# Patient Record
Sex: FEMALE | Race: BLACK OR AFRICAN AMERICAN | NOT HISPANIC OR LATINO | Employment: FULL TIME | URBAN - METROPOLITAN AREA
[De-identification: names, ages, dates, MRNs, and addresses within clinical notes are randomized per-mention and may not be internally consistent; named-entity substitution may affect disease eponyms.]

---

## 2017-01-04 RX ORDER — LEVONORGESTREL / ETHINYL ESTRADIOL AND ETHINYL ESTRADIOL 150-30(84)
1 KIT ORAL DAILY
Qty: 28 EACH | Refills: 0 | Status: SHIPPED | OUTPATIENT
Start: 2017-01-04 | End: 2017-02-07

## 2017-01-09 ENCOUNTER — OFFICE VISIT (OUTPATIENT)
Dept: PULMONOLOGY | Facility: CLINIC | Age: 37
End: 2017-01-09
Payer: COMMERCIAL

## 2017-01-09 VITALS
BODY MASS INDEX: 48.82 KG/M2 | DIASTOLIC BLOOD PRESSURE: 100 MMHG | RESPIRATION RATE: 18 BRPM | HEIGHT: 65 IN | SYSTOLIC BLOOD PRESSURE: 136 MMHG | HEART RATE: 92 BPM | OXYGEN SATURATION: 99 % | WEIGHT: 293 LBS

## 2017-01-09 DIAGNOSIS — G47.33 OSA ON CPAP: Chronic | ICD-10-CM

## 2017-01-09 DIAGNOSIS — E66.01 MORBID OBESITY WITH BMI OF 70 AND OVER, ADULT: Primary | ICD-10-CM

## 2017-01-09 PROCEDURE — 99213 OFFICE O/P EST LOW 20 MIN: CPT | Mod: S$GLB,,, | Performed by: INTERNAL MEDICINE

## 2017-01-09 PROCEDURE — 99999 PR PBB SHADOW E&M-EST. PATIENT-LVL III: CPT | Mod: PBBFAC,,, | Performed by: INTERNAL MEDICINE

## 2017-01-09 NOTE — MR AVS SNAPSHOT
[facility] - Pulmonary Services  [address] [facility] [place] [address]  Phone: [phone]  Fax: [phone]                  Shaina Olson   2017 8:00 AM   Office Visit    Description:  Female : 1980   Provider:  Dakotah Aj MD   Department:  [facility] - Pulmonary Services           Reason for Visit     Sleep Apnea           Diagnoses this Visit        Comments    Morbid obesity with BMI of 70 and over, adult    -  Primary     TREVOR on CPAP                To Do List           Future Appointments        Provider Department Dept Phone    2017 8:15 AM Jose R Mariscal OD [facility] - Ophthalmology [phone]    2017 8:00 AM PRIMARY CARE NURSE, FILIBERTO Duran - Primary Care [phone]    2017 2:30 PM Homar Hart MD [place] - Endocrinology [phone]    3/6/2017 8:00 AM LINDA Perry - Orthopedics [phone]      Goals (5 Years of Data)     None      Follow-Up and Disposition     Return in about 1 year (around 2018) for weight loss and exercise, dowload.      [facility] On Call     [facility] On Call Nurse [facility] Line -  Assistance  Registered nurses in the [facility] On Call Center provide clinical advisement, health education, appointment booking, and other advisory services.  Call for this free service at [phone].             Medications           Message regarding Medications     Verify the changes and/or additions to your medication regime listed below are the same as discussed with your clinician today.  If any of these changes or additions are incorrect, please notify your healthcare provider.             Verify that the below list of medications is an accurate representation of the medications you are currently taking.  If none reported, the list may be blank. If incorrect, please contact your healthcare provider. Carry this list with you in case of emergency.           Current Medications     albuterol 90 mcg/actuation inhaler Inhale 2  "puffs into the lungs every 4 (four) hours as needed for Wheezing.    amlodipine (NORVASC) 5 MG tablet Take 1 tablet (5 mg total) by mouth once daily.    cyclobenzaprine (FLEXERIL) 10 MG tablet Take 1 tablet (10 mg total) by mouth 3 (three) times daily as needed for Muscle spasms.    duloxetine (CYMBALTA) 30 MG capsule TAKE 1 CAPSULE BY MOUTH ONCE DAILY    fluticasone (FLONASE) 50 mcg/actuation nasal spray 2 sprays by Each Nare route once daily.    hydrOXYzine HCl (ATARAX) 25 MG tablet Take 1 tablet (25 mg total) by mouth 3 (three) times daily as needed.    L norgest/e.estradiol-e.estrad (CAMRESE) 0.15 mg-30 mcg (84)/10 mcg (7) 3MPk Take 1 tablet by mouth once daily.    latanoprost 0.005 % ophthalmic solution Place 1 drop into both eyes every evening.    levocetirizine (XYZAL) 5 MG tablet Take 1 tablet (5 mg total) by mouth every evening.    meloxicam (MOBIC) 15 MG tablet Take 1 tablet (15 mg total) by mouth 2 (two) times daily.    methimazole (TAPAZOLE) 5 MG Tab Take 1 tablet (5 mg total) by mouth once daily. Take 1.5 tablets (7.5 mg total) by mouth once daily.    montelukast (SINGULAIR) 10 mg tablet Take 1 tablet (10 mg total) by mouth once daily.    pantoprazole (PROTONIX) 40 MG tablet Take 1 tablet (40 mg total) by mouth once daily.    propranolol (INDERAL) 60 MG tablet Take 1 tablet (60 mg total) by mouth 2 (two) times daily.    VITAMIN D2 50,000 unit capsule 50,000 Units every 7 days.            Clinical Reference Information           Vital Signs - Last Recorded  Most recent update: 1/9/2017  8:22 AM by Suhas Mon LPN    BP Pulse Resp Ht Wt SpO2    (!) 136/100 92 18 5' 4.5" (1.638 m) (!) 199.2 kg (439 lb 2.5 oz) 99%    BMI                74.22 kg/m2          Blood Pressure          Most Recent Value    BP  (!)  136/100      Allergies as of 1/9/2017     Demerol [Meperidine]      Immunizations Administered on Date of Encounter - 1/9/2017     None      Instructions    [place] [id number]   [facility] " Health [facility]  Everyone Everyone    Add to Wishlist    Install

## 2017-01-09 NOTE — PROGRESS NOTES
Subjective:       Shaina Olson is a 36 y.o. female    This a follow-up appointment since November 2016.    I ordered an auto Pap machine set at 4-20 cm water pressure  Patient's been using the machine well and benefits from it.    Chandler sleepiness score 9.  Bedtime is 10 PM onset 11 PM  Patient has a routine where she may play with her cell phone in bed and sometimes watch TV.  I reminded her that this activities resulting in the blue light which we will attenuate the melatonin effect on sleep onset.  Wakeup time is 7:15 AM.  Weight has increased from 430 pounds to 439 pounds.  We discussed using the Healthy wage  in behavioral technique for management modifying food choices and exercise   has CPAP usage is 63%.  Her AHI is 2.9.  The mean pressure was 7.8.  I like her to email me with her  weight change in the next 3 months       Previous Report(s) Reviewed: historical medical records, lab reports and office notes     The following portions of the patient's history were reviewed and updated as appropriate:   She  has a past medical history of Allergy; Anxiety (2004); Arthritis; Arthritis of right knee; Graves' disease (6/2014); Hypertension (2004); and Vitamin D deficiency.  She  does not have any pertinent problems on file.  She  has a past surgical history that includes Wrist fracture surgery (Right, 2013); TONSILLECTOMY, ADENOIDECTOMY (2001); Sinus surgery (2001); Nasal septum surgery (2001); and Fracture surgery (Right, 2013).  Her family history includes Cancer in her maternal grandfather, maternal grandmother, paternal grandfather, and paternal grandmother; Cancer (age of onset: 30) in her sister; Diabetes in her paternal grandmother; Diverticulosis in her mother; Hypertension in her mother; Lupus in her mother; Stroke in her paternal grandmother.  She  reports that she has never smoked. She has never used smokeless tobacco. She reports that she drinks alcohol. She reports that she does not use illicit  drugs.  She has a current medication list which includes the following prescription(s): albuterol, amlodipine, cyclobenzaprine, duloxetine, fluticasone, hydroxyzine hcl, l norgest/e.estradiol-e.estrad, latanoprost, levocetirizine, meloxicam, methimazole, montelukast, pantoprazole, propranolol, and vitamin d2.  Current Outpatient Prescriptions on File Prior to Visit   Medication Sig Dispense Refill    albuterol 90 mcg/actuation inhaler Inhale 2 puffs into the lungs every 4 (four) hours as needed for Wheezing. 18 g 3    amlodipine (NORVASC) 5 MG tablet Take 1 tablet (5 mg total) by mouth once daily. 90 tablet 1    cyclobenzaprine (FLEXERIL) 10 MG tablet Take 1 tablet (10 mg total) by mouth 3 (three) times daily as needed for Muscle spasms. 90 tablet 0    duloxetine (CYMBALTA) 30 MG capsule TAKE 1 CAPSULE BY MOUTH ONCE DAILY 30 capsule 3    fluticasone (FLONASE) 50 mcg/actuation nasal spray 2 sprays by Each Nare route once daily. 16 g 6    hydrOXYzine HCl (ATARAX) 25 MG tablet Take 1 tablet (25 mg total) by mouth 3 (three) times daily as needed. 30 tablet 1    L norgest/e.estradiol-e.estrad (CAMRESE) 0.15 mg-30 mcg (84)/10 mcg (7) 3MPk Take 1 tablet by mouth once daily. 28 each 0    latanoprost 0.005 % ophthalmic solution Place 1 drop into both eyes every evening. 1 Bottle 4    levocetirizine (XYZAL) 5 MG tablet Take 1 tablet (5 mg total) by mouth every evening. 90 tablet 3    meloxicam (MOBIC) 15 MG tablet Take 1 tablet (15 mg total) by mouth 2 (two) times daily. 180 tablet 0    methimazole (TAPAZOLE) 5 MG Tab Take 1 tablet (5 mg total) by mouth once daily. Take 1.5 tablets (7.5 mg total) by mouth once daily. 30 tablet 6    montelukast (SINGULAIR) 10 mg tablet Take 1 tablet (10 mg total) by mouth once daily. 90 tablet 1    pantoprazole (PROTONIX) 40 MG tablet Take 1 tablet (40 mg total) by mouth once daily. 30 tablet 11    propranolol (INDERAL) 60 MG tablet Take 1 tablet (60 mg total) by mouth 2 (two)  "times daily. 180 tablet 3    VITAMIN D2 50,000 unit capsule 50,000 Units every 7 days.        No current facility-administered medications on file prior to visit.      She is allergic to demerol [meperidine]..    Review of Systems  A comprehensive review of systems was negative.      Objective:        Visit Vitals    BP (!) 136/100    Pulse 92    Resp 18    Ht 5' 4.5" (1.638 m)    Wt (!) 199.2 kg (439 lb 2.5 oz)    SpO2 99%    BMI 74.22 kg/m2     General appearance: alert, appears stated age, cooperative, no distress and morbidly obese        DOWNLOAD    12/6/2016 - 1/4/2017  YOB: 1980  Mask:  Compliance Summary  12/6/2016 - 1/4/2017 (30 days)  Days with Device Usage 26 days  Days without Device Usage 4 days  Percent Days with Device Usage 86.7%  Cumulative Usage 6 days 3 hrs. 11 mins. 49 secs.  Maximum Usage (1 Day) 8 hrs. 23 mins. 51 secs.  Average Usage (All Days) 4 hrs. 54 mins. 23 secs.  Average Usage (Days Used) 5 hrs. 39 mins. 41 secs.  Minimum Usage (1 Day) 1 hrs. 18 mins. 50 secs.  Percent of Days with Usage >= 4 Hours 63.3%  Percent of Days with Usage < 4 Hours 36.7%  Date Range  Total Blower Time 7 days 4 hrs. 11 mins. 53 secs.  Average AHI 2.9  Auto CPAP Summary  Auto CPAP Mean Pressure 7.8 cmH2O  Auto CPAP Peak Average Pressure 16.3 cmH2O  Average Device Pressure <= 90% of Time 9.7 cmH2O  Average Time in Large Leak Per Day 46 secs.  Assessment:     Problem List Items Addressed This Visit     TREVOR on CPAP (Chronic)    Morbid obesity with BMI of 70 and over, adult - Primary           Plan:     Return in about 1 year (around 1/9/2018) for weight loss and exercise, dowload.    This note was prepared using voice recognition system and is likely to have sound alike errors that may have been overlooked even after proof reading.  Please call me with any questions    Discussed diagnosis, its evaluation, treatment and usual course. All questions answered.    Thank you for the courtesy of " participating in the care of this patient      Time spent: 20 minutes in face to face  discussion concerning diagnosis, prognosis, review of lab and test results, benefits of treatment as well as management of disease, counseling of patient and coordination of care between various health  care providers . Greater than half the time spent was used for coordination of care and counseling of patient.     Dakotah Aj    Pulmonary/Critical care/Sleepmedicine

## 2017-01-09 NOTE — PATIENT INSTRUCTIONS
[employer]   [address]   [employer] [facility] Health & Fitness  Everyone Everyone    Add to Wishlist    Install

## 2017-01-16 ENCOUNTER — OFFICE VISIT (OUTPATIENT)
Dept: OPHTHALMOLOGY | Facility: CLINIC | Age: 37
End: 2017-01-16
Payer: COMMERCIAL

## 2017-01-16 DIAGNOSIS — H40.003 GLAUCOMA SUSPECT OF BOTH EYES: Primary | ICD-10-CM

## 2017-01-16 DIAGNOSIS — E05.00 GRAVES' DISEASE: Chronic | ICD-10-CM

## 2017-01-16 PROCEDURE — 99999 PR PBB SHADOW E&M-EST. PATIENT-LVL I: CPT | Mod: PBBFAC,,, | Performed by: OPTOMETRIST

## 2017-01-16 PROCEDURE — 92012 INTRM OPH EXAM EST PATIENT: CPT | Mod: S$GLB,,, | Performed by: OPTOMETRIST

## 2017-01-16 NOTE — PROGRESS NOTES
HPI     Glaucoma Suspect    Additional comments: Latanoprost QHS OU           Comments   Pt states no VA change since her last appt. No other ocular symptoms.     Medication eye drops if any: Latanoprost QHS OU; pt is 100% compliant         Last edited by Atilio Santiago, Patient Care Assistant on 1/16/2017  8:13   AM. (History)            Assessment /Plan     For exam results, see Encounter Report.    Glaucoma suspect of both eyes    Graves' disease    IOP still elevated since starting latanoprost and not within acceptable range  Discussed adding med vs SLT laser  Continue latanoprost qhs OU  Refer to Dr Cruz for SLT consult, next available  Discussed above and all questions were answered.

## 2017-01-18 ENCOUNTER — PATIENT MESSAGE (OUTPATIENT)
Dept: ORTHOPEDICS | Facility: CLINIC | Age: 37
End: 2017-01-18

## 2017-01-23 ENCOUNTER — DOCUMENTATION ONLY (OUTPATIENT)
Dept: BARIATRICS | Facility: CLINIC | Age: 37
End: 2017-01-23

## 2017-01-26 ENCOUNTER — PATIENT MESSAGE (OUTPATIENT)
Dept: OTOLARYNGOLOGY | Facility: CLINIC | Age: 37
End: 2017-01-26

## 2017-01-26 ENCOUNTER — OFFICE VISIT (OUTPATIENT)
Dept: FAMILY MEDICINE | Facility: CLINIC | Age: 37
End: 2017-01-26
Payer: COMMERCIAL

## 2017-01-26 VITALS
HEART RATE: 116 BPM | WEIGHT: 293 LBS | DIASTOLIC BLOOD PRESSURE: 86 MMHG | BODY MASS INDEX: 72.71 KG/M2 | OXYGEN SATURATION: 96 % | SYSTOLIC BLOOD PRESSURE: 143 MMHG | TEMPERATURE: 98 F

## 2017-01-26 DIAGNOSIS — Z98.890 HISTORY OF SINUS SURGERY: ICD-10-CM

## 2017-01-26 DIAGNOSIS — J10.1 INFLUENZA A: Primary | ICD-10-CM

## 2017-01-26 DIAGNOSIS — J32.9 CHRONIC SINUSITIS, UNSPECIFIED LOCATION: Primary | ICD-10-CM

## 2017-01-26 PROCEDURE — 99999 PR PBB SHADOW E&M-EST. PATIENT-LVL III: CPT | Mod: PBBFAC,,, | Performed by: NURSE PRACTITIONER

## 2017-01-26 PROCEDURE — 99213 OFFICE O/P EST LOW 20 MIN: CPT | Mod: S$GLB,,, | Performed by: NURSE PRACTITIONER

## 2017-01-26 RX ORDER — OSELTAMIVIR PHOSPHATE 75 MG/1
75 CAPSULE ORAL 2 TIMES DAILY
Qty: 10 CAPSULE | Refills: 0 | Status: SHIPPED | OUTPATIENT
Start: 2017-01-26 | End: 2017-01-31

## 2017-01-26 NOTE — TELEPHONE ENCOUNTER
Spoke with patient and scheduled CT scan for 2/7/17 and follow up on 2/13/17. Patient also wanted to advise that she seen the provider at her office and she tested positive for flu.

## 2017-01-26 NOTE — PROGRESS NOTES
CC: No chief complaint on file.    HPI: This is a new problem.   Shaina Olson is a 37 y.o. female with a complaint of URI.  The current episode started in the past 2 days.   The problem has been gradually worsening.   Associated symptoms included fever, chills, nasal congestion, facial pain, cough, body aches.    Pertinent negatives include dyspnea, wheezing   Treatments tried: pt is currently taking zpak for sinusitis and prednisone taper  Has had flu vaccine     [unfilled]  Outpatient Medications Prior to Visit   Medication Sig Dispense Refill    albuterol 90 mcg/actuation inhaler Inhale 2 puffs into the lungs every 4 (four) hours as needed for Wheezing. 18 g 3    amlodipine (NORVASC) 5 MG tablet Take 1 tablet (5 mg total) by mouth once daily. 90 tablet 1    cyclobenzaprine (FLEXERIL) 10 MG tablet Take 1 tablet (10 mg total) by mouth 3 (three) times daily as needed for Muscle spasms. 90 tablet 0    duloxetine (CYMBALTA) 30 MG capsule TAKE 1 CAPSULE BY MOUTH ONCE DAILY 30 capsule 3    fluticasone (FLONASE) 50 mcg/actuation nasal spray 2 sprays by Each Nare route once daily. 16 g 6    hydrOXYzine HCl (ATARAX) 25 MG tablet Take 1 tablet (25 mg total) by mouth 3 (three) times daily as needed. 30 tablet 1    L norgest/e.estradiol-e.estrad (CAMRESE) 0.15 mg-30 mcg (84)/10 mcg (7) 3MPk Take 1 tablet by mouth once daily. 28 each 0    latanoprost 0.005 % ophthalmic solution Place 1 drop into both eyes every evening. 1 Bottle 4    levocetirizine (XYZAL) 5 MG tablet Take 1 tablet (5 mg total) by mouth every evening. 90 tablet 3    meloxicam (MOBIC) 15 MG tablet Take 1 tablet (15 mg total) by mouth 2 (two) times daily. 180 tablet 0    methimazole (TAPAZOLE) 5 MG Tab Take 1 tablet (5 mg total) by mouth once daily. Take 1.5 tablets (7.5 mg total) by mouth once daily. 30 tablet 6    montelukast (SINGULAIR) 10 mg tablet Take 1 tablet (10 mg total) by mouth once daily. 90 tablet 1    oseltamivir (TAMIFLU) 75 MG  capsule Take 1 capsule (75 mg total) by mouth 2 (two) times daily. 10 capsule 0    pantoprazole (PROTONIX) 40 MG tablet Take 1 tablet (40 mg total) by mouth once daily. 30 tablet 11    propranolol (INDERAL) 60 MG tablet Take 1 tablet (60 mg total) by mouth 2 (two) times daily. 180 tablet 3    VITAMIN D2 50,000 unit capsule 50,000 Units every 7 days.        No facility-administered medications prior to visit.         Physical Exam   Visit Vitals    BP (!) 143/86 (BP Location: Right arm, Patient Position: Sitting, BP Method: Automatic)    Pulse (!) 116    Temp 98.1 °F (36.7 °C) (Tympanic)    Wt (!) 195.1 kg (430 lb 3.6 oz)    SpO2 96%    BMI 72.71 kg/m2     Constitutional: The patient appears well-developed and well-nourished.   Head: Normocephalic and atraumatic.   Right Ear: Tympanic membrane and ear canal normal. No drainage, swelling or tenderness. Tympanic membrane is not injected, not erythematous and not bulging.   Left Ear: Ear canal normal. No drainage, swelling or tenderness. Tympanic membrane is not injected, not erythematous and not bulging.   Nose: Mucosal edema and rhinorrhea present. Bilateral frontal and maxillary sinus tenderness on palpation  Mouth/Throat: Uvula is midline. Posterior oropharyngeal erythema present. No oropharyngeal exudate.        THE MUCOSA IS BOGGY AND ERYTHEMATOUS.     Eyes: Conjunctivae normal and lids are normal. Pupils are equal, round, and reactive to light. Right eye exhibits no discharge. Left eye exhibits no discharge. Right eye exhibits normal extraocular motion. Left eye exhibits normal extraocular motion.   Neck: Trachea normal and normal range of motion. Neck supple. No tracheal tenderness present. No mass and no thyromegaly present.   Cardiovascular: Normal rate, regular rhythm, S1 normal, S2 normal and normal heart sounds.  Exam reveals no gallop, no S3, no S4 and no friction rub.    No murmur heard.  Pulmonary/Chest: Effort normal and breath sounds normal.  No stridor. Not tachypneic. No respiratory distress. The patient has no wheezes. The patient has no rhonchi. The patient has no rales.   Skin: The patient is not diaphoretic.     Encounter Diagnosis   Name Primary?    Influenza A Yes       PLAN:    Diagnoses and all orders for this visit:    Influenza A  -     POCT Influenza A/B  -positive influenza A.  Tamiflu 75mg bid sent to the pharmacy       Orders Placed This Encounter   Procedures    POCT Influenza A/B     RTC if symptoms are worsening or changing significantly or if not improved by the end of therapy.

## 2017-01-29 DIAGNOSIS — I10 HYPERTENSION GOAL BP (BLOOD PRESSURE) < 140/90: ICD-10-CM

## 2017-01-30 ENCOUNTER — PATIENT MESSAGE (OUTPATIENT)
Dept: OTOLARYNGOLOGY | Facility: CLINIC | Age: 37
End: 2017-01-30

## 2017-01-30 RX ORDER — AMLODIPINE BESYLATE 5 MG/1
TABLET ORAL
Qty: 90 TABLET | Refills: 1 | Status: SHIPPED | OUTPATIENT
Start: 2017-01-30 | End: 2017-06-02 | Stop reason: SDUPTHER

## 2017-02-06 ENCOUNTER — OFFICE VISIT (OUTPATIENT)
Dept: OPHTHALMOLOGY | Facility: CLINIC | Age: 37
End: 2017-02-06
Payer: COMMERCIAL

## 2017-02-06 DIAGNOSIS — H40.003 GLAUCOMA SUSPECT OF BOTH EYES: Primary | ICD-10-CM

## 2017-02-06 DIAGNOSIS — E05.00 GRAVES' DISEASE: ICD-10-CM

## 2017-02-06 PROCEDURE — 92014 COMPRE OPH EXAM EST PT 1/>: CPT | Mod: S$GLB,,, | Performed by: OPHTHALMOLOGY

## 2017-02-06 PROCEDURE — 92020 GONIOSCOPY: CPT | Mod: S$GLB,,, | Performed by: OPHTHALMOLOGY

## 2017-02-06 PROCEDURE — 99999 PR PBB SHADOW E&M-EST. PATIENT-LVL II: CPT | Mod: PBBFAC,,, | Performed by: OPHTHALMOLOGY

## 2017-02-06 NOTE — PROGRESS NOTES
HPI     Pt is here for evaluation for possible SLT.  She has been on6-8   weeks, and it hasn't brought the pressure down.  Had HVF and SDPs last   year.    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         Assessment /Plan     For exam results, see Encounter Report.      ICD-10-CM ICD-9-CM    1. Glaucoma suspect of both eyes H40.003 365.00 Wait on laser for now  Pt is straining some in slit lamp, that is raising  IOP on exam- confirmed this with multiple tonopen readings   Stop meds for now and check IOP with tonopen  Pt also has thick CCT  Gonio Done today    2. Graves' disease E05.00 242.00 Liz done today, appears stable        Stop meds for now - dee IOP off meds with tonopen     RETURN TO CLINIC 4 weeks with Dr. Cruz for IOP check with Cornelius pen and slit lamp

## 2017-02-07 ENCOUNTER — OFFICE VISIT (OUTPATIENT)
Dept: FAMILY MEDICINE | Facility: CLINIC | Age: 37
End: 2017-02-07
Payer: COMMERCIAL

## 2017-02-07 VITALS
DIASTOLIC BLOOD PRESSURE: 71 MMHG | SYSTOLIC BLOOD PRESSURE: 151 MMHG | TEMPERATURE: 98 F | BODY MASS INDEX: 48.82 KG/M2 | WEIGHT: 293 LBS | OXYGEN SATURATION: 98 % | HEART RATE: 94 BPM | HEIGHT: 65 IN

## 2017-02-07 DIAGNOSIS — E55.9 VITAMIN D DEFICIENCY: ICD-10-CM

## 2017-02-07 DIAGNOSIS — R25.2 CRAMPS OF LOWER EXTREMITY, UNSPECIFIED LATERALITY: Primary | ICD-10-CM

## 2017-02-07 DIAGNOSIS — E66.01 MORBID OBESITY WITH BMI OF 70 AND OVER, ADULT: ICD-10-CM

## 2017-02-07 DIAGNOSIS — I10 ESSENTIAL HYPERTENSION: ICD-10-CM

## 2017-02-07 DIAGNOSIS — G44.019 EPISODIC CLUSTER HEADACHE, NOT INTRACTABLE: ICD-10-CM

## 2017-02-07 PROCEDURE — 99999 PR PBB SHADOW E&M-EST. PATIENT-LVL III: CPT | Mod: PBBFAC,,, | Performed by: FAMILY MEDICINE

## 2017-02-07 PROCEDURE — 99213 OFFICE O/P EST LOW 20 MIN: CPT | Mod: S$GLB,,, | Performed by: FAMILY MEDICINE

## 2017-02-07 RX ORDER — CYCLOBENZAPRINE HCL 10 MG
10 TABLET ORAL 3 TIMES DAILY PRN
Qty: 90 TABLET | Refills: 0 | Status: SHIPPED | OUTPATIENT
Start: 2017-02-07 | End: 2017-08-02 | Stop reason: SDUPTHER

## 2017-02-07 RX ORDER — AMLODIPINE BESYLATE 2.5 MG/1
TABLET ORAL
Qty: 30 TABLET | Refills: 0 | Status: SHIPPED | OUTPATIENT
Start: 2017-02-07 | End: 2017-03-10 | Stop reason: SDUPTHER

## 2017-02-07 RX ORDER — TOPIRAMATE 25 MG/1
25 TABLET ORAL 2 TIMES DAILY
Qty: 60 TABLET | Refills: 0 | Status: SHIPPED | OUTPATIENT
Start: 2017-02-07 | End: 2017-10-02

## 2017-02-08 ENCOUNTER — LAB VISIT (OUTPATIENT)
Dept: LAB | Facility: HOSPITAL | Age: 37
End: 2017-02-08
Attending: FAMILY MEDICINE
Payer: COMMERCIAL

## 2017-02-08 DIAGNOSIS — R25.2 CRAMPS OF LOWER EXTREMITY, UNSPECIFIED LATERALITY: ICD-10-CM

## 2017-02-08 DIAGNOSIS — E55.9 VITAMIN D DEFICIENCY: ICD-10-CM

## 2017-02-08 LAB
25(OH)D3+25(OH)D2 SERPL-MCNC: 14 NG/ML
ALBUMIN SERPL BCP-MCNC: 3.5 G/DL
ALP SERPL-CCNC: 97 U/L
ALT SERPL W/O P-5'-P-CCNC: 24 U/L
ANION GAP SERPL CALC-SCNC: 8 MMOL/L
AST SERPL-CCNC: 18 U/L
BILIRUB SERPL-MCNC: 0.6 MG/DL
BUN SERPL-MCNC: 14 MG/DL
CALCIUM SERPL-MCNC: 9.5 MG/DL
CHLORIDE SERPL-SCNC: 107 MMOL/L
CO2 SERPL-SCNC: 25 MMOL/L
CREAT SERPL-MCNC: 0.9 MG/DL
EST. GFR  (AFRICAN AMERICAN): >60 ML/MIN/1.73 M^2
EST. GFR  (NON AFRICAN AMERICAN): >60 ML/MIN/1.73 M^2
GLUCOSE SERPL-MCNC: 97 MG/DL
MAGNESIUM SERPL-MCNC: 1.9 MG/DL
POTASSIUM SERPL-SCNC: 4.3 MMOL/L
PROT SERPL-MCNC: 7.5 G/DL
SODIUM SERPL-SCNC: 140 MMOL/L
TSH SERPL DL<=0.005 MIU/L-ACNC: 0.93 UIU/ML

## 2017-02-08 PROCEDURE — 82306 VITAMIN D 25 HYDROXY: CPT

## 2017-02-08 PROCEDURE — 36415 COLL VENOUS BLD VENIPUNCTURE: CPT | Mod: PO

## 2017-02-08 PROCEDURE — 83735 ASSAY OF MAGNESIUM: CPT

## 2017-02-08 PROCEDURE — 80053 COMPREHEN METABOLIC PANEL: CPT

## 2017-02-08 PROCEDURE — 84443 ASSAY THYROID STIM HORMONE: CPT

## 2017-02-09 ENCOUNTER — PATIENT MESSAGE (OUTPATIENT)
Dept: FAMILY MEDICINE | Facility: CLINIC | Age: 37
End: 2017-02-09

## 2017-02-10 ENCOUNTER — TELEPHONE (OUTPATIENT)
Dept: INTERNAL MEDICINE | Facility: CLINIC | Age: 37
End: 2017-02-10

## 2017-02-10 RX ORDER — VIT C/E/ZN/COPPR/LUTEIN/ZEAXAN 250MG-90MG
CAPSULE ORAL
Qty: 60 CAPSULE | Refills: 0 | Status: SHIPPED | OUTPATIENT
Start: 2017-02-10 | End: 2017-03-13

## 2017-02-20 DIAGNOSIS — M79.10 MYALGIA: ICD-10-CM

## 2017-02-20 DIAGNOSIS — F41.9 ANXIETY: ICD-10-CM

## 2017-02-20 RX ORDER — DULOXETIN HYDROCHLORIDE 30 MG/1
30 CAPSULE, DELAYED RELEASE ORAL DAILY
Qty: 30 CAPSULE | Refills: 3 | Status: SHIPPED | OUTPATIENT
Start: 2017-02-20 | End: 2017-06-12 | Stop reason: SDUPTHER

## 2017-02-20 NOTE — TELEPHONE ENCOUNTER
Patient requesting refill of cymbalta, would like med sent to [facility] [address] in [place]. Last office visit 2/7/17

## 2017-02-24 ENCOUNTER — LAB VISIT (OUTPATIENT)
Dept: LAB | Facility: HOSPITAL | Age: 37
End: 2017-02-24
Attending: INTERNAL MEDICINE
Payer: COMMERCIAL

## 2017-02-24 DIAGNOSIS — E05.00 GRAVES' DISEASE: Chronic | ICD-10-CM

## 2017-02-24 DIAGNOSIS — R00.2 PALPITATIONS: ICD-10-CM

## 2017-02-24 DIAGNOSIS — E66.01 MORBID OBESITY WITH BMI OF 70 AND OVER, ADULT: ICD-10-CM

## 2017-02-24 LAB
T4 FREE SERPL-MCNC: 0.98 NG/DL
TSH SERPL DL<=0.005 MIU/L-ACNC: 1.04 UIU/ML

## 2017-02-24 PROCEDURE — 36415 COLL VENOUS BLD VENIPUNCTURE: CPT | Mod: PO

## 2017-02-24 PROCEDURE — 84443 ASSAY THYROID STIM HORMONE: CPT

## 2017-02-24 PROCEDURE — 84439 ASSAY OF FREE THYROXINE: CPT

## 2017-02-25 ENCOUNTER — PATIENT MESSAGE (OUTPATIENT)
Dept: OPHTHALMOLOGY | Facility: CLINIC | Age: 37
End: 2017-02-25

## 2017-02-27 ENCOUNTER — OFFICE VISIT (OUTPATIENT)
Dept: ENDOCRINOLOGY | Facility: CLINIC | Age: 37
End: 2017-02-27
Payer: COMMERCIAL

## 2017-02-27 VITALS
HEIGHT: 65 IN | BODY MASS INDEX: 48.82 KG/M2 | HEART RATE: 80 BPM | WEIGHT: 293 LBS | SYSTOLIC BLOOD PRESSURE: 120 MMHG | DIASTOLIC BLOOD PRESSURE: 84 MMHG

## 2017-02-27 DIAGNOSIS — I10 ESSENTIAL HYPERTENSION: Chronic | ICD-10-CM

## 2017-02-27 DIAGNOSIS — E66.01 MORBID OBESITY WITH BMI OF 70 AND OVER, ADULT: ICD-10-CM

## 2017-02-27 DIAGNOSIS — E05.00 GRAVES' DISEASE: Primary | Chronic | ICD-10-CM

## 2017-02-27 DIAGNOSIS — E55.9 VITAMIN D DEFICIENCY: ICD-10-CM

## 2017-02-27 PROCEDURE — 99999 PR PBB SHADOW E&M-EST. PATIENT-LVL III: CPT | Mod: PBBFAC,,, | Performed by: INTERNAL MEDICINE

## 2017-02-27 PROCEDURE — 99214 OFFICE O/P EST MOD 30 MIN: CPT | Mod: S$GLB,,, | Performed by: INTERNAL MEDICINE

## 2017-02-27 RX ORDER — ERGOCALCIFEROL 1.25 MG/1
100000 CAPSULE ORAL WEEKLY
Qty: 24 CAPSULE | Refills: 2 | Status: SHIPPED | OUTPATIENT
Start: 2017-02-27 | End: 2017-05-16

## 2017-02-27 NOTE — MR AVS SNAPSHOT
[facility]  [address] [facility] [address] [place] [address]  Phone: [phone]  Fax: [phone]                  Shaina Olson   2017 2:30 PM   Office Visit    Description:  Female : 1980   Provider:  Homar Hart MD   Department:  [facility]           Reason for Visit     Graves' Disease           Diagnoses this Visit        Comments    Graves' disease    -  Primary     Essential hypertension         Vitamin D deficiency         Morbid obesity with BMI of 70 and over, adult                To Do List           Future Appointments        Provider Department Dept Phone    3/6/2017 8:00 AM LINDA Perry - Orthopedics [phone]    3/9/2017 5:45 PM Jose R Mariscal OD [place] - Ophthalmology [phone]    3/13/2017 8:00 AM [facility] CT1 LIM[address] [facility] -  [phone]    3/20/2017 7:30 AM MD Ghanshyam Arizmendi - Otohinolaryngology [phone]    4/10/2017 9:40 AM LABORATORY, [place] [facility] (5 Years of Data)     None       These Medications        Disp Refills Start End    ergocalciferol (ERGOCALCIFEROL) 50,000 unit Cap 24 capsule 2 2017    Take 2 capsules (100,000 Units total) by mouth once a week. - Oral    Pharmacy: [facility]/pharm[facility] #[id number] - [place]  [address] AT [facility] [place] Ph #: [phone]         [facility] On Call     [facility] On Call Nurse Care Line -  Assistance  Registered nurses in the [facility] On Call Center provide clinical advisement, health education, appointment booking, and other advisory services.  Call for this free service at [phone].             Medications           Message regarding Medications     Verify the changes and/or additions to your medication regime listed below are the same as discussed with your clinician today.  If any of these changes or additions are incorrect, please notify your  healthcare provider.        START taking these NEW medications        Refills    ergocalciferol (ERGOCALCIFEROL) 50,000 unit Cap 2    Sig: Take 2 capsules (100,000 Units total) by mouth once a week.    Class: Normal    Route: Oral           Verify that the below list of medications is an accurate representation of the medications you are currently taking.  If none reported, the list may be blank. If incorrect, please contact your healthcare provider. Carry this list with you in case of emergency.           Current Medications     albuterol 90 mcg/actuation inhaler Inhale 2 puffs into the lungs every 4 (four) hours as needed for Wheezing.    amlodipine (NORVASC) 2.5 MG tablet 1 tab po qd for a total of 7.5 mg qd    amlodipine (NORVASC) 5 MG tablet TAKE 1 TABLET BY MOUTH EVERY DAY    cholecalciferol, vitamin D3, 1,000 unit capsule 2 caps po qd    cyclobenzaprine (FLEXERIL) 10 MG tablet Take 1 tablet (10 mg total) by mouth 3 (three) times daily as needed for Muscle spasms.    duloxetine (CYMBALTA) 30 MG capsule Take 1 capsule (30 mg total) by mouth once daily.    ergocalciferol (ERGOCALCIFEROL) 50,000 unit Cap Take 2 capsules (100,000 Units total) by mouth once a week.    fluticasone (FLONASE) 50 mcg/actuation nasal spray 2 sprays by Each Nare route once daily.    hydrOXYzine HCl (ATARAX) 25 MG tablet Take 1 tablet (25 mg total) by mouth 3 (three) times daily as needed.    levocetirizine (XYZAL) 5 MG tablet Take 1 tablet (5 mg total) by mouth every evening.    meloxicam (MOBIC) 15 MG tablet Take 1 tablet (15 mg total) by mouth 2 (two) times daily.    methimazole (TAPAZOLE) 5 MG Tab Take 1 tablet (5 mg total) by mouth once daily. Take 1.5 tablets (7.5 mg total) by mouth once daily.    montelukast (SINGULAIR) 10 mg tablet Take 1 tablet (10 mg total) by mouth once daily.    pantoprazole (PROTONIX) 40 MG tablet Take 1 tablet (40 mg total) by mouth once daily.    topiramate (TOPAMAX) 25 MG tablet Take 1 tablet (25 mg total)  by mouth 2 (two) times daily.           Clinical Reference Information           Your Vitals Were     BP                   120/84 (BP Method: Manual)           Blood Pressure          Most Recent Value    BP  120/84      Allergies as of 2/27/2017     Demerol [Meperidine]      Immunizations Administered on Date of Encounter - 2/27/2017     None      Orders Placed During Today's Visit     Future Labs/Procedures Expected by Expires    T3, FREE  2/27/2017 4/28/2018    T3, FREE  2/27/2017 4/28/2018    T4, free  2/27/2017 4/28/2018    T4, free  2/27/2017 4/28/2018    TSH  2/27/2017 4/28/2018    TSH  2/27/2017 4/28/2018      Language Assistance Services     ATTENTION: Language assistance services are available, free of charge. Please call [phone].      ATENCIÓN: Si habla verenaañol, tiene a erickson disposición servicios gratuitos de asistencia lingüística. Llame al [phone].     CHÚ Ý: N?u b?n nói Ti?ng Vi?t, có các d?ch v? h? tr? ngôn ng? mi?n phí dành cho b?n. G?i s? [phone].         [facility] complies with applicable Federal civil rights laws and does not discriminate on the basis of race, color, national origin, age, disability, or sex.

## 2017-02-27 NOTE — PROGRESS NOTES
Subjective:      Patient ID: Shaina Olsno is a 37 y.o. female.    Chief Complaint:  Graves' Disease      History of Present Illness      Diagnosed with Graves disease in summer 2014.   Found to have graves disease, was on PTU for few weeks then switched to MMI,  She was told by her old doctor she can not be on MMI for more than 18-24 months. But we went over and she does not have any side effects form MMI.   Was on MMI 40 mg BID now tapered down to 5 mg daily current dose      Denies change in size of neck, difficulty swallowing, shortness of breath in the recumbent position, pain or pressure around the neck.  + Hoarseness  - getting better with steroids and Augmentin - she is a ray. - nodule on vocal cord and on speech therapy     Denies rashes   No infection   No fever   LRTs     Patient denies history of radiation to the head or neck. Denies history of colon or breast cancer. Denies family history of thyroid cancer or thyroid disease.        Not planning to get pregnant     Interval HPI:  Planning to get gastric sleeve   evaluated by sleep study and also cardiology   Off of propranolol     Takes MMI 5 mg daily    Review of Systems   Constitutional: Positive for fatigue. Negative for unexpected weight change.   HENT: Positive for congestion.    Eyes: Negative for visual disturbance.   Respiratory: Negative for shortness of breath.    Cardiovascular: Negative for chest pain.   Gastrointestinal: Negative for abdominal pain, constipation, diarrhea and vomiting.   Endocrine: Positive for heat intolerance.   Genitourinary: Negative for menstrual problem (on OCPs).   Musculoskeletal: Negative for arthralgias.   Skin: Negative for wound.   Neurological: Negative for headaches.   Hematological: Does not bruise/bleed easily.   Psychiatric/Behavioral: Negative for sleep disturbance.        Takes lexapro   All other systems reviewed and are negative.      Objective:   Physical Exam   Skin:   Insulin resistance    "    Vitals - 1 value per visit 2/27/2017   SYSTOLIC 120   DIASTOLIC 84   PULSE 80   TEMPERATURE    RESPIRATIONS    SPO2    Weight (lb) 433.2   HEIGHT 5' 4.5"   BODY MASS INDEX 73.21       Vitals - 1 value per visit 6/30/2016 7/12/2016 7/18/2016 7/20/2016   Weight (lb) 431 430.78 431.66 429.68     Vitals - 1 value per visit 8/22/2016 8/29/2016 9/9/2016 9/16/2016   Weight (lb) 433.42 429.24  426.37     Vitals - 1 value per visit 10/17/2016 10/24/2016 10/26/2016   Weight (lb) 421.52 423.28 423.72     Vitals - 1 value per visit 11/7/2016 11/14/2016 12/5/2016 12/19/2016   Weight (lb) 430.56 438.72 438.72 435.41     Vitals - 1 value per visit 1/9/2017 1/26/2017 2/7/2017 2/27/2017   Weight (lb) 439.16 430.23 427.69 433.2       Lab Review:   Results for orders placed or performed in visit on 02/24/17   TSH   Result Value Ref Range    TSH 1.041 0.400 - 4.000 uIU/mL   T4, free   Result Value Ref Range    Free T4 0.98 0.71 - 1.51 ng/dL     Results for TULIO COLLAZO (MRN [id number]) as of 2/27/2017 14:52   Ref. Range 4/20/2016 15:19 2/8/2017 16:05   Vit D, 25-Hydroxy Latest Ref Range: 30 - 96 ng/mL 10 (L) 14 (L)   Results for TULIO COLLAZO (MRN [id number]) as of 2/27/2017 15:11   Ref. Range 7/21/2016 08:12   Cortisol -8 AM Latest Ref Range: 4.30 - 22.40 ug/dL <1.0 (L)   ACTH Latest Ref Range: 0 - 46 pg/mL 12       Assessment:     # Graves disease     - Check TSH, Free T3, Free T4, CMP and CBC in 6 weeks   - Clinically and bio chemically euthyroid      - continue  MMI to 5 mg daily, repeat TFTs in 6 weeks and in 6 months     # Morbid Obesity Body mass index is 73.21 kg/(m^2).  - DST - normal   - Bariatric surgery advised      #vitamin D deficiency   - restart ergo  100,000 units once a week likely will require higher doses after gastric surgery         Plan:     Follow up:    TSH, FT4,FT3 in  6 weeks   TSH,FT4,FT3 in 6 months f/u in 6 months     "

## 2017-03-06 ENCOUNTER — TELEPHONE (OUTPATIENT)
Dept: FAMILY MEDICINE | Facility: CLINIC | Age: 37
End: 2017-03-06

## 2017-03-06 RX ORDER — PREDNISONE 20 MG/1
TABLET ORAL
Qty: 20 TABLET | Refills: 0 | Status: SHIPPED | OUTPATIENT
Start: 2017-03-06 | End: 2017-05-08

## 2017-03-06 RX ORDER — AMOXICILLIN AND CLAVULANATE POTASSIUM 875; 125 MG/1; MG/1
1 TABLET, FILM COATED ORAL 2 TIMES DAILY
Qty: 20 TABLET | Refills: 0 | Status: SHIPPED | OUTPATIENT
Start: 2017-03-06 | End: 2017-03-16

## 2017-03-06 NOTE — TELEPHONE ENCOUNTER
Returned call. Spoke with pharmacist. Verified prescription strength. Pharmacist verbalized understanding.

## 2017-03-06 NOTE — TELEPHONE ENCOUNTER
----- Message from Carolyn Roque sent at 3/6/2017  2:33 PM CST -----   [facility] [place] at [phone]//states is needing to check on the dosage of med sent in//med is Prednisone/please call//thanks/[facility]

## 2017-03-07 ENCOUNTER — OFFICE VISIT (OUTPATIENT)
Dept: OPHTHALMOLOGY | Facility: CLINIC | Age: 37
End: 2017-03-07
Payer: COMMERCIAL

## 2017-03-07 DIAGNOSIS — H40.003 GLAUCOMA SUSPECT OF BOTH EYES: Primary | ICD-10-CM

## 2017-03-07 DIAGNOSIS — E05.00 GRAVES' DISEASE: ICD-10-CM

## 2017-03-07 PROCEDURE — 92012 INTRM OPH EXAM EST PATIENT: CPT | Mod: S$GLB,,, | Performed by: OPTOMETRIST

## 2017-03-07 PROCEDURE — 99999 PR PBB SHADOW E&M-EST. PATIENT-LVL I: CPT | Mod: PBBFAC,,, | Performed by: OPTOMETRIST

## 2017-03-07 NOTE — PROGRESS NOTES
HPI     Glaucoma Suspect    Additional comments: iop ck with tonopen and slit lamp           Comments   Last seen by mgm 2/6/17. Last seen by dnl 1/16/17. Here for iop ck with   tonopen and slit lamp. Pt would like PD measured as well. Dr yanez took   her off of latanoprost.        Last edited by Louann Moseley MA on 3/7/2017  8:24 AM. (History)            Assessment /Plan     For exam results, see Encounter Report.    Glaucoma suspect of both eyes    Graves' disease      IOP elevated today since stopping latanoprost  Checked IOP with tonopen  Recommended resuming latanoprost qhs OU  Monitor 4 months    RTC 4 months for IOP check or PRN   Discussed above and all questions were answered.

## 2017-03-10 RX ORDER — AMLODIPINE BESYLATE 2.5 MG/1
TABLET ORAL
Qty: 30 TABLET | Refills: 0 | Status: SHIPPED | OUTPATIENT
Start: 2017-03-10 | End: 2017-04-12 | Stop reason: SDUPTHER

## 2017-03-13 ENCOUNTER — OFFICE VISIT (OUTPATIENT)
Dept: ORTHOPEDICS | Facility: CLINIC | Age: 37
End: 2017-03-13
Payer: COMMERCIAL

## 2017-03-13 ENCOUNTER — HOSPITAL ENCOUNTER (OUTPATIENT)
Dept: RADIOLOGY | Facility: HOSPITAL | Age: 37
Discharge: HOME | End: 2017-03-13
Attending: ORTHOPAEDIC SURGERY
Payer: COMMERCIAL

## 2017-03-13 VITALS
BODY MASS INDEX: 50.02 KG/M2 | HEIGHT: 64 IN | SYSTOLIC BLOOD PRESSURE: 130 MMHG | HEART RATE: 89 BPM | DIASTOLIC BLOOD PRESSURE: 82 MMHG | WEIGHT: 293 LBS

## 2017-03-13 DIAGNOSIS — M17.11 ARTHRITIS OF RIGHT KNEE: ICD-10-CM

## 2017-03-13 DIAGNOSIS — Z98.890 HISTORY OF SINUS SURGERY: ICD-10-CM

## 2017-03-13 DIAGNOSIS — J32.9 CHRONIC SINUSITIS, UNSPECIFIED LOCATION: ICD-10-CM

## 2017-03-13 DIAGNOSIS — M22.2X1 PATELLOFEMORAL STRESS SYNDROME OF RIGHT KNEE: Primary | ICD-10-CM

## 2017-03-13 DIAGNOSIS — E66.01 MORBID OBESITY WITH BMI OF 70 AND OVER, ADULT: ICD-10-CM

## 2017-03-13 PROCEDURE — 99999 PR PBB SHADOW E&M-EST. PATIENT-LVL III: CPT | Mod: PBBFAC,,, | Performed by: PHYSICIAN ASSISTANT

## 2017-03-13 PROCEDURE — 99214 OFFICE O/P EST MOD 30 MIN: CPT | Mod: S$GLB,,, | Performed by: PHYSICIAN ASSISTANT

## 2017-03-13 PROCEDURE — 70486 CT MAXILLOFACIAL W/O DYE: CPT | Mod: TC

## 2017-03-13 NOTE — PROGRESS NOTES
Subjective:      Patient ID: Shaina Olson is a 37 y.o. female.    Chief Complaint: Pain of the Right Knee    HPI Comments: Body part: Right Knee    Occupation: LPN/ [employer]    Dominant hand: Right    Referred by: Sarah Smart, *    Date of Injury: None    Patient's visit goal: FU Right Knee    Problem Description: Right Knee Pain; noticed pain after doing [place].  She is well established to our orthopedic department and has had x-rays as well as MRI of the right knee.  She is a new patient to me.  She has daily pain is worse with activity.  Pain is primarily in the anterior aspect of the knees.  She does not notice any swelling.  She occasionally has feelings of giving way, but no discrete locking or catching.  She does take mobic on a daily basis which helps.  She is under evaluation and workup for bariatric surgery.          Pain   The current episode started more than 1 month ago (pain since October 2016). The problem occurs intermittently. The problem has been unchanged. Pertinent negatives include no abdominal pain, chest pain, chills, congestion, coughing, fever, joint swelling, nausea, numbness, rash or vomiting. Exacerbated by: activity. She has tried NSAIDs and ice for the symptoms. The treatment provided significant relief.       Review of Systems   Constitution: Negative for chills, fever and weight loss.   HENT: Negative for congestion and hearing loss.    Eyes: Negative for double vision and pain.   Cardiovascular: Negative for chest pain and irregular heartbeat.   Respiratory: Negative for cough and shortness of breath.    Endocrine: Negative for polyuria.   Hematologic/Lymphatic: Does not bruise/bleed easily.   Skin: Negative for poor wound healing, rash and suspicious lesions.   Musculoskeletal: Positive for arthritis and joint pain. Negative for joint swelling.   Gastrointestinal: Negative for abdominal pain, nausea and vomiting.   Genitourinary: Negative for bladder incontinence and  frequency.   Neurological: Negative for loss of balance, numbness, paresthesias, sensory change and tremors.   Psychiatric/Behavioral: Negative for depression. The patient is nervous/anxious.    Allergic/Immunologic: Negative for hives.         Objective:            General    Nursing note and vitals reviewed.  Constitutional: She is oriented to person, place, and time. She appears well-developed and well-nourished. No distress.   Neurological: She is alert and oriented to person, place, and time.   Psychiatric: She has a normal mood and affect. Her behavior is normal. Judgment and thought content normal.           Right Knee Exam     Inspection   Scars: absent  Swelling: absent  Effusion: effusion    Tenderness   The patient is tender to palpation of the medial joint line and patella.    Range of Motion   The patient has normal right knee ROM.    Tests   Ligament Examination   MCL - Valgus: normal (0 to 2mm)  LCL - Varus: normal  Patella   Patellar Grind: positive    Other   Sensation: normal    Comments:  She has full extension and skin to skin flexion bilaterally.    Left Knee Exam     Inspection   Scars: absent  Swelling: absent  Effusion: absent    Tenderness   The patient tender to palpation of the patella.    Range of Motion   The patient has normal left knee ROM.    Tests   Stability   MCL - Valgus: normal (0 to 2mm)  LCL - Varus: normal (0 to 2mm)    Other   Sensation: normal    Muscle Strength   Right Lower Extremity   Hip Abduction: 4/5 and 5/5   Quadriceps:  4/5   Hamstrin/5 and 5/5   Left Lower Extremity   Hip Abduction: 5/5   Quadriceps:  4/5   Hamstrin/5 and 5/5       I have reviewed the films and report. I agree with the radiologist interpretation of the radiographic findings:  There is mild joint space narrowing and mild marginal osteophyte formation involving the medial compartment of either knee.  No acute fracture or dislocation.  No joint effusion suggested.        Assessment:        Encounter Diagnoses   Name Primary?    Patellofemoral stress syndrome of right knee Yes    Morbid obesity with BMI of 70 and over, adult     Arthritis of right knee           Plan:       Shaina was seen today for pain.    Diagnoses and all orders for this visit:    Patellofemoral stress syndrome of right knee    Morbid obesity with BMI of 70 and over, adult    Arthritis of right knee      I reviewed the x-rays and the MRI.  I think most of her discomfort from a clinical standpoint is patellofemoral.  We reviewed exercises and conditioning programs to work on VMO strengthening.  Ultimately, she has to lose weight in order to relieve the pressure on her knees.  It will not only help her rapidly advancing degenerative changes, but overall improve her health.  She knows that this has to be completed and she is in the saving this stage as she has to come out of pocket $5000 in order to do this.  She has approximately half saved.  While the Mobic is helping to control her symptoms, I have encouraged her to reduce it to once daily and ultimately when necessary.  She should continue to follow with her primary care concerning the effect of this on her renal function.  For now, I have discouraged injections into her knees.     The patient understands, chooses and consents to this plan and accepts all   the risks which include but are not limited to the risks mentioned above.   Pt understands the alternative of having no testing, intervention or       treatment at this time. Pt left content and without questions.     Disclaimer: This note was prepared using a voice recognition system and is likely to have sound alike errors within the text.

## 2017-03-13 NOTE — LETTER
March 14, 2017      Sarah Smart MD  [id number] [address] [address]           [facility]  [address] [place] [id number]  Phone: [phone]  Fax: [phone]          Patient: Shaina Olson   MR Number: [id number]   YOB: 1980   Date of Visit: 3/13/2017       Dear Dr. Sarah Smart:    Thank you for referring Shaina Olson to me for evaluation. Attached you will find relevant portions of my assessment and plan of care.    If you have questions, please do not hesitate to call me. I look forward to following Shaina Olson along with you.    Sincerely,    LINDA Cordovaosure  CC:  No Recipients    If you would like to receive this communication electronically, please contact [email or web address] or [phone] to request more information on [facility] [employer] access.    For providers and/or their staff who would like to refer a patient to [facility], please contact us through our one-stop-shop provider referral line, [facility], at [phone].    If you feel you have received this communication in error or would no longer like to receive these types of communications, please e-mail [email or web address]

## 2017-03-14 ENCOUNTER — TELEPHONE (OUTPATIENT)
Dept: FAMILY MEDICINE | Facility: CLINIC | Age: 37
End: 2017-03-14

## 2017-03-14 NOTE — TELEPHONE ENCOUNTER
----- Message from Jena Beltrán sent at 3/10/2017 12:09 PM CST -----  Contact: [facility]  She needs to clarify the directions and the quantity for Amlodipine besylat.  Call her at [phone].                                       pang

## 2017-03-14 NOTE — TELEPHONE ENCOUNTER
----- Message from Melly Dickson sent at 3/13/2017  3:48 PM CDT -----  [phone][facility] been [facility] since last week no one call them back yet ,please call//elvis ht

## 2017-03-20 ENCOUNTER — OFFICE VISIT (OUTPATIENT)
Dept: OTOLARYNGOLOGY | Facility: CLINIC | Age: 37
End: 2017-03-20
Payer: COMMERCIAL

## 2017-03-20 VITALS
BODY MASS INDEX: 74.28 KG/M2 | WEIGHT: 293 LBS | TEMPERATURE: 98 F | DIASTOLIC BLOOD PRESSURE: 63 MMHG | HEART RATE: 91 BPM | SYSTOLIC BLOOD PRESSURE: 121 MMHG

## 2017-03-20 DIAGNOSIS — J30.9 CHRONIC ALLERGIC RHINITIS: ICD-10-CM

## 2017-03-20 DIAGNOSIS — J32.9 CHRONIC SINUSITIS, UNSPECIFIED LOCATION: Primary | ICD-10-CM

## 2017-03-20 DIAGNOSIS — Z98.890 HISTORY OF SINUS SURGERY: ICD-10-CM

## 2017-03-20 PROCEDURE — 99214 OFFICE O/P EST MOD 30 MIN: CPT | Mod: S$GLB,,, | Performed by: ORTHOPAEDIC SURGERY

## 2017-03-20 NOTE — PROGRESS NOTES
Subjective:       Patient ID: Shaina Olson is a 37 y.o. female.    Chief Complaint: No chief complaint on file.    HPI Comments: Patient is a very pleasant 37 year old female here to see me today for evaluation of several issues.  First, she has recently noted an increase in her allergy symptoms including nasal congestion and drainage.  She has not had any eye symptoms including itching and drainage.  She has had a feeling of congestion in her ears.  She continues to have issues with intermittent hoarseness, worse with singing.  She has been trying to decrease her singing.  She has a history of childhood asthma, not currently.  She remains on Singulair, Xyzal and Flonase daily.  I last saw her in December, and at that time she was diagnosed with a vocal cord nodule.  She has undergone speech therapy in the past, with some modest improvement.  Since her last visit, she has had a CT of her sinuses.  She has a history of a FESS in 2001 with Dr. De La Rosa in [place].    Review of Systems   Constitutional: Negative for chills, fatigue, fever and unexpected weight change.   HENT: Positive for congestion, postnasal drip, rhinorrhea, sinus pressure and voice change. Negative for dental problem, ear discharge, ear pain (ear pressure), facial swelling, hearing loss, nosebleeds, sneezing, sore throat, tinnitus and trouble swallowing.    Eyes: Negative for redness, itching and visual disturbance.   Respiratory: Positive for cough. Negative for choking, shortness of breath and wheezing.    Cardiovascular: Negative for chest pain and palpitations.   Gastrointestinal: Negative for abdominal pain.        No reflux.   Musculoskeletal: Negative for gait problem.   Skin: Negative for rash.   Allergic/Immunologic: Positive for environmental allergies.   Neurological: Positive for headaches. Negative for dizziness and light-headedness.       Objective:      Physical Exam   Constitutional: She is oriented to person, place, and  time. She appears well-developed and well-nourished. No distress.   HENT:   Head: Normocephalic and atraumatic.   Right Ear: Tympanic membrane, external ear and ear canal normal.   Left Ear: Tympanic membrane, external ear and ear canal normal.   Nose: Mucosal edema and rhinorrhea present. No nasal deformity or septal deviation. No epistaxis. Right sinus exhibits no maxillary sinus tenderness and no frontal sinus tenderness. Left sinus exhibits no maxillary sinus tenderness and no frontal sinus tenderness.   Mouth/Throat: Uvula is midline, oropharynx is clear and moist and mucous membranes are normal. Mucous membranes are not pale and not dry. No dental caries. No oropharyngeal exudate or posterior oropharyngeal erythema.   Very coarse voice, partial resection of right middle turbinate visible on anterior rhinoscopy   Eyes: Conjunctivae, EOM and lids are normal. Pupils are equal, round, and reactive to light. Right eye exhibits no chemosis. Left eye exhibits no chemosis. Right conjunctiva is not injected. Left conjunctiva is not injected. No scleral icterus. Right eye exhibits normal extraocular motion and no nystagmus. Left eye exhibits normal extraocular motion and no nystagmus.   Neck: Trachea normal and phonation normal. No tracheal tenderness present. No tracheal deviation present. No thyroid mass and no thyromegaly present.   Cardiovascular: Intact distal pulses.    Pulmonary/Chest: Effort normal. No stridor. No respiratory distress.   Abdominal: She exhibits no distension.   Lymphadenopathy:        Head (right side): No submental, no submandibular, no preauricular, no posterior auricular and no occipital adenopathy present.        Head (left side): No submental, no submandibular, no preauricular, no posterior auricular and no occipital adenopathy present.     She has no cervical adenopathy.   Neurological: She is alert and oriented to person, place, and time. No cranial nerve deficit.   Skin: Skin is warm and  dry. No rash noted. No erythema.   Psychiatric: She has a normal mood and affect. Her behavior is normal.       CT SINUS:  Images and report reviewed with patient.  She has moderate thickening of the right maxillary sinus and right sphenoid, but overall antrostomies are adequate and patent        Assessment:       1. Chronic sinusitis, unspecified location    2. History of sinus surgery    3. Chronic allergic rhinitis        Plan:       1.  Chronic sinusitis, history of FESS:  Continue with current allergy therapy.  Reviewed her CT together in detail, and she does have mucosal thickening.  However, with her previous surgery we do have very good access for topical therapy.  Will send prescription to [facility] for nasoneb, and I would like to see her back in three months to review.  If she is not making progress at that point, would then repeat her skin testing as it has been several years since it was done.  2.  AR:  As above.  Continue with current maximal medical therapy.  If no improvement will need repeat skin testing.

## 2017-03-21 ENCOUNTER — LAB VISIT (OUTPATIENT)
Dept: LAB | Facility: HOSPITAL | Age: 37
End: 2017-03-21
Attending: FAMILY MEDICINE
Payer: COMMERCIAL

## 2017-03-21 ENCOUNTER — TELEPHONE (OUTPATIENT)
Dept: FAMILY MEDICINE | Facility: CLINIC | Age: 37
End: 2017-03-21

## 2017-03-21 DIAGNOSIS — R30.0 DYSURIA: Primary | ICD-10-CM

## 2017-03-21 DIAGNOSIS — R30.0 DYSURIA: ICD-10-CM

## 2017-03-21 LAB
BACTERIA #/AREA URNS AUTO: NORMAL /HPF
HYALINE CASTS UR QL AUTO: 1 /LPF
MICROSCOPIC COMMENT: NORMAL
RBC #/AREA URNS AUTO: 1 /HPF (ref 0–4)
SQUAMOUS #/AREA URNS AUTO: 3 /HPF
WBC #/AREA URNS AUTO: 1 /HPF (ref 0–5)

## 2017-03-21 PROCEDURE — 81001 URINALYSIS AUTO W/SCOPE: CPT

## 2017-03-21 PROCEDURE — 87086 URINE CULTURE/COLONY COUNT: CPT

## 2017-03-21 RX ORDER — SULFAMETHOXAZOLE AND TRIMETHOPRIM 800; 160 MG/1; MG/1
1 TABLET ORAL 2 TIMES DAILY
Qty: 14 TABLET | Refills: 0 | Status: SHIPPED | OUTPATIENT
Start: 2017-03-21 | End: 2017-03-28

## 2017-03-21 NOTE — TELEPHONE ENCOUNTER
Patient having UTI symptoms and would like to know if something can be called to pharm for her. [facility] [address]

## 2017-03-22 ENCOUNTER — TELEPHONE (OUTPATIENT)
Dept: FAMILY MEDICINE | Facility: CLINIC | Age: 37
End: 2017-03-22

## 2017-03-22 LAB — BACTERIA UR CULT: NORMAL

## 2017-03-22 RX ORDER — FLUCONAZOLE 150 MG/1
150 TABLET ORAL DAILY
Qty: 1 TABLET | Refills: 3 | Status: SHIPPED | OUTPATIENT
Start: 2017-03-22 | End: 2017-03-23

## 2017-03-23 ENCOUNTER — TELEPHONE (OUTPATIENT)
Dept: FAMILY MEDICINE | Facility: CLINIC | Age: 37
End: 2017-03-23

## 2017-03-23 RX ORDER — PHENTERMINE HYDROCHLORIDE 37.5 MG/1
37.5 TABLET ORAL
Qty: 30 TABLET | Refills: 2 | Status: SHIPPED | OUTPATIENT
Start: 2017-03-23 | End: 2017-04-22

## 2017-03-27 ENCOUNTER — PATIENT MESSAGE (OUTPATIENT)
Dept: OTOLARYNGOLOGY | Facility: CLINIC | Age: 37
End: 2017-03-27

## 2017-04-10 ENCOUNTER — LAB VISIT (OUTPATIENT)
Dept: LAB | Facility: HOSPITAL | Age: 37
End: 2017-04-10
Attending: INTERNAL MEDICINE
Payer: COMMERCIAL

## 2017-04-10 DIAGNOSIS — E55.9 VITAMIN D DEFICIENCY: ICD-10-CM

## 2017-04-10 DIAGNOSIS — I10 ESSENTIAL HYPERTENSION: Chronic | ICD-10-CM

## 2017-04-10 DIAGNOSIS — E05.00 GRAVES' DISEASE: Chronic | ICD-10-CM

## 2017-04-10 DIAGNOSIS — E66.01 MORBID OBESITY WITH BMI OF 70 AND OVER, ADULT: ICD-10-CM

## 2017-04-10 LAB
T3FREE SERPL-MCNC: 3 PG/ML
T4 FREE SERPL-MCNC: 0.91 NG/DL
TSH SERPL DL<=0.005 MIU/L-ACNC: 0.93 UIU/ML

## 2017-04-10 PROCEDURE — 84439 ASSAY OF FREE THYROXINE: CPT

## 2017-04-10 PROCEDURE — 84443 ASSAY THYROID STIM HORMONE: CPT

## 2017-04-10 PROCEDURE — 84481 FREE ASSAY (FT-3): CPT

## 2017-04-10 PROCEDURE — 36415 COLL VENOUS BLD VENIPUNCTURE: CPT | Mod: PO

## 2017-04-12 RX ORDER — AMLODIPINE BESYLATE 2.5 MG/1
TABLET ORAL
Qty: 90 TABLET | Refills: 3 | Status: SHIPPED | OUTPATIENT
Start: 2017-04-12 | End: 2018-04-08 | Stop reason: SDUPTHER

## 2017-04-18 RX ORDER — MONTELUKAST SODIUM 10 MG/1
10 TABLET ORAL DAILY
Qty: 30 TABLET | Refills: 11 | Status: SHIPPED | OUTPATIENT
Start: 2017-04-18 | End: 2017-05-19 | Stop reason: SDUPTHER

## 2017-05-05 DIAGNOSIS — M25.561 RIGHT KNEE PAIN, UNSPECIFIED CHRONICITY: ICD-10-CM

## 2017-05-05 DIAGNOSIS — M23.91 INTERNAL DERANGEMENT OF KNEE, RIGHT: ICD-10-CM

## 2017-05-07 RX ORDER — MELOXICAM 15 MG/1
15 TABLET ORAL 2 TIMES DAILY
Qty: 180 TABLET | Refills: 0 | Status: SHIPPED | OUTPATIENT
Start: 2017-05-07 | End: 2017-12-11

## 2017-05-08 ENCOUNTER — OFFICE VISIT (OUTPATIENT)
Dept: OBSTETRICS AND GYNECOLOGY | Facility: CLINIC | Age: 37
End: 2017-05-08
Payer: COMMERCIAL

## 2017-05-08 VITALS
WEIGHT: 293 LBS | DIASTOLIC BLOOD PRESSURE: 85 MMHG | HEIGHT: 64 IN | BODY MASS INDEX: 50.02 KG/M2 | SYSTOLIC BLOOD PRESSURE: 138 MMHG

## 2017-05-08 DIAGNOSIS — Z30.011 ENCOUNTER FOR INITIAL PRESCRIPTION OF CONTRACEPTIVE PILLS: Primary | ICD-10-CM

## 2017-05-08 PROCEDURE — 99213 OFFICE O/P EST LOW 20 MIN: CPT | Mod: S$GLB,,, | Performed by: ADVANCED PRACTICE MIDWIFE

## 2017-05-08 PROCEDURE — 99999 PR PBB SHADOW E&M-EST. PATIENT-LVL III: CPT | Mod: PBBFAC,,, | Performed by: ADVANCED PRACTICE MIDWIFE

## 2017-05-08 RX ORDER — DROSPIRENONE AND ETHINYL ESTRADIOL 0.02-3(28)
1 KIT ORAL DAILY
Qty: 28 TABLET | Refills: 12 | Status: SHIPPED | OUTPATIENT
Start: 2017-05-08 | End: 2017-05-10 | Stop reason: ALTCHOICE

## 2017-05-08 NOTE — PROGRESS NOTES
Subjective:      Shaina Olson is a 37 y.o. female who presents for contraception counseling. The patient is complaining of heavy cycles. She stopped her OCP's last year because she is not sexually active but would like to restart secondary to her heavy cycles.     Past Medical History:   Diagnosis Date    Allergy     Anxiety     Arthritis     Arthritis of right knee     Graves' disease 2014    Hypertension 2004    Vitamin D deficiency      Family History   Problem Relation Age of Onset    Hypertension Mother     Lupus Mother     Diverticulosis Mother     Cancer Sister 30     stg 1 boderline? ov cancer    Cancer Maternal Grandmother     Cancer Maternal Grandfather     Diabetes Paternal Grandmother     Stroke Paternal Grandmother     Cancer Paternal Grandmother     Cancer Paternal Grandfather      Social History     Social History    Marital status: Single     Spouse name: N/A    Number of children: 0    Years of education: N/A     Occupational History    [employer]      [employer]     Social History Main Topics    Smoking status: Never Smoker    Smokeless tobacco: Never Used    Alcohol use 0.0 oz/week     0 Standard drinks or equivalent per week      Comment: occasionally    Drug use: No    Sexual activity: Yes     Partners: Male     Birth control/ protection: OCP     Other Topics Concern    None     Social History Narrative     Past Surgical History:   Procedure Laterality Date    FRACTURE SURGERY Right 2013    right wrist    NASAL SEPTUM SURGERY  2001    SINUS SURGERY  2001    TONSILLECTOMY, ADENOIDECTOMY  2001    WRIST FRACTURE SURGERY Right 2013    Right wrist       OB History    Para Term  AB SAB TAB Ectopic Multiple Living   0 0 0 0 0 0 0 0 0 0             Menstrual History:  OB History      Para Term  AB TAB SAB Ectopic Multiple Living    0 0 0 0 0 0 0 0 0 0           Patient's last menstrual period was 2017 (exact date).           Review of  Systems  A comprehensive review of systems was negative.       Assessment:     Encounter Diagnosis   Name Primary?    Encounter for initial prescription of contraceptive pills Yes       Plan:   Encounter for initial prescription of contraceptive pills    Other orders  -     drospirenone-ethinyl estradiol (GABRIELA) 3-0.02 mg per tablet; Take 1 tablet by mouth once daily.  Dispense: 28 tablet; Refill: 12        Discussed risks of DVT development with birth control use.  Pt denies history of blood clots, DVT, cardiac issues, HTN, CVA, gallbladder disease, liver disease, or adrenal disease.   Pt denies family hx of DVT use.  Pt with CHTN, well controlled on medication. Ok to start her back on her OCP's per Dr Bautista.

## 2017-05-09 ENCOUNTER — PATIENT MESSAGE (OUTPATIENT)
Dept: OBSTETRICS AND GYNECOLOGY | Facility: CLINIC | Age: 37
End: 2017-05-09

## 2017-05-10 RX ORDER — NORGESTIMATE AND ETHINYL ESTRADIOL 7DAYSX3 28
1 KIT ORAL DAILY
Qty: 30 TABLET | Refills: 11 | Status: SHIPPED | OUTPATIENT
Start: 2017-05-10 | End: 2017-10-02

## 2017-05-16 DIAGNOSIS — F41.9 ANXIETY: ICD-10-CM

## 2017-05-16 RX ORDER — HYDROXYZINE HYDROCHLORIDE 25 MG/1
25 TABLET, FILM COATED ORAL 3 TIMES DAILY PRN
Qty: 30 TABLET | Refills: 1 | Status: SHIPPED | OUTPATIENT
Start: 2017-05-16 | End: 2018-10-25

## 2017-05-19 NOTE — TELEPHONE ENCOUNTER
Last office visit 02/07/2017. Last refill date 04/18/2017. Pt is requesting a refill on singular 10mg #30 qd.

## 2017-05-24 RX ORDER — MONTELUKAST SODIUM 10 MG/1
10 TABLET ORAL DAILY
Qty: 30 TABLET | Refills: 11 | Status: SHIPPED | OUTPATIENT
Start: 2017-05-24 | End: 2018-10-29 | Stop reason: SDUPTHER

## 2017-06-01 ENCOUNTER — PATIENT MESSAGE (OUTPATIENT)
Dept: FAMILY MEDICINE | Facility: CLINIC | Age: 37
End: 2017-06-01

## 2017-06-01 DIAGNOSIS — J30.9 ALLERGIC RHINITIS: ICD-10-CM

## 2017-06-01 DIAGNOSIS — I10 HYPERTENSION GOAL BP (BLOOD PRESSURE) < 140/90: ICD-10-CM

## 2017-06-01 RX ORDER — LEVOCETIRIZINE DIHYDROCHLORIDE 5 MG/1
5 TABLET, FILM COATED ORAL NIGHTLY
Qty: 90 TABLET | Refills: 1 | Status: SHIPPED | OUTPATIENT
Start: 2017-06-01 | End: 2017-11-26 | Stop reason: SDUPTHER

## 2017-06-02 RX ORDER — AMLODIPINE BESYLATE 5 MG/1
5 TABLET ORAL DAILY
Qty: 90 TABLET | Refills: 1 | Status: SHIPPED | OUTPATIENT
Start: 2017-06-02 | End: 2017-08-02 | Stop reason: SDUPTHER

## 2017-06-02 NOTE — TELEPHONE ENCOUNTER
Shaina Smart MD 10 hours ago (9:54 PM)         Can I please have a refill on my amlodipine 5 mg? [facility] [address]

## 2017-06-06 ENCOUNTER — TELEPHONE (OUTPATIENT)
Dept: OTOLARYNGOLOGY | Facility: CLINIC | Age: 37
End: 2017-06-06

## 2017-06-06 NOTE — TELEPHONE ENCOUNTER
Patient has a 3 month follow up with Dr. Wilkinson on Monday, 6/26/17 at 7:15 am.  Dr. Wilkinson will be in surgery at this time.  We need to either move it down later in the day or change it to a different day.  I asked that she call back to reschedule.

## 2017-06-12 DIAGNOSIS — F41.9 ANXIETY: ICD-10-CM

## 2017-06-12 DIAGNOSIS — M79.10 MYALGIA: ICD-10-CM

## 2017-06-12 RX ORDER — METHIMAZOLE 5 MG/1
TABLET ORAL
Qty: 45 TABLET | Refills: 3 | Status: SHIPPED | OUTPATIENT
Start: 2017-06-12 | End: 2017-12-20 | Stop reason: SDUPTHER

## 2017-06-12 RX ORDER — DULOXETIN HYDROCHLORIDE 30 MG/1
30 CAPSULE, DELAYED RELEASE ORAL DAILY
Qty: 30 CAPSULE | Refills: 3 | Status: SHIPPED | OUTPATIENT
Start: 2017-06-12 | End: 2017-10-12 | Stop reason: SDUPTHER

## 2017-07-11 ENCOUNTER — TELEPHONE (OUTPATIENT)
Dept: FAMILY MEDICINE | Facility: CLINIC | Age: 37
End: 2017-07-11

## 2017-07-11 ENCOUNTER — LAB VISIT (OUTPATIENT)
Dept: LAB | Facility: HOSPITAL | Age: 37
End: 2017-07-11
Attending: FAMILY MEDICINE
Payer: COMMERCIAL

## 2017-07-11 DIAGNOSIS — Z00.00 WELLNESS EXAMINATION: Primary | ICD-10-CM

## 2017-07-11 DIAGNOSIS — Z00.00 WELLNESS EXAMINATION: ICD-10-CM

## 2017-07-11 LAB
ALBUMIN SERPL BCP-MCNC: 3.3 G/DL
ALP SERPL-CCNC: 96 U/L
ALT SERPL W/O P-5'-P-CCNC: 11 U/L
ANION GAP SERPL CALC-SCNC: 9 MMOL/L
AST SERPL-CCNC: 12 U/L
BILIRUB SERPL-MCNC: 0.8 MG/DL
BUN SERPL-MCNC: 9 MG/DL
CALCIUM SERPL-MCNC: 9 MG/DL
CHLORIDE SERPL-SCNC: 108 MMOL/L
CHOLEST/HDLC SERPL: 3.5 {RATIO}
CO2 SERPL-SCNC: 25 MMOL/L
CREAT SERPL-MCNC: 0.8 MG/DL
EST. GFR  (AFRICAN AMERICAN): >60 ML/MIN/1.73 M^2
EST. GFR  (NON AFRICAN AMERICAN): >60 ML/MIN/1.73 M^2
GLUCOSE SERPL-MCNC: 85 MG/DL
HDL/CHOLESTEROL RATIO: 28.6 %
HDLC SERPL-MCNC: 206 MG/DL
HDLC SERPL-MCNC: 59 MG/DL
LDLC SERPL CALC-MCNC: 133.2 MG/DL
NONHDLC SERPL-MCNC: 147 MG/DL
POTASSIUM SERPL-SCNC: 4 MMOL/L
PROT SERPL-MCNC: 6.7 G/DL
SODIUM SERPL-SCNC: 142 MMOL/L
TRIGL SERPL-MCNC: 69 MG/DL

## 2017-07-11 PROCEDURE — 80053 COMPREHEN METABOLIC PANEL: CPT

## 2017-07-11 PROCEDURE — 36415 COLL VENOUS BLD VENIPUNCTURE: CPT | Mod: PO

## 2017-07-11 PROCEDURE — 80061 LIPID PANEL: CPT

## 2017-07-13 ENCOUNTER — TELEPHONE (OUTPATIENT)
Dept: OTOLARYNGOLOGY | Facility: CLINIC | Age: 37
End: 2017-07-13

## 2017-07-13 NOTE — TELEPHONE ENCOUNTER
I left patient another message.  This time I let her know that I had to cancel her 7:15 appointment on Monday and changed it to 1:45 pm the same day.  I asked if this was not good for her, to please call and reschedule.

## 2017-07-24 ENCOUNTER — PATIENT MESSAGE (OUTPATIENT)
Dept: OBSTETRICS AND GYNECOLOGY | Facility: CLINIC | Age: 37
End: 2017-07-24

## 2017-07-25 ENCOUNTER — OFFICE VISIT (OUTPATIENT)
Dept: OPHTHALMOLOGY | Facility: CLINIC | Age: 37
End: 2017-07-25
Payer: COMMERCIAL

## 2017-07-25 DIAGNOSIS — H40.003 GLAUCOMA SUSPECT OF BOTH EYES: Primary | ICD-10-CM

## 2017-07-25 DIAGNOSIS — H52.13 MYOPIA, BILATERAL: ICD-10-CM

## 2017-07-25 PROCEDURE — 99999 PR PBB SHADOW E&M-EST. PATIENT-LVL I: CPT | Mod: PBBFAC,,, | Performed by: OPTOMETRIST

## 2017-07-25 PROCEDURE — 92015 DETERMINE REFRACTIVE STATE: CPT | Mod: S$GLB,,, | Performed by: OPTOMETRIST

## 2017-07-25 PROCEDURE — 92012 INTRM OPH EXAM EST PATIENT: CPT | Mod: S$GLB,,, | Performed by: OPTOMETRIST

## 2017-07-25 NOTE — PROGRESS NOTES
HPI     Glaucoma Suspect    Additional comments: 4 Month IOP           Eye Exam    Additional comments: Yearly           Comments   Last seen by DNL on 3/7/17 for IOP check. Patient here today for 4 month   IOP check. Patient is also requesting to update her glasses prescription   today.  1. Glaucoma Suspect OU  PT was last seen on 3/7/17 with DNL for IOP check. PT was told to RTC  4 Months for IOP check.  Medication eye drops if any: Latanoprost % compliant  Last HVF: 9/9/16  Last gOCT: 9/9/16  Last SDPs:7/5/16    HPI    Any vision changes since last exam: No  Eye pain: No  Other ocular symptoms: No    Do you wear currently wear glasses or contacts? Glasses and Contacts    Interested in contacts today? Yes    Do you plan on getting new glasses today? Yes       Last edited by Ana Macdonald, PCT on 7/25/2017  8:12 AM. (History)              Assessment /Plan     For exam results, see Encounter Report.    Glaucoma suspect of both eyes  IOP much better today with latanoprost qhs OU  Pt experiencing some burning OU after instillation, sometimes hours later  Recommended AT about 15-20 after instillation of latanoprost  Continue latanoprost qhs OU  Monitor 3 months    Myopia, bilateral  Eyeglass Final Rx     Eyeglass Final Rx       Sphere Cylinder [place] Dist [facility]    Right -4.25 +0.25 095 20/20    Left -4.50 +0.50 095 20/20    Expiration Date:  7/26/2018              Contact [facility] Prescription (7/25/2017)        Brand Base Curve Diameter Sphere    Right Acuvue 1 Day Moist 8.5 14.2 -4.00    Left Acuvue 1 Day Moist 8.5 14.2 -4.00    Expiration Date:  7/26/2018    Replacement:  Daily    Wearing Schedule:  Daily wear        RTC 3 months for 24-2VF, gOCT and IOP check  Discussed above and all questions were answered.

## 2017-08-02 DIAGNOSIS — I10 HYPERTENSION GOAL BP (BLOOD PRESSURE) < 140/90: ICD-10-CM

## 2017-08-02 RX ORDER — AMLODIPINE BESYLATE 5 MG/1
5 TABLET ORAL DAILY
Qty: 90 TABLET | Refills: 1 | Status: SHIPPED | OUTPATIENT
Start: 2017-08-02 | End: 2018-01-30 | Stop reason: SDUPTHER

## 2017-08-02 RX ORDER — CYCLOBENZAPRINE HCL 10 MG
10 TABLET ORAL 3 TIMES DAILY PRN
Qty: 90 TABLET | Refills: 0 | Status: SHIPPED | OUTPATIENT
Start: 2017-08-02 | End: 2017-11-01 | Stop reason: SDUPTHER

## 2017-08-07 ENCOUNTER — LAB VISIT (OUTPATIENT)
Dept: LAB | Facility: HOSPITAL | Age: 37
End: 2017-08-07
Attending: FAMILY MEDICINE
Payer: COMMERCIAL

## 2017-08-07 DIAGNOSIS — E05.00 GRAVES' DISEASE: Chronic | ICD-10-CM

## 2017-08-07 DIAGNOSIS — E55.9 VITAMIN D DEFICIENCY: ICD-10-CM

## 2017-08-07 DIAGNOSIS — E66.01 MORBID OBESITY WITH BMI OF 70 AND OVER, ADULT: ICD-10-CM

## 2017-08-07 DIAGNOSIS — I10 ESSENTIAL HYPERTENSION: Chronic | ICD-10-CM

## 2017-08-07 PROCEDURE — 84439 ASSAY OF FREE THYROXINE: CPT

## 2017-08-07 PROCEDURE — 84443 ASSAY THYROID STIM HORMONE: CPT

## 2017-08-07 PROCEDURE — 84481 FREE ASSAY (FT-3): CPT

## 2017-08-07 PROCEDURE — 36415 COLL VENOUS BLD VENIPUNCTURE: CPT | Mod: PO

## 2017-08-08 LAB
T3FREE SERPL-MCNC: 3.3 PG/ML
T4 FREE SERPL-MCNC: 1.05 NG/DL
TSH SERPL DL<=0.005 MIU/L-ACNC: 0.95 UIU/ML

## 2017-08-14 ENCOUNTER — OFFICE VISIT (OUTPATIENT)
Dept: ENDOCRINOLOGY | Facility: CLINIC | Age: 37
End: 2017-08-14
Payer: COMMERCIAL

## 2017-08-14 VITALS
DIASTOLIC BLOOD PRESSURE: 80 MMHG | BODY MASS INDEX: 50.02 KG/M2 | SYSTOLIC BLOOD PRESSURE: 124 MMHG | HEART RATE: 95 BPM | HEIGHT: 64 IN | WEIGHT: 293 LBS | TEMPERATURE: 98 F

## 2017-08-14 DIAGNOSIS — E05.90 HYPERTHYROIDISM: Primary | ICD-10-CM

## 2017-08-14 PROCEDURE — 99214 OFFICE O/P EST MOD 30 MIN: CPT | Mod: S$GLB,,, | Performed by: INTERNAL MEDICINE

## 2017-08-14 PROCEDURE — 3079F DIAST BP 80-89 MM HG: CPT | Mod: S$GLB,,, | Performed by: INTERNAL MEDICINE

## 2017-08-14 PROCEDURE — 3074F SYST BP LT 130 MM HG: CPT | Mod: S$GLB,,, | Performed by: INTERNAL MEDICINE

## 2017-08-14 PROCEDURE — 3008F BODY MASS INDEX DOCD: CPT | Mod: S$GLB,,, | Performed by: INTERNAL MEDICINE

## 2017-08-14 PROCEDURE — 99999 PR PBB SHADOW E&M-EST. PATIENT-LVL III: CPT | Mod: PBBFAC,,, | Performed by: INTERNAL MEDICINE

## 2017-08-14 NOTE — PROGRESS NOTES
""This note will be shared with the patient"Subjective:       Patient ID: Shaina Olson is a 37 y.o. female.  Patient is new to me  Chief Complaint: Hyperthyroidism    HPI    Consultation was requested by Dr. Manasa Aviles       Diagnosed: diagnosed in 2014 and started on antithyroid medications immediately due to severe symptoms; she shared a picture of how she looked initially and patient had significant stare and enlarged thyroid which both have improved over time on treatment    Previous radiology tests:  Thyroid ultrasound : Yes - last one October 2016 showing a stable nodule, status post benign FNA   NM uptake and scan: No    Previous thyroid surgery: No      Thyroid symptoms:denies fatigue, weight changes, heat/cold intolerance, bowel/skin changes or CVS symptoms      Thyroid medications: methimazole 5 mg daily      Takes medication appropriately: Yes    I have reviewed the past medical, family and social history  Review of Systems   Constitutional: Negative for appetite change, fatigue, fever and unexpected weight change.        Has lost weight doing [employer]   HENT: Negative for sore throat and trouble swallowing.    Eyes: Negative for visual disturbance.   Respiratory: Negative for shortness of breath and wheezing.    Cardiovascular: Positive for palpitations. Negative for chest pain and leg swelling.        Rarely has  Palpitations and will take a propranolol now and then   Gastrointestinal: Negative for diarrhea, nausea and vomiting.   Endocrine: Negative for cold intolerance, heat intolerance, polydipsia, polyphagia and polyuria.   Genitourinary: Negative for difficulty urinating, dysuria and menstrual problem.   Musculoskeletal: Negative for arthralgias and joint swelling.   Skin: Negative for rash.   Neurological: Positive for weakness. Negative for dizziness, numbness and headaches.        Complains of muscle weakness for several months and has joined [employer] to help with muscle " strengthening especially in her legs   Psychiatric/Behavioral: Negative for confusion, dysphoric mood and sleep disturbance.       Objective:      Physical Exam   Constitutional: She is oriented to person, place, and time. No distress.   HENT:   Head: Normocephalic and atraumatic.   Right Ear: External ear normal.   Left Ear: External ear normal.   Nose: Nose normal.   Mouth/Throat: Oropharynx is clear and moist. No oropharyngeal exudate.   Lid lag right eye   Eyes: Conjunctivae and EOM are normal. Pupils are equal, round, and reactive to light. No scleral icterus.   Neck: No JVD present. No tracheal deviation present. Thyromegaly present.   Cardiovascular: Normal rate, regular rhythm, normal heart sounds and intact distal pulses.  Exam reveals no gallop and no friction rub.    No murmur heard.  Pulmonary/Chest: Effort normal and breath sounds normal. No respiratory distress. She has no wheezes. She has no rales.   Abdominal: Soft. Bowel sounds are normal. She exhibits no distension and no mass. There is no tenderness. There is no rebound and no guarding. No hernia.   Musculoskeletal: She exhibits no edema or deformity.   Lymphadenopathy:     She has no cervical adenopathy.   Neurological: She is alert and oriented to person, place, and time. She has normal reflexes. No cranial nerve deficit.   Skin: Skin is warm. No rash noted. She is not diaphoretic. No erythema.   Psychiatric: She has a normal mood and affect. Her behavior is normal.   Vitals reviewed.        Lab Review:   Lab Results   Component Value Date    TSH 0.951 08/07/2017    TSH 0.929 04/10/2017    TSH 1.041 02/24/2017    TSH 0.931 02/08/2017     Lab Results   Component Value Date    FREET4 1.05 08/07/2017    FREET4 0.91 04/10/2017    FREET4 0.98 02/24/2017    FREET4 0.99 12/07/2016     No components found for: FREET3      Assessment:     1. Hyperthyroidism  TSH    T4, free    T3, free    Thyroid peroxidase antibody    Thyroid stimulating immunoglobulin     Anti-thyroglobulin antibody    CBC auto differential        History of Graves' disease stable on medication; continue methimazole    Discussed the importance of monitoring thyroid function and antibody levels should she become pregnant    Discussed alternative treatment such as radioactive iodine but since has been well controlled on methimazole will continue this  Plan:   Hyperthyroidism  -     TSH; Future; Expected date: 11/27/2017  -     T4, free; Future; Expected date: 11/27/2017  -     T3, free; Future; Expected date: 11/27/2017  -     Thyroid peroxidase antibody; Future; Expected date: 11/27/2017  -     Thyroid stimulating immunoglobulin; Future; Expected date: 11/27/2017  -     Anti-thyroglobulin antibody; Future; Expected date: 11/27/2017  -     CBC auto differential; Future; Expected date: 11/27/2017        Return in about 4 months (around 12/14/2017). labs prior     Thank you to Dr. Manasa Aviles for this consultation

## 2017-08-24 ENCOUNTER — OFFICE VISIT (OUTPATIENT)
Dept: FAMILY MEDICINE | Facility: CLINIC | Age: 37
End: 2017-08-24
Payer: COMMERCIAL

## 2017-08-24 VITALS
SYSTOLIC BLOOD PRESSURE: 124 MMHG | HEIGHT: 64 IN | OXYGEN SATURATION: 99 % | HEART RATE: 91 BPM | BODY MASS INDEX: 50.02 KG/M2 | WEIGHT: 293 LBS | DIASTOLIC BLOOD PRESSURE: 80 MMHG | TEMPERATURE: 98 F

## 2017-08-24 DIAGNOSIS — G47.33 OSA ON CPAP: ICD-10-CM

## 2017-08-24 DIAGNOSIS — J45.20 MILD INTERMITTENT ASTHMA WITHOUT COMPLICATION: ICD-10-CM

## 2017-08-24 DIAGNOSIS — J06.9 UPPER RESPIRATORY TRACT INFECTION, UNSPECIFIED TYPE: Primary | ICD-10-CM

## 2017-08-24 DIAGNOSIS — I10 ESSENTIAL HYPERTENSION: ICD-10-CM

## 2017-08-24 DIAGNOSIS — Z87.09 HISTORY OF ASTHMA: ICD-10-CM

## 2017-08-24 DIAGNOSIS — E05.00 GRAVES' DISEASE: ICD-10-CM

## 2017-08-24 PROCEDURE — 96372 THER/PROPH/DIAG INJ SC/IM: CPT | Mod: S$GLB,,, | Performed by: FAMILY MEDICINE

## 2017-08-24 PROCEDURE — 99999 PR PBB SHADOW E&M-EST. PATIENT-LVL III: CPT | Mod: PBBFAC,,, | Performed by: FAMILY MEDICINE

## 2017-08-24 PROCEDURE — 3074F SYST BP LT 130 MM HG: CPT | Mod: S$GLB,,, | Performed by: FAMILY MEDICINE

## 2017-08-24 PROCEDURE — 99214 OFFICE O/P EST MOD 30 MIN: CPT | Mod: 25,S$GLB,, | Performed by: FAMILY MEDICINE

## 2017-08-24 PROCEDURE — 3079F DIAST BP 80-89 MM HG: CPT | Mod: S$GLB,,, | Performed by: FAMILY MEDICINE

## 2017-08-24 PROCEDURE — 3008F BODY MASS INDEX DOCD: CPT | Mod: S$GLB,,, | Performed by: FAMILY MEDICINE

## 2017-08-24 RX ORDER — LATANOPROST 50 UG/ML
1 SOLUTION/ DROPS OPHTHALMIC NIGHTLY
Refills: 1 | COMMUNITY
Start: 2017-08-17 | End: 2018-07-10 | Stop reason: SDUPTHER

## 2017-08-24 RX ORDER — BETAMETHASONE SODIUM PHOSPHATE AND BETAMETHASONE ACETATE 3; 3 MG/ML; MG/ML
6 INJECTION, SUSPENSION INTRA-ARTICULAR; INTRALESIONAL; INTRAMUSCULAR; SOFT TISSUE
Status: COMPLETED | OUTPATIENT
Start: 2017-08-24 | End: 2017-08-24

## 2017-08-24 RX ADMIN — BETAMETHASONE SODIUM PHOSPHATE AND BETAMETHASONE ACETATE 6 MG: 3; 3 INJECTION, SUSPENSION INTRA-ARTICULAR; INTRALESIONAL; INTRAMUSCULAR; SOFT TISSUE at 09:08

## 2017-08-24 NOTE — PROGRESS NOTES
Subjective:       Patient ID: Shaina Olson is a 37 y.o. female.    Chief Complaint: Sinusitis; Cough; Nasal Congestion; Headache; and Otalgia (haroon)      HPI Comments:     24-hour history of dry hacking cough, keeping her up all night.  Congestion.  Bilateral ear pain and itching.  No sore throat.  No GI symptoms.  Hasn't taken anything for it yet.  Has Phenergan cough syrup at home which has helped in the past      Review of Systems   Constitutional: Positive for activity change. Negative for appetite change, chills and fever.   HENT: Positive for congestion, ear pain, postnasal drip, rhinorrhea and sinus pressure. Negative for sore throat.    Respiratory: Positive for cough. Negative for shortness of breath.    Cardiovascular: Negative for chest pain.   Gastrointestinal: Negative for abdominal pain.   Neurological: Negative for dizziness and headaches.   Hematological: Negative for adenopathy.       Objective:      Physical Exam   Constitutional: She is oriented to person, place, and time. She appears well-developed and well-nourished. No distress.   HENT:   Right Ear: Hearing, tympanic membrane, external ear and ear canal normal.   Left Ear: Hearing, tympanic membrane, external ear and ear canal normal.   Nose: Mucosal edema and rhinorrhea present.   Mouth/Throat: Mucous membranes are normal. Posterior oropharyngeal edema present. No oropharyngeal exudate, posterior oropharyngeal erythema or tonsillar abscesses.   Neck: Normal range of motion.   Cardiovascular: Normal rate, regular rhythm and normal heart sounds.    No murmur heard.  Pulmonary/Chest: Effort normal and breath sounds normal. She has no rales.   Lymphadenopathy:     She has no cervical adenopathy.   Neurological: She is alert and oriented to person, place, and time.   Skin: Skin is warm and dry. She is not diaphoretic.   Psychiatric: She has a normal mood and affect. Her behavior is normal. Judgment and thought content normal.   Nursing note and  vitals reviewed.      Assessment:       1. Upper respiratory tract infection, unspecified type    2. History of asthma    3. Essential hypertension    4. Graves' disease    5. Mild intermittent asthma without complication    6. TREVOR on CPAP        Plan:   Upper respiratory tract infection, unspecified type  Comments:  IM  [employer]; has Phenergan syrup at home for cough, phenylephrine if needed for decongestant  Orders:  -     betamethasone acetate-betamethasone sodium phosphate injection 6 mg; Inject 1 mL (6 mg total) into the muscle one time.    History of asthma  Comments:  Albuterol inhaler when necessary for wheezing or protracted coughing spells  Orders:  -     betamethasone acetate-betamethasone sodium phosphate injection 6 mg; Inject 1 mL (6 mg total) into the muscle one time.    Essential hypertension  Comments:  Controlled    Graves' disease  Comments:  Stable per recent lab work    Mild intermittent asthma without complication  Comments:  Currently stable.  Albuterol when necessary    TREVOR on CPAP

## 2017-08-31 RX ORDER — AMOXICILLIN 875 MG/1
875 TABLET, FILM COATED ORAL 2 TIMES DAILY
Qty: 20 TABLET | Refills: 0 | Status: SHIPPED | OUTPATIENT
Start: 2017-08-31 | End: 2017-09-10

## 2017-09-21 ENCOUNTER — TELEPHONE (OUTPATIENT)
Dept: OBSTETRICS AND GYNECOLOGY | Facility: CLINIC | Age: 37
End: 2017-09-21

## 2017-09-21 NOTE — TELEPHONE ENCOUNTER
Patient asking if her Sean (OCP) has been called to the [facility] in [place] as discussed.  Please advise.

## 2017-09-22 ENCOUNTER — TELEPHONE (OUTPATIENT)
Dept: OBSTETRICS AND GYNECOLOGY | Facility: CLINIC | Age: 37
End: 2017-09-22

## 2017-09-22 RX ORDER — LEVONORGESTREL / ETHINYL ESTRADIOL AND ETHINYL ESTRADIOL 150-30(84)
1 KIT ORAL DAILY
Qty: 30 EACH | Refills: 11 | Status: SHIPPED | OUTPATIENT
Start: 2017-09-22 | End: 2018-12-03 | Stop reason: SDUPTHER

## 2017-10-02 ENCOUNTER — OFFICE VISIT (OUTPATIENT)
Dept: OTOLARYNGOLOGY | Facility: CLINIC | Age: 37
End: 2017-10-02
Payer: COMMERCIAL

## 2017-10-02 VITALS — HEART RATE: 84 BPM | TEMPERATURE: 99 F | WEIGHT: 293 LBS | BODY MASS INDEX: 72.09 KG/M2

## 2017-10-02 DIAGNOSIS — Z98.890 HISTORY OF SINUS SURGERY: ICD-10-CM

## 2017-10-02 DIAGNOSIS — J32.9 CHRONIC SINUSITIS, UNSPECIFIED LOCATION: Primary | ICD-10-CM

## 2017-10-02 DIAGNOSIS — J30.89 CHRONIC NON-SEASONAL ALLERGIC RHINITIS, UNSPECIFIED TRIGGER: ICD-10-CM

## 2017-10-02 PROCEDURE — 99214 OFFICE O/P EST MOD 30 MIN: CPT | Mod: 25,S$GLB,, | Performed by: ORTHOPAEDIC SURGERY

## 2017-10-02 PROCEDURE — 31231 NASAL ENDOSCOPY DX: CPT | Mod: S$GLB,,, | Performed by: ORTHOPAEDIC SURGERY

## 2017-10-02 PROCEDURE — 99999 PR PBB SHADOW E&M-EST. PATIENT-LVL III: CPT | Mod: PBBFAC,,, | Performed by: ORTHOPAEDIC SURGERY

## 2017-10-02 RX ORDER — CEFDINIR 300 MG/1
300 CAPSULE ORAL 2 TIMES DAILY
Qty: 20 CAPSULE | Refills: 0 | Status: SHIPPED | OUTPATIENT
Start: 2017-10-02 | End: 2017-10-12

## 2017-10-02 NOTE — PROGRESS NOTES
Subjective:       Patient ID: Shaina Olson is a 37 y.o. female.    Chief Complaint: Sinus Problem    Patient is a very pleasant 37 year old female here to see me today in followup for evaluation of her sinuses.   First, she has recently noted an increase in her allergy symptoms including nasal congestion and drainage.  She has not had any eye symptoms including itching and drainage.  She has been having itching in her ears, and has been itching her throat as well.  She has had a feeling of congestion in her ears.  She continues to have issues with intermittent hoarseness, worse with singing.  She has a history of childhood asthma, not currently.  She remains on Singulair, Xyzal and Flonase daily.  She has been in immunotherapy in the past (several years ago), and found benefit from injections the first time.  She had a variety of aeroallergens.  Second, she has had a CT of her sinuses at her last visit which showed mild mucosal thickening.  She has been using her Nasoneb every other day, and is still seeing green and hard nasal draiange.  She has a history of a FESS in 2001 with Dr. De La Rosa in [place].  She has noted increased headaches as well.      Review of Systems   Constitutional: Positive for fatigue. Negative for chills, fever and unexpected weight change.   HENT: Positive for congestion, postnasal drip, rhinorrhea and sinus pressure. Negative for dental problem, ear discharge, ear pain, facial swelling, hearing loss, nosebleeds, sneezing, sore throat, tinnitus, trouble swallowing and voice change.    Eyes: Positive for itching. Negative for redness and visual disturbance.   Respiratory: Positive for cough and shortness of breath. Negative for choking and wheezing.    Cardiovascular: Negative for chest pain and palpitations.   Gastrointestinal: Negative for abdominal pain.        No reflux.   Musculoskeletal: Negative for gait problem.   Skin: Negative for rash.   Allergic/Immunologic: Positive for  environmental allergies.   Neurological: Positive for headaches. Negative for dizziness and light-headedness.       Objective:      Physical Exam   Constitutional: She is oriented to person, place, and time. She appears well-developed and well-nourished. No distress.   HENT:   Head: Normocephalic and atraumatic.   Right Ear: Tympanic membrane, external ear and ear canal normal.   Left Ear: Tympanic membrane, external ear and ear canal normal.   Nose: Nose normal. No mucosal edema, rhinorrhea, nasal deformity or septal deviation. No epistaxis. Right sinus exhibits no maxillary sinus tenderness and no frontal sinus tenderness. Left sinus exhibits no maxillary sinus tenderness and no frontal sinus tenderness.   Mouth/Throat: Uvula is midline, oropharynx is clear and moist and mucous membranes are normal. Mucous membranes are not pale and not dry. No dental caries. No oropharyngeal exudate or posterior oropharyngeal erythema.   Hyponasal voice   Eyes: Conjunctivae, EOM and lids are normal. Pupils are equal, round, and reactive to light. Right eye exhibits no chemosis. Left eye exhibits no chemosis. Right conjunctiva is not injected. Left conjunctiva is not injected. No scleral icterus. Right eye exhibits normal extraocular motion and no nystagmus. Left eye exhibits normal extraocular motion and no nystagmus.   Neck: Trachea normal and phonation normal. No tracheal tenderness present. No tracheal deviation present. No thyroid mass and no thyromegaly present.   Cardiovascular: Intact distal pulses.    Pulmonary/Chest: Effort normal. No stridor. No respiratory distress.   Abdominal: She exhibits no distension.   Lymphadenopathy:        Head (right side): No submental, no submandibular, no preauricular, no posterior auricular and no occipital adenopathy present.        Head (left side): No submental, no submandibular, no preauricular, no posterior auricular and no occipital adenopathy present.     She has no cervical  adenopathy.   Neurological: She is alert and oriented to person, place, and time. No cranial nerve deficit.   Skin: Skin is warm and dry. No rash noted. No erythema.   Psychiatric: She has a normal mood and affect. Her behavior is normal.       PROCEDURE NOTE:  Nasal endoscopy and debridement  Preprocedure diagnosis:  Chronic sinusitis  Postprocedure diangosis:  Same  Complications:  None  Blood Loss:  None    Procedure in detail:  After verbal consent was obtained, the patient's nasal cavity was anesthesized using topical Tetracaine and Neosynepherine.  A rigid 30 degree endoscope was placed in first the right, then the left nasal cavity.  The inferior and middle turbinates were examined, and found to be normal bilaterally.  The middle meatus and maxillary antrum was also examined, and found to be normal bilaterally.  No purulent drainage or masses seen.  Overall, she has had good result from her FESS and I can see into her ethmoids and maxillary sinuses bilaterally.  She did have a concretion in the floor of her left maxillary sinus that I was unable to remove in clinic due to patient tolerance, was surrounded by purulent drainage as well.  The patient tolerated the procedure well and there were no complications.            Assessment:       1. Chronic sinusitis, unspecified location    2. History of sinus surgery    3. Chronic non-seasonal allergic rhinitis, unspecified trigger        Plan:       1.  Chronic sinusitis:  Her exam today is consistent with an infection in her left maxillary sinus.  I would like for her to start using a sinus irrigation to her nose daily to help soften and flush out the debris in her left maxillary sinus.  Prescription for omnicef sent to her pharmacy.  Return to clinic in 2-3 weeks for repeat scope examination, and will hopefully be able to clear her left maxillary sinus at that time.  2.  History of sinus surgery:  As above.  3.  Chronic AR:  Continue with Singulair, Xyzal, and  Flonase at this time.  Discussed that repeating her allergy testing and considering immunotherapy is still an option once her sinus disease is controlled.  Will discuss further at her return visit.  She had been on immunotherapy in the past, but it was difficult at that time for her to be consistent with her injections, would be easier at this time for her.

## 2017-10-11 ENCOUNTER — PATIENT MESSAGE (OUTPATIENT)
Dept: OTOLARYNGOLOGY | Facility: CLINIC | Age: 37
End: 2017-10-11

## 2017-10-12 DIAGNOSIS — F41.9 ANXIETY: ICD-10-CM

## 2017-10-12 DIAGNOSIS — M79.10 MYALGIA: ICD-10-CM

## 2017-10-13 RX ORDER — DULOXETIN HYDROCHLORIDE 30 MG/1
CAPSULE, DELAYED RELEASE ORAL
Qty: 30 CAPSULE | Refills: 3 | Status: SHIPPED | OUTPATIENT
Start: 2017-10-13 | End: 2017-12-18 | Stop reason: SDUPTHER

## 2017-10-23 ENCOUNTER — OFFICE VISIT (OUTPATIENT)
Dept: OTOLARYNGOLOGY | Facility: CLINIC | Age: 37
End: 2017-10-23
Payer: COMMERCIAL

## 2017-10-23 VITALS
HEART RATE: 75 BPM | WEIGHT: 293 LBS | RESPIRATION RATE: 18 BRPM | TEMPERATURE: 98 F | HEIGHT: 64 IN | BODY MASS INDEX: 50.02 KG/M2

## 2017-10-23 DIAGNOSIS — Z98.890 HISTORY OF SINUS SURGERY: ICD-10-CM

## 2017-10-23 DIAGNOSIS — J32.4 CHRONIC PANSINUSITIS: Primary | ICD-10-CM

## 2017-10-23 DIAGNOSIS — J30.89 CHRONIC NON-SEASONAL ALLERGIC RHINITIS, UNSPECIFIED TRIGGER: ICD-10-CM

## 2017-10-23 PROCEDURE — 99214 OFFICE O/P EST MOD 30 MIN: CPT | Mod: 25,S$GLB,, | Performed by: ORTHOPAEDIC SURGERY

## 2017-10-23 PROCEDURE — 31231 NASAL ENDOSCOPY DX: CPT | Mod: S$GLB,,, | Performed by: ORTHOPAEDIC SURGERY

## 2017-10-23 PROCEDURE — 99999 PR PBB SHADOW E&M-EST. PATIENT-LVL V: CPT | Mod: PBBFAC,,, | Performed by: ORTHOPAEDIC SURGERY

## 2017-10-23 RX ORDER — ERGOCALCIFEROL 1.25 MG/1
2 CAPSULE ORAL
Refills: 2 | COMMUNITY
Start: 2017-10-09 | End: 2018-07-23 | Stop reason: SDUPTHER

## 2017-10-23 NOTE — PROGRESS NOTES
Subjective:       Patient ID: Shaina Olson is a 37 y.o. female.    Chief Complaint: Sinus Problem    Patient is a very pleasant 37 year old female here to see me today in followup for evaluation of her sinuses.   First, she has recently noted an increase in her allergy symptoms including nasal congestion and drainage.  She has not had any eye symptoms including itching and drainage.  She has been having itching in her ears, and has been itching her throat as well.  She has had a feeling of congestion in her ears.  She continues to have issues with intermittent hoarseness, worse with singing.  She has a history of childhood asthma, not currently.  She remains on Singulair, Xyzal and Flonase daily.  She has been in immunotherapy in the past (several years ago), and found benefit from injections the first time.  She had a variety of aeroallergens.  Second, she has had a CT of her sinuses at her last visit which showed mild mucosal thickening.  She has been using her Nasoneb every other day, and is still seeing green and hard nasal draiange.  She has a history of a FESS in 2001 with Dr. De La Rosa in [place].  She has noted increased headaches as well.  Since her last visit, she has been using a nasal irrigation, as she had purulent debris in her left maxillary sinus.      Review of Systems   Constitutional: Negative for chills, fatigue, fever and unexpected weight change.   HENT: Positive for congestion, postnasal drip and rhinorrhea. Negative for dental problem, ear discharge, ear pain, facial swelling, hearing loss, nosebleeds, sinus pressure (improved from previously), sneezing, sore throat, tinnitus, trouble swallowing and voice change.    Eyes: Positive for itching. Negative for redness and visual disturbance.   Respiratory: Negative for cough, choking, shortness of breath and wheezing.    Cardiovascular: Negative for chest pain and palpitations.   Gastrointestinal: Negative for abdominal pain.        No  reflux.   Musculoskeletal: Negative for gait problem.   Skin: Negative for rash.   Allergic/Immunologic: Positive for environmental allergies.   Neurological: Negative for dizziness, light-headedness and headaches (intermittent).       Objective:      Physical Exam   Constitutional: She is oriented to person, place, and time. She appears well-developed and well-nourished. No distress.   HENT:   Head: Normocephalic and atraumatic.   Right Ear: Tympanic membrane, external ear and ear canal normal.   Left Ear: Tympanic membrane, external ear and ear canal normal.   Nose: Nose normal. No mucosal edema, rhinorrhea, nasal deformity or septal deviation. No epistaxis. Right sinus exhibits no maxillary sinus tenderness and no frontal sinus tenderness. Left sinus exhibits no maxillary sinus tenderness and no frontal sinus tenderness.   Mouth/Throat: Uvula is midline, oropharynx is clear and moist and mucous membranes are normal. Mucous membranes are not pale and not dry. No dental caries. No oropharyngeal exudate or posterior oropharyngeal erythema.   Hyponasal voice   Eyes: Conjunctivae, EOM and lids are normal. Pupils are equal, round, and reactive to light. Right eye exhibits no chemosis. Left eye exhibits no chemosis. Right conjunctiva is not injected. Left conjunctiva is not injected. No scleral icterus. Right eye exhibits normal extraocular motion and no nystagmus. Left eye exhibits normal extraocular motion and no nystagmus.   Neck: Trachea normal and phonation normal. No tracheal tenderness present. No tracheal deviation present. No thyroid mass and no thyromegaly present.   Cardiovascular: Intact distal pulses.    Pulmonary/Chest: Effort normal. No stridor. No respiratory distress.   Abdominal: She exhibits no distension.   Lymphadenopathy:        Head (right side): No submental, no submandibular, no preauricular, no posterior auricular and no occipital adenopathy present.        Head (left side): No submental, no  submandibular, no preauricular, no posterior auricular and no occipital adenopathy present.     She has no cervical adenopathy.   Neurological: She is alert and oriented to person, place, and time. No cranial nerve deficit.   Skin: Skin is warm and dry. No rash noted. No erythema.   Psychiatric: She has a normal mood and affect. Her behavior is normal.       PROCEDURE NOTE:  Nasal endoscopy and debridement  Preprocedure diagnosis:  Chronic sinusitis  Postprocedure diangosis:  Same  Complications:  None  Blood Loss:  None     Procedure in detail:  After verbal consent was obtained, the patient's nasal cavity was anesthesized using topical Tetracaine and Neosynepherine.  A rigid 30 degree endoscope was placed in first the right, then the left nasal cavity.  The inferior and middle turbinates were examined, and found to be normal bilaterally.  The middle meatus and maxillary antrum was also examined, and found to be normal bilaterally.  No purulent drainage or masses seen.  Overall, she has had good result from her FESS and I can see into her ethmoids and maxillary sinuses bilaterally.  She did have a concretion in the floor of her left maxillary sinus that I was unable to remove in clinic due to patient tolerance, was surrounded by purulent drainage as well.  The patient tolerated the procedure well and there were no complications.        Assessment:       1. Chronic pansinusitis    2. History of sinus surgery    3. Chronic non-seasonal allergic rhinitis, unspecified trigger        Plan:       1.  Chronic pansinusitis:  Unfortunately, despite aggressive use of saline irrigations and antibiotics, she continues to have a large concretion in her left maxillary sinus surrounded by purulent debris.  At this point, I would recommend a revision FESS, primarily focusing on her left maxillary sinus and other areas of residual disease.  Risks and benefits of the procedure were discussed in detail, and she agreed to the  treatment plan.  2.  History of sinus surgery:  As above, will proceed with revision FESS at her convenience.  3.  Chronic AR:  She understands that she likely has at least a component of her symptoms related to allergy, and if she continues to have symptoms after her upcoming procedure would then consider repeating her skin testing as it has been some time since it has been done.

## 2017-10-23 NOTE — PROGRESS NOTES
Subjective:       Patient ID: Shaina Olson is a 37 y.o. female.    Chief Complaint: Sinus Problem    HPI  Review of Systems    Objective:      Physical Exam    Assessment:       No diagnosis found.    Plan:       ***

## 2017-10-26 ENCOUNTER — CLINICAL SUPPORT (OUTPATIENT)
Dept: CARDIOLOGY | Facility: CLINIC | Age: 37
End: 2017-10-26
Payer: COMMERCIAL

## 2017-10-26 ENCOUNTER — TELEPHONE (OUTPATIENT)
Dept: FAMILY MEDICINE | Facility: CLINIC | Age: 37
End: 2017-10-26

## 2017-10-26 ENCOUNTER — LAB VISIT (OUTPATIENT)
Dept: LAB | Facility: HOSPITAL | Age: 37
End: 2017-10-26
Attending: ORTHOPAEDIC SURGERY
Payer: COMMERCIAL

## 2017-10-26 DIAGNOSIS — J32.4 CHRONIC PANSINUSITIS: ICD-10-CM

## 2017-10-26 DIAGNOSIS — E78.5 DYSLIPIDEMIA: ICD-10-CM

## 2017-10-26 DIAGNOSIS — E78.5 DYSLIPIDEMIA: Primary | ICD-10-CM

## 2017-10-26 LAB
ANION GAP SERPL CALC-SCNC: 7 MMOL/L
BASOPHILS # BLD AUTO: 0.06 K/UL
BASOPHILS NFR BLD: 0.6 %
BUN SERPL-MCNC: 9 MG/DL
CALCIUM SERPL-MCNC: 9.2 MG/DL
CHLORIDE SERPL-SCNC: 107 MMOL/L
CO2 SERPL-SCNC: 25 MMOL/L
CREAT SERPL-MCNC: 0.7 MG/DL
DIFFERENTIAL METHOD: NORMAL
EOSINOPHIL # BLD AUTO: 0.2 K/UL
EOSINOPHIL NFR BLD: 2.1 %
ERYTHROCYTE [DISTWIDTH] IN BLOOD BY AUTOMATED COUNT: 13.4 %
EST. GFR  (AFRICAN AMERICAN): >60 ML/MIN/1.73 M^2
EST. GFR  (NON AFRICAN AMERICAN): >60 ML/MIN/1.73 M^2
GLUCOSE SERPL-MCNC: 84 MG/DL
HCT VFR BLD AUTO: 39 %
HGB BLD-MCNC: 12.5 G/DL
IMM GRANULOCYTES # BLD AUTO: 0.03 K/UL
IMM GRANULOCYTES NFR BLD AUTO: 0.3 %
LYMPHOCYTES # BLD AUTO: 3 K/UL
LYMPHOCYTES NFR BLD: 28.5 %
MCH RBC QN AUTO: 29.3 PG
MCHC RBC AUTO-ENTMCNC: 32.1 G/DL
MCV RBC AUTO: 92 FL
MONOCYTES # BLD AUTO: 0.5 K/UL
MONOCYTES NFR BLD: 5.2 %
NEUTROPHILS # BLD AUTO: 6.6 K/UL
NEUTROPHILS NFR BLD: 63.3 %
NRBC BLD-RTO: 0 /100 WBC
PLATELET # BLD AUTO: 343 K/UL
PMV BLD AUTO: 10.2 FL
POTASSIUM SERPL-SCNC: 3.9 MMOL/L
RBC # BLD AUTO: 4.26 M/UL
SODIUM SERPL-SCNC: 139 MMOL/L
WBC # BLD AUTO: 10.37 K/UL

## 2017-10-26 PROCEDURE — 80048 BASIC METABOLIC PNL TOTAL CA: CPT

## 2017-10-26 PROCEDURE — 83036 HEMOGLOBIN GLYCOSYLATED A1C: CPT

## 2017-10-26 PROCEDURE — 36415 COLL VENOUS BLD VENIPUNCTURE: CPT | Mod: PO

## 2017-10-26 PROCEDURE — 93010 ELECTROCARDIOGRAM REPORT: CPT | Mod: S$GLB,,, | Performed by: INTERNAL MEDICINE

## 2017-10-26 PROCEDURE — 93005 ELECTROCARDIOGRAM TRACING: CPT | Mod: S$GLB,,, | Performed by: ORTHOPAEDIC SURGERY

## 2017-10-26 PROCEDURE — 85025 COMPLETE CBC W/AUTO DIFF WBC: CPT

## 2017-10-27 ENCOUNTER — HOSPITAL ENCOUNTER (OUTPATIENT)
Dept: RADIOLOGY | Facility: HOSPITAL | Age: 37
Discharge: HOME | End: 2017-10-27
Attending: ORTHOPAEDIC SURGERY
Payer: COMMERCIAL

## 2017-10-27 DIAGNOSIS — J32.4 CHRONIC PANSINUSITIS: ICD-10-CM

## 2017-10-27 LAB
ESTIMATED AVG GLUCOSE: 85 MG/DL
HBA1C MFR BLD HPLC: 4.6 %

## 2017-10-27 PROCEDURE — 70486 CT MAXILLOFACIAL W/O DYE: CPT | Mod: TC

## 2017-10-29 ENCOUNTER — PATIENT MESSAGE (OUTPATIENT)
Dept: SURGERY | Facility: HOSPITAL | Age: 37
End: 2017-10-29

## 2017-11-01 ENCOUNTER — OFFICE VISIT (OUTPATIENT)
Dept: FAMILY MEDICINE | Facility: CLINIC | Age: 37
End: 2017-11-01
Payer: COMMERCIAL

## 2017-11-01 VITALS
WEIGHT: 293 LBS | OXYGEN SATURATION: 99 % | DIASTOLIC BLOOD PRESSURE: 85 MMHG | BODY MASS INDEX: 72.66 KG/M2 | SYSTOLIC BLOOD PRESSURE: 129 MMHG | HEART RATE: 89 BPM

## 2017-11-01 DIAGNOSIS — E66.01 MORBID OBESITY WITH BMI OF 70 AND OVER, ADULT: ICD-10-CM

## 2017-11-01 DIAGNOSIS — Z01.811 PRE-OP CHEST EXAM: Primary | ICD-10-CM

## 2017-11-01 DIAGNOSIS — I10 HYPERTENSION, UNSPECIFIED TYPE: ICD-10-CM

## 2017-11-01 PROCEDURE — 99214 OFFICE O/P EST MOD 30 MIN: CPT | Mod: S$GLB,,, | Performed by: FAMILY MEDICINE

## 2017-11-01 PROCEDURE — 99999 PR PBB SHADOW E&M-EST. PATIENT-LVL II: CPT | Mod: PBBFAC,,, | Performed by: FAMILY MEDICINE

## 2017-11-01 RX ORDER — CYCLOBENZAPRINE HCL 10 MG
10 TABLET ORAL 3 TIMES DAILY PRN
Qty: 90 TABLET | Refills: 0 | Status: SHIPPED | OUTPATIENT
Start: 2017-11-01 | End: 2018-02-20 | Stop reason: SDUPTHER

## 2017-11-01 NOTE — PROGRESS NOTES
Subjective:       Patient ID: Shaina Olson is a 37 y.o. female.    Chief Complaint: No chief complaint on file.    37 y old female with MO, HTN here to get pre op clearance  for  Sinus surgery (functional endoscopy )By Dr Wilkinson  on 11/22 . Last surgery was orthopaedic wrist surgery in 2013. She did  well , no complications . No hx of DVT . NO adverse reactions  to anesthesia . She  Started walking on treadmill however she quit , she denies any PORTILLO, palpations , CP when she used to       Review of Systems   Constitutional: Negative.  Negative for activity change and unexpected weight change.   HENT: Positive for sinus pain and sinus pressure. Negative for hearing loss, rhinorrhea and trouble swallowing.    Eyes: Negative for discharge and visual disturbance.   Respiratory: Negative.  Negative for chest tightness and wheezing.    Cardiovascular: Negative.  Negative for chest pain and palpitations.   Gastrointestinal: Negative.  Negative for blood in stool, constipation, diarrhea and vomiting.   Endocrine: Negative for polydipsia and polyuria.   Genitourinary: Negative.  Negative for difficulty urinating, dysuria, hematuria and menstrual problem.   Musculoskeletal: Negative for arthralgias, joint swelling and neck pain.   Neurological: Negative.  Negative for weakness and headaches.   Hematological: Negative.    Psychiatric/Behavioral: Negative.  Negative for confusion and dysphoric mood.       Objective:      Physical Exam   Constitutional: She is oriented to person, place, and time. She appears well-developed and well-nourished. No distress.   HENT:   Head: Normocephalic and atraumatic.   Right Ear: External ear normal.   Left Ear: External ear normal.   Mouth/Throat: No oropharyngeal exudate.   Eyes: Conjunctivae and EOM are normal. Pupils are equal, round, and reactive to light. Right eye exhibits no discharge. Left eye exhibits no discharge. No scleral icterus.   Neck: Normal range of motion. Neck supple.  No JVD present. No tracheal deviation present. No thyromegaly present.   Cardiovascular: Normal rate, regular rhythm and normal heart sounds.  Exam reveals no gallop and no friction rub.    No murmur heard.  Pulmonary/Chest: Effort normal and breath sounds normal. No stridor. No respiratory distress. She has no wheezes. She has no rales. She exhibits no tenderness.   Abdominal: Soft. Bowel sounds are normal. She exhibits no distension. There is no tenderness. There is no rebound and no guarding.   Musculoskeletal: Normal range of motion.   Lymphadenopathy:     She has no cervical adenopathy.   Neurological: She is alert and oriented to person, place, and time.   Skin: Skin is warm and dry. She is not diaphoretic.   Psychiatric: She has a normal mood and affect. Her behavior is normal. Judgment and thought content normal.       Assessment:      Diagnoses and all orders for this visit:    Pre-op chest exam  -     X-Ray Chest PA And Lateral; Future    Morbid obesity with BMI of 70 and over, adult    Hypertension, unspecified type    Other orders  -     cyclobenzaprine (FLEXERIL) 10 MG tablet; Take 1 tablet (10 mg total) by mouth 3 (three) times daily as needed for Muscle spasms.      Plan:     Risk has been discussed with patient who expressed and verbalized understanding. Based on my assessment , it is okay to proceed with surgery PROVIDED RISK IS ACCEPTABLE TO BOTH THE PATIENT AND THE SURGEON PERFORMING THE SURGERY.     The patient is asked to make an attempt to improve diet and exercise patterns to aid in medical management of this problem.   Controlled . Cont med

## 2017-11-02 ENCOUNTER — HOSPITAL ENCOUNTER (OUTPATIENT)
Dept: RADIOLOGY | Facility: HOSPITAL | Age: 37
Discharge: HOME | End: 2017-11-02
Attending: FAMILY MEDICINE
Payer: COMMERCIAL

## 2017-11-02 ENCOUNTER — TELEPHONE (OUTPATIENT)
Dept: FAMILY MEDICINE | Facility: CLINIC | Age: 37
End: 2017-11-02

## 2017-11-02 DIAGNOSIS — Z01.811 PRE-OP CHEST EXAM: ICD-10-CM

## 2017-11-02 PROCEDURE — 71020 XR CHEST PA AND LATERAL: CPT | Mod: TC,PO

## 2017-11-02 PROCEDURE — 71020 XR CHEST PA AND LATERAL: CPT | Mod: 26,,, | Performed by: RADIOLOGY

## 2017-11-15 ENCOUNTER — PATIENT MESSAGE (OUTPATIENT)
Dept: PULMONOLOGY | Facility: CLINIC | Age: 37
End: 2017-11-15

## 2017-11-15 ENCOUNTER — TELEPHONE (OUTPATIENT)
Dept: OTOLARYNGOLOGY | Facility: CLINIC | Age: 37
End: 2017-11-15

## 2017-11-15 DIAGNOSIS — G47.33 OSA ON CPAP: Primary | Chronic | ICD-10-CM

## 2017-11-15 NOTE — TELEPHONE ENCOUNTER
Attempt #1 to reach patient    I left a message asking the patient to call us back.  We need to let her know that the arrival time for surgery on Wednesday, 11/22/17, is 8:00 am and the surgery should begin around  9:30 am.  NPO after midnight and location also need to be discussed.

## 2017-11-16 NOTE — PRE-PROCEDURE INSTRUCTIONS
Pre op instructions reviewed with patient per phone:    To confirm, Your surgeon has instructed you:  Surgery is scheduled 11/22/17 at ENT.      Please report to [facility] 1st floor main lobby by MD.   Pre admit office to call afternoon prior to surgery with final arrival time      INSTRUCTIONS IMPORTANT!!!  ¨ Do not eat, drink, or smoke after 12 midnight-including water. OK to brush teeth, no gum, candy or mints!    ¨ Take only these medicines with a small swallow of water-morning of surgery.  None  ____  Do not wear makeup, including mascara.  ____  No powder, lotions or creams to surgical area.  ____  Please remove all jewelry, including piercings and leave at home.  ____  No money or valuables needed. Please leave at home.  ____  Please bring identification and insurance information to hospital.  ____  If going home the same day, arrange for a ride home. You will not be able to   drive if Anesthesia was used.  ____  Children, under 12 years old, must remain in the waiting room with an adult.  They are not allowed in patient areas.  ____  Wear loose fitting clothing. Allow for dressings, bandages.  ____  Stop Aspirin, Ibuprofen, Motrin and Aleve at least 5-7 days before surgery, unless otherwise instructed by your doctor, or the nurse.   You MAY use Tylenol/acetaminophen until day of surgery.  ____  If you take diabetic medication, do not take am of surgery unless instructed by   Doctor.  ____ Stop taking any Fish Oil supplement or any Vitamins that contain Vitamin E at least 5 days prior to surgery.          Bathing Instructions-- The night before surgery and the morning prior to coming to the hospital:   -Do not shave the surgical area.   -Shower and wash your hair and body as usual with anti-bacterial  soap and shampoo.   -Rinse your hair and body completely.   -Use one packet of hibiclens to wash the surgical site (using your hand) gently for 5 minutes.  Do not scrub you skin too  hard.   -Do not use hibiclens on your head, face, or genitals.   -Do not wash with anti-bacterial soap after you use the hibiclens.   -Rinse your body thoroughly.   -Dry with clean, soft towel.  Do not use lotion, cream, deodorant, or powders on   the surgical site.    Use antibacterial soap in place of hibiclens if your surgery is on the head, face or genitals.         Surgical Site Infection    Prevention of surgical site infections:     -Keep incisions clean and dry.   -Do not soak/submerge incisions in water until completely healed.   -Do not apply lotions, powders, creams, or deodorants to site.   -Always make sure hands are cleaned with antibacterial soap/ alcohol-based   prior to touching the surgical site.  (This includes doctors, nurses, staff, and yourself.)    Signs and symptoms:   -Redness and pain around the area where you had surgery   -Drainage of cloudy fluid from your surgical wound   -Fever over 100.4  I have read or had read and explained to me, and understand the above information.

## 2017-11-21 ENCOUNTER — ANESTHESIA EVENT (OUTPATIENT)
Dept: SURGERY | Facility: HOSPITAL | Age: 37
End: 2017-11-21
Payer: COMMERCIAL

## 2017-11-22 ENCOUNTER — HOSPITAL ENCOUNTER (OUTPATIENT)
Facility: HOSPITAL | Age: 37
Discharge: HOME | End: 2017-11-22
Attending: ORTHOPAEDIC SURGERY | Admitting: ORTHOPAEDIC SURGERY
Payer: COMMERCIAL

## 2017-11-22 ENCOUNTER — ANESTHESIA (OUTPATIENT)
Dept: SURGERY | Facility: HOSPITAL | Age: 37
End: 2017-11-22
Payer: COMMERCIAL

## 2017-11-22 ENCOUNTER — SURGERY (OUTPATIENT)
Age: 37
End: 2017-11-22

## 2017-11-22 VITALS
TEMPERATURE: 98 F | OXYGEN SATURATION: 97 % | SYSTOLIC BLOOD PRESSURE: 125 MMHG | HEART RATE: 98 BPM | RESPIRATION RATE: 18 BRPM | HEIGHT: 64 IN | WEIGHT: 293 LBS | DIASTOLIC BLOOD PRESSURE: 80 MMHG | BODY MASS INDEX: 50.02 KG/M2

## 2017-11-22 DIAGNOSIS — J32.4 CHRONIC PANSINUSITIS: Primary | ICD-10-CM

## 2017-11-22 LAB
B-HCG UR QL: NEGATIVE
CTP QC/QA: YES

## 2017-11-22 PROCEDURE — 88305 TISSUE EXAM BY PATHOLOGIST: CPT | Performed by: PATHOLOGY

## 2017-11-22 PROCEDURE — 25000003 PHARM REV CODE 250: Performed by: NURSE ANESTHETIST, CERTIFIED REGISTERED

## 2017-11-22 PROCEDURE — 71000015 HC POSTOP RECOV 1ST HR: Performed by: ORTHOPAEDIC SURGERY

## 2017-11-22 PROCEDURE — 63600175 PHARM REV CODE 636 W HCPCS: Performed by: NURSE ANESTHETIST, CERTIFIED REGISTERED

## 2017-11-22 PROCEDURE — 25000003 PHARM REV CODE 250: Performed by: ORTHOPAEDIC SURGERY

## 2017-11-22 PROCEDURE — 88305 TISSUE EXAM BY PATHOLOGIST: CPT | Mod: 26,,, | Performed by: PATHOLOGY

## 2017-11-22 PROCEDURE — 36000709 HC OR TIME LEV III EA ADD 15 MIN: Performed by: ORTHOPAEDIC SURGERY

## 2017-11-22 PROCEDURE — 61782 SCAN PROC CRANIAL EXTRA: CPT | Mod: ,,, | Performed by: ORTHOPAEDIC SURGERY

## 2017-11-22 PROCEDURE — 81025 URINE PREGNANCY TEST: CPT | Performed by: ANESTHESIOLOGY

## 2017-11-22 PROCEDURE — 31255 NSL/SINS NDSC W/TOT ETHMDCT: CPT | Mod: LT,,, | Performed by: ORTHOPAEDIC SURGERY

## 2017-11-22 PROCEDURE — 36000708 HC OR TIME LEV III 1ST 15 MIN: Performed by: ORTHOPAEDIC SURGERY

## 2017-11-22 PROCEDURE — 71000033 HC RECOVERY, INTIAL HOUR: Performed by: ORTHOPAEDIC SURGERY

## 2017-11-22 PROCEDURE — 27201423 OPTIME MED/SURG SUP & DEVICES STERILE SUPPLY: Performed by: ORTHOPAEDIC SURGERY

## 2017-11-22 PROCEDURE — 71000016 HC POSTOP RECOV ADDL HR: Performed by: ORTHOPAEDIC SURGERY

## 2017-11-22 PROCEDURE — 63600175 PHARM REV CODE 636 W HCPCS: Performed by: ANESTHESIOLOGY

## 2017-11-22 PROCEDURE — 37000009 HC ANESTHESIA EA ADD 15 MINS: Performed by: ORTHOPAEDIC SURGERY

## 2017-11-22 PROCEDURE — 31267 ENDOSCOPY MAXILLARY SINUS: CPT | Mod: 51,LT,, | Performed by: ORTHOPAEDIC SURGERY

## 2017-11-22 PROCEDURE — 37000008 HC ANESTHESIA 1ST 15 MINUTES: Performed by: ORTHOPAEDIC SURGERY

## 2017-11-22 PROCEDURE — 25000003 PHARM REV CODE 250: Performed by: ANESTHESIOLOGY

## 2017-11-22 RX ORDER — SUCCINYLCHOLINE CHLORIDE 20 MG/ML
INJECTION INTRAMUSCULAR; INTRAVENOUS
Status: DISCONTINUED | OUTPATIENT
Start: 2017-11-22 | End: 2017-11-22

## 2017-11-22 RX ORDER — MORPHINE SULFATE 10 MG/ML
2 INJECTION INTRAMUSCULAR; INTRAVENOUS; SUBCUTANEOUS EVERY 5 MIN PRN
Status: COMPLETED | OUTPATIENT
Start: 2017-11-22 | End: 2017-11-22

## 2017-11-22 RX ORDER — FENTANYL CITRATE 50 UG/ML
INJECTION, SOLUTION INTRAMUSCULAR; INTRAVENOUS
Status: DISCONTINUED | OUTPATIENT
Start: 2017-11-22 | End: 2017-11-22

## 2017-11-22 RX ORDER — DEXAMETHASONE SODIUM PHOSPHATE 4 MG/ML
INJECTION, SOLUTION INTRA-ARTICULAR; INTRALESIONAL; INTRAMUSCULAR; INTRAVENOUS; SOFT TISSUE
Status: DISCONTINUED | OUTPATIENT
Start: 2017-11-22 | End: 2017-11-22

## 2017-11-22 RX ORDER — LIDOCAINE HCL/PF 100 MG/5ML
SYRINGE (ML) INTRAVENOUS
Status: DISCONTINUED | OUTPATIENT
Start: 2017-11-22 | End: 2017-11-22

## 2017-11-22 RX ORDER — GLYCOPYRROLATE 0.2 MG/ML
INJECTION INTRAMUSCULAR; INTRAVENOUS
Status: DISCONTINUED | OUTPATIENT
Start: 2017-11-22 | End: 2017-11-22

## 2017-11-22 RX ORDER — MIDAZOLAM HYDROCHLORIDE 1 MG/ML
INJECTION, SOLUTION INTRAMUSCULAR; INTRAVENOUS
Status: DISCONTINUED | OUTPATIENT
Start: 2017-11-22 | End: 2017-11-22

## 2017-11-22 RX ORDER — OXYMETAZOLINE HCL 0.05 %
2 SPRAY, NON-AEROSOL (ML) NASAL 2 TIMES DAILY
Status: DISCONTINUED | OUTPATIENT
Start: 2017-11-22 | End: 2017-11-22

## 2017-11-22 RX ORDER — ONDANSETRON 2 MG/ML
INJECTION INTRAMUSCULAR; INTRAVENOUS
Status: DISCONTINUED | OUTPATIENT
Start: 2017-11-22 | End: 2017-11-22

## 2017-11-22 RX ORDER — ACETAMINOPHEN 10 MG/ML
INJECTION, SOLUTION INTRAVENOUS
Status: DISCONTINUED | OUTPATIENT
Start: 2017-11-22 | End: 2017-11-22

## 2017-11-22 RX ORDER — HYDROCODONE BITARTRATE AND ACETAMINOPHEN 5; 325 MG/1; MG/1
1 TABLET ORAL EVERY 4 HOURS PRN
Status: DISCONTINUED | OUTPATIENT
Start: 2017-11-22 | End: 2017-11-22 | Stop reason: HOSPADM

## 2017-11-22 RX ORDER — HYDROCODONE BITARTRATE AND ACETAMINOPHEN 5; 325 MG/1; MG/1
1 TABLET ORAL EVERY 6 HOURS PRN
Qty: 15 TABLET | Refills: 0 | Status: SHIPPED | OUTPATIENT
Start: 2017-11-22 | End: 2017-11-27

## 2017-11-22 RX ORDER — PROPOFOL 10 MG/ML
VIAL (ML) INTRAVENOUS
Status: DISCONTINUED | OUTPATIENT
Start: 2017-11-22 | End: 2017-11-22

## 2017-11-22 RX ORDER — SODIUM CHLORIDE 0.9 % (FLUSH) 0.9 %
3 SYRINGE (ML) INJECTION EVERY 8 HOURS
Status: DISCONTINUED | OUTPATIENT
Start: 2017-11-22 | End: 2017-11-22 | Stop reason: HOSPADM

## 2017-11-22 RX ORDER — ROCURONIUM BROMIDE 10 MG/ML
INJECTION, SOLUTION INTRAVENOUS
Status: DISCONTINUED | OUTPATIENT
Start: 2017-11-22 | End: 2017-11-22

## 2017-11-22 RX ORDER — NEOSTIGMINE METHYLSULFATE 1 MG/ML
INJECTION, SOLUTION INTRAVENOUS
Status: DISCONTINUED | OUTPATIENT
Start: 2017-11-22 | End: 2017-11-22

## 2017-11-22 RX ORDER — METOCLOPRAMIDE HYDROCHLORIDE 5 MG/ML
10 INJECTION INTRAMUSCULAR; INTRAVENOUS EVERY 10 MIN PRN
Status: DISCONTINUED | OUTPATIENT
Start: 2017-11-22 | End: 2017-11-22 | Stop reason: HOSPADM

## 2017-11-22 RX ORDER — SODIUM CHLORIDE, SODIUM LACTATE, POTASSIUM CHLORIDE, CALCIUM CHLORIDE 600; 310; 30; 20 MG/100ML; MG/100ML; MG/100ML; MG/100ML
INJECTION, SOLUTION INTRAVENOUS CONTINUOUS
Status: DISCONTINUED | OUTPATIENT
Start: 2017-11-22 | End: 2017-11-22 | Stop reason: HOSPADM

## 2017-11-22 RX ORDER — OXYCODONE HYDROCHLORIDE 5 MG/1
5 TABLET ORAL
Status: DISCONTINUED | OUTPATIENT
Start: 2017-11-22 | End: 2017-11-22 | Stop reason: HOSPADM

## 2017-11-22 RX ORDER — SODIUM CHLORIDE 0.9 % (FLUSH) 0.9 %
3 SYRINGE (ML) INJECTION
Status: DISCONTINUED | OUTPATIENT
Start: 2017-11-22 | End: 2017-11-22 | Stop reason: HOSPADM

## 2017-11-22 RX ORDER — ONDANSETRON 4 MG/1
8 TABLET, ORALLY DISINTEGRATING ORAL EVERY 8 HOURS PRN
Qty: 6 TABLET | Refills: 0 | Status: SHIPPED | OUTPATIENT
Start: 2017-11-22 | End: 2017-12-11

## 2017-11-22 RX ADMIN — LIDOCAINE HYDROCHLORIDE 60 MG: 20 INJECTION, SOLUTION INTRAVENOUS at 10:11

## 2017-11-22 RX ADMIN — PROPOFOL 200 MG: 10 INJECTION, EMULSION INTRAVENOUS at 10:11

## 2017-11-22 RX ADMIN — ROCURONIUM BROMIDE 35 MG: 10 INJECTION, SOLUTION INTRAVENOUS at 10:11

## 2017-11-22 RX ADMIN — DEXAMETHASONE SODIUM PHOSPHATE 4 MG: 4 INJECTION, SOLUTION INTRA-ARTICULAR; INTRALESIONAL; INTRAMUSCULAR; INTRAVENOUS; SOFT TISSUE at 10:11

## 2017-11-22 RX ADMIN — MORPHINE SULFATE 2 MG: 10 INJECTION, SOLUTION INTRAMUSCULAR; INTRAVENOUS at 11:11

## 2017-11-22 RX ADMIN — LIDOCAINE HYDROCHLORIDE 30 ML: 10; .005 INJECTION, SOLUTION EPIDURAL; INFILTRATION; INTRACAUDAL; PERINEURAL at 10:11

## 2017-11-22 RX ADMIN — ACETAMINOPHEN 1000 MG: 10 INJECTION, SOLUTION INTRAVENOUS at 11:11

## 2017-11-22 RX ADMIN — ROBINUL 0.8 MG: 0.2 INJECTION INTRAMUSCULAR; INTRAVENOUS at 11:11

## 2017-11-22 RX ADMIN — MORPHINE SULFATE 2 MG: 10 INJECTION, SOLUTION INTRAMUSCULAR; INTRAVENOUS at 12:11

## 2017-11-22 RX ADMIN — ROCURONIUM BROMIDE 5 MG: 10 INJECTION, SOLUTION INTRAVENOUS at 10:11

## 2017-11-22 RX ADMIN — ONDANSETRON 4 MG: 2 INJECTION, SOLUTION INTRAMUSCULAR; INTRAVENOUS at 11:11

## 2017-11-22 RX ADMIN — SODIUM CHLORIDE, SODIUM LACTATE, POTASSIUM CHLORIDE, AND CALCIUM CHLORIDE: 600; 310; 30; 20 INJECTION, SOLUTION INTRAVENOUS at 10:11

## 2017-11-22 RX ADMIN — SUCCINYLCHOLINE CHLORIDE 140 MG: 20 INJECTION, SOLUTION INTRAMUSCULAR; INTRAVENOUS at 10:11

## 2017-11-22 RX ADMIN — FENTANYL CITRATE 50 MCG: 50 INJECTION, SOLUTION INTRAMUSCULAR; INTRAVENOUS at 10:11

## 2017-11-22 RX ADMIN — ROCURONIUM BROMIDE 10 MG: 10 INJECTION, SOLUTION INTRAVENOUS at 10:11

## 2017-11-22 RX ADMIN — Medication 14 ML: at 10:11

## 2017-11-22 RX ADMIN — MIDAZOLAM HYDROCHLORIDE 2 MG: 1 INJECTION, SOLUTION INTRAMUSCULAR; INTRAVENOUS at 10:11

## 2017-11-22 RX ADMIN — Medication 1 SPRAY: at 01:11

## 2017-11-22 RX ADMIN — NEOSTIGMINE METHYLSULFATE 5 MG: 1 INJECTION INTRAVENOUS at 11:11

## 2017-11-22 NOTE — INTERVAL H&P NOTE
The patient has been examined and the H&P has been reviewed:    I concur with the findings and no changes have occurred since H&P was written.     Past Medical History:   Diagnosis Date    Allergy     Anxiety 2004    Arthritis     Arthritis of right knee     Graves' disease 6/2014    Hypertension 2004    Vitamin D deficiency      Past Surgical History:   Procedure Laterality Date    FRACTURE SURGERY Right 2013    right wrist    NASAL SEPTUM SURGERY  2001    SINUS SURGERY  2001    TONSILLECTOMY, ADENOIDECTOMY  2001    WRIST FRACTURE SURGERY Right 2013    Right wrist     Family History   Problem Relation Age of Onset    Hypertension Mother     Lupus Mother     Diverticulosis Mother     Cancer Sister 30     stg 1 boderline? ov cancer    Cancer Maternal Grandmother     Cancer Maternal Grandfather     Diabetes Paternal Grandmother     Stroke Paternal Grandmother     Cancer Paternal Grandmother     Cancer Paternal Grandfather        Review of patient's allergies indicates:   Allergen Reactions    Demerol [meperidine] Other (See Comments)     blackout         Anesthesia/Surgery risks, benefits and alternative options discussed and understood by patient/family.          There are no hospital problems to display for this patient.

## 2017-11-22 NOTE — PROGRESS NOTES
Pt getting dressed to go home. States feeling nauseated, sitting up at bedside. Emesis bag give. Vomited up dark red fluids with bright red blood noted to mouth. No blood noted coming from nostrils. Dr Wilkinson notified. Instructed to use nasal spray ordered. Neosynephrine sprayed to haroon nostils x5. Pt tolerated well. No cleeding noted states nausea is a lot better. Mom and sister at bedside.1400Pt resting in bed at this time.Monitared for more bleeding at this time. Home Care instructions given to her Mom and Sister.Verbalizes understanding.

## 2017-11-22 NOTE — ANESTHESIA RELEASE NOTE
"Anesthesia Release from PACU Note    Patient: Shaina Olson    Procedure(s) Performed: Procedure(s) (LRB):  SINUS SURGERY FUNCTIONAL ENDOSCOPIC (N/A)    Anesthesia type: general    Post pain: Adequate analgesia    Post assessment: no apparent anesthetic complications, tolerated procedure well and no evidence of recall    Last Vitals:   Visit Vitals  BP (!) 152/72 (BP Location: Right arm, Patient Position: Sitting)   Pulse 96   Temp 36.7 °C (98.1 °F) (Skin)   Resp 20   Ht 5' 4" (1.626 m)   Wt (!) 187.9 kg (414 lb 3.9 oz)   SpO2 98%   Breastfeeding? No   BMI 71.10 kg/m²       Post vital signs: stable    Level of consciousness: awake, alert  and oriented    Nausea/Vomiting: no nausea/no vomiting    Complications: none    Airway Patency: patent    Respiratory: unassisted    Cardiovascular: stable and blood pressure at baseline    Hydration: euvolemic  "

## 2017-11-22 NOTE — ANESTHESIA POSTPROCEDURE EVALUATION
"Anesthesia Post Evaluation    Patient: Shaina Olson    Procedure(s) Performed: Procedure(s) (LRB):  SINUS SURGERY FUNCTIONAL ENDOSCOPIC (N/A)    Final Anesthesia Type: general  Patient location during evaluation: PACU  Patient participation: Yes- Able to Participate  Level of consciousness: awake and alert  Post-procedure vital signs: reviewed and stable  Pain management: adequate  Airway patency: patent  PONV status at discharge: No PONV  Anesthetic complications: no      Cardiovascular status: blood pressure returned to baseline  Respiratory status: unassisted  Hydration status: euvolemic  Follow-up not needed.        Visit Vitals  /80 (BP Location: Right arm, Patient Position: Sitting)   Pulse 98   Temp 36.6 °C (97.9 °F) (Tympanic)   Resp 18   Ht 5' 4" (1.626 m)   Wt (!) 187.9 kg (414 lb 3.9 oz)   SpO2 97%   Breastfeeding? No   BMI 71.10 kg/m²       Pain/Aline Score: Pain Assessment Performed: Yes (11/22/2017 12:00 PM)  Presence of Pain: complains of pain/discomfort (11/22/2017 12:00 PM)  Pain Rating Prior to Med Admin: 6 (11/22/2017 12:00 PM)  Aline Score: 10 (11/22/2017 12:00 PM)      "

## 2017-11-22 NOTE — TRANSFER OF CARE
"Anesthesia Transfer of Care Note    Patient: Shaina Olson    Procedure(s) Performed: Procedure(s) (LRB):  SINUS SURGERY FUNCTIONAL ENDOSCOPIC (N/A)    Patient location: PACU    Anesthesia Type: general    Transport from OR: Transported from OR on room air with adequate spontaneous ventilation    Post pain: adequate analgesia    Post assessment: no apparent anesthetic complications    Post vital signs: stable    Level of consciousness: responds to stimulation    Nausea/Vomiting: no nausea/vomiting    Complications: none    Transfer of care protocol was followed      Last vitals:   Visit Vitals  BP (!) 152/72 (BP Location: Right arm, Patient Position: Sitting)   Pulse 96   Temp 36.7 °C (98.1 °F) (Skin)   Resp 20   Ht 5' 4" (1.626 m)   Wt (!) 187.9 kg (414 lb 3.9 oz)   SpO2 98%   Breastfeeding? No   BMI 71.10 kg/m²     "

## 2017-11-22 NOTE — OP NOTE
DATE OF PROCEDURE:  11/22/2017    SURGEON:  Renée Wilkinson M.D.    ASSISTANT:  None.    PROCEDURES:  1.  Revision, left maxillary antrostomy with removal of tissue.  2.  Revision total ethmoidectomy.  3.  Use of image guidance stereotactic navigation system.    PREOPERATIVE DIAGNOSIS:  Chronic pansinusitis.    POSTOPERATIVE DIAGNOSIS:  Chronic pansinusitis.    FINDINGS:    1.  Thick debris filling the entirety of the left maxillary sinus, consistent   with fungal debris.  2.  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    BLOOD LOSS:  20 mL.    ANESTHESIA:  General.    SPECIMENS:  Left maxillary sinus contents.    INDICATION FOR PROCEDURE:  The patient is a very pleasant 37-year-old female who   presented to ENT clinic with complaints of persistent sinusitis as well as   facial pain and pressure.  Despite repeated rounds of oral antibiotics as well   as aggressive irrigation and attempted debridement in clinic, we were unable to   fully clear her left maxillary sinus.  Of note, she has previously had an   endoscopic sinus surgery and continues to struggle with pansinusitis only on the   left side.  Risks and benefits were discussed with the patient, who elected to   proceed with a procedure.    PROCEDURE IN DETAIL:  After appropriate consents were obtained, the patient was   taken back to the Operating Room and placed on the operating table in a supine   position.  After anesthesia achieved an adequate orotracheal intubation, we   first examined her right sinus cavity.  Overall, her right nasal cavity was   extremely healthy, as was suggested by her recent CT scan of the sinuses.  On   examination, her maxillary antrum on the right was opened and there was no   significant drainage seen.  We then turned our attention to her left nasal   cavity.  On examination, she was found to initially have extremely thick   purulent debris filling the entirety of her left maxillary  sinus, and   photodocumentation was obtained.  On closer examination, she was found to have   accessory ostia with a scar band  the accessory ostia from the true   natural ostia.  This area was infiltrated with approximately 1 mL of 1%   lidocaine with epinephrine.  The scar band  the two ostia was then   sharply transected using Thru-biting instrumentation.  Then, using a curved and   straight microdebrider as well as curved and straight suctions, the left   maxillary sinus was completely cleaned.  Once all of the debris was removed,   this was confirmed both using a 30 degree as well as 70-degree endoscope.  A   curved suction was then placed into the left maxillary sinus and it was   irrigated using a normal saline.  Once this was complete, we then turned our   attention to her ethmoid cavity.  She did have some remaining ethmoid cells and   these were taken down using a microdebrider as well as Thru-cutting   instrumentation using the image guidance system.  Her sphenoid ostia was   identified and it was widely patent and healthy, therefore a revision   sphenoidotomy was not performed.  After this was complete, the patient's nasal   cavity was packed with several pledgets, and she was then brought to Recovery   Room in good condition.  She was extubated in the Operating Room without   difficulty.      AGR/IN  dd: 11/22/2017 11:35:53 (CST)  td: 11/22/2017 12:25:46 (CST)  Doc ID   #[id number]  Job ID #[id number]    CC:

## 2017-11-22 NOTE — H&P (VIEW-ONLY)
Subjective:       Patient ID: Shaina Olson is a 37 y.o. female.    Chief Complaint: Sinus Problem    Patient is a very pleasant 37 year old female here to see me today in followup for evaluation of her sinuses.   First, she has recently noted an increase in her allergy symptoms including nasal congestion and drainage.  She has not had any eye symptoms including itching and drainage.  She has been having itching in her ears, and has been itching her throat as well.  She has had a feeling of congestion in her ears.  She continues to have issues with intermittent hoarseness, worse with singing.  She has a history of childhood asthma, not currently.  She remains on Singulair, Xyzal and Flonase daily.  She has been in immunotherapy in the past (several years ago), and found benefit from injections the first time.  She had a variety of aeroallergens.  Second, she has had a CT of her sinuses at her last visit which showed mild mucosal thickening.  She has been using her Nasoneb every other day, and is still seeing green and hard nasal draiange.  She has a history of a FESS in 2001 with Dr. De La Rosa in [place].  She has noted increased headaches as well.  Since her last visit, she has been using a nasal irrigation, as she had purulent debris in her left maxillary sinus.      Review of Systems   Constitutional: Negative for chills, fatigue, fever and unexpected weight change.   HENT: Positive for congestion, postnasal drip and rhinorrhea. Negative for dental problem, ear discharge, ear pain, facial swelling, hearing loss, nosebleeds, sinus pressure (improved from previously), sneezing, sore throat, tinnitus, trouble swallowing and voice change.    Eyes: Positive for itching. Negative for redness and visual disturbance.   Respiratory: Negative for cough, choking, shortness of breath and wheezing.    Cardiovascular: Negative for chest pain and palpitations.   Gastrointestinal: Negative for abdominal pain.        No  reflux.   Musculoskeletal: Negative for gait problem.   Skin: Negative for rash.   Allergic/Immunologic: Positive for environmental allergies.   Neurological: Negative for dizziness, light-headedness and headaches (intermittent).       Objective:      Physical Exam   Constitutional: She is oriented to person, place, and time. She appears well-developed and well-nourished. No distress.   HENT:   Head: Normocephalic and atraumatic.   Right Ear: Tympanic membrane, external ear and ear canal normal.   Left Ear: Tympanic membrane, external ear and ear canal normal.   Nose: Nose normal. No mucosal edema, rhinorrhea, nasal deformity or septal deviation. No epistaxis. Right sinus exhibits no maxillary sinus tenderness and no frontal sinus tenderness. Left sinus exhibits no maxillary sinus tenderness and no frontal sinus tenderness.   Mouth/Throat: Uvula is midline, oropharynx is clear and moist and mucous membranes are normal. Mucous membranes are not pale and not dry. No dental caries. No oropharyngeal exudate or posterior oropharyngeal erythema.   Hyponasal voice   Eyes: Conjunctivae, EOM and lids are normal. Pupils are equal, round, and reactive to light. Right eye exhibits no chemosis. Left eye exhibits no chemosis. Right conjunctiva is not injected. Left conjunctiva is not injected. No scleral icterus. Right eye exhibits normal extraocular motion and no nystagmus. Left eye exhibits normal extraocular motion and no nystagmus.   Neck: Trachea normal and phonation normal. No tracheal tenderness present. No tracheal deviation present. No thyroid mass and no thyromegaly present.   Cardiovascular: Intact distal pulses.    Pulmonary/Chest: Effort normal. No stridor. No respiratory distress.   Abdominal: She exhibits no distension.   Lymphadenopathy:        Head (right side): No submental, no submandibular, no preauricular, no posterior auricular and no occipital adenopathy present.        Head (left side): No submental, no  submandibular, no preauricular, no posterior auricular and no occipital adenopathy present.     She has no cervical adenopathy.   Neurological: She is alert and oriented to person, place, and time. No cranial nerve deficit.   Skin: Skin is warm and dry. No rash noted. No erythema.   Psychiatric: She has a normal mood and affect. Her behavior is normal.       PROCEDURE NOTE:  Nasal endoscopy and debridement  Preprocedure diagnosis:  Chronic sinusitis  Postprocedure diangosis:  Same  Complications:  None  Blood Loss:  None     Procedure in detail:  After verbal consent was obtained, the patient's nasal cavity was anesthesized using topical Tetracaine and Neosynepherine.  A rigid 30 degree endoscope was placed in first the right, then the left nasal cavity.  The inferior and middle turbinates were examined, and found to be normal bilaterally.  The middle meatus and maxillary antrum was also examined, and found to be normal bilaterally.  No purulent drainage or masses seen.  Overall, she has had good result from her FESS and I can see into her ethmoids and maxillary sinuses bilaterally.  She did have a concretion in the floor of her left maxillary sinus that I was unable to remove in clinic due to patient tolerance, was surrounded by purulent drainage as well.  The patient tolerated the procedure well and there were no complications.        Assessment:       1. Chronic pansinusitis    2. History of sinus surgery    3. Chronic non-seasonal allergic rhinitis, unspecified trigger        Plan:       1.  Chronic pansinusitis:  Unfortunately, despite aggressive use of saline irrigations and antibiotics, she continues to have a large concretion in her left maxillary sinus surrounded by purulent debris.  At this point, I would recommend a revision FESS, primarily focusing on her left maxillary sinus and other areas of residual disease.  Risks and benefits of the procedure were discussed in detail, and she agreed to the  treatment plan.  2.  History of sinus surgery:  As above, will proceed with revision FESS at her convenience.  3.  Chronic AR:  She understands that she likely has at least a component of her symptoms related to allergy, and if she continues to have symptoms after her upcoming procedure would then consider repeating her skin testing as it has been some time since it has been done.

## 2017-11-22 NOTE — BRIEF OP NOTE
[facility]  Brief Operative Note     SUMMARY     Surgery Date: 11/22/2017     Surgeon(s) and Role:     * Renée Wilkinson MD - Primary    Assisting Surgeon: None    Pre-op Diagnosis:  Chronic pansinusitis [J32.4]    Post-op Diagnosis:  Post-Op Diagnosis Codes:     * Chronic pansinusitis [J32.4]    Procedure(s) (LRB):  SINUS SURGERY FUNCTIONAL ENDOSCOPIC (N/A)    Anesthesia: General    Findings/Key Components:  Left maxillary sinus debris, accessory ostia    Estimated Blood Loss: 20 mL         Specimens:   Specimen (12h ago through future)    Start     Ordered    11/22/17 1100  Specimen to Pathology - Surgery  Once     Comments:  1.) Left Maxillary ContentsDX: Chronic Pansinusitis      11/22/17 1120          Discharge Note    SUMMARY     Admit Date: 11/22/2017    Discharge Date and Time: No discharge date for patient encounter.    Attending Physician: Renée Wilkinson MD     Discharge Provider: Renée Wilkinson    Final Diagnosis: Post-Op Diagnosis Codes:     * Chronic pansinusitis [J32.4]    Disposition: Home or Self Care    Follow Up/Patient Instructions:     Medications:  Reconciled Home Medications: Current Discharge Medication List      START taking these medications    Details   hydrocodone-acetaminophen 5-325mg (NORCO) 5-325 mg per tablet Take 1 tablet by mouth every 6 (six) hours as needed for Pain.  Qty: 15 tablet, Refills: 0      ondansetron (ZOFRAN-ODT) 4 MG TbDL Take 2 tablets (8 mg total) by mouth every 8 (eight) hours as needed.  Qty: 6 tablet, Refills: 0      phenylephrine HCL 0.5% (FERCHO-SYNEPHRINE) 0.5 % nasal spray 1 drop by Nasal route as needed (nasal bleeding).  Refills: 0      sodium chloride (SALINE NASAL) 0.65 % nasal spray 1 spray by Nasal route every hour as needed for Congestion.  Refills: 12         CONTINUE these medications which have NOT CHANGED    Details   !! amlodipine (NORVASC) 2.5 MG tablet TAKE 1 TABLET BY MOUTH EVERY DAY WITH 5MG FOR A TOTAL OF 7.5 MG PER DAY  Qty: 90  tablet, Refills: 3      !! amlodipine (NORVASC) 5 MG tablet Take 1 tablet (5 mg total) by mouth once daily.  Qty: 90 tablet, Refills: 1    Associated Diagnoses: Hypertension goal BP (blood pressure) < 140/90      cyclobenzaprine (FLEXERIL) 10 MG tablet Take 1 tablet (10 mg total) by mouth 3 (three) times daily as needed for Muscle spasms.  Qty: 90 tablet, Refills: 0      duloxetine (CYMBALTA) 30 MG capsule TAKE ONE CAPSULE BY MOUTH EVERY DAY  Qty: 30 capsule, Refills: 3    Associated Diagnoses: Anxiety; Myalgia      fluticasone (FLONASE) 50 mcg/actuation nasal spray 2 sprays by Each Nare route once daily.  Qty: 16 g, Refills: 6    Associated Diagnoses: Acute sinusitis, recurrence not specified, unspecified location      L norgest/e.estradiol-e.estrad (CAMRESE) 0.15 mg-30 mcg (84)/10 mcg (7) 3MPk Take 1 capsule by mouth once daily.  Qty: 30 each, Refills: 11      latanoprost 0.005 % ophthalmic solution Place 1 drop into both eyes every evening.  Refills: 1      levocetirizine (XYZAL) 5 MG tablet TAKE 1 TABLET (5 MG TOTAL) BY MOUTH EVERY EVENING.  Qty: 90 tablet, Refills: 1    Associated Diagnoses: Allergic rhinitis      methimazole (TAPAZOLE) 5 MG Tab TAKE 1 AND 1/2 TABLET BY MOUTH DAILY  Qty: 45 tablet, Refills: 3      montelukast (SINGULAIR) 10 mg tablet Take 1 tablet (10 mg total) by mouth once daily.  Qty: 30 tablet, Refills: 11      pantoprazole (PROTONIX) 40 MG tablet Take 1 tablet (40 mg total) by mouth once daily.  Qty: 30 tablet, Refills: 11      hydrOXYzine HCl (ATARAX) 25 MG tablet Take 1 tablet (25 mg total) by mouth 3 (three) times daily as needed.  Qty: 30 tablet, Refills: 1    Associated Diagnoses: Anxiety      meloxicam (MOBIC) 15 MG tablet Take 1 tablet (15 mg total) by mouth 2 (two) times daily.  Qty: 180 tablet, Refills: 0    Comments: Dose is appropriate considering patient's weight  Associated Diagnoses: Right knee pain, unspecified chronicity; Internal derangement of knee, right      VITAMIN D2  50,000 unit capsule Take 2 capsules by mouth every 7 days.  Refills: 2       !! - Potential duplicate medications found. Please discuss with provider.          Discharge Procedure Orders  Diet general     Other restrictions (specify):   Order Comments: No strenuous activity for two weeks     Call MD for:  temperature >100.4     Call MD for:  severe uncontrolled pain     Call MD for:   Order Comments: Persistent bleeding from mouth     No dressing needed       Follow-up Information     Renée Wilkinson MD In 1 week.    Specialty:  Otolaryngology  Contact information:  [address] [place] [address] Dr Niyah BARRETT [id number] [phone]

## 2017-11-22 NOTE — DISCHARGE INSTRUCTIONS
General Information:    1. Do not drink alcoholic beverages including beer for 24 hours or as long as you are on pain medication..  2. Do not drive a motor vehicle, operate machinery or power tools, or signs legal papers for 24 hours or as long as you are on pain medication.   3. You may experience light-headedness, dizziness, and sleepiness following surgery. Please do not stay alone. A responsible adult should be with you for this 24 hour period.  4. Go home and rest.  5. Progress slowly to a normal diet unless instructed.  Otherwise, begin with liquids such as soft drinks, then soup and crackers working up to solid foods. Drink plenty of nonalcoholic fluids.  6. Certain anesthetics and pain medications produce nausea and vomiting in certain individuals. If nausea becomes a problem at home, call you doctor.  7. A nurse will be calling you sometime after surgery. Do not be alarmed. This is our way of finding out how you are doing.  8. Several times every hour while you are awake, take 2-3 deep breaths and cough. If you had stomach surgery hold a pillow or rolled towel firmly against your stomach before you cough. This will help with any pain the cough might cause.  9. Several times every hour while you are awake, pump and flex your feet 5-6 times and do foot circles. This will help prevent blood clots.  10. Call your doctor for severe pain, bleeding, fever, or signs or symptoms of infection (pain, swelling, redness, foul odor, drainage).  11. You can contact your doctor anytime by [phone] for the [facility] (at [facility]) or [phone] for the [facility] on [facility].   [email or web address] is another way to contact your doctor if you are an active participant online with [facility].

## 2017-11-22 NOTE — ANESTHESIA PREPROCEDURE EVALUATION
11/22/2017  Shaina Olson is a 37 y.o., female.    Anesthesia Evaluation    I have reviewed the Patient Summary Reports.    I have reviewed the Nursing Notes.   I have reviewed the Medications.     Review of Systems  Anesthesia Hx:  No problems with previous Anesthesia  Denies Family Hx of Anesthesia complications.   Denies Personal Hx of Anesthesia complications.   Cardiovascular:   Hypertension ECG has been reviewed.    Pulmonary:   Sleep Apnea, CPAP    Endocrine:   Hyperthyroidism        Physical Exam  General:  Morbid Obesity    Airway/Jaw/Neck:  Airway Findings: Mouth Opening: Normal Tongue: Normal  General Airway Assessment: Adult  Mallampati: II  Large neck    Dental:  DENTAL FINDINGS: Normal   Chest/Lungs:  Chest/Lungs Findings: Normal Respiratory Rate     Heart/Vascular:  Heart Findings: Normal            Anesthesia Plan  Type of Anesthesia, risks & benefits discussed:  Anesthesia Type:  general  Patient's Preference:   Intra-op Monitoring Plan:   Intra-op Monitoring Plan Comments:   Post Op Pain Control Plan:   Post Op Pain Control Plan Comments:   Induction:   IV  Beta Blocker:  Patient is not currently on a Beta-Blocker (No further documentation required).       Informed Consent: Patient understands risks and agrees with Anesthesia plan.  Questions answered. Anesthesia consent signed with patient.  ASA Score: 3     Day of Surgery Review of History & Physical: I have interviewed and examined the patient. I have reviewed the patient's H&P dated:  There are no significant changes.          Ready For Surgery From Anesthesia Perspective.

## 2017-11-23 ENCOUNTER — PATIENT MESSAGE (OUTPATIENT)
Dept: OTOLARYNGOLOGY | Facility: CLINIC | Age: 37
End: 2017-11-23

## 2017-11-24 ENCOUNTER — PATIENT MESSAGE (OUTPATIENT)
Dept: OTOLARYNGOLOGY | Facility: CLINIC | Age: 37
End: 2017-11-24

## 2017-11-26 DIAGNOSIS — J30.9 ALLERGIC RHINITIS: ICD-10-CM

## 2017-11-27 ENCOUNTER — OFFICE VISIT (OUTPATIENT)
Dept: OTOLARYNGOLOGY | Facility: CLINIC | Age: 37
End: 2017-11-27
Payer: COMMERCIAL

## 2017-11-27 ENCOUNTER — PATIENT MESSAGE (OUTPATIENT)
Dept: OTOLARYNGOLOGY | Facility: CLINIC | Age: 37
End: 2017-11-27

## 2017-11-27 VITALS
SYSTOLIC BLOOD PRESSURE: 124 MMHG | DIASTOLIC BLOOD PRESSURE: 82 MMHG | HEIGHT: 64 IN | TEMPERATURE: 98 F | WEIGHT: 293 LBS | HEART RATE: 100 BPM | BODY MASS INDEX: 50.02 KG/M2

## 2017-11-27 DIAGNOSIS — J32.4 CHRONIC PANSINUSITIS: Primary | ICD-10-CM

## 2017-11-27 DIAGNOSIS — Z98.890 HISTORY OF SINUS SURGERY: ICD-10-CM

## 2017-11-27 PROCEDURE — 99999 PR PBB SHADOW E&M-EST. PATIENT-LVL III: CPT | Mod: PBBFAC,,, | Performed by: ORTHOPAEDIC SURGERY

## 2017-11-27 PROCEDURE — 31237 NSL/SINS NDSC SURG BX POLYPC: CPT | Mod: LT,S$GLB,, | Performed by: ORTHOPAEDIC SURGERY

## 2017-11-27 PROCEDURE — 99499 UNLISTED E&M SERVICE: CPT | Mod: S$GLB,,, | Performed by: ORTHOPAEDIC SURGERY

## 2017-11-27 RX ORDER — LEVOCETIRIZINE DIHYDROCHLORIDE 5 MG/1
5 TABLET, FILM COATED ORAL NIGHTLY
Qty: 90 TABLET | Refills: 1 | Status: SHIPPED | OUTPATIENT
Start: 2017-11-27 | End: 2018-08-20 | Stop reason: SDUPTHER

## 2017-11-27 RX ORDER — AMOXICILLIN AND CLAVULANATE POTASSIUM 875; 125 MG/1; MG/1
1 TABLET, FILM COATED ORAL 2 TIMES DAILY
Qty: 20 TABLET | Refills: 0 | Status: SHIPPED | OUTPATIENT
Start: 2017-11-27 | End: 2017-12-07

## 2017-11-27 NOTE — PROGRESS NOTES
Subjective:       Patient ID: Shaina Olson is a 37 y.o. female.    Chief Complaint: Follow-up (post op FESS)    Patient is a very pleasant 37 year old female here to see me today several days after FESS.  She says that she has been using saline spray, has not yet restarted irrigations.  She has had some discolored drainage coming out of her nose, similar to prior to her surgery (more bloody now than previously).  She continues to have headaches and pressure, as well as headaches on the left.  She also has pain in her upper teeth, more on the left than in the right.  She has noted a low grade fever (less than 100), and sore throat with ear pain.  She has not had any chest pain or shortness of breath.      Review of Systems   Constitutional: Positive for fatigue. Negative for chills, fever and unexpected weight change.   HENT: Positive for congestion, ear pain, postnasal drip, rhinorrhea, sinus pressure and sore throat. Negative for dental problem, ear discharge, facial swelling, hearing loss, nosebleeds, sneezing, tinnitus, trouble swallowing and voice change.    Eyes: Negative for redness, itching and visual disturbance.   Respiratory: Negative for cough, choking, shortness of breath and wheezing.    Cardiovascular: Negative for chest pain and palpitations.   Gastrointestinal: Negative for abdominal pain.        No reflux.   Musculoskeletal: Negative for gait problem.   Skin: Negative for rash.   Allergic/Immunologic: Positive for environmental allergies.   Neurological: Positive for light-headedness and headaches. Negative for dizziness.       Objective:      Physical Exam   HENT:   Tender over left maxillary sinus, otherwise comfortable       PROCEDURE NOTE:  Nasal endoscopy and debridement  Preprocedure diagnosis:  Chronic sinusitis  Postprocedure diangosis:  Same  Complications:  None  Blood Loss:  None    Procedure in detail:  After verbal consent was obtained, the patient's nasal cavity was anesthesized  using topical Tetracaine and Neosynepherine.  A rigid 30 degree endoscope was placed in the left nasal cavity.  The inferior and middle turbinates were examined, and found to be normal on the left.  The middle meatus and maxillary antrum was also examined, and found to be extremely edematous on the left, and she was very tender to exam.  A curved suction was passed to the cheek sinus without difficulty.  The patient tolerated the procedure well and there were no complications.          Assessment:       1. Chronic pansinusitis    2. History of sinus surgery        Plan:       1.  Chronic pansinusitis: Improving exam, but still with purulent secretions.  Will start on oral augmentin, and will have her resume her irrigations.  Return to clinic in two weeks, sooner if needed.

## 2017-11-29 ENCOUNTER — TELEPHONE (OUTPATIENT)
Dept: OTOLARYNGOLOGY | Facility: CLINIC | Age: 37
End: 2017-11-29

## 2017-11-29 NOTE — TELEPHONE ENCOUNTER
I spoke with the patient and conveyed to her that the disability forms were ready.  She asked that I fax them to her work at [phone] which I did.  I included a note on the cover sheet asking that they place the forms on the patients desk for her .

## 2017-12-01 ENCOUNTER — TELEPHONE (OUTPATIENT)
Dept: OTOLARYNGOLOGY | Facility: CLINIC | Age: 37
End: 2017-12-01

## 2017-12-01 NOTE — TELEPHONE ENCOUNTER
----- Message from Renée Wilkinson MD sent at 12/1/2017  7:47 AM CST -----  Please let Ms. Olson know that her sinus culture done in surgery does show a chronic fungal infection.  I reviewed her case with the sinus specialist in [place], and he recommended doing irrigations with a topical antifungal.  Please send a prescription to [facility] for a topical fungal irrigation, if Tiana can sign prescription can send in today, otherwise I will seen in on Monday.  Keep scheduled followup.

## 2017-12-01 NOTE — TELEPHONE ENCOUNTER
Spoke with pt.  Advised of results and recommendations.  Tiana Mcarthur PA-C signed order and order has been faxed to [facility].  Pt is aware they will be calling to discuss delivery.  Pt verbalized understanding of plan and will call if issues develop prior to her follow up on 12/11/17.

## 2017-12-06 RX ORDER — FLUCONAZOLE 150 MG/1
TABLET ORAL
Qty: 2 TABLET | Refills: 0 | Status: SHIPPED | OUTPATIENT
Start: 2017-12-06 | End: 2017-12-11

## 2017-12-07 ENCOUNTER — LAB VISIT (OUTPATIENT)
Dept: LAB | Facility: HOSPITAL | Age: 37
End: 2017-12-07
Attending: INTERNAL MEDICINE
Payer: COMMERCIAL

## 2017-12-07 DIAGNOSIS — E05.90 HYPERTHYROIDISM: ICD-10-CM

## 2017-12-07 LAB
BASOPHILS # BLD AUTO: 0.06 K/UL
BASOPHILS NFR BLD: 0.5 %
DIFFERENTIAL METHOD: ABNORMAL
EOSINOPHIL # BLD AUTO: 0.2 K/UL
EOSINOPHIL NFR BLD: 1.5 %
ERYTHROCYTE [DISTWIDTH] IN BLOOD BY AUTOMATED COUNT: 13.4 %
HCT VFR BLD AUTO: 36.2 %
HGB BLD-MCNC: 11.4 G/DL
IMM GRANULOCYTES # BLD AUTO: 0.05 K/UL
IMM GRANULOCYTES NFR BLD AUTO: 0.4 %
LYMPHOCYTES # BLD AUTO: 3.3 K/UL
LYMPHOCYTES NFR BLD: 26.4 %
MCH RBC QN AUTO: 28.7 PG
MCHC RBC AUTO-ENTMCNC: 31.5 G/DL
MCV RBC AUTO: 91 FL
MONOCYTES # BLD AUTO: 0.5 K/UL
MONOCYTES NFR BLD: 3.9 %
NEUTROPHILS # BLD AUTO: 8.4 K/UL
NEUTROPHILS NFR BLD: 67.3 %
NRBC BLD-RTO: 0 /100 WBC
PLATELET # BLD AUTO: 380 K/UL
PMV BLD AUTO: 10.2 FL
RBC # BLD AUTO: 3.97 M/UL
T3FREE SERPL-MCNC: 2.8 PG/ML
T4 FREE SERPL-MCNC: 1.12 NG/DL
THYROGLOB AB SERPL IA-ACNC: 30.3 IU/ML
THYROPEROXIDASE IGG SERPL-ACNC: 192.4 IU/ML
TSH SERPL DL<=0.005 MIU/L-ACNC: 0.83 UIU/ML
WBC # BLD AUTO: 12.52 K/UL

## 2017-12-07 PROCEDURE — 36415 COLL VENOUS BLD VENIPUNCTURE: CPT | Mod: PO

## 2017-12-07 PROCEDURE — 84445 ASSAY OF TSI GLOBULIN: CPT

## 2017-12-07 PROCEDURE — 84443 ASSAY THYROID STIM HORMONE: CPT

## 2017-12-07 PROCEDURE — 85025 COMPLETE CBC W/AUTO DIFF WBC: CPT

## 2017-12-07 PROCEDURE — 86376 MICROSOMAL ANTIBODY EACH: CPT

## 2017-12-07 PROCEDURE — 86800 THYROGLOBULIN ANTIBODY: CPT

## 2017-12-07 PROCEDURE — 84439 ASSAY OF FREE THYROXINE: CPT

## 2017-12-07 PROCEDURE — 84481 FREE ASSAY (FT-3): CPT

## 2017-12-11 ENCOUNTER — OFFICE VISIT (OUTPATIENT)
Dept: OTOLARYNGOLOGY | Facility: CLINIC | Age: 37
End: 2017-12-11
Payer: COMMERCIAL

## 2017-12-11 VITALS — TEMPERATURE: 99 F | WEIGHT: 293 LBS | HEART RATE: 100 BPM | BODY MASS INDEX: 72.09 KG/M2

## 2017-12-11 DIAGNOSIS — J30.89 CHRONIC NON-SEASONAL ALLERGIC RHINITIS, UNSPECIFIED TRIGGER: ICD-10-CM

## 2017-12-11 DIAGNOSIS — J32.4 CHRONIC PANSINUSITIS: Primary | ICD-10-CM

## 2017-12-11 DIAGNOSIS — Z98.890 HISTORY OF SINUS SURGERY: ICD-10-CM

## 2017-12-11 LAB — TSI SER-ACNC: <0.1 IU/L

## 2017-12-11 PROCEDURE — 31231 NASAL ENDOSCOPY DX: CPT | Mod: S$GLB,,, | Performed by: ORTHOPAEDIC SURGERY

## 2017-12-11 PROCEDURE — 99213 OFFICE O/P EST LOW 20 MIN: CPT | Mod: 25,S$GLB,, | Performed by: ORTHOPAEDIC SURGERY

## 2017-12-11 PROCEDURE — 99999 PR PBB SHADOW E&M-EST. PATIENT-LVL III: CPT | Mod: PBBFAC,,, | Performed by: ORTHOPAEDIC SURGERY

## 2017-12-11 NOTE — PROGRESS NOTES
Subjective:       Patient ID: Shaina Olson is a 37 y.o. female.    Chief Complaint: Follow-up (2 week follow up)    Patient is a very pleasant 37 year old female here to see me three weeks after FESS.  She is now using a nasal rinse, twice daily, medicated from [facility] with an antifungal.  She no longer is having any discolored drainage coming out of her nose, improved from prior to her surgery.  She no longer has headaches and pressure.  She has no pain in her upper teeth, had been more on the left than in the right.  She has not had any chest pain or shortness of breath.  Overall, she has made significant progress since her last visit and she is now feeling much better.      Review of Systems   Constitutional: Positive for fatigue. Negative for chills, fever and unexpected weight change.   HENT: Positive for postnasal drip and rhinorrhea. Negative for congestion, dental problem, ear discharge, ear pain, facial swelling, hearing loss, nosebleeds, sinus pressure, sneezing, sore throat, tinnitus, trouble swallowing and voice change.    Eyes: Negative for redness, itching and visual disturbance.   Respiratory: Negative for cough, choking, shortness of breath and wheezing.    Cardiovascular: Negative for chest pain and palpitations.   Gastrointestinal: Negative for abdominal pain.        No reflux.   Musculoskeletal: Negative for gait problem.   Skin: Negative for rash.   Allergic/Immunologic: Positive for environmental allergies.   Neurological: Negative for dizziness, light-headedness and headaches.       Objective:      Physical Exam   Constitutional: She is oriented to person, place, and time. She appears well-developed and well-nourished. No distress.   HENT:   Head: Normocephalic and atraumatic.   Right Ear: Tympanic membrane, external ear and ear canal normal.   Left Ear: Tympanic membrane, external ear and ear canal normal.   Nose: Nose normal. No mucosal edema, rhinorrhea, nasal deformity or septal  deviation. No epistaxis. Right sinus exhibits no maxillary sinus tenderness and no frontal sinus tenderness. Left sinus exhibits no maxillary sinus tenderness and no frontal sinus tenderness.   Mouth/Throat: Uvula is midline, oropharynx is clear and moist and mucous membranes are normal. Mucous membranes are not pale and not dry. No dental caries. No oropharyngeal exudate or posterior oropharyngeal erythema.   Eyes: Conjunctivae, EOM and lids are normal. Pupils are equal, round, and reactive to light. Right eye exhibits no chemosis. Left eye exhibits no chemosis. Right conjunctiva is not injected. Left conjunctiva is not injected. No scleral icterus. Right eye exhibits normal extraocular motion and no nystagmus. Left eye exhibits normal extraocular motion and no nystagmus.   Neck: Trachea normal and phonation normal. No tracheal tenderness present. No tracheal deviation present. No thyroid mass and no thyromegaly present.   Cardiovascular: Intact distal pulses.    Pulmonary/Chest: Effort normal. No stridor. No respiratory distress.   Abdominal: She exhibits no distension.   Lymphadenopathy:        Head (right side): No submental, no submandibular, no preauricular, no posterior auricular and no occipital adenopathy present.        Head (left side): No submental, no submandibular, no preauricular, no posterior auricular and no occipital adenopathy present.     She has no cervical adenopathy.   Neurological: She is alert and oriented to person, place, and time. No cranial nerve deficit.   Skin: Skin is warm and dry. No rash noted. No erythema.   Psychiatric: She has a normal mood and affect. Her behavior is normal.       PROCEDURE NOTE:  Nasal endoscopy and debridement  Preprocedure diagnosis:  Chronic sinusitis  Postprocedure diangosis:  Same  Complications:  None  Blood Loss:  None     Procedure in detail:  After verbal consent was obtained, the patient's nasal cavity was anesthesized using topical Tetracaine and  Neosynepherine.  A rigid 30 degree endoscope was placed in the left nasal cavity.  The inferior and middle turbinates were examined, and found to be normal on the left.  The middle meatus and maxillary antrum was also examined, and she has made tremendous progress since her last visit.  I can now see into her maxillary sinus and middle meatus clearly, no active drainage or infection or fungal debris.  The patient tolerated the procedure well and there were no complications.        Assessment:       1. Chronic pansinusitis    2. History of sinus surgery    3. Chronic non-seasonal allergic rhinitis, unspecified trigger        Plan:       1.  Chronic pansinusitis, history of FESS:  Now status post revision FESS and doing very well.  She has noted tremendous improvement with topical irrigation with antifungal.  She is now using twice daily, will try and space out to every other day as long as her symptoms do not increase.  I would like to see her back in 3 months for recheck, sooner if she notes any return of her symptoms.  2.  AR:    Continue with Flonase and Xyzal.

## 2017-12-14 ENCOUNTER — OFFICE VISIT (OUTPATIENT)
Dept: ENDOCRINOLOGY | Facility: CLINIC | Age: 37
End: 2017-12-14
Payer: COMMERCIAL

## 2017-12-14 VITALS
HEIGHT: 64 IN | SYSTOLIC BLOOD PRESSURE: 122 MMHG | HEART RATE: 90 BPM | TEMPERATURE: 98 F | DIASTOLIC BLOOD PRESSURE: 86 MMHG | WEIGHT: 293 LBS | BODY MASS INDEX: 50.02 KG/M2

## 2017-12-14 DIAGNOSIS — E05.90 HYPERTHYROIDISM: Primary | ICD-10-CM

## 2017-12-14 DIAGNOSIS — E04.9 GOITER: ICD-10-CM

## 2017-12-14 DIAGNOSIS — E04.1 THYROID NODULE: ICD-10-CM

## 2017-12-14 DIAGNOSIS — E05.00 GRAVES' DISEASE: ICD-10-CM

## 2017-12-14 PROCEDURE — 99214 OFFICE O/P EST MOD 30 MIN: CPT | Mod: S$GLB,,, | Performed by: INTERNAL MEDICINE

## 2017-12-14 PROCEDURE — 99999 PR PBB SHADOW E&M-EST. PATIENT-LVL III: CPT | Mod: PBBFAC,,, | Performed by: INTERNAL MEDICINE

## 2017-12-14 RX ORDER — PANTOPRAZOLE SODIUM 40 MG/1
TABLET, DELAYED RELEASE ORAL
Qty: 30 TABLET | Refills: 10 | Status: SHIPPED | OUTPATIENT
Start: 2017-12-14 | End: 2018-11-11 | Stop reason: SDUPTHER

## 2017-12-14 NOTE — PROGRESS NOTES
Patient ID: Shaina Olson is a 37 y.o. female.  Patient is here for follow up        Chief Complaint: Hyperthyroidism      HPI     Consultation was requested by Dr. Manasa Aviles       Diagnosed: diagnosed in 2014 and started on antithyroid medications immediately due to severe symptoms; she shared a picture of how she looked initially and patient had significant stare and enlarged thyroid which both have improved over time on treatment    Previous radiology tests:  Thyroid ultrasound : Yes - last one October 2016 showing a stable nodule, status post benign FNA   NM uptake and scan: No    Previous thyroid surgery: No      Thyroid symptoms:denies fatigue, weight changes, heat/cold intolerance, bowel/skin changes or CVS symptoms      Thyroid medications: methimazole 5 mg daily      Takes medication appropriately: Yes     Patient had sinus surgery in November and had some blood loss in her recent CBC shows mild anemia and slightly elevated platelets but her CBC in October prior to the surgery was completely normal and she feels well today    Her A1c was normal.  She has joined weight watchers on line.  She has not yet joined [employer].  She is contemplating gastric sleeve next year    I have reviewed the past medical, family and social history    Review of Systems   Constitutional: Negative for appetite change, fatigue, fever and unexpected weight change.   HENT: Negative for sore throat and trouble swallowing.    Eyes: Negative for visual disturbance.   Respiratory: Negative for shortness of breath and wheezing.    Cardiovascular: Negative for chest pain, palpitations and leg swelling.   Gastrointestinal: Negative for diarrhea, nausea and vomiting.   Endocrine: Negative for cold intolerance, heat intolerance, polydipsia, polyphagia and polyuria.   Genitourinary: Negative for difficulty urinating, dysuria and menstrual problem.   Musculoskeletal: Negative for arthralgias and joint swelling.   Skin:  Negative for rash.   Neurological: Negative for dizziness, weakness, numbness and headaches.   Psychiatric/Behavioral: Negative for confusion, dysphoric mood and sleep disturbance.       Objective:      Physical Exam   Constitutional: She appears well-developed and well-nourished. No distress.   HENT:   Head: Normocephalic and atraumatic.   Eyes: Conjunctivae are normal.   Neck: No JVD present. No tracheal deviation present. Thyromegaly present.   Cardiovascular: Normal rate, regular rhythm, normal heart sounds and intact distal pulses.  Exam reveals no gallop and no friction rub.    No murmur heard.  Pulmonary/Chest: Effort normal and breath sounds normal. No stridor. No respiratory distress. She has no wheezes. She has no rales. She exhibits no tenderness.   Musculoskeletal: She exhibits no edema or deformity.   Lymphadenopathy:     She has no cervical adenopathy.   Neurological: She is alert.   Skin: She is not diaphoretic.   Vitals reviewed.        Lab Review:   Lab Visit on 12/07/2017   Component Date Value    TSH 12/07/2017 0.827     Free T4 12/07/2017 1.12     T3, Free 12/07/2017 2.8     Thyroperoxidase Antibodi* 12/07/2017 192.4*    Thyroid-Stim IG Quantita* 12/07/2017 <0.10     Thyroglobulin Ab Screen 12/07/2017 30.3*    WBC 12/07/2017 12.52     RBC 12/07/2017 3.97*    Hemoglobin 12/07/2017 11.4*    Hematocrit 12/07/2017 36.2*    MCV 12/07/2017 91     MCH 12/07/2017 28.7     MCHC 12/07/2017 31.5*    RDW 12/07/2017 13.4     Platelets 12/07/2017 380*    MPV 12/07/2017 10.2     Immature Granulocytes 12/07/2017 0.4     Gran # 12/07/2017 8.4*    Immature Grans (Abs) 12/07/2017 0.05*    Lymph # 12/07/2017 3.3     Mono # 12/07/2017 0.5     Eos # 12/07/2017 0.2     Baso # 12/07/2017 0.06     nRBC 12/07/2017 0     Gran% 12/07/2017 67.3     Lymph% 12/07/2017 26.4     Mono% 12/07/2017 3.9*    Eosinophil% 12/07/2017 1.5     Basophil% 12/07/2017 0.5     Differential Method 12/07/2017  Automated    Admission on 11/22/2017, Discharged on 11/22/2017   Component Date Value    POC Preg Test, Ur 11/22/2017 Negative      Acceptab* 11/22/2017 Yes    Lab Visit on 10/26/2017   Component Date Value    WBC 10/26/2017 10.37     RBC 10/26/2017 4.26     Hemoglobin 10/26/2017 12.5     Hematocrit 10/26/2017 39.0     MCV 10/26/2017 92     MCH 10/26/2017 29.3     MCHC 10/26/2017 32.1     RDW 10/26/2017 13.4     Platelets 10/26/2017 343     MPV 10/26/2017 10.2     Immature Granulocytes 10/26/2017 0.3     Gran # 10/26/2017 6.6     Immature Grans (Abs) 10/26/2017 0.03     Lymph # 10/26/2017 3.0     Mono # 10/26/2017 0.5     Eos # 10/26/2017 0.2     Baso # 10/26/2017 0.06     nRBC 10/26/2017 0     Gran% 10/26/2017 63.3     Lymph% 10/26/2017 28.5     Mono% 10/26/2017 5.2     Eosinophil% 10/26/2017 2.1     Basophil% 10/26/2017 0.6     Differential Method 10/26/2017 Automated     Sodium 10/26/2017 139     Potassium 10/26/2017 3.9     Chloride 10/26/2017 107     CO2 10/26/2017 25     Glucose 10/26/2017 84     BUN, Bld 10/26/2017 9     Creatinine 10/26/2017 0.7     Calcium 10/26/2017 9.2     Anion Gap 10/26/2017 7*    eGFR if African American 10/26/2017 >60.0     eGFR if non African Amer* 10/26/2017 >60.0     Hemoglobin A1C 10/26/2017 4.6     Estimated Avg Glucose 10/26/2017 85    Lab Visit on 08/07/2017   Component Date Value    TSH 08/07/2017 0.951     T3, Free 08/07/2017 3.3     Free T4 08/07/2017 1.05    Lab Visit on 07/11/2017   Component Date Value    Sodium 07/11/2017 142     Potassium 07/11/2017 4.0     Chloride 07/11/2017 108     CO2 07/11/2017 25     Glucose 07/11/2017 85     BUN, Bld 07/11/2017 9     Creatinine 07/11/2017 0.8     Calcium 07/11/2017 9.0     Total Protein 07/11/2017 6.7     Albumin 07/11/2017 3.3*    Total Bilirubin 07/11/2017 0.8     Alkaline Phosphatase 07/11/2017 96     AST 07/11/2017 12     ALT 07/11/2017 11     Anion Gap  07/11/2017 9     eGFR if African American 07/11/2017 >60.0     eGFR if non African Amer* 07/11/2017 >60.0     Cholesterol 07/11/2017 206*    Triglycerides 07/11/2017 69     HDL 07/11/2017 59     LDL Cholesterol 07/11/2017 133.2     HDL/Chol Ratio 07/11/2017 28.6     Total Cholesterol/HDL Ra* 07/11/2017 3.5     Non-HDL Cholesterol 07/11/2017 147        Assessment:     1. Hyperthyroidism  TSH    T4, free    T3, free    CBC auto differential    Hepatic function panel   2. Graves' disease  TSH    T4, free    T3, free    CBC auto differential    Hepatic function panel   3. Thyroid nodule  US Soft Tissue Head Neck Thyroid   4. Goiter          Her hyperthyroidism in stable.  Continue methimazole and check thyroid ultrasound to follow up on nodule.  Her goiter is asymptomatic    In regards to her obesity continue Weight Watchers and I encouraged her to increase physical activity and joined a gym that's near her job and may be get access to the weight watcher's  for accountability  Plan:   Hyperthyroidism  -     TSH; Future; Expected date: 12/14/2017  -     T4, free; Future; Expected date: 12/14/2017  -     T3, free; Future; Expected date: 12/14/2017  -     CBC auto differential; Future; Expected date: 12/14/2017  -     Hepatic function panel; Future; Expected date: 12/14/2017    Graves' disease  -     TSH; Future; Expected date: 12/14/2017  -     T4, free; Future; Expected date: 12/14/2017  -     T3, free; Future; Expected date: 12/14/2017  -     CBC auto differential; Future; Expected date: 12/14/2017  -     Hepatic function panel; Future; Expected date: 12/14/2017    Thyroid nodule  -     US Soft Tissue Head Neck Thyroid; Future; Expected date: 12/14/2017    Goiter          Return in about 3 months (around 3/14/2018).    Labs prior to appointment? yes     Disclaimer:  This note may have been partially prepared using voice recognition software and  it may have not been extensively proofed, as such there could  be errors within the text such as sound alike errors.

## 2017-12-18 DIAGNOSIS — M79.10 MYALGIA: ICD-10-CM

## 2017-12-18 DIAGNOSIS — F41.9 ANXIETY: ICD-10-CM

## 2017-12-18 RX ORDER — DULOXETIN HYDROCHLORIDE 30 MG/1
30 CAPSULE, DELAYED RELEASE ORAL DAILY
Qty: 90 CAPSULE | Refills: 3 | Status: SHIPPED | OUTPATIENT
Start: 2017-12-18 | End: 2018-04-09 | Stop reason: SDUPTHER

## 2017-12-19 ENCOUNTER — OFFICE VISIT (OUTPATIENT)
Dept: FAMILY MEDICINE | Facility: CLINIC | Age: 37
End: 2017-12-19
Payer: COMMERCIAL

## 2017-12-19 ENCOUNTER — TELEPHONE (OUTPATIENT)
Dept: FAMILY MEDICINE | Facility: CLINIC | Age: 37
End: 2017-12-19

## 2017-12-19 DIAGNOSIS — H66.001 ACUTE SUPPURATIVE OTITIS MEDIA OF RIGHT EAR WITHOUT SPONTANEOUS RUPTURE OF TYMPANIC MEMBRANE, RECURRENCE NOT SPECIFIED: ICD-10-CM

## 2017-12-19 DIAGNOSIS — H60.91 OTITIS EXTERNA OF RIGHT EAR, UNSPECIFIED CHRONICITY, UNSPECIFIED TYPE: Primary | ICD-10-CM

## 2017-12-19 PROCEDURE — 99213 OFFICE O/P EST LOW 20 MIN: CPT | Mod: 25,S$GLB,, | Performed by: FAMILY MEDICINE

## 2017-12-19 PROCEDURE — 99999 PR PBB SHADOW E&M-EST. PATIENT-LVL III: CPT | Mod: PBBFAC,,, | Performed by: FAMILY MEDICINE

## 2017-12-19 PROCEDURE — 96372 THER/PROPH/DIAG INJ SC/IM: CPT | Mod: S$GLB,,, | Performed by: FAMILY MEDICINE

## 2017-12-19 RX ORDER — KETOROLAC TROMETHAMINE 30 MG/ML
60 INJECTION, SOLUTION INTRAMUSCULAR; INTRAVENOUS
Status: COMPLETED | OUTPATIENT
Start: 2017-12-19 | End: 2017-12-19

## 2017-12-19 RX ORDER — CIPROFLOXACIN AND DEXAMETHASONE 3; 1 MG/ML; MG/ML
4 SUSPENSION/ DROPS AURICULAR (OTIC) 2 TIMES DAILY
Qty: 7.5 ML | Refills: 0 | Status: SHIPPED | OUTPATIENT
Start: 2017-12-19 | End: 2018-01-15

## 2017-12-19 RX ORDER — OFLOXACIN 3 MG/ML
5 SOLUTION AURICULAR (OTIC) DAILY
Qty: 5 ML | Refills: 0 | Status: SHIPPED | OUTPATIENT
Start: 2017-12-19 | End: 2017-12-19

## 2017-12-19 RX ADMIN — KETOROLAC TROMETHAMINE 60 MG: 30 INJECTION, SOLUTION INTRAMUSCULAR; INTRAVENOUS at 11:12

## 2017-12-19 NOTE — TELEPHONE ENCOUNTER
Pt would like to know if ear drops can be changed to Ciprodex. She is having itching along with the ear pain. Please send to [facility] [address].

## 2017-12-19 NOTE — PROGRESS NOTES
Subjective:       Patient ID: Shaina Olson is a 37 y.o. female.    Chief Complaint: No chief complaint on file.    37 y old female with severe R otalgia since today . No otorrhea, no recent swimming        Review of Systems   Constitutional: Negative.    HENT: Positive for ear pain.    Respiratory: Negative.    Cardiovascular: Negative.    Gastrointestinal: Negative.    Genitourinary: Negative.    Musculoskeletal: Negative.        Objective:      Physical Exam   Constitutional: She is oriented to person, place, and time. She appears well-developed and well-nourished. No distress.   HENT:   Head: Normocephalic and atraumatic.   Right Ear: There is swelling. Tympanic membrane is injected.   Left Ear: External ear normal.   Mouth/Throat: No oropharyngeal exudate.   Eyes: Conjunctivae and EOM are normal. Pupils are equal, round, and reactive to light. Right eye exhibits no discharge. Left eye exhibits no discharge. No scleral icterus.   Neck: Normal range of motion. Neck supple. No JVD present. No tracheal deviation present. No thyromegaly present.   Cardiovascular: Normal rate, regular rhythm and normal heart sounds.  Exam reveals no gallop and no friction rub.    No murmur heard.  Pulmonary/Chest: Effort normal and breath sounds normal. No stridor. No respiratory distress. She has no wheezes. She has no rales. She exhibits no tenderness.   Abdominal: Soft. Bowel sounds are normal. She exhibits no distension. There is no tenderness. There is no rebound and no guarding.   Musculoskeletal: Normal range of motion.   Lymphadenopathy:     She has no cervical adenopathy.   Neurological: She is alert and oriented to person, place, and time.   Skin: Skin is warm and dry. She is not diaphoretic.   Psychiatric: She has a normal mood and affect. Her behavior is normal. Judgment and thought content normal.       Assessment:     Diagnoses and all orders for this visit:    Otitis externa of right ear, unspecified chronicity,  unspecified type    Acute suppurative otitis media of right ear without spontaneous rupture of tympanic membrane, recurrence not specified    Other orders  -     ketorolac injection 60 mg; Inject 2 mLs (60 mg total) into the muscle one time.  -     Discontinue: ofloxacin (FLOXIN) 0.3 % otic solution; Place 5 drops into the right ear once daily.      Plan:   Call or return to clinic prn if these symptoms worsen or fail to improve as anticipated.  Will start taking Amox prescribed by dentist for tooth extraction

## 2017-12-20 ENCOUNTER — TELEPHONE (OUTPATIENT)
Dept: FAMILY MEDICINE | Facility: CLINIC | Age: 37
End: 2017-12-20

## 2017-12-20 RX ORDER — AMOXICILLIN AND CLAVULANATE POTASSIUM 875; 125 MG/1; MG/1
1 TABLET, FILM COATED ORAL EVERY 12 HOURS
Qty: 20 TABLET | Refills: 0 | Status: SHIPPED | OUTPATIENT
Start: 2017-12-20 | End: 2018-01-15

## 2017-12-20 RX ORDER — METHIMAZOLE 5 MG/1
5 TABLET ORAL DAILY
Qty: 30 TABLET | Refills: 5 | Status: SHIPPED | OUTPATIENT
Start: 2017-12-20 | End: 2018-07-03 | Stop reason: SDUPTHER

## 2017-12-20 RX ORDER — PREDNISONE 20 MG/1
TABLET ORAL
Qty: 20 TABLET | Refills: 0 | Status: SHIPPED | OUTPATIENT
Start: 2017-12-20 | End: 2018-01-15

## 2017-12-20 NOTE — TELEPHONE ENCOUNTER
----- Message from Faith Bella sent at 12/20/2017  2:54 PM CST -----  Contact: [facility] / [phone]  ..Refill request.  90 day - methimazole (TAPAZOLE) 5 MG Tab     ..  [facility] #[id number] - [place] - [address] AT [facility]  [address]  [place] [address]  Phone: [phone] Fax: [phone]       Hypoglycemia: Care Instructions  Your Care Instructions  Hypoglycemia means that your blood sugar is low and your body is not getting enough fuel. Some people get low blood sugar from not eating often enough. Some medicines to treat diabetes can cause low blood sugar. People who have had surgery on their stomachs or intestines may get hypoglycemia. Problems with the pancreas, kidneys, or liver also can cause low blood sugar. A snack or drink with sugar in it will raise your blood sugar and should ease your symptoms right away. Your doctor may recommend that you change or stop your medicines until you can get your blood sugar levels under control. In the long run, you may need to change your diet and eating habits so that you get enough fuel for your body throughout the day. Follow-up care is a key part of your treatment and safety. Be sure to make and go to all appointments, and call your doctor if you are having problems. It's also a good idea to know your test results and keep a list of the medicines you take. How can you care for yourself at home? · Learn to recognize the early signs of low blood sugar. Signs include:  ¨ Nausea. ¨ Hunger. ¨ Feeling nervous, irritable, or shaky. ¨ Cold, clammy, wet skin. ¨ Sweating (when you are not exercising). ¨ A fast heartbeat. ¨ Numbness or tingling of the fingertips or lips. · If you feel an episode of low blood sugar coming on, drink fruit juice or sugared (not diet) soda, or eat sugar in the form of candy, cubes, or tablets. [employer] are another [employer]. · Eat small, frequent meals so that you do not get too hungry between meals. · Balance extra exercise with eating more. · Keep a written record of your low blood sugar episodes, including when you last ate and what you ate, so that you can learn what causes your blood sugar to drop.   · Make sure your family, friends, and coworkers know the symptoms of low blood sugar and know what to do to get your sugar level up. · Wear medical alert jewelry that lists your condition. You can buy this at most drugstores. When should you call for help? Call 911 anytime you think you may need emergency care. For example, call if:  · You have a seizure. · You passed out (lost consciousness), or you suddenly become very sleepy or confused. (You may have very low blood sugar.)  Call your doctor now or seek immediate medical care if:  · You have symptoms of low blood sugar, such as:  ¨ Sweating. ¨ Feeling nervous, shaky, and weak. ¨ Extreme hunger and slight nausea. ¨ Dizziness and headache. ¨ Blurred vision. ¨ Confusion. Watch closely for changes in your health, and be sure to contact your doctor if:  · Your symptoms continue or return. Where can you learn more? Go to http://laneZipalongashly.info/. Enter L215 in the search box to learn more about \"Hypoglycemia: Care Instructions. \"  Current as of: May 23, 2016  Content Version: 11.2  © 2001-2476 [employer]. Care instructions adapted under license by [employer] (which disclaims liability or warranty for this information). If you have questions about a medical condition or this instruction, always ask your healthcare professional. [address] any warranty or liability for your use of this information. We hope that we have addressed all of your medical concerns. The examination and treatment you received in the Emergency Department were for an emergent problem and were not intended as complete care. It is important that you follow up with your healthcare provider(s) for ongoing care. If your symptoms worsen or do not improve as expected, and you are unable to reach your usual health care provider(s), you should return to the Emergency Department.       Today's healthcare is undergoing tremendous change, and patient satisfaction surveys are one of the many tools to assess the quality of medical care.  You may receive a survey from the [employer] regarding your experience in the Emergency Department. I hope that your experience has been completely positive, particularly the medical care that I provided. As such, please participate in the survey; anything less than excellent does not meet my expectations or intentions. [facility] and [employer] participate in nationally recognized quality of care measures. If your blood pressure is greater than 120/80, as reported below, we urge that you seek medical care to address the potential of high blood pressure, commonly known as hypertension. Hypertension can be hereditary or can be caused by certain medical conditions, pain, stress, or \"white coat syndrome. \"       Please make an appointment with your health care provider(s) for follow up of your Emergency Department visit. VITALS:   Patient Vitals for the past 8 hrs:   Temp Pulse Resp BP SpO2   06/09/17 1137 99.4 °F (37.4 °C) 80 16 133/86 100 %          Thank you for allowing us to provide you with medical care today. We realize that you have many choices for your emergency care needs. Please choose us in the future for any continued health care needs. Kolleen Phi Charolett Alpers, [address] [place] [address].   Office: [phone]            Recent Results (from the past 24 hour(s))   GLUCOSE, POC    Collection Time: 06/09/17 11:42 AM   Result Value Ref [place]    Glucose (POC) 75 65 - 100 mg/dL    Performed by [address] [place], [place] [address]    Collection Time: 06/09/17 11:50 AM   Result Value Ref [place]    Glucose (POC) 89 65 - 100 mg/dL    Performed by Adi Cardoza        No results found.

## 2017-12-21 VITALS
OXYGEN SATURATION: 99 % | DIASTOLIC BLOOD PRESSURE: 84 MMHG | BODY MASS INDEX: 50.02 KG/M2 | HEIGHT: 64 IN | SYSTOLIC BLOOD PRESSURE: 130 MMHG | HEART RATE: 104 BPM | WEIGHT: 293 LBS | TEMPERATURE: 100 F

## 2017-12-27 ENCOUNTER — TELEPHONE (OUTPATIENT)
Dept: RADIOLOGY | Facility: HOSPITAL | Age: 37
End: 2017-12-27

## 2017-12-27 DIAGNOSIS — J01.90 ACUTE SINUSITIS, RECURRENCE NOT SPECIFIED, UNSPECIFIED LOCATION: ICD-10-CM

## 2017-12-27 RX ORDER — FLUTICASONE PROPIONATE 50 MCG
2 SPRAY, SUSPENSION (ML) NASAL DAILY
Qty: 16 G | Refills: 6 | Status: SHIPPED | OUTPATIENT
Start: 2017-12-27 | End: 2018-09-10 | Stop reason: SDUPTHER

## 2017-12-28 ENCOUNTER — APPOINTMENT (OUTPATIENT)
Dept: RADIOLOGY | Facility: HOSPITAL | Age: 37
End: 2017-12-28
Attending: INTERNAL MEDICINE
Payer: COMMERCIAL

## 2017-12-28 DIAGNOSIS — E04.1 THYROID NODULE: ICD-10-CM

## 2017-12-28 PROCEDURE — 76536 US EXAM OF HEAD AND NECK: CPT | Mod: 26,,, | Performed by: RADIOLOGY

## 2017-12-28 PROCEDURE — 76536 US EXAM OF HEAD AND NECK: CPT | Mod: TC,PO

## 2018-01-11 DIAGNOSIS — M79.10 MYALGIA: ICD-10-CM

## 2018-01-11 DIAGNOSIS — F41.9 ANXIETY: ICD-10-CM

## 2018-01-11 RX ORDER — DULOXETIN HYDROCHLORIDE 30 MG/1
CAPSULE, DELAYED RELEASE ORAL
Qty: 30 CAPSULE | Refills: 3 | Status: SHIPPED | OUTPATIENT
Start: 2018-01-11 | End: 2018-01-15 | Stop reason: SDUPTHER

## 2018-01-15 ENCOUNTER — PATIENT MESSAGE (OUTPATIENT)
Dept: OTOLARYNGOLOGY | Facility: CLINIC | Age: 38
End: 2018-01-15

## 2018-01-15 ENCOUNTER — OFFICE VISIT (OUTPATIENT)
Dept: PULMONOLOGY | Facility: CLINIC | Age: 38
End: 2018-01-15
Payer: COMMERCIAL

## 2018-01-15 VITALS
RESPIRATION RATE: 18 BRPM | SYSTOLIC BLOOD PRESSURE: 134 MMHG | DIASTOLIC BLOOD PRESSURE: 82 MMHG | HEIGHT: 64 IN | OXYGEN SATURATION: 98 % | WEIGHT: 293 LBS | HEART RATE: 107 BPM | BODY MASS INDEX: 50.02 KG/M2

## 2018-01-15 DIAGNOSIS — E66.01 MORBID OBESITY WITH BMI OF 70 AND OVER, ADULT: ICD-10-CM

## 2018-01-15 DIAGNOSIS — G47.33 OSA ON CPAP: Primary | Chronic | ICD-10-CM

## 2018-01-15 DIAGNOSIS — Z87.09 HISTORY OF ASTHMA: ICD-10-CM

## 2018-01-15 PROCEDURE — 99214 OFFICE O/P EST MOD 30 MIN: CPT | Mod: S$GLB,,, | Performed by: INTERNAL MEDICINE

## 2018-01-15 PROCEDURE — 99999 PR PBB SHADOW E&M-EST. PATIENT-LVL III: CPT | Mod: PBBFAC,,, | Performed by: INTERNAL MEDICINE

## 2018-01-15 RX ORDER — ALBUTEROL SULFATE 90 UG/1
2 AEROSOL, METERED RESPIRATORY (INHALATION) EVERY 6 HOURS PRN
Qty: 18 G | Refills: 6 | Status: SHIPPED | OUTPATIENT
Start: 2018-01-15 | End: 2018-10-25

## 2018-01-15 NOTE — ASSESSMENT & PLAN NOTE
[place] score 8  Bed time 0830 pm to 10 pm  Wake 7 am  AHI 4-20  Residual AHI 2.3    Device working well  [employer]herent

## 2018-01-15 NOTE — PROGRESS NOTES
Subjective:       Shaina Olson is a 37 y.o. female   Follow-up visit, had recent sinus surgery  She has CPAP set at 4-20 cm water pressure.    She is using the device and benefits from it  Mean pressure was 6.9.  Average AHI 2.3 events per hour.  Knee orders supplies were given  We talked extensively about weight exercise and behavioral adjustments  She has childhood asthma but occasionally has symptoms when exposed to extreme cold  She has albuterol when necessary  We will do a spirometry when I see her next year  She will get refills if necessary  Overall her weight has dropped from 439 pounds down to 419 pounds    Previous Report(s) Reviewed: historical medical records and office notes     The following portions of the patient's history were reviewed and updated as appropriate:   She  has a past medical history of Allergy; Anxiety (2004); Arthritis; Arthritis of right knee; Graves' disease (6/2014); Hypertension (2004); and Vitamin D deficiency.  She  does not have any pertinent problems on file.  She  has a past surgical history that includes Wrist fracture surgery (Right, 2013); TONSILLECTOMY, ADENOIDECTOMY (2001); Nasal septum surgery (2001); Fracture surgery (Right, 2013); Sinus surgery (2001); and Sinus surgery (11/22/2017).  Her family history includes Cancer in her maternal grandfather, maternal grandmother, paternal grandfather, and paternal grandmother; Cancer (age of onset: 30) in her sister; Diabetes in her paternal grandmother; Diverticulosis in her mother; Hypertension in her mother; Lupus in her mother; Stroke in her paternal grandmother.  She  reports that she has never smoked. She has never used smokeless tobacco. She reports that she drinks alcohol. She reports that she does not use drugs.  She has a current medication list which includes the following prescription(s): amlodipine, amlodipine, cyclobenzaprine, duloxetine, fluticasone, hydroxyzine hcl, l norgest/e.estradiol-e.estrad,  latanoprost, levocetirizine, methimazole, montelukast, pantoprazole, sodium chloride, and vitamin d2.  Current Outpatient Prescriptions on File Prior to Visit   Medication Sig Dispense Refill    amlodipine (NORVASC) 2.5 MG tablet TAKE 1 TABLET BY MOUTH EVERY DAY WITH 5MG FOR A TOTAL OF 7.5 MG PER DAY (Patient taking differently: TAKE 1 TABLET BY MOUTH EVERY NIGHT WITH 5MG FOR A TOTAL OF 7.5 MG PER DAY) 90 tablet 3    amlodipine (NORVASC) 5 MG tablet Take 1 tablet (5 mg total) by mouth once daily. (Patient taking differently: Take 5 mg by mouth every evening. ) 90 tablet 1    cyclobenzaprine (FLEXERIL) 10 MG tablet Take 1 tablet (10 mg total) by mouth 3 (three) times daily as needed for Muscle spasms. 90 tablet 0    DULoxetine (CYMBALTA) 30 MG capsule Take 1 capsule (30 mg total) by mouth once daily. 90 capsule 3    fluticasone (FLONASE) 50 mcg/actuation nasal spray 2 sprays by Each Nare route once daily. 16 g 6    hydrOXYzine HCl (ATARAX) 25 MG tablet Take 1 tablet (25 mg total) by mouth 3 (three) times daily as needed. 30 tablet 1    L norgest/e.estradiol-e.estrad (CAMRESE) 0.15 mg-30 mcg (84)/10 mcg (7) 3MPk Take 1 capsule by mouth once daily. 30 each 11    latanoprost 0.005 % ophthalmic solution Place 1 drop into both eyes every evening.  1    levocetirizine (XYZAL) 5 MG tablet TAKE 1 TABLET (5 MG TOTAL) BY MOUTH EVERY EVENING. 90 tablet 1    methIMAzole (TAPAZOLE) 5 MG Tab Take 1 tablet (5 mg total) by mouth once daily. 30 tablet 5    montelukast (SINGULAIR) 10 mg tablet Take 1 tablet (10 mg total) by mouth once daily. 30 tablet 11    pantoprazole (PROTONIX) 40 MG tablet TAKE 1 TABLET EVERY DAY 30 tablet 10    sodium chloride (SALINE NASAL) 0.65 % nasal spray 1 spray by Nasal route every hour as needed for Congestion.  12    VITAMIN D2 50,000 unit capsule Take 2 capsules by mouth every 7 days.  2    [DISCONTINUED] predniSONE (DELTASONE) 20 MG tablet Take 3 pills daily for 3 days, then 2 pills  "daily for 3 days, then 1 pill daily for 3 days, then 1/2 pill daily for 4 days. 20 tablet 0    [DISCONTINUED] amoxicillin-clavulanate 875-125mg (AUGMENTIN) 875-125 mg per tablet Take 1 tablet by mouth every 12 (twelve) hours. 20 tablet 0    [DISCONTINUED] ciprofloxacin-dexamethasone 0.3-0.1% (CIPRODEX) 0.3-0.1 % DrpS Place 4 drops into the right ear 2 (two) times daily. 7.5 mL 0    [DISCONTINUED] DULoxetine (CYMBALTA) 30 MG capsule TAKE ONE CAPSULE BY MOUTH EVERY DAY 30 capsule 3     No current facility-administered medications on file prior to visit.      She is allergic to demerol [meperidine]..    Review of Systems  A comprehensive review of systems was negative.      Objective:      /82   Pulse 107   Resp 18   Ht 5' 4" (1.626 m)   Wt (!) 190.3 kg (419 lb 8.6 oz)   SpO2 98%   BMI 72.01 kg/m²   General appearance: alert, appears stated age, cooperative, no distress and morbidly obese  Head: Normocephalic, without obvious abnormality  Eyes: negative findings: conjunctivae and sclerae normal  Lungs: clear to auscultation bilaterally and normal percussion bilaterally  Heart: regular rate and rhythm, S1, S2 normal, no murmur, click, rub or gallop  Extremities: extremities normal, atraumatic, no cyanosis or edema  Pulses: 2+ and symmetric  Skin: Skin color, texture, turgor normal. No rashes or lesions  Neurologic: Grossly normal      Download    12/15/2017 - 1/13/2018  YOB: 1980  Mask:  Compliance Summary  12/15/2017 - 1/13/2018 (30 days)  Days with Device Usage 26 days  Days without Device Usage 4 days  Percent Days with Device Usage 86.7%  Cumulative Usage 7 days 15 hrs. 39 mins. 8 secs.  Maximum Usage (1 Day) 8 hrs. 52 mins. 16 secs.  Average Usage (All Days) 6 hrs. 7 mins. 18 secs.  Average Usage (Days Used) 7 hrs. 3 mins. 48 secs.  Minimum Usage (1 Day) 38 mins. 50 secs.  Percent of Days with Usage >= 4 Hours 80.0%  Percent of Days with Usage < 4 Hours 20.0%  Date Range  Total Blower " Time 7 days 20 hrs. 4 mins. 21 secs.  Average AHI 2.3  Auto CPAP Summary  Auto CPAP Mean Pressure 6.9 cmH2O  Auto CPAP Peak Average Pressure 10.2 cmH2O  Average Device Pressure <= 90% of Time 9.1 cmH2O  Average Time in Large Leak Per Day 30 secs.  Printed By:  Assessment:      Problem List Items Addressed This Visit     TREVOR on CPAP - Primary (Chronic)     [place] score 8  Bed time 0830 pm to 10 pm  Wake 7 am  AHI 4-20  Residual AHI 2.3    Device working well  Adherent         Relevant Orders    CPAP/BIPAP SUPPLIES    Morbid obesity with BMI of 70 and over, adult     behavioral interventions  On weight watchers         History of asthma     Rare symptoms except exposure to cold air  ACT score 20  Spirometry and CXR next visit         Relevant Orders    X-Ray Chest PA And Lateral    Spirometry with/without bronchodilator         Plan:        Follow-up in about 1 year (around 1/15/2019) for Download, CPAP supplies, Weight loss and exercise, Spirometry and CXR next visit.    This note was prepared using voice recognition system and is likely to have sound alike errors that may have been overlooked even after proof reading.  Please call me with any questions    Discussed diagnosis, its evaluation, treatment and usual course. All questions answered.    Thank you for the courtesy of participating in the care of this patient    Dakotah Aj MD

## 2018-01-16 ENCOUNTER — PATIENT MESSAGE (OUTPATIENT)
Dept: ENDOCRINOLOGY | Facility: CLINIC | Age: 38
End: 2018-01-16

## 2018-01-29 ENCOUNTER — OFFICE VISIT (OUTPATIENT)
Dept: FAMILY MEDICINE | Facility: CLINIC | Age: 38
End: 2018-01-29
Payer: COMMERCIAL

## 2018-01-29 ENCOUNTER — HOSPITAL ENCOUNTER (OUTPATIENT)
Dept: RADIOLOGY | Facility: HOSPITAL | Age: 38
Discharge: HOME | End: 2018-01-29
Attending: FAMILY MEDICINE
Payer: COMMERCIAL

## 2018-01-29 VITALS
DIASTOLIC BLOOD PRESSURE: 80 MMHG | BODY MASS INDEX: 50.02 KG/M2 | OXYGEN SATURATION: 98 % | TEMPERATURE: 99 F | SYSTOLIC BLOOD PRESSURE: 124 MMHG | WEIGHT: 293 LBS | HEART RATE: 99 BPM | HEIGHT: 64 IN

## 2018-01-29 DIAGNOSIS — M25.572 LEFT ANKLE PAIN, UNSPECIFIED CHRONICITY: ICD-10-CM

## 2018-01-29 DIAGNOSIS — L02.31 ABSCESS OF RIGHT BUTTOCK: ICD-10-CM

## 2018-01-29 DIAGNOSIS — M25.572 LEFT ANKLE PAIN, UNSPECIFIED CHRONICITY: Primary | ICD-10-CM

## 2018-01-29 PROCEDURE — 87077 CULTURE AEROBIC IDENTIFY: CPT

## 2018-01-29 PROCEDURE — 73610 X-RAY EXAM OF ANKLE: CPT | Mod: TC,PO,LT

## 2018-01-29 PROCEDURE — 87186 SC STD MICRODIL/AGAR DIL: CPT

## 2018-01-29 PROCEDURE — 73610 X-RAY EXAM OF ANKLE: CPT | Mod: 26,LT,, | Performed by: RADIOLOGY

## 2018-01-29 PROCEDURE — 99999 PR PBB SHADOW E&M-EST. PATIENT-LVL III: CPT | Mod: PBBFAC,,, | Performed by: FAMILY MEDICINE

## 2018-01-29 PROCEDURE — 87070 CULTURE OTHR SPECIMN AEROBIC: CPT

## 2018-01-29 PROCEDURE — 73630 X-RAY EXAM OF FOOT: CPT | Mod: TC,PO,LT

## 2018-01-29 PROCEDURE — 99214 OFFICE O/P EST MOD 30 MIN: CPT | Mod: S$GLB,,, | Performed by: FAMILY MEDICINE

## 2018-01-29 PROCEDURE — 73630 X-RAY EXAM OF FOOT: CPT | Mod: 26,LT,, | Performed by: RADIOLOGY

## 2018-01-29 NOTE — PROGRESS NOTES
Subjective:       Patient ID: Shaina Olson is a 38 y.o. female.    Chief Complaint: No chief complaint on file.    38 y old female with recurrent ankle sprains who hurt  left ankle 2 d ago while trying to seat on couch  . She was unable to ambulate at fist now she can do  it with severe  pain . Taking Aleve  with some releif       Review of Systems   Constitutional: Negative.    HENT: Negative.    Respiratory: Negative.    Cardiovascular: Negative.    Genitourinary: Negative.    Musculoskeletal: Negative.        Objective:      Physical Exam   Constitutional: She is oriented to person, place, and time. She appears well-developed and well-nourished. No distress.   HENT:   Head: Normocephalic and atraumatic.   Right Ear: External ear normal.   Left Ear: External ear normal.   Mouth/Throat: No oropharyngeal exudate.   Eyes: Conjunctivae and EOM are normal. Pupils are equal, round, and reactive to light. Right eye exhibits no discharge. Left eye exhibits no discharge. No scleral icterus.   Neck: Normal range of motion. Neck supple. No JVD present. No tracheal deviation present. No thyromegaly present.   Cardiovascular: Normal rate, regular rhythm and normal heart sounds.  Exam reveals no gallop and no friction rub.    No murmur heard.  Pulmonary/Chest: Effort normal and breath sounds normal. No stridor. No respiratory distress. She has no wheezes. She has no rales. She exhibits no tenderness.   Abdominal: Soft. Bowel sounds are normal. She exhibits no distension. There is no tenderness. There is no rebound and no guarding.   Musculoskeletal: Normal range of motion.        Left ankle: Tenderness. Lateral malleolus tenderness found.        Feet:    Lymphadenopathy:     She has no cervical adenopathy.   Neurological: She is alert and oriented to person, place, and time.   Skin: Skin is warm and dry. She is not diaphoretic.   Psychiatric: She has a normal mood and affect. Her behavior is normal. Judgment and thought  content normal.       Assessment:       1. Left ankle pain, unspecified chronicity        Plan:     Diagnoses and all orders for this visit:    Left ankle pain, unspecified chronicity  -     X-Ray Ankle Complete Left; Future  -     X-Ray Foot Complete Left; Future

## 2018-01-30 DIAGNOSIS — I10 HYPERTENSION GOAL BP (BLOOD PRESSURE) < 140/90: ICD-10-CM

## 2018-01-30 RX ORDER — AMLODIPINE BESYLATE 5 MG/1
5 TABLET ORAL DAILY
Qty: 90 TABLET | Refills: 1 | Status: SHIPPED | OUTPATIENT
Start: 2018-01-30 | End: 2018-07-26 | Stop reason: SDUPTHER

## 2018-02-05 ENCOUNTER — PATIENT MESSAGE (OUTPATIENT)
Dept: OTOLARYNGOLOGY | Facility: CLINIC | Age: 38
End: 2018-02-05

## 2018-02-05 ENCOUNTER — TELEPHONE (OUTPATIENT)
Dept: OTOLARYNGOLOGY | Facility: CLINIC | Age: 38
End: 2018-02-05

## 2018-02-05 NOTE — TELEPHONE ENCOUNTER
Pt states her voice is getting hoarse again and sore.  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 Wilkinson would be willing to write a letter stating she can have water at her work station.

## 2018-02-06 ENCOUNTER — TELEPHONE (OUTPATIENT)
Dept: OTOLARYNGOLOGY | Facility: CLINIC | Age: 38
End: 2018-02-06

## 2018-02-06 NOTE — TELEPHONE ENCOUNTER
----- Message from Adriana Rome MA sent at 2/6/2018  1:41 PM CST -----  Pt has severe hoarseness and has an appointment scheduled on 02/19/2018. Is there anyway the patient can be seen prior this week or Monday of next week. Pt been hoarse x 3 weeks.

## 2018-02-06 NOTE — TELEPHONE ENCOUNTER
Just a reminder to check her [place] [employer] message about the revised letter she needs.  Thanks.

## 2018-02-06 NOTE — TELEPHONE ENCOUNTER
I sent the patient a message and offered her an appointment with you on tomorrow at 2.  Otherwise I explained that you are not back in clinic until the 19th.

## 2018-02-07 ENCOUNTER — OFFICE VISIT (OUTPATIENT)
Dept: OTOLARYNGOLOGY | Facility: CLINIC | Age: 38
End: 2018-02-07
Payer: COMMERCIAL

## 2018-02-07 VITALS — TEMPERATURE: 99 F | HEART RATE: 100 BPM | BODY MASS INDEX: 70.92 KG/M2 | WEIGHT: 293 LBS

## 2018-02-07 DIAGNOSIS — J04.0 LARYNGITIS, ACUTE: ICD-10-CM

## 2018-02-07 DIAGNOSIS — J30.89 CHRONIC NON-SEASONAL ALLERGIC RHINITIS, UNSPECIFIED TRIGGER: ICD-10-CM

## 2018-02-07 DIAGNOSIS — R49.0 DYSPHONIA: Primary | ICD-10-CM

## 2018-02-07 PROCEDURE — 31575 DIAGNOSTIC LARYNGOSCOPY: CPT | Mod: S$GLB,,, | Performed by: ORTHOPAEDIC SURGERY

## 2018-02-07 PROCEDURE — 99214 OFFICE O/P EST MOD 30 MIN: CPT | Mod: 25,S$GLB,, | Performed by: ORTHOPAEDIC SURGERY

## 2018-02-07 PROCEDURE — 99999 PR PBB SHADOW E&M-EST. PATIENT-LVL III: CPT | Mod: PBBFAC,,, | Performed by: ORTHOPAEDIC SURGERY

## 2018-02-07 PROCEDURE — 3008F BODY MASS INDEX DOCD: CPT | Mod: S$GLB,,, | Performed by: ORTHOPAEDIC SURGERY

## 2018-02-07 RX ORDER — AZITHROMYCIN 250 MG/1
TABLET, FILM COATED ORAL
Qty: 6 TABLET | Refills: 0 | Status: SHIPPED | OUTPATIENT
Start: 2018-02-07 | End: 2018-03-12

## 2018-02-07 RX ORDER — METHYLPREDNISOLONE 4 MG/1
TABLET ORAL
Qty: 1 PACKAGE | Refills: 0 | Status: SHIPPED | OUTPATIENT
Start: 2018-02-07 | End: 2018-03-12 | Stop reason: ALTCHOICE

## 2018-02-07 NOTE — LETTER
February 7, 2018    Shaina Olson  [address]  [address]  [facility] [address]         OTOLARYNGOLOGY AND COMMUNICATION SCIENCES    Otolaryngology  MD Renée Solitario MD Amanda L. Williams, PA-C    Audiology  Cherelle Butler M.A., CCC-A  Lauri Jason, [facility]  Shital Ricketts M.A., [facility]      Speech Pathology  Kelly Valdes, PhD, CCC-SLP    To Whom it May Concern,    Ms. Olson is currently having issues with her vocal cords, and is under my care.  She needs to have access to water at all times, and needs to be able to have at her desk.  She also needs to rest her voice for one week, whispering is as bad for the larynx as shouting.  She may speak with normal effort as little as possible.    Please call if you have any additional questions.      Sincerely,        Renée Wilkinson MD                                                         [facility]: [address] [place] [id number]  phone [phone]  fax [phone]  [email or web address]  [place]:[address] [place] [address] phone [phone] fax [phone] [email or web address]

## 2018-02-07 NOTE — PROGRESS NOTES
Subjective:      Patient ID: Shaina Olson is a 38 y.o. female.    Chief Complaint: Hoarse; Sore Throat; Cough; and Otalgia (bilateral)    Patient is a very pleasant 38 year old female here to see me today for evaluation of hoarseness over the last 2-3 weeks.  She reports that she had a URI, and then began to lose her voice.  She has had sore throat, a feeling of sandpaper in her throat, as well as otalgia.  She has a chronic cough.  Her voice is tight and strangled.  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  She has tried different OTC medication as well as codeine containing cough syrup, but continues to have symptoms.  She says that it burns when she tries to swallow.  She has not had any fevers.  She has not had any shortness of breath or difficulty breathing.  She has not had any nasal drainage and has not been blowing her nose, but thinks she may have a postnasal drip.  She has a history of FESS several months ago.          Review of Systems   Constitutional: Negative for chills, fatigue, fever and unexpected weight change.   HENT: Positive for postnasal drip, sore throat, trouble swallowing and voice change. Negative for congestion, dental problem, ear discharge, ear pain, facial swelling, hearing loss, nosebleeds, rhinorrhea, sinus pressure, sneezing and tinnitus.    Eyes: Negative for redness, itching and visual disturbance.   Respiratory: Positive for cough. Negative for choking, shortness of breath and wheezing.    Cardiovascular: Negative for chest pain and palpitations.   Gastrointestinal: Negative for abdominal pain.        No reflux.   Musculoskeletal: Negative for gait problem.   Skin: Negative for rash.   Neurological: Negative for dizziness, light-headedness and headaches.       Objective:       Physical Exam   Constitutional: She is oriented to person, place, and time. She appears well-developed and  well-nourished. No distress.   HENT:   Head: Normocephalic and atraumatic.   Right Ear: Tympanic membrane, external ear and ear canal normal.   Left Ear: Tympanic membrane, external ear and ear canal normal.   Nose: Nose normal. No mucosal edema, rhinorrhea, nasal deformity or septal deviation. No epistaxis. Right sinus exhibits no maxillary sinus tenderness and no frontal sinus tenderness. Left sinus exhibits no maxillary sinus tenderness and no frontal sinus tenderness.   Mouth/Throat: Uvula is midline, oropharynx is clear and moist and mucous membranes are normal. Mucous membranes are not pale and not dry. No dental caries. No oropharyngeal exudate or posterior oropharyngeal erythema.   Voice is breathy and very strained   Eyes: Conjunctivae, EOM and lids are normal. Pupils are equal, round, and reactive to light. Right eye exhibits no chemosis. Left eye exhibits no chemosis. Right conjunctiva is not injected. Left conjunctiva is not injected. No scleral icterus. Right eye exhibits normal extraocular motion and no nystagmus. Left eye exhibits normal extraocular motion and no nystagmus.   Neck: Trachea normal and phonation normal. No tracheal tenderness present. No tracheal deviation present. No thyroid mass and no thyromegaly present.   Cardiovascular: Intact distal pulses.    Pulmonary/Chest: Effort normal. No stridor. No respiratory distress.   Abdominal: She exhibits no distension.   Lymphadenopathy:        Head (right side): No submental, no submandibular, no preauricular, no posterior auricular and no occipital adenopathy present.        Head (left side): No submental, no submandibular, no preauricular, no posterior auricular and no occipital adenopathy present.     She has no cervical adenopathy.   Neurological: She is alert and oriented to person, place, and time. No cranial nerve deficit.   Skin: Skin is warm and dry. No rash noted. No erythema.   Psychiatric: She has a normal mood and affect. Her  behavior is normal.     Due to indication in patient's history, presentation or risk factors,  a fiber optic exam was performed.    SEPARATE PROCEDURE NOTE:    ANESTHESIA:  Topical xylocaine with hesham-synephrine  FINDINGS:  Pseduosulcus with hyperfunction of larynx, very small nodule left mid cord      PROCEDURE:  After verbal consent was obtained, the flexible scope was passed through the patient's nasal cavity without difficulty.  The nasopharynx (adenoid pad) and eustachian tube orifices were first visualized and were found to be normal, without masses or irregularity.  The posterior pharyngeal wall and base of tongue were then examined and no mass or irregular tissue was seen.  The scope was then advanced to the larynx, and the epiglottis, valleculae, and piriform sinuses were normal, without masses or mucosal irregularity.  The false vocal folds and true vocal folds were then examined and were found to have normal mobility (full abduction and adduction) and no masses or mucosal irregularity was seen, other than a very small nodule of the left cord.  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  The arytenoids and the interarytenoid area were normal.        Assessment:       1. Dysphonia    2. Laryngitis, acute    3. Chronic non-seasonal allergic rhinitis, unspecified trigger        Plan:     Dysphonia:  Will treat acute issue with zithromax, medrol pack and most importantly 7 days of vocal rest.  If not improved, would then consider repeating course of ST. Discussed with patient importance of vocal rest, and that whispering is as stressful for her larynx as shouting.    Laryngitis, acute:  As above.    Chronic non-seasonal allergic rhinitis, unspecified trigger:  On her scope examination, her nasal cavity was very healthy and she has had an excellent result following FESS.  She has no significant sinus drainage or secretions seen on scope  examination.  Continue with allergy medications and irrigations as needed.    Other orders  -     azithromycin (Z-BARRY) 250 MG tablet; Take as directed  Dispense: 6 tablet; Refill: 0  -     methylPREDNISolone (MEDROL DOSEPACK) 4 mg tablet; use as directed  Dispense: 1 Package; Refill: 0

## 2018-02-13 DIAGNOSIS — L02.31 ABSCESS OF RIGHT BUTTOCK: Primary | ICD-10-CM

## 2018-02-13 RX ORDER — VALACYCLOVIR HYDROCHLORIDE 1 G/1
1000 TABLET, FILM COATED ORAL 2 TIMES DAILY
Qty: 10 TABLET | Refills: 0 | Status: SHIPPED | OUTPATIENT
Start: 2018-02-13 | End: 2018-03-12

## 2018-02-13 RX ORDER — SULFAMETHOXAZOLE AND TRIMETHOPRIM 800; 160 MG/1; MG/1
1 TABLET ORAL 2 TIMES DAILY
Qty: 14 TABLET | Refills: 0 | Status: SHIPPED | OUTPATIENT
Start: 2018-02-13 | End: 2018-02-23

## 2018-02-16 ENCOUNTER — OFFICE VISIT (OUTPATIENT)
Dept: OPHTHALMOLOGY | Facility: CLINIC | Age: 38
End: 2018-02-16
Payer: COMMERCIAL

## 2018-02-16 DIAGNOSIS — H40.053 OCULAR HYPERTENSION, BILATERAL: Primary | ICD-10-CM

## 2018-02-16 PROCEDURE — 92133 CPTRZD OPH DX IMG PST SGM ON: CPT | Mod: S$GLB,,, | Performed by: OPTOMETRIST

## 2018-02-16 PROCEDURE — 92012 INTRM OPH EXAM EST PATIENT: CPT | Mod: S$GLB,,, | Performed by: OPTOMETRIST

## 2018-02-16 PROCEDURE — 99999 PR PBB SHADOW E&M-EST. PATIENT-LVL I: CPT | Mod: PBBFAC,,, | Performed by: OPTOMETRIST

## 2018-02-16 PROCEDURE — 92083 EXTENDED VISUAL FIELD XM: CPT | Mod: S$GLB,,, | Performed by: OPTOMETRIST

## 2018-02-16 NOTE — PROGRESS NOTES
HPI     Glaucoma Suspect    Additional comments: 24-2VF, gOCT and IOP check           Comments   Pts last visit was 7/25/17 with DNL for IOP check. PT was told to rtc 3   months for 24-2VF, gOCT and IOP check.  Medication eye drops if any: Latanoprhst qhs OU  Last HVF: 2/16/18  Last gOCT: 2/16/18  Last SDP: 7/5/16          Last edited by Ashley Hudson MA on 2/16/2018  8:03 AM. (History)            Assessment /Plan     For exam results, see Encounter Report.    Ocular hypertension, bilateral  -     Posterior Segment OCT Optic Nerve- Both eyes  -     Collazo Visual Field - OU - Extended - Both Eyes      IOP stable OU and within acceptable range  No NFL thinning on gOCT today  No VF defects OD, OS  Continue latanoprost qhs OU  Monitor 6 months    RTC 6 months for dilation, refract and SDPs or PRN  Discussed above and all questions were answered.

## 2018-02-17 LAB — BACTERIA SPEC AEROBE CULT: NORMAL

## 2018-02-20 RX ORDER — CYCLOBENZAPRINE HCL 10 MG
10 TABLET ORAL 3 TIMES DAILY PRN
Qty: 90 TABLET | Refills: 0 | Status: SHIPPED | OUTPATIENT
Start: 2018-02-20 | End: 2018-04-09

## 2018-03-07 ENCOUNTER — PATIENT MESSAGE (OUTPATIENT)
Dept: FAMILY MEDICINE | Facility: CLINIC | Age: 38
End: 2018-03-07

## 2018-03-08 ENCOUNTER — LAB VISIT (OUTPATIENT)
Dept: LAB | Facility: HOSPITAL | Age: 38
End: 2018-03-08
Attending: FAMILY MEDICINE
Payer: COMMERCIAL

## 2018-03-08 ENCOUNTER — TELEPHONE (OUTPATIENT)
Dept: FAMILY MEDICINE | Facility: CLINIC | Age: 38
End: 2018-03-08

## 2018-03-08 DIAGNOSIS — M47.816 LUMBAR SPONDYLOSIS: ICD-10-CM

## 2018-03-08 DIAGNOSIS — R25.2 LEG CRAMPS: ICD-10-CM

## 2018-03-08 DIAGNOSIS — R25.2 LEG CRAMPS: Primary | ICD-10-CM

## 2018-03-08 LAB
ALBUMIN SERPL BCP-MCNC: 3.3 G/DL
ALP SERPL-CCNC: 87 U/L
ALT SERPL W/O P-5'-P-CCNC: 12 U/L
ANION GAP SERPL CALC-SCNC: 8 MMOL/L
AST SERPL-CCNC: 13 U/L
BILIRUB SERPL-MCNC: 0.5 MG/DL
BUN SERPL-MCNC: 10 MG/DL
CALCIUM SERPL-MCNC: 9.1 MG/DL
CHLORIDE SERPL-SCNC: 108 MMOL/L
CO2 SERPL-SCNC: 24 MMOL/L
CREAT SERPL-MCNC: 0.8 MG/DL
EST. GFR  (AFRICAN AMERICAN): >60 ML/MIN/1.73 M^2
EST. GFR  (NON AFRICAN AMERICAN): >60 ML/MIN/1.73 M^2
GLUCOSE SERPL-MCNC: 82 MG/DL
MAGNESIUM SERPL-MCNC: 1.8 MG/DL
POTASSIUM SERPL-SCNC: 4 MMOL/L
PROT SERPL-MCNC: 6.8 G/DL
SODIUM SERPL-SCNC: 140 MMOL/L

## 2018-03-08 PROCEDURE — 80053 COMPREHEN METABOLIC PANEL: CPT

## 2018-03-08 PROCEDURE — 83735 ASSAY OF MAGNESIUM: CPT

## 2018-03-08 PROCEDURE — 36415 COLL VENOUS BLD VENIPUNCTURE: CPT | Mod: PO

## 2018-03-12 ENCOUNTER — OFFICE VISIT (OUTPATIENT)
Dept: OTOLARYNGOLOGY | Facility: CLINIC | Age: 38
End: 2018-03-12
Payer: COMMERCIAL

## 2018-03-12 ENCOUNTER — LAB VISIT (OUTPATIENT)
Dept: LAB | Facility: HOSPITAL | Age: 38
End: 2018-03-12
Attending: FAMILY MEDICINE
Payer: COMMERCIAL

## 2018-03-12 VITALS
TEMPERATURE: 98 F | WEIGHT: 293 LBS | BODY MASS INDEX: 72.32 KG/M2 | SYSTOLIC BLOOD PRESSURE: 147 MMHG | DIASTOLIC BLOOD PRESSURE: 93 MMHG | HEART RATE: 105 BPM

## 2018-03-12 DIAGNOSIS — E05.90 HYPERTHYROIDISM: ICD-10-CM

## 2018-03-12 DIAGNOSIS — J30.89 CHRONIC NONSEASONAL ALLERGIC RHINITIS DUE TO FUNGAL SPORES: Primary | ICD-10-CM

## 2018-03-12 DIAGNOSIS — E05.00 GRAVES' DISEASE: ICD-10-CM

## 2018-03-12 DIAGNOSIS — J30.89 CHRONIC NONSEASONAL ALLERGIC RHINITIS DUE TO POLLEN: ICD-10-CM

## 2018-03-12 DIAGNOSIS — R49.0 DYSPHONIA: ICD-10-CM

## 2018-03-12 DIAGNOSIS — J30.81 CHRONIC ALLERGIC RHINITIS DUE TO ANIMAL HAIR AND DANDER: ICD-10-CM

## 2018-03-12 LAB
ALBUMIN SERPL BCP-MCNC: 3.4 G/DL
ALP SERPL-CCNC: 97 U/L
ALT SERPL W/O P-5'-P-CCNC: 13 U/L
AST SERPL-CCNC: 13 U/L
BASOPHILS # BLD AUTO: 0.08 K/UL
BASOPHILS NFR BLD: 0.6 %
BILIRUB DIRECT SERPL-MCNC: 0.2 MG/DL
BILIRUB SERPL-MCNC: 0.5 MG/DL
DIFFERENTIAL METHOD: ABNORMAL
EOSINOPHIL # BLD AUTO: 0.1 K/UL
EOSINOPHIL NFR BLD: 0.9 %
ERYTHROCYTE [DISTWIDTH] IN BLOOD BY AUTOMATED COUNT: 15.1 %
HCT VFR BLD AUTO: 38.9 %
HGB BLD-MCNC: 12.2 G/DL
IMM GRANULOCYTES # BLD AUTO: 0.04 K/UL
IMM GRANULOCYTES NFR BLD AUTO: 0.3 %
LYMPHOCYTES # BLD AUTO: 4.1 K/UL
LYMPHOCYTES NFR BLD: 29.6 %
MCH RBC QN AUTO: 29.2 PG
MCHC RBC AUTO-ENTMCNC: 31.4 G/DL
MCV RBC AUTO: 93 FL
MONOCYTES # BLD AUTO: 0.5 K/UL
MONOCYTES NFR BLD: 3.9 %
NEUTROPHILS # BLD AUTO: 9 K/UL
NEUTROPHILS NFR BLD: 64.7 %
NRBC BLD-RTO: 0 /100 WBC
PLATELET # BLD AUTO: 389 K/UL
PMV BLD AUTO: 9.7 FL
PROT SERPL-MCNC: 7.2 G/DL
RBC # BLD AUTO: 4.18 M/UL
T3FREE SERPL-MCNC: 2.9 PG/ML
T4 FREE SERPL-MCNC: 1.03 NG/DL
TSH SERPL DL<=0.005 MIU/L-ACNC: 0.82 UIU/ML
WBC # BLD AUTO: 13.93 K/UL

## 2018-03-12 PROCEDURE — 36415 COLL VENOUS BLD VENIPUNCTURE: CPT | Mod: PO

## 2018-03-12 PROCEDURE — 99999 PR PBB SHADOW E&M-EST. PATIENT-LVL III: CPT | Mod: PBBFAC,,, | Performed by: ORTHOPAEDIC SURGERY

## 2018-03-12 PROCEDURE — 84443 ASSAY THYROID STIM HORMONE: CPT

## 2018-03-12 PROCEDURE — 99214 OFFICE O/P EST MOD 30 MIN: CPT | Mod: S$GLB,,, | Performed by: ORTHOPAEDIC SURGERY

## 2018-03-12 PROCEDURE — 84481 FREE ASSAY (FT-3): CPT

## 2018-03-12 PROCEDURE — 85025 COMPLETE CBC W/AUTO DIFF WBC: CPT

## 2018-03-12 PROCEDURE — 3080F DIAST BP >= 90 MM HG: CPT | Mod: CPTII,S$GLB,, | Performed by: ORTHOPAEDIC SURGERY

## 2018-03-12 PROCEDURE — 3077F SYST BP >= 140 MM HG: CPT | Mod: CPTII,S$GLB,, | Performed by: ORTHOPAEDIC SURGERY

## 2018-03-12 PROCEDURE — 84439 ASSAY OF FREE THYROXINE: CPT

## 2018-03-12 PROCEDURE — 80076 HEPATIC FUNCTION PANEL: CPT

## 2018-03-12 NOTE — PROGRESS NOTES
Subjective:      Patient ID: Shaina Olson is a 38 y.o. female.    Chief Complaint: Follow-up (3 month follow up); Allergies (Sneezin, throat clearing); and Headache    Patient is a very pleasant 38 year old female here to see me today for evaluation of her allergies.  She has a vocal cord nodule, and has been through speech therapy.  She currently has clear nasal drainage (no thickened nasal drainage), but no facial pain or pressure.  She has been sneezing a lot, and has nasal itching.  She has some itching of her ears, but has no occular symptoms.  She had asthma as a child.  She is currently on Flonase, Singulair and Xyzal, but continues to have symptoms.  She had allergy testing done in [place] several years ago (around 2013), and had numerous positive reactions, especially some of the molds.  She has had a FESS 1/2017.  She started immunotherapy, but never was able to do consistently.  She was on immunotherapy for three years around 2009, and then had only several months of treatment more recently.  She can't remember if she had improvement with her injections in 2009, but she was not taking her shots consistently (she was doing at home).          Review of Systems   Constitutional: Negative for chills, fatigue, fever and unexpected weight change.   HENT: Positive for congestion, postnasal drip, rhinorrhea, sneezing and voice change. Negative for dental problem, ear discharge, ear pain, facial swelling, hearing loss, nosebleeds, sinus pressure, sore throat (itchy), tinnitus and trouble swallowing.    Eyes: Negative for redness, itching and visual disturbance.   Respiratory: Positive for cough. Negative for choking, shortness of breath and wheezing.    Cardiovascular: Negative for chest pain and palpitations.   Gastrointestinal: Negative for abdominal pain.        No reflux.   Musculoskeletal: Negative for gait problem.   Skin: Negative for rash.   Allergic/Immunologic: Positive for environmental  allergies.   Neurological: Positive for headaches. Negative for dizziness and light-headedness.       Objective:       Physical Exam   Constitutional: She is oriented to person, place, and time. She appears well-developed and well-nourished. No distress.   HENT:   Head: Normocephalic and atraumatic.   Right Ear: Tympanic membrane, external ear and ear canal normal.   Left Ear: Tympanic membrane, external ear and ear canal normal.   Nose: Nose normal. No mucosal edema, rhinorrhea, nasal deformity or septal deviation. No epistaxis. Right sinus exhibits no maxillary sinus tenderness and no frontal sinus tenderness. Left sinus exhibits no maxillary sinus tenderness and no frontal sinus tenderness.   Mouth/Throat: Uvula is midline, oropharynx is clear and moist and mucous membranes are normal. Mucous membranes are not pale and not dry. No dental caries. No oropharyngeal exudate or posterior oropharyngeal erythema.   Voice is improved, medialized middle turbinates bilaterally   Eyes: Conjunctivae, EOM and lids are normal. Pupils are equal, round, and reactive to light. Right eye exhibits no chemosis. Left eye exhibits no chemosis. Right conjunctiva is not injected. Left conjunctiva is not injected. No scleral icterus. Right eye exhibits normal extraocular motion and no nystagmus. Left eye exhibits normal extraocular motion and no nystagmus.   Neck: Trachea normal and phonation normal. No tracheal tenderness present. No tracheal deviation present. No thyroid mass and no thyromegaly present.   Cardiovascular: Intact distal pulses.    Pulmonary/Chest: Effort normal. No stridor. No respiratory distress.   Abdominal: She exhibits no distension.   Lymphadenopathy:        Head (right side): No submental, no submandibular, no preauricular, no posterior auricular and no occipital adenopathy present.        Head (left side): No submental, no submandibular, no preauricular, no posterior auricular and no occipital adenopathy present.      She has no cervical adenopathy.   Neurological: She is alert and oriented to person, place, and time. No cranial nerve deficit.   Skin: Skin is warm and dry. No rash noted. No erythema.   Psychiatric: She has a normal mood and affect. Her behavior is normal.       Assessment:       1. Chronic nonseasonal allergic rhinitis due to fungal spores    2. Chronic allergic rhinitis due to animal hair and dander    3. Chronic nonseasonal allergic rhinitis due to pollen    4. Dysphonia        Plan:     Chronic nonseasonal allergic rhinitis due to fungal spores:  Patient has recently been on a trial of maximal medical therapy, but continues to have symptoms.  At this point, the next step would be to proceed with allergy skin testing.  Risks and benefits were discussed with the patient, including the risk of an allergic reaction to the testing.  In the most severe cases, this reaction could cause a life threatening anaphylaxis that would require treatment in clinic.  I reviewed her medications to ensure no contraindication to testing, and she will need to stop oral antihistamines prior.  The patient was given a handout with detailed instructions.  We also briefly discussed that pending the allergy testing results, she may be a candidate for specific immunotherapy.  There are different methods of desensitization therapy, including both subcutaneous and sublingual options.  Will discuss further once allergy testing is complete and results are available.    Chronic allergic rhinitis due to animal hair and dander    Chronic nonseasonal allergic rhinitis due to pollen    Dysphonia:  As above.  She has been through speech therapy and has had some improvement.

## 2018-03-18 ENCOUNTER — PATIENT MESSAGE (OUTPATIENT)
Dept: OTOLARYNGOLOGY | Facility: CLINIC | Age: 38
End: 2018-03-18

## 2018-03-19 ENCOUNTER — OFFICE VISIT (OUTPATIENT)
Dept: ENDOCRINOLOGY | Facility: CLINIC | Age: 38
End: 2018-03-19
Payer: COMMERCIAL

## 2018-03-19 VITALS
SYSTOLIC BLOOD PRESSURE: 130 MMHG | DIASTOLIC BLOOD PRESSURE: 80 MMHG | BODY MASS INDEX: 50.02 KG/M2 | TEMPERATURE: 99 F | HEART RATE: 95 BPM | HEIGHT: 64 IN | WEIGHT: 293 LBS

## 2018-03-19 DIAGNOSIS — R25.2 LEG CRAMPING: ICD-10-CM

## 2018-03-19 DIAGNOSIS — E04.2 MULTINODULAR GOITER: ICD-10-CM

## 2018-03-19 DIAGNOSIS — D72.829 LEUKOCYTOSIS, UNSPECIFIED TYPE: Primary | ICD-10-CM

## 2018-03-19 DIAGNOSIS — E05.90 HYPERTHYROIDISM: ICD-10-CM

## 2018-03-19 PROCEDURE — 99999 PR PBB SHADOW E&M-EST. PATIENT-LVL III: CPT | Mod: PBBFAC,,, | Performed by: INTERNAL MEDICINE

## 2018-03-19 PROCEDURE — 99214 OFFICE O/P EST MOD 30 MIN: CPT | Mod: S$GLB,,, | Performed by: INTERNAL MEDICINE

## 2018-03-19 PROCEDURE — 3079F DIAST BP 80-89 MM HG: CPT | Mod: CPTII,S$GLB,, | Performed by: INTERNAL MEDICINE

## 2018-03-19 PROCEDURE — 3075F SYST BP GE 130 - 139MM HG: CPT | Mod: CPTII,S$GLB,, | Performed by: INTERNAL MEDICINE

## 2018-03-19 NOTE — PROGRESS NOTES
Patient ID: Shaina Olson is a 38 y.o. female.  Patient is here for follow up        Chief Complaint: Hyperthyroidism      HPI    Chief Complaint: Hyperthyroidism      HPI      Diagnosed: diagnosed in 2014 and started on antithyroid medications immediately due to severe symptoms; she shared a picture of how she looked initially and patient had significant stare and enlarged thyroid which both have improved over time on treatment    Previous radiology tests:  Thyroid ultrasound : Yes - status post benign FNA left sided thyroid nodule that is hyperechoic in 2015. I directly looked at the images of her last ultrasound done December 2017 and compared to her previous and although the radiologist has not compared the nodule it appears similar, and because it is a hyperechoic nodule and not hypoechoic solid nodule without any other worrisome features no FNA is indicated and patient has had a benign FNA in the past.  It measured 1.2 cm last year and 1.3 cm this year.  I requested multiple times for radiologist to make a comparison via radiology department  NM uptake and scan: No    Previous thyroid surgery: No      Thyroid symptoms:denies fatigue, weight changes, heat/cold intolerance, bowel/skin changes or CVS symptoms    Patient has chronic hoarseness but has ALLERGIES and sinusitis and will be starting ALLERGY shots again.  Had sinus surgery last year.  Also takes protonix.  Her thyroid gland is enlarged also and she does have a vocal cord nodule.  Her ENT may consider speech therapy as well.      Recent CBC shows elevation of white blood count but her last dose dose steroids and antibiotic she says was a month prior.  She did had elevated platelets/year following sinus surgery     No recent fever and no current URI symptoms       Thyroid medications: methimazole 5 mg daily      Takes medication appropriately: Yes  Her A1c was normal.  She has joined weight watchers on line.  She has not yet joined [employer]  gym.  She is contemplating gastric sleeve     She has been having chronic for several months leg cramps and has discussed with her PCP and her recent electrolytes and magnesium levels were normal and she will be getting an EMG and she states she's been having the cramping ever since she has a diagnosis of hyperthyroidism    Uses CPAP for obstructive sleep apnea  I have reviewed the past medical, family and social history    Review of Systems   Constitutional: Negative for appetite change, fatigue, fever and unexpected weight change.   HENT: Positive for sinus pressure, sneezing and voice change. Negative for sore throat and trouble swallowing.    Eyes: Negative for visual disturbance.   Respiratory: Negative for shortness of breath and wheezing.    Cardiovascular: Negative for chest pain, palpitations and leg swelling.   Gastrointestinal: Negative for diarrhea, nausea and vomiting.   Endocrine: Negative for cold intolerance, heat intolerance, polydipsia, polyphagia and polyuria.   Genitourinary: Negative for difficulty urinating, dysuria and menstrual problem.   Musculoskeletal: Positive for myalgias. Negative for arthralgias and joint swelling.   Skin: Negative for rash.   Neurological: Negative for dizziness, weakness, numbness and headaches.   Psychiatric/Behavioral: Negative for confusion, dysphoric mood and sleep disturbance.       Objective:      Physical Exam   Constitutional: No distress.   HENT:   Head: Normocephalic and atraumatic.   Right Ear: External ear normal.   Left Ear: External ear normal.   Mouth/Throat: Oropharynx is clear and moist. No oropharyngeal exudate.   Eyes: Conjunctivae are normal. Right eye exhibits no discharge. Left eye exhibits no discharge. No scleral icterus.   Neck: No JVD present. No tracheal deviation present. Thyromegaly present.   Cardiovascular: Normal rate, regular rhythm, normal heart sounds and intact distal pulses.  Exam reveals no gallop and no friction rub.    No  murmur heard.  Pulmonary/Chest: Effort normal and breath sounds normal. No stridor. No respiratory distress. She has no wheezes. She has no rales. She exhibits no tenderness.   Musculoskeletal: She exhibits no edema or deformity.   Lymphadenopathy:     She has no cervical adenopathy.   Neurological: She is alert.   Skin: She is not diaphoretic.   Vitals reviewed.        Lab Review:   Lab Visit on 03/12/2018   Component Date Value    TSH 03/12/2018 0.820     Free T4 03/12/2018 1.03     T3, Free 03/12/2018 2.9     WBC 03/12/2018 13.93*    RBC 03/12/2018 4.18     Hemoglobin 03/12/2018 12.2     Hematocrit 03/12/2018 38.9     MCV 03/12/2018 93     MCH 03/12/2018 29.2     MCHC 03/12/2018 31.4*    RDW 03/12/2018 15.1*    Platelets 03/12/2018 389*    MPV 03/12/2018 9.7     Immature Granulocytes 03/12/2018 0.3     Gran # (ANC) 03/12/2018 9.0*    Immature Grans (Abs) 03/12/2018 0.04     Lymph # 03/12/2018 4.1     Mono # 03/12/2018 0.5     Eos # 03/12/2018 0.1     Baso # 03/12/2018 0.08     nRBC 03/12/2018 0     Gran% 03/12/2018 64.7     Lymph% 03/12/2018 29.6     Mono% 03/12/2018 3.9*    Eosinophil% 03/12/2018 0.9     Basophil% 03/12/2018 0.6     Differential Method 03/12/2018 Automated     Total Protein 03/12/2018 7.2     Albumin 03/12/2018 3.4*    Total Bilirubin 03/12/2018 0.5     Bilirubin, Direct 03/12/2018 0.2     AST 03/12/2018 13     ALT 03/12/2018 13     Alkaline Phosphatase 03/12/2018 97    Lab Visit on 03/08/2018   Component Date Value    Sodium 03/08/2018 140     Potassium 03/08/2018 4.0     Chloride 03/08/2018 108     CO2 03/08/2018 24     Glucose 03/08/2018 82     BUN, Bld 03/08/2018 10     Creatinine 03/08/2018 0.8     Calcium 03/08/2018 9.1     Total Protein 03/08/2018 6.8     Albumin 03/08/2018 3.3*    Total Bilirubin 03/08/2018 0.5     Alkaline Phosphatase 03/08/2018 87     AST 03/08/2018 13     ALT 03/08/2018 12     Anion Gap 03/08/2018 8     eGFR if   American 03/08/2018 >60.0     eGFR if non  Amer* 03/08/2018 >60.0     Magnesium 03/08/2018 1.8    Office Visit on 01/29/2018   Component Date Value    Aerobic Bacterial Culture 02/13/2018                      Value:METHICILLIN RESISTANT STAPHYLOCOCCUS AUREUS  Rare  Skin shantanu also present     Lab Visit on 12/07/2017   Component Date Value    TSH 12/07/2017 0.827     Free T4 12/07/2017 1.12     T3, Free 12/07/2017 2.8     Thyroperoxidase Antibodi* 12/07/2017 192.4*    Thyroid-Stim IG [facility]* 12/07/2017 <0.10     Thyroglobulin Ab Screen 12/07/2017 30.3*    WBC 12/07/2017 12.52     RBC 12/07/2017 3.97*    Hemoglobin 12/07/2017 11.4*    Hematocrit 12/07/2017 36.2*    MCV 12/07/2017 91     MCH 12/07/2017 28.7     MCHC 12/07/2017 31.5*    RDW 12/07/2017 13.4     Platelets 12/07/2017 380*    MPV 12/07/2017 10.2     Immature Granulocytes 12/07/2017 0.4     Gran # (ANC) 12/07/2017 8.4*    Immature Grans (Abs) 12/07/2017 0.05*    Lymph # 12/07/2017 3.3     Mono # 12/07/2017 0.5     Eos # 12/07/2017 0.2     Baso # 12/07/2017 0.06     nRBC 12/07/2017 0     Gran% 12/07/2017 67.3     Lymph% 12/07/2017 26.4     Mono% 12/07/2017 3.9*    Eosinophil% 12/07/2017 1.5     Basophil% 12/07/2017 0.5     Differential Method 12/07/2017 Automated    Admission on 11/22/2017, Discharged on 11/22/2017   Component Date Value    POC Preg Test, Ur 11/22/2017 Negative      Acceptab* 11/22/2017 Yes    Lab Visit on 10/26/2017   Component Date Value    WBC 10/26/2017 10.37     RBC 10/26/2017 4.26     Hemoglobin 10/26/2017 12.5     Hematocrit 10/26/2017 39.0     MCV 10/26/2017 92     MCH 10/26/2017 29.3     MCHC 10/26/2017 32.1     RDW 10/26/2017 13.4     Platelets 10/26/2017 343     MPV 10/26/2017 10.2     Immature Granulocytes 10/26/2017 0.3     Gran # (ANC) 10/26/2017 6.6     Immature Grans (Abs) 10/26/2017 0.03     Lymph # 10/26/2017 3.0     Mono # 10/26/2017 0.5     Eos #  10/26/2017 0.2     Baso # 10/26/2017 0.06     nRBC 10/26/2017 0     Gran% 10/26/2017 63.3     Lymph% 10/26/2017 28.5     Mono% 10/26/2017 5.2     Eosinophil% 10/26/2017 2.1     Basophil% 10/26/2017 0.6     Differential Method 10/26/2017 Automated     Sodium 10/26/2017 139     Potassium 10/26/2017 3.9     Chloride 10/26/2017 107     CO2 10/26/2017 25     Glucose 10/26/2017 84     BUN, Bld 10/26/2017 9     Creatinine 10/26/2017 0.7     Calcium 10/26/2017 9.2     Anion Gap 10/26/2017 7*    eGFR if African American 10/26/2017 >60.0     eGFR if non African Amer* 10/26/2017 >60.0     Hemoglobin A1C 10/26/2017 4.6     Estimated Avg Glucose 10/26/2017 85        Assessment:     1. Leukocytosis, unspecified type  TSH    T4, free    T3, free    CBC auto differential    Hepatic function panel    Repeat CBC in 1 month, this still could be related to ongoing sinusitis or recent steroid use   2. Hyperthyroidism      Continue methimazole, stable   3. Multinodular goiter      Stable, could be contributing to hoarseness among other causes   4. Leg cramping      New problem to me.  She will be getting EMG.  Can consider 2 to three-day trial off methimazole to see if he improves      Plan:   Leukocytosis, unspecified type  Comments:  Repeat CBC in 1 month, this still could be related to ongoing sinusitis or recent steroid use  Orders:  -     TSH; Future; Expected date: 07/02/2018  -     T4, free; Future; Expected date: 07/02/2018  -     T3, free; Future; Expected date: 07/02/2018  -     CBC auto differential; Future; Expected date: 07/02/2018  -     Hepatic function panel; Future; Expected date: 07/02/2018    Hyperthyroidism  Comments:  Continue methimazole, stable    Multinodular goiter  Comments:  Stable, could be contributing to hoarseness among other causes    Leg cramping  Comments:  New problem to me.  She will be getting EMG.  Can consider 2 to three-day trial off methimazole to see if he improves    Other  orders  -     Cancel: TSH; Future; Expected date: 03/19/2018  -     Cancel: T4, free; Future; Expected date: 03/19/2018  -     Cancel: T3, free; Future; Expected date: 03/19/2018          Follow-up in about 4 months (around 7/19/2018).    Labs prior to appointment? yes     Disclaimer:  This note may have been partially prepared using voice recognition software and  it may have not been extensively proofed, as such there could be errors within the text such as sound alike errors.

## 2018-03-21 ENCOUNTER — PATIENT MESSAGE (OUTPATIENT)
Dept: PULMONOLOGY | Facility: CLINIC | Age: 38
End: 2018-03-21

## 2018-03-22 ENCOUNTER — OFFICE VISIT (OUTPATIENT)
Dept: PHYSICAL MEDICINE AND REHAB | Facility: CLINIC | Age: 38
End: 2018-03-22
Payer: COMMERCIAL

## 2018-03-22 VITALS
SYSTOLIC BLOOD PRESSURE: 144 MMHG | WEIGHT: 293 LBS | BODY MASS INDEX: 50.02 KG/M2 | HEART RATE: 93 BPM | RESPIRATION RATE: 14 BRPM | DIASTOLIC BLOOD PRESSURE: 84 MMHG | HEIGHT: 64 IN

## 2018-03-22 DIAGNOSIS — M54.17 LUMBOSACRAL RADICULOPATHY AT S1: ICD-10-CM

## 2018-03-22 DIAGNOSIS — M47.816 LUMBAR SPONDYLOSIS: ICD-10-CM

## 2018-03-22 PROCEDURE — 95910 NRV CNDJ TEST 7-8 STUDIES: CPT | Mod: S$GLB,,, | Performed by: PHYSICAL MEDICINE & REHABILITATION

## 2018-03-22 PROCEDURE — 3079F DIAST BP 80-89 MM HG: CPT | Mod: CPTII,S$GLB,, | Performed by: PHYSICAL MEDICINE & REHABILITATION

## 2018-03-22 PROCEDURE — 95885 MUSC TST DONE W/NERV TST LIM: CPT | Mod: S$GLB,,, | Performed by: PHYSICAL MEDICINE & REHABILITATION

## 2018-03-22 PROCEDURE — 3077F SYST BP >= 140 MM HG: CPT | Mod: CPTII,S$GLB,, | Performed by: PHYSICAL MEDICINE & REHABILITATION

## 2018-03-22 PROCEDURE — 99204 OFFICE O/P NEW MOD 45 MIN: CPT | Mod: 25,S$GLB,, | Performed by: PHYSICAL MEDICINE & REHABILITATION

## 2018-03-22 PROCEDURE — 99999 PR PBB SHADOW E&M-EST. PATIENT-LVL III: CPT | Mod: PBBFAC,,, | Performed by: PHYSICAL MEDICINE & REHABILITATION

## 2018-03-22 NOTE — PATIENT INSTRUCTIONS
Possible Causes of Low Back or Leg Pain    The symptoms in your back or leg may be due to pressure on a nerve. This pressure may be caused by a damaged disk or by abnormal bone growth. Either way, you may feel pain, burning, tingling, or numbness. If you have pressure on a nerve that connects to the sciatic nerve, pain may shoot down your leg.    Pressure from the disk  Constant wear and tear can weaken a disk over time and cause back pain. The disk can then be damaged by a sudden movement or injury. If its soft center begins to bulge, the disk may press on a nerve. Or the outside of the disk may tear, and the soft center may squeeze through and pinch a nerve.    Pressure from bone  As a disk wears out, the vertebrae right above and below the disk begin to touch. This can put pressure on a nerve. Often, abnormal bone (called bone spurs) grows where the vertebrae rub against each other. This can cause the foramen or the spinal canal to narrow (called stenosis) and press against a nerve.  Date Last Reviewed: 10/4/2015  © 1364-7942 [employer]. [address] [place] [address]. All rights reserved. This information is not intended as a substitute for professional medical care. Always follow your healthcare professional's instructions.        Causes of Lumbar (Low Back) Pain  Low back pain can be caused by problems with any part of the lumbar spine. A disk can herniate (push out) and press on a nerve. Vertebrae can rub against each other or slip out of place. This can irritate facet joints and nerves. It can also lead to stenosis, a narrowing of the spinal canal or foramen.  Pressure from a disk  Constant wear and tear on a disk can cause it to weaken and push outward. Part of the disk may then press on nearby nerves. There are two common types of herniated disks:  Contained means the soft nucleus is protruding outward.   Extruded means the firm annulus has torn, letting the soft center squeeze  through.     Pressure from bone  An unstable spine   With age, a disk may thin and wear out. Vertebrae above and below the disk may begin to touch. This can put pressure on nerves. It can also cause bone spurs (growths) to form where the bones rub together.    Stenosis results when bone spurs narrow the foramen or spinal canal. This also puts pressure on nerves. Slipping vertebrae can irritate nerves and joints. They can also worsen stenosis.    In some cases, vertebrae become unstable and slip forward. This is called spondylolisthesis.     Date Last Reviewed: 10/12/2015  © 3404-9809 [employer]. [address] [place] [address], [place] [address]. All rights reserved. This information is not intended as a substitute for professional medical care. Always follow your healthcare professional's instructions.        Relieving Back Pain  Back pain is a common problem. You can strain back muscles by lifting too much weight or just by moving the wrong way. Back strain can be uncomfortable, even painful. And it can take weeks or months to improve. To help yourself feel better and prevent future back strains, try these tips.  Important Note: Do not give aspirin to children or teens without first discussing it with your healthcare provider.      ? Ice    Ice reduces muscle pain and swelling. It helps most during the first 24 to 48 hours after an injury.  · Wrap an ice pack or a bag of frozen peas in a thin towel. (Never place ice directly on your skin.)  · Place the ice where your back hurts the most.  · Dont ice for more than 20 minutes at a time.  · You can use ice several times a day.  ? Medicines  Over-the-counter pain relievers can include acetaminophen and anti-inflammatory medicines, which includes aspirin or ibuprofen. They can help ease discomfort. Some also reduce swelling.  · Tell your healthcare provider about any medicines you are already taking.  · Take medicines only as directed.  ? Heat  After the first  48 hours, heat can relax sore muscles and improve blood flow.  · Try a warm bath or shower. Or use a heating pad set on low. To prevent a burn, keep a cloth between you and the heating pad.  · Dont use a heating pad for more than 15 minutes at a time. Never sleep on a heating pad.  Date Last Reviewed: 9/1/2015  © 1096-0718 [employer]. [address] [place] [address], [place] [address]. All rights reserved. This information is not intended as a substitute for professional medical care. Always follow your healthcare professional's instructions.        Exercises to Strengthen Your Lower Back  Strong lower back and abdominal muscles work together to support your spine. The exercises below will help strengthen the lower back. It is important that you begin exercising slowly and increase levels gradually.  Always begin any exercise program with stretching. If you feel pain while doing any of these exercises, stop and talk to your doctor about a more specific exercise program that better suits your condition.   Low back stretch  The point of stretching is to make you more flexible and increase your range of motion. Stretch only as much as you are able. Stretch slowly. Do not push your stretch to the limit. If at any point you feel pain while stretching, this is your (temporary) limit.  · Lie on your back with your knees bent and both feet on the ground.  · Slowly raise your left knee to your chest as you flatten your lower back against the floor. Hold for 5 seconds.  · Relax and repeat the exercise with your right knee.  · Do 10 of these exercises for each leg.  · Repeat hugging both knees to your chest at the same time.  Building lower back strength  Start your exercise routine with 10 to 30 minutes a day, 1 to 3 times a day.  Initial exercises  Lying on your back:  1. Ankle pumps: Move your foot up and down, towards your head, and then away. Repeat 10 times with each foot.  2. Heel slides: Slowly bend your knee,  drawing the heel of your foot towards you. Then slide your heel/foot from you, straightening your knee. Do not lift your foot off the floor (this is not a leg lift).  3. Abdominal contraction: Bend your knees and put your hands on your stomach. Tighten your stomach muscles. Hold for 5 seconds, then relax. Repeat 10 times.  4. Straight leg raise: Bend one leg at the knee and keep the other leg straight. Tighten your stomach muscles. Slowly lift your straight leg 6 to 12 inches off the floor and hold for up to 5 seconds. Repeat 10 times on each side.  Standin. Wall squats: Stand with your back against the wall. Move your feet about 12 inches away from the wall. Tighten your stomach muscles, and slowly bend your knees until they are at about a 45 degree angle. Do not go down too far. Hold about 5 seconds. Then slowly return to your starting position. Repeat 10 times.  2. Heel raises: Stand facing the wall. Slowly raise the heels of your feet up and down, while keeping your toes on the floor. If you have trouble balancing, you can touch the wall with your hands. Repeat 10 times.  More advanced exercises  When you feel comfortable enough, try these exercises.  1. Kneeling lumbar extension: Begin on your hands and knees. At the same time, raise and straighten your right arm and left leg until they are parallel to the ground. Hold for 2 seconds and come back slowly to a starting position. Repeat with left arm and right leg, alternating 10 times.  2. Prone lumbar extension: Lie face down, arms extended overhead, palms on the floor. At the same time, raise your right arm and left leg as high as comfortably possible. Hold for 10 seconds and slowly return to start. Repeat with left arm and right leg, alternating 10 times. Gradually build up to 20 times. (Advanced: Repeat this exercise raising both arms and both legs a few inches off the floor at the same time. Hold for 5 seconds and release.)  3. Pelvic tilt: Lie on the  floor on your back with your knees bent at 90 degrees. Your feet should be flat on the floor. Inhale, exhale, then slowly contract your abdominal muscles bringing your navel toward your spine. Let your pelvis rock back until your lower back is flat on the floor. Hold for 10 seconds while breathing smoothly.  4. Abdominal crunch: Perform a pelvic tilt (above) flattening your lower back against the floor. Holding the tension in your abdominal muscles, take another breath and raise your shoulder blades off the ground (this is not a full sit-up). Keep your head in line with your body (dont bend your neck forward). Hold for 2 seconds, then slowly lower.  Date Last Reviewed: 6/1/2016 © 2000-2017 [employer]. [address] [place] [address], [place] [address]. All rights reserved. This information is not intended as a substitute for professional medical care. Always follow your healthcare professional's instructions.        Relieving Back Pain  Back pain is a common problem. You can strain back muscles by lifting too much weight or just by moving the wrong way. Back strain can be uncomfortable, even painful. And it can take weeks or months to improve. To help yourself feel better and prevent future back strains, try these tips.  Important Note: Do not give aspirin to children or teens without first discussing it with your healthcare provider.      ? Ice    Ice reduces muscle pain and swelling. It helps most during the first 24 to 48 hours after an injury.  · Wrap an ice pack or a bag of frozen peas in a thin towel. (Never place ice directly on your skin.)  · Place the ice where your back hurts the most.  · Dont ice for more than 20 minutes at a time.  · You can use ice several times a day.  ? Medicines  Over-the-counter pain relievers can include acetaminophen and anti-inflammatory medicines, which includes aspirin or ibuprofen. They can help ease discomfort. Some also reduce swelling.  · Tell your healthcare  provider about any medicines you are already taking.  · Take medicines only as directed.  ? Heat  After the first 48 hours, heat can relax sore muscles and improve blood flow.  · Try a warm bath or shower. Or use a heating pad set on low. To prevent a burn, keep a cloth between you and the heating pad.  · Dont use a heating pad for more than 15 minutes at a time. Never sleep on a heating pad.  Date Last Reviewed: 9/1/2015  © 7060-1087 [employer]. [address] [place] [address], [place] [address]. All rights reserved. This information is not intended as a substitute for professional medical care. Always follow your healthcare professional's instructions.        Back Safety: Poor Posture Hurts  An unhealthy spine often starts with bad habits. Poor movement patterns and posture problems are common causes of back pain. Disk, bone, nerve, and soft tissue problems can all be affected by poor posture. They can lead to pain, stiffness, and other symptoms.    Poor posture backfires  Poor posture can cause pain. Too much slouching puts increased pressure on the disks. An excessive lumbar curve can overload and inflame the vertebrae. As a result, the back muscles may tighten or spasm to splint and protect the spine. This adds to the pain you feel.    Proper posture: The key to safe movement  Your spine bears your weight throughout the day. This is true whether youre sleeping, standing, or bending. Certain positions strain your spine more than others. But by maintaining proper posture in all positions, you can reduce the stress on your spine.       To improve your standing posture, follow these steps:  · Breathe deeply.  · Relax your shoulders, hips, and ankles. · Think of the ears, shoulders, hips, and ankles as a series of dots. Now, adjust your body to connect the dots in a straight line.  · Tuck your buttocks in just a bit if you need to.      Date Last Reviewed: 10/28/2015  © 5628-6068 [employer]  [address] [place], [place] [id number]. All rights reserved. This information is not intended as a substitute for professional medical care. Always follow your healthcare professional's instructions.        Caring for Your Back Throughout the Day  Take care of your back throughout the day. You will likely have fewer back problems if you do. Try to warm up before you move. Shift positions often. Also do your best to form healthy habits.    Warm up for the day  Do a few slow, catlike stretches before starting your day. This simple warmup can soften your disks, stretch your back muscles, and help prevent injuries.  Shift positions often  At work and at home, change positions often. This helps keep your body from getting stiff. Stand up or lean back while you sit. If you can, get up and move every 1/2 hour.  Form healthy habits  Here are some suggestions:   · Keep a healthy weight. When you weigh too much, your back is under excess strain. But losing just a few extra pounds can help a lot.  · Try not to overeat. Learn about serving sizes. The size of a serving depends on the food and the food group. Many foods list serving sizes on the labels.  · Handle minor aches with cold and heat. Apply cold the first 24 to 48 hours. Use heat after that. Always place a thin cloth between your skin and the source of cold or heat.  · Take medicines as directed. This helps keep pain under control. Always read labels, and call your healthcare provider or pharmacist if you have any questions.  Walk each day  A daily walk keeps your back and thigh muscles stretched and strong. This gives your back better support. Be sure to walk with your spines three curves aligned, by keeping your head, hips, and toes connected by a vertical line.   Date Last Reviewed: 10/18/2015  © 0451-4119 [employer]. [address] [place] [address]. All rights reserved. This information is not intended as a substitute for professional  medical care. Always follow your healthcare professional's instructions.        Relieving Tension in Your Back  Being relaxed helps keep your mind healthy and your back ready to move. Take short breaks often. Walk around. Stretch. Switch tasks. Also give the following a try.  Make time to relax. Start by setting aside 5 minutes daily.   Deep breathing    Deep breathing is a simple way to reduce stress. You can do it almost any time you need to relax.  · Inhale slowly through your nose. Let your lungs and stomach expand.  · Hold your breath for 2 to 3 seconds.  · Exhale slowly through your mouth until your lungs feel empty. Repeat 3 to 4 times.  Relieve tension  Muscle tension can create tender spots called trigger points. The tips below may help relieve muscle tension.  · Press the trigger point if you can reach it. If not, lie on a soft tennis ball, or ask a friend to press the spot. Use steady pressure for 10 to 15 seconds. Breathe deeply. Repeat a few times.  · Massage trigger points with ice for 2 to 5 minutes. Press lightly at first. Slowly increase firmness.  Date Last Reviewed: 10/18/2015  © 6829-8580 [employer]. [address] [place] [address]. All rights reserved. This information is not intended as a substitute for professional medical care. Always follow your healthcare professional's instructions.        Back Safety: Basics of Good Posture  Good posture helps protect you from injury. It also increases your comfort. Aim for good posture throughout the day.    Check your posture  The human body works best when it is properly aligned. To improve your standing posture, follow these steps:  · Take a moment to close your eyes and feel your body. Then breathe deeply and relax your shoulders, hips, and knees.  · Now, from the very top of your head, lift up just a bit. Think of a line linking your ears, shoulders, hips, and ankles. Adjust your body to follow the line. You may need to  relax your hips and tuck your buttocks under a bit.  · Next, take a look at yourself in a mirror. Is one ear, shoulder, or hip higher than the other? They should be level.  Check how you sit  When you sit properly, pressure on your back is reduced. Try these steps:  · Sit so that the curve of your lower back fits easily against the chair. Keep your gaze level.  · Support your feet. They should be flat on the floor or on a footrest. Your knees should be level with your hips.  · Adjust the chair height as needed. Sit so your forearms are level with the work surface.  Proper posture helps  When your back is aligned, its more likely to stay safe throughout the day.  · Standing in place. Rest one foot on a stool or low box to ease pressure on your lower back. Switch feet often. If you can, adjust the height of your work surface so your neck and shoulders arent under strain.  · Driving. Sit close enough to the steering wheel to keep your knees slightly bent. For comfort, your knees should be level with your hips or just a bit lower. Sit as straight as you can. The curve of your lower back should be fully supported.  · Walking. Stand tall and walk with your head up. Let your arms swing while you walk. This helps relax muscles. Wear shoes that fit and support your feet. If you will be standing or walking for a long time, dont wear high heels.  · Sitting and sleeping. Choose your furniture with care. Make sure its not causing or increasing your back pain. Chairs should allow for comfortable, correct sitting posture. Use pillows for added support if needed. Your bed should support your backs natural curves without being too hard or too soft.  Date Last Reviewed: 10/18/2015  © 6000-6898 [employer]. [address] [place] [address], [place] [address]. All rights reserved. This information is not intended as a substitute for professional medical care. Always follow your healthcare professional's  instructions.        Understanding Lumbar Radiculopathy    Lumbar radiculopathy is irritation or inflammation of a nerve root in the low back. It causes symptoms that spread out from the back down one or both legs. To understand this condition, it helps to understand the parts of the spine:  · Vertebrae. These are bones that stack to form the spine. The lumbar spine contains the 5 bottom vertebrae.  · Disks. These are soft pads of tissue between the vertebrae. They act as shock absorbers for the spine.  · Spinal canal. This is a tunnel formed within the stacked vertebrae. In the lumbar spine, nerves run through this canal.  · Nerves. These branch off and leave the spinal canal, traveling out to parts of the body. As they leave the spinal canal, nerves pass through openings between the vertebrae. The nerve root is the part of the nerve that is closest to the spinal canal.  · Sciatic nerve. This is a large nerve formed from several nerve roots in the low back. This nerve extends down the back of the leg to the foot.  With lumbar radiculopathy, nerve roots in the low back become irritated. This leads to pain and symptoms. The sciatic nerve is commonly involved, so the condition is often called sciatica.  What causes lumbar radiculopathy?  Aging, injury, poor posture, extra body weight, and other issues can lead to problems in the low back. These problems may then irritate nerve roots. They include:  · Damage to a disk in the lumbar spine. The damaged disk may then press on nearby nerve roots.  · Degeneration from wear and tear, and aging. This can lead to narrowing (stenosis) of the openings between the vertebrae. The narrowed openings press on nerve roots as they leave the spinal canal.  · Unstable spine. This is when a vertebra slips forward. It can then press on a nerve root.  Other, less common things can put pressure on nerves in the low back. These include diabetes, infection, or a tumor.  Symptoms of lumbar  radiculopathy  These include:  · Pain in the low back  · Pain, numbness, tingling, or weakness that travels into the buttocks, hip, groin, or leg  · Muscle spasms  Treatment for lumbar radiculopathy  In most cases, your healthcare provider will first try treatments that help relieve symptoms. These may include:  · Prescription and over-the-counter pain medicines. These help relieve pain, swelling, and irritation.  · Limits on positions and activities that increase pain. But lying in bed or avoiding all movement is only recommended for a short period of time.  · Physical therapy, including exercises and stretches. This helps decrease pain and increase movement and function.  · Steroid shots into the lower back. This may help relieve symptoms for a time.  · Weight-loss program. If you are overweight, losing extra pounds may help relieve symptoms.  In some cases, you may need surgery to fix the underlying problem. This depends on the cause, the symptoms, and how long the pain has lasted.  Possible complications  Over time, an irritated and inflamed nerve may become damaged. This may lead to long-lasting (permanent) numbness or weakness in your legs and feet. If symptoms change suddenly or get worse, be sure to let your healthcare provider know.  When to call your healthcare provider  Call your healthcare provider right away if you have any of these:  · New pain or pain that gets worse  · New or increasing weakness, tingling, or numbness in your leg or foot  · Problems controlling your bladder or bowel   Date Last Reviewed: 3/10/2016  © 4033-1292 [employer]. [address] [place] [address], [place] [address]. All rights reserved. This information is not intended as a substitute for professional medical care. Always follow your healthcare professional's instructions.

## 2018-03-22 NOTE — LETTER
March 22, 2018      Sarah Gonzalez MD  [address]  [facility] [address]           [facility] - Physiatry  [address]  [place] [address]  Phone: [phone]  Fax: [phone]          Patient: Shaina Olson   MR Number: [id number]   YOB: 1980   Date of Visit: 3/22/2018       Dear Dr. Sarah Gonzalez:    Thank you for referring Shaina Olson to me for evaluation. Attached you will find relevant portions of my assessment and plan of care.    If you have questions, please do not hesitate to call me. I look forward to following Shaina Olson along with you.    Sincerely,    Shaina Ayon MD    Enclosure  CC:  No Recipients    If you would like to receive this communication electronically, please contact [email or web address] or [phone] to request more information on [facility] [facility] Link access.    For providers and/or their staff who would like to refer a patient to [facility], please contact us through our one-stop-shop provider referral line, [facility], at [phone].    If you feel you have received this communication in error or would no longer like to receive these types of communications, please e-mail [email or web address]

## 2018-03-22 NOTE — PROGRESS NOTES
[facility] [address] [place] [address]  Phone: [phone]          Full Name: shaina ren YOB: 1980  Patient ID: [id number]      Visit Date: 3/22/2018 08:05  Age: 38 Years 2 Months Old  Examining Physician: Shaina Ayon M.D.  Referring Physician:   Reason for Referral: le pain        Chief Complaint   Patient presents with    Spasms     bilateral leg       HPI: This is a 38 y.o.  female being seen in clinic today for evaluation of chronic leg cramping and spasms-worse at night.  Her spasms are worse in her calves but she can experience entire leg spasms at times.  Taking flexeril or trying to stretch her legs provide some relief.  She denies significant back pain and has only occasional leg weakness when climbing stairs.  She attributes this weakness more so due to her weight     History obtained from patient    Past family, medical, social, and surgical history reviewed in chart    Review of Systems:     General- denies lethargy, +weight change, fever, chills+insomnia  Head/neck- denies swallowing difficulties  ENT- denies hearing changes  Cardiovascular-denies chest pain  Pulmonary- denies shortness of breath  GI- denies constipation or bowel incontinence  - denies bladder incontinence  Skin- denies wounds or rashes  Musculoskeletal- denies weakness, +pain  Neurologic- +denies numbness and tingling  Psychiatric- denies depressive or psychotic features, +anxiety  Lymphatic-denies swelling  Endocrine- denies hypoglycemic symptoms/DM history  All other pertinent systems negative     Physical Examination:  General: Well developed, well nourished female, NAD, obese habitus, very pleasant   HEENT:NCAT EOMI bilaterally   Pulmonary:Normal respirations    Spinal Examination: CERVICAL  Active ROM is within normal limits.  Inspection: No deformity of spinal alignment.  Palpation: No vertebral tenderness to percussion.      Spinal Examination: LUMBAR or  THORACIC  Active ROM is within normal limits.  Inspection: No deformity of spinal alignment.      Bilateral Upper and Lower Extremities:  Pulses are 2+ at radial bilaterally.  Shoulder/Elbow/Wrist/Hand ROM   Hip/Knee/Ankle ROM wnl except limited due to habitus, ttp at musculature in lower exts  Bilateral Extremities show normal capillary refill.  No signs of cyanosis, rubor, edema, skin changes, or dysvascular changes of appendages.  Nails appear intact.    Neurological Exam:  Cranial Nerves:  II-XII grossly intact    Manual Muscle Testing: (Motor 5=normal)  4+/5 strength bilateral lower extremities    No focal atrophy is noted of lower extremity.    Bilateral Reflexes:hypo at patellar   No clonus at knee or ankle.    Sensation: tested to light touch  - intact in legs   Gait: Narrow base and good arm swing.      Entire procedure explained to patient prior to proceeding.  Verbal consent obtained        SNC      Nerve / Sites Rec. Site Onset Lat Peak Lat Amp Segments Distance Peak Diff Velocity     ms ms µV  mm ms m/s   L Sural - Ankle (Calf)      Calf Ankle 2.5 3.4 7.3 Calf - Ankle 140  56   R Sural - Ankle (Calf)      Calf Ankle 2.7 3.5 11.9 Calf - Ankle 140  53   R Medial plantar, Lateral plantar - Ankle (Medial, lateral sole)      Medial plantar Sole Ankle 2.4 3.2 8.4 Medial plantar Sole - Ankle 140  58      Lateral plantar Sole Ankle 3.1 3.6 10.5 Lateral plantar Sole - Ankle 140  45        Medial plantar Sole - Lateral plantar Sole  -0.4        MNC      Nerve / Sites Muscle Latency Amplitude Duration Rel Amp Segments Distance Lat Diff Velocity     ms mV ms %  mm ms m/s   R Peroneal - EDB      Ankle EDB 3.8 4.4 7.1 100 Ankle - EDB 80        Fib head EDB 10.1 3.2 9.2 72.9 Fib head - Ankle 320 6.2 51      Pop fossa EDB 11.8 3.0 8.5 92.8 Pop fossa - Fib head 100 1.7 58         Pop fossa - Ankle  8.0    L Peroneal - EDB      Ankle EDB 3.3 6.9 5.9 100 Ankle - EDB 80        Fib head EDB 9.9 5.4 6.5 78.6 Fib head - Ankle  350 6.6 53      Pop fossa EDB 11.6 5.5 5.3 101 Pop fossa - Fib head 90 1.7 54         Pop fossa - Ankle  8.2    R Tibial - AH      Ankle AH 8.5 3.0 3.5 100 Ankle - AH 80        Pop fossa AH 18.3 1.7 4.4 56.7 Pop fossa - Ankle  9.7    L Tibial - AH      Ankle AH 6.9 5.5 4.3 100 Ankle - AH 80        Pop fossa AH 16.6 4.6 4.9 82.8 Pop fossa - Ankle 390 9.7 40       EMG            EMG Summary Table     Spontaneous MUAP Recruitment   Muscle IA Fib PSW Fasc Other Amp Dur Polys Pattern Effort   R. Gastrocnemius (Medial head) N None None None . N n N sl red Max   R. Abductor hallucis Incr 1+ 2+ None . N n N Reduced Max   R. Tibialis anterior N None None None . N n 1+ sl red Max   R. Peroneus longus N None None None . N n 1+ sl red Max                               INTERPRETATION  -Bilateral sural sensory nerve  conduction study showed normal peak latency and amplitude  -Bilateral peroneal motor nerve conduction study showed normal latency, amplitude, and conduction velocity  -Bilateral tibial motor nerve conduction study showed prolonged latency, dec amplitude on the right, and normal conduction velocity  -Medial and lateral plantar nerve conduction studies showed normal latencies, normal amplitudes  -Needle EMG performed to above mentioned muscles.  Unable to test muscle proximal to knee due to body habitus     IMPRESSION  1. ABNORMAL study  2. There is electrodiagnostic evidence of an ACUTE on CHRONIC radiculopathy of the S1 nerve roots and mild chronic radiculopathy at L5    PLAN  1. Follow up with referring provider: Dr. Sarah Beck*  2. Handouts on back care provided. Rec further Lspine imaging and referral to PT.  May need referral to Dr Keyes in interventional pain management to consider ESIs if not responding to PT  3. This study is good for one year. If symptoms worsen or do not improve, please re-consult.    Shaina Ayon M.D.  Physical Medicine and Rehab

## 2018-03-23 ENCOUNTER — TELEPHONE (OUTPATIENT)
Dept: FAMILY MEDICINE | Facility: CLINIC | Age: 38
End: 2018-03-23

## 2018-03-23 DIAGNOSIS — M51.36 DDD (DEGENERATIVE DISC DISEASE), LUMBAR: Primary | ICD-10-CM

## 2018-03-23 DIAGNOSIS — M54.16 LUMBAR RADICULOPATHY: ICD-10-CM

## 2018-03-23 NOTE — TELEPHONE ENCOUNTER
EMG showed evidence of pinched nerves  in Lower back .   Images+ PT were recomended   . Is she agreeable ?

## 2018-03-26 ENCOUNTER — OFFICE VISIT (OUTPATIENT)
Dept: OTOLARYNGOLOGY | Facility: CLINIC | Age: 38
End: 2018-03-26
Payer: COMMERCIAL

## 2018-03-26 ENCOUNTER — PATIENT MESSAGE (OUTPATIENT)
Dept: FAMILY MEDICINE | Facility: CLINIC | Age: 38
End: 2018-03-26

## 2018-03-26 DIAGNOSIS — J30.9 ALLERGIC RHINITIS, UNSPECIFIED CHRONICITY, UNSPECIFIED SEASONALITY, UNSPECIFIED TRIGGER: ICD-10-CM

## 2018-03-26 DIAGNOSIS — J01.90 ACUTE NON-RECURRENT SINUSITIS, UNSPECIFIED LOCATION: Primary | ICD-10-CM

## 2018-03-26 PROCEDURE — 99214 OFFICE O/P EST MOD 30 MIN: CPT | Mod: 25,S$GLB,, | Performed by: PHYSICIAN ASSISTANT

## 2018-03-26 PROCEDURE — 96372 THER/PROPH/DIAG INJ SC/IM: CPT | Mod: S$GLB,,, | Performed by: ORTHOPAEDIC SURGERY

## 2018-03-26 PROCEDURE — 99999 PR PBB SHADOW E&M-EST. PATIENT-LVL III: CPT | Mod: PBBFAC,,, | Performed by: PHYSICIAN ASSISTANT

## 2018-03-26 RX ORDER — AMOXICILLIN 875 MG/1
875 TABLET, FILM COATED ORAL 2 TIMES DAILY
Qty: 20 TABLET | Refills: 0 | Status: SHIPPED | OUTPATIENT
Start: 2018-03-26 | End: 2018-04-05

## 2018-03-26 RX ORDER — DEXAMETHASONE SODIUM PHOSPHATE 4 MG/ML
12 INJECTION, SOLUTION INTRA-ARTICULAR; INTRALESIONAL; INTRAMUSCULAR; INTRAVENOUS; SOFT TISSUE ONCE
Status: COMPLETED | OUTPATIENT
Start: 2018-03-26 | End: 2018-03-26

## 2018-03-26 RX ORDER — METHYLPREDNISOLONE 4 MG/1
TABLET ORAL
Qty: 1 PACKAGE | Refills: 0 | Status: SHIPPED | OUTPATIENT
Start: 2018-03-27 | End: 2018-04-09

## 2018-03-26 RX ADMIN — DEXAMETHASONE SODIUM PHOSPHATE 12 MG: 4 INJECTION, SOLUTION INTRA-ARTICULAR; INTRALESIONAL; INTRAMUSCULAR; INTRAVENOUS; SOFT TISSUE at 03:03

## 2018-03-26 NOTE — PROGRESS NOTES
"Subjective:       Patient ID: Shaina Olson is a 38 y.o. female.    Chief Complaint: Sinus Problem    Ms. Olson is dino pleasant 39 yo female here to see me today for nasal congestion, Headaches, sinus pressure/pain, sore throat, and tactile fever. She is scheduled for allergy testing tomorrow and has been off of her daily allergy medication. She became symptomatic 3 days ago and awoke this morning "feeling terrible." She was unable to work today.       Review of Systems   Constitutional: Positive for activity change and fatigue. Negative for fever and unexpected weight change.   HENT: Positive for postnasal drip, rhinorrhea and sinus pain. Negative for dental problem, ear discharge, facial swelling, hearing loss, nosebleeds, tinnitus, trouble swallowing and voice change.    Eyes: Negative for redness, itching and visual disturbance.   Respiratory: Negative for choking and wheezing.    Cardiovascular: Negative for chest pain and palpitations.   Gastrointestinal: Negative for abdominal pain.        No reflux.   Musculoskeletal: Negative for gait problem.   Skin: Negative for rash.   Neurological: Negative for dizziness and light-headedness.       Objective:      Physical Exam   Constitutional: She is oriented to person, place, and time. She appears well-developed and well-nourished. No distress.   HENT:   Head: Normocephalic and atraumatic.   Right Ear: Tympanic membrane, external ear and ear canal normal.   Left Ear: Tympanic membrane, external ear and ear canal normal.   Nose: Mucosal edema and rhinorrhea present. No nasal deformity or septal deviation. No epistaxis. Right sinus exhibits maxillary sinus tenderness. Right sinus exhibits no frontal sinus tenderness. Left sinus exhibits maxillary sinus tenderness. Left sinus exhibits no frontal sinus tenderness.   Mouth/Throat: Uvula is midline, oropharynx is clear and moist and mucous membranes are normal. Mucous membranes are not pale and not dry. No dental " caries. No oropharyngeal exudate or posterior oropharyngeal erythema.   Eyes: Conjunctivae, EOM and lids are normal. Pupils are equal, round, and reactive to light. Right eye exhibits no chemosis. Left eye exhibits no chemosis. Right conjunctiva is not injected. Left conjunctiva is not injected. No scleral icterus. Right eye exhibits normal extraocular motion and no nystagmus. Left eye exhibits normal extraocular motion and no nystagmus.   Neck: Trachea normal and phonation normal. No tracheal tenderness present. No tracheal deviation present. No thyroid mass and no thyromegaly present.   Cardiovascular: Intact distal pulses.    Pulmonary/Chest: Effort normal. No stridor. No respiratory distress.   Abdominal: She exhibits no distension.   Lymphadenopathy:        Head (right side): No submental, no submandibular, no preauricular, no posterior auricular and no occipital adenopathy present.        Head (left side): No submental, no submandibular, no preauricular, no posterior auricular and no occipital adenopathy present.     She has no cervical adenopathy.   Neurological: She is alert and oriented to person, place, and time. No cranial nerve deficit.   Skin: Skin is warm and dry. No rash noted. No erythema.   Psychiatric: She has a normal mood and affect. Her behavior is normal.       Assessment:       1. Acute non-recurrent sinusitis, unspecified location    2. Allergic rhinitis, unspecified chronicity, unspecified seasonality, unspecified trigger        Plan:     1. Sinusitis: She received steroid injection in clinic today and was sent home with steroid dosepk as well as amoxil. She will still have her allergy testing tomorrow. RTC as discussed to review allergy testing results.

## 2018-03-26 NOTE — TELEPHONE ENCOUNTER
Yes, she can come in for a steroid injection if needed- please find out if she would like to and can schedule for this morning.

## 2018-03-27 ENCOUNTER — PATIENT MESSAGE (OUTPATIENT)
Dept: OTOLARYNGOLOGY | Facility: CLINIC | Age: 38
End: 2018-03-27

## 2018-03-27 ENCOUNTER — CLINICAL SUPPORT (OUTPATIENT)
Dept: OTOLARYNGOLOGY | Facility: CLINIC | Age: 38
End: 2018-03-27
Payer: COMMERCIAL

## 2018-03-27 ENCOUNTER — OFFICE VISIT (OUTPATIENT)
Dept: OTOLARYNGOLOGY | Facility: CLINIC | Age: 38
End: 2018-03-27
Payer: COMMERCIAL

## 2018-03-27 VITALS
WEIGHT: 293 LBS | DIASTOLIC BLOOD PRESSURE: 84 MMHG | SYSTOLIC BLOOD PRESSURE: 128 MMHG | HEART RATE: 89 BPM | BODY MASS INDEX: 50.02 KG/M2 | RESPIRATION RATE: 18 BRPM | TEMPERATURE: 99 F | HEIGHT: 64 IN

## 2018-03-27 DIAGNOSIS — J30.89 CHRONIC NONSEASONAL ALLERGIC RHINITIS DUE TO POLLEN: Primary | ICD-10-CM

## 2018-03-27 DIAGNOSIS — J30.89 ALLERGIC RHINITIS DUE TO DUST: ICD-10-CM

## 2018-03-27 DIAGNOSIS — J30.9 ALLERGIC RHINITIS, UNSPECIFIED CHRONICITY, UNSPECIFIED SEASONALITY, UNSPECIFIED TRIGGER: ICD-10-CM

## 2018-03-27 DIAGNOSIS — J30.81 CHRONIC ALLERGIC RHINITIS DUE TO ANIMAL HAIR AND DANDER: ICD-10-CM

## 2018-03-27 DIAGNOSIS — J30.89 CHRONIC NONSEASONAL ALLERGIC RHINITIS DUE TO FUNGAL SPORES: ICD-10-CM

## 2018-03-27 PROCEDURE — 3074F SYST BP LT 130 MM HG: CPT | Mod: CPTII,S$GLB,, | Performed by: ORTHOPAEDIC SURGERY

## 2018-03-27 PROCEDURE — 3079F DIAST BP 80-89 MM HG: CPT | Mod: CPTII,S$GLB,, | Performed by: ORTHOPAEDIC SURGERY

## 2018-03-27 PROCEDURE — 99999 PR PBB SHADOW E&M-EST. PATIENT-LVL III: CPT | Mod: PBBFAC,,, | Performed by: ORTHOPAEDIC SURGERY

## 2018-03-27 PROCEDURE — 95024 IQ TESTS W/ALLERGENIC XTRCS: CPT | Mod: S$GLB,,, | Performed by: ORTHOPAEDIC SURGERY

## 2018-03-27 PROCEDURE — 95004 PERQ TESTS W/ALRGNC XTRCS: CPT | Mod: S$GLB,,, | Performed by: ORTHOPAEDIC SURGERY

## 2018-03-27 PROCEDURE — 99214 OFFICE O/P EST MOD 30 MIN: CPT | Mod: S$GLB,,, | Performed by: ORTHOPAEDIC SURGERY

## 2018-03-27 PROCEDURE — 99999 PR PBB SHADOW E&M-EST. PATIENT-LVL II: CPT | Mod: PBBFAC,,,

## 2018-03-27 NOTE — PROGRESS NOTES
Subjective:       Patient ID: Shaina Olson is a 38 y.o. female.    Chief Complaint: Follow-up (Allergy Testing)    Patient is a very pleasant 38 year old female here to see me today for evaluation of her allergies. She currently has clear nasal drainage (no thickened nasal drainage), as well as increasing and severe facial pain or pressure.  She has been sneezing a lot, and has nasal itching.  She has some itching of her ears, but has no occular symptoms.  She had asthma as a child.  She is currently on Flonase, Singulair and Xyzal, but continues to have symptoms.  She previously had allergy testing done in [place] several years ago (around 2013), and had numerous positive reactions, especially some of the molds.  She has had a FESS 1/2017.  She started immunotherapy, but never was able to do consistently.  She was on immunotherapy for three years around 2009, and then had only several months of treatment more recently.  She can't remember if she had improvement with her injections in 2009, but she was not taking her shots consistently (she was doing at home).  She has been off of her antihistamine in preparation for today's allergy skin testing, and she has had a severe increase in her symptoms.  She was seen yesterday and was given steroids, which has helped.  She tolerated skin testing without difficulty.      Review of Systems   Constitutional: Positive for activity change and fatigue. Negative for fever and unexpected weight change.   HENT: Positive for postnasal drip, rhinorrhea and sinus pain. Negative for dental problem, ear discharge, facial swelling, hearing loss, nosebleeds, tinnitus, trouble swallowing and voice change.    Eyes: Negative for redness, itching and visual disturbance.   Respiratory: Positive for cough. Negative for choking and wheezing.    Cardiovascular: Negative for chest pain and palpitations.   Gastrointestinal: Negative for abdominal pain.        No reflux.   Musculoskeletal:  Negative for gait problem.   Skin: Negative for rash.   Allergic/Immunologic: Positive for environmental allergies.   Neurological: Positive for headaches. Negative for dizziness and light-headedness.       Objective:      Physical Exam   Constitutional: She is oriented to person, place, and time. She appears well-developed and well-nourished. No distress.   HENT:   Head: Normocephalic and atraumatic.   Right Ear: Tympanic membrane, external ear and ear canal normal.   Left Ear: Tympanic membrane, external ear and ear canal normal.   Nose: Nose normal. No mucosal edema, rhinorrhea, nasal deformity or septal deviation. No epistaxis. Right sinus exhibits no maxillary sinus tenderness and no frontal sinus tenderness. Left sinus exhibits no maxillary sinus tenderness and no frontal sinus tenderness.   Mouth/Throat: Uvula is midline, oropharynx is clear and moist and mucous membranes are normal. Mucous membranes are not pale and not dry. No dental caries. No oropharyngeal exudate or posterior oropharyngeal erythema.   Voice is improved, medialized middle turbinates bilaterally   Eyes: Conjunctivae, EOM and lids are normal. Pupils are equal, round, and reactive to light. Right eye exhibits no chemosis. Left eye exhibits no chemosis. Right conjunctiva is not injected. Left conjunctiva is not injected. No scleral icterus. Right eye exhibits normal extraocular motion and no nystagmus. Left eye exhibits normal extraocular motion and no nystagmus.   Neck: Trachea normal and phonation normal. No tracheal tenderness present. No tracheal deviation present. No thyroid mass and no thyromegaly present.   Cardiovascular: Intact distal pulses.    Pulmonary/Chest: Effort normal. No stridor. No respiratory distress.   Abdominal: She exhibits no distension.   Lymphadenopathy:        Head (right side): No submental, no submandibular, no preauricular, no posterior auricular and no occipital adenopathy present.        Head (left side): No  submental, no submandibular, no preauricular, no posterior auricular and no occipital adenopathy present.     She has no cervical adenopathy.   Neurological: She is alert and oriented to person, place, and time. No cranial nerve deficit.   Skin: Skin is warm and dry. No rash noted. No erythema.   Psychiatric: She has a normal mood and affect. Her behavior is normal.       According to departmental and published protocols, the patient underwent Modified Quantitative Testing including Skin Endpoint Titration.  The patient first had 40 prick tests with appropriate reactions to the positive and negative controls.  Based on those results, the patient then had 24 intradermal tests.  The patient had positive reactions to multiple different inhalant aeroallergens including several molds, grasses, trees, weeds and dust mites.  See nurse's note from today's date for further detail.        Assessment:       1. Chronic nonseasonal allergic rhinitis due to pollen    2. Chronic nonseasonal allergic rhinitis due to fungal spores    3. Chronic allergic rhinitis due to animal hair and dander    4. Allergic rhinitis due to dust        Plan:       1.  Chronic AR:  Patient's recent allergy testing was reviewed in great detail.  We discussed that continued use of allergy medications, both oral and intranasal spray, as well as lifestyle and home modifications specific to their allergies remains a viable option.  However, if we are unable to achieve adequate symptom control, I would then consider specific immunotherapy.  We had a long discussion regarding immunotherapy, both sublingual and subcutaneous.  Specifically, we discussed that subcutaneous requires weekly injections and does have a small but real risk of anaphylaxis, approximately 1:1-2 million.  Sublingual is administered at home, and while it does have multiple studies documenting both safety and efficacy, it is not FDA approved.  Either treatment required a commitment to  generally 2-3 years of therapy.  She would like to proceed with SLIT.  Return to clinic in six months for recheck.

## 2018-03-27 NOTE — TELEPHONE ENCOUNTER
Sublingual Allergy Drops  Patient: Melissa Olson   : 1980  MRN# [id number]  Ordering Physician: ATIF Wilkinson MD    RED VIAL - sublingually as directed  Allergens: Amt   Fusarium 1:40 w/v 1mL   Red [place] 1:20 w/v 1mL   Red Cedar 1:20 w/v 1mL   Dock-Sorrel 1:20 w/v 1mL   Nettle Weed 1:40 w/v 1mL   Vj Grass 100,000 BAU / mL 1mL   Cockroach 1:20 w/v 1mL   Feather Mix 1:20 w/v 1mL   Mold Mix #1 1:20 w/v 1mL   Tree Mix #4 1:20 w/v 1mL   National Weed mix 1:20 w/v  1mL   Mite Mix 5,000 AU / mL each - farina and pteronyssinus 1mL     GREEN VIAL - sublingually as directed  Volume Added From RED Maintenance Vial 0.25mL   50% Glycerin - LOT#[id number]  EXP: 2022 1mL

## 2018-03-27 NOTE — PROGRESS NOTES
After two patient identifiers and written consent were obtained, patient's allergies and medications were reviewed and changes were made as necessary.  Testing was discussed in detail.  Patient confirmed she does not have asthma, has not been experienced wheezing within the last seven days, has not used an inhaler within the last seven days, is not currently on a beta blocker, and is not on any other medications that are contraindicated for allergy testing.    The patient's forearms were cleansed with alcohol, and the allergen extracts were applied using the Skintestor OMNI device according to departmental procedures and guidelines.  Further intradermal skin testing was then completed using dilutions from allergens on the diagnostic panel according to departmental procedures and guidelines.    Past Medical History:   Diagnosis Date    Allergy     Anxiety 2004    Arthritis     Arthritis of right knee     Graves' disease 6/2014    Hypertension 2004    Vitamin D deficiency      Review of patient's allergies indicates:   Allergen Reactions    Demerol [meperidine] Other (See Comments)     blackout       Results obtained from the Modified Quantitative Testing (MQT), including skin endpoint titration (SET) are as follows:    ALLERGENS HIGHLIGHTED RED - ENDPOINT 6  ALLERGENS HIGHLIGHTED ORANGE - ENDPOINT 5    Allergen MTII Wheal   SIZE Dilution #   to be tested Intradermal   Wheal Size MQT   ENDPOINT   Histamine   (+ control) 11      *Alternaria 3 5 7 5   *Apergillus 5 5 7 5   Fusarium 5 5 9 6   *Cladosporium Sphaer 9 - - 6   Cladosporium Herb 7 5 11 6   *Penicillium Frances 9 - - 6   Mucor Race 5 5 9 6   *Bipolaris 3 5 5 4   Glycerin   (- control) 0             *American Elm 3 5 3 4   *[place] 5 5 5 4   [place] 3 5 5 4   *Pecan 3 5 9 6   *Brooks 9 - - 6   Mikey 5 5 7 5   Wann Birch 3 5 7 5   Red Velva 9 - - 6   B[place] 7 5 3 4   Red [place] 9 - - 6          *Short Ragweed 3 5 5 4   English Plantain 3 5  5 4   Reynolds Elder 3 5 0 4   Mugwort Enzo 5 5 9 6   Dock-Sorrel Mix 9 - - 6   *Spiny Pigweed 7 5 7 5   Baccharis Bethel 9 - - 6   *Cocklebur [place] 9 - - 6   *Lambs [place] 9 - - 6   Nettle [place] 9 - - 6          Nico Grass 5 5 5 4   Bermuda Grass 3 5 5 4   *Vj Grass 3 5 9 6   *[place] 3 5 7 5   *Farinae Mite 9 - - 6   *Pteronyssiunus Mite 9 - - 6   Cockroach 9 - - 6   Cat Hair 7 5 7 5   Dog Epithelia 7 5 7 5   Feather Mix 9 - - 6     *Allergens included in sublingual allergen mixtures    Patient tolerated testing well, no complaints of pain, discomfort, or shortness of breath. Information on allergens and methods to prevent exposure provided to patient. Discussed the different forms of immunotherapy offered by our office. Patient is scheduled to see Dr. Wilkinson this morning, advised that Dr. Wilkinson will review allergy testing in detail at that time. Instructed to contact our office with any questions or concerns. Patient verbalized understanding.

## 2018-04-05 ENCOUNTER — PATIENT MESSAGE (OUTPATIENT)
Dept: FAMILY MEDICINE | Facility: CLINIC | Age: 38
End: 2018-04-05

## 2018-04-06 ENCOUNTER — PATIENT OUTREACH (OUTPATIENT)
Dept: ADMINISTRATIVE | Facility: HOSPITAL | Age: 38
End: 2018-04-06

## 2018-04-09 ENCOUNTER — OFFICE VISIT (OUTPATIENT)
Dept: FAMILY MEDICINE | Facility: CLINIC | Age: 38
End: 2018-04-09
Payer: COMMERCIAL

## 2018-04-09 ENCOUNTER — TELEPHONE (OUTPATIENT)
Dept: FAMILY MEDICINE | Facility: CLINIC | Age: 38
End: 2018-04-09

## 2018-04-09 VITALS
WEIGHT: 293 LBS | OXYGEN SATURATION: 99 % | SYSTOLIC BLOOD PRESSURE: 146 MMHG | HEIGHT: 64 IN | TEMPERATURE: 100 F | DIASTOLIC BLOOD PRESSURE: 86 MMHG | HEART RATE: 98 BPM | BODY MASS INDEX: 50.02 KG/M2

## 2018-04-09 DIAGNOSIS — R45.89 MOODINESS: Primary | ICD-10-CM

## 2018-04-09 DIAGNOSIS — F41.9 ANXIETY: ICD-10-CM

## 2018-04-09 DIAGNOSIS — M54.17 LUMBOSACRAL RADICULOPATHY AT L5: ICD-10-CM

## 2018-04-09 DIAGNOSIS — E66.01 MORBID OBESITY WITH BMI OF 70 AND OVER, ADULT: ICD-10-CM

## 2018-04-09 DIAGNOSIS — M79.10 MYALGIA: ICD-10-CM

## 2018-04-09 DIAGNOSIS — E55.9 VITAMIN D DEFICIENCY: ICD-10-CM

## 2018-04-09 DIAGNOSIS — F41.1 GAD (GENERALIZED ANXIETY DISORDER): ICD-10-CM

## 2018-04-09 DIAGNOSIS — F32.1 CURRENT MODERATE EPISODE OF MAJOR DEPRESSIVE DISORDER WITHOUT PRIOR EPISODE: ICD-10-CM

## 2018-04-09 PROCEDURE — 99999 PR PBB SHADOW E&M-EST. PATIENT-LVL IV: CPT | Mod: PBBFAC,,, | Performed by: FAMILY MEDICINE

## 2018-04-09 PROCEDURE — 3079F DIAST BP 80-89 MM HG: CPT | Mod: CPTII,S$GLB,, | Performed by: FAMILY MEDICINE

## 2018-04-09 PROCEDURE — 3077F SYST BP >= 140 MM HG: CPT | Mod: CPTII,S$GLB,, | Performed by: FAMILY MEDICINE

## 2018-04-09 PROCEDURE — 99214 OFFICE O/P EST MOD 30 MIN: CPT | Mod: S$GLB,,, | Performed by: FAMILY MEDICINE

## 2018-04-09 RX ORDER — DULOXETIN HYDROCHLORIDE 60 MG/1
60 CAPSULE, DELAYED RELEASE ORAL DAILY
Qty: 30 CAPSULE | Refills: 0 | Status: SHIPPED | OUTPATIENT
Start: 2018-04-09 | End: 2018-05-07 | Stop reason: SDUPTHER

## 2018-04-09 RX ORDER — AMLODIPINE BESYLATE 2.5 MG/1
TABLET ORAL
Qty: 90 TABLET | Refills: 3 | Status: SHIPPED | OUTPATIENT
Start: 2018-04-09 | End: 2019-04-10 | Stop reason: SDUPTHER

## 2018-04-09 RX ORDER — PREGABALIN 50 MG/1
50 CAPSULE ORAL 2 TIMES DAILY
Qty: 60 CAPSULE | Refills: 0 | Status: SHIPPED | OUTPATIENT
Start: 2018-04-09 | End: 2018-07-05 | Stop reason: DRUGHIGH

## 2018-04-10 NOTE — PROGRESS NOTES
PHYSICAL THERAPY INITIAL OUTPATIENT EVALUATION    Referring Provider:  Dr. Sarah Beck*    Diagnosis:   No diagnosis found.     Orders:  Evaluate and Treat    Date of Initial Evaluation: ***    Visit # 1 of 20    SUBJECTIVE    Onset:  Constant/ intermittent  Aggravating positions  Relieving positions  Pain at worst  Pain at best  Pain currently    Goal for PT    Occupation    OBJECTIVE      Gait:  Posture/Structure:     L/sp AROM:   % Pain Present (Y/N) Comments( deviations,  reversal of lordosis, decreased pain with repeated mvts/ tissue on slack) aberrant movements- juddering, painful arc/return from flexion, pavithra's sign and reversal of lumbopelvic rhythm ,angulations   FB      RSB      LSB      RR      LR      BB          Hip ROM:    Hip/LE Strength R  L    Hip Fl:  Quadriceps:  Hamstrings  Ankle DF:  Ankle Pf:  Hip AB:  Hip ADD:  Hip EX:    Palpation (condition, position, mobility (hypo/hyper):     Other LE strength  Core MM Strength: Trunk flexors     Trunk extensors     Lateral abdominals     TA    MM extensibility:    Neuro/Sensation:   Reflexes  Sensation     Special Test: SLR    Neetak    SI compression/ distraction    Stability with isometrics    Prone instability test    Function: Patient reports ***% disability based on score of the Oswestry Low back Pain questionnaire Scale on initial evaluation.    ASSESSMENT:  The patient is a 38 y.o. year old female who presents to physical therapy with complaints of ***.  Patient's impairments include ***.  These impairments are limiting patient's ability to ***.  Patient's prognosis is ***.  Patient will benefit from skilled physical therapy intervention to ***.    Co-morbidities which may impact the plan of care and potentially impede the patient's progress in therapy include: anxiety, arthritis, Grave's disease, HTN    Patients CLINICAL PRESENTATION is STABLE.     Short Term Goals:    1.I with HEP  2.Pt to increase AROM from        To     3. Pt to  increase BLE strength from      To     4, increase core mm strength   Long Term Goals:  1.Pt to score            On Oswestry Low back pain disability questionnaire  2. Pt to report minimal to no LBP with   3. Pt to demo Good core strength and endurance  4. Pt reports good self management     TREATMENT PROVIDED:  -Manual Therapy:  ***  -Therapeutic Exercise:  ***  -Modalities:   -Evaluation  -Education on proper posture, body mechanics and condition    PLAN:  Patient will benefit from physical therapy (***) x/week for (***) weeks including manual therapy, therapeutic exercise, functional activities, modalities, and patient education.    Thank you for this referral.    These services are reasonable and necessary for the conditions set forth above while under my care.     Cruz-Anali Phoenix, PT, DPT

## 2018-04-11 ENCOUNTER — CLINICAL SUPPORT (OUTPATIENT)
Dept: REHABILITATION | Facility: HOSPITAL | Age: 38
End: 2018-04-11
Attending: FAMILY MEDICINE
Payer: COMMERCIAL

## 2018-04-11 DIAGNOSIS — M54.50 LOW BACK PAIN POTENTIALLY ASSOCIATED WITH RADICULOPATHY: Primary | ICD-10-CM

## 2018-04-11 PROCEDURE — 97161 PT EVAL LOW COMPLEX 20 MIN: CPT

## 2018-04-11 PROCEDURE — 97110 THERAPEUTIC EXERCISES: CPT

## 2018-04-11 NOTE — PROGRESS NOTES
PHYSICAL THERAPY INITIAL OUTPATIENT EVALUATION    Referring Provider:  Dr. Sarah Beck*    Diagnosis:       ICD-10-CM ICD-9-CM    1. Low back pain potentially associated with radiculopathy M54.5 724.2        Orders:  Evaluate and Treat    Date of Initial Evaluation: 4/11/18    Visit # 1     BACKGROUND: This is a 38 y.o. Morbidly obese female being seen in clinic today for evaluation of chronic leg cramping and spasms-worse at night.  Her spasms are worse in her calves but she can experience entire leg spasms at times.  Taking flexeril or trying to stretch her legs provide some relief.  She denies significant back pain and has only occasional leg weakness when climbing stairs.  She attributes this weakness more so due to her weight.   PMH: Graves disease, HTN, anxiety, obesity, asthma, multiple PVC on EKG, depression  IMAGING: MRI scheduled.There is electrodiagnostic evidence of an ACUTE on CHRONIC radiculopathy of the S1 nerve roots and mild chronic radiculopathy at L5   OBJECTIVE    PAIN: 0/10  PAIN AT WORST: 10/10  PAIN AT BEST: 0/10  OCCUPATION: LPN    Posture/Structure:   Genu recurvatum bilaterally with flexed trunk and pes planus however ambulates with supination bilaterally with increasing trendelenburg     L/sp AROM:   % Pain Present (Y/N)   FB 50% N   RSB 50% N   LSB 50% N   BB 25% Y     Palpation: minimal tenderness to right SI and L4-L5 with spring testing    Strength:   Right Left   Hip Flexor 4/5     4 /5   Quad 4+/5    4+ /5   Hamstring 4/5     4 /5   ANT TIB 4+/5     4+ /5   Gastrocnemius          3/5                  3/5  Ankle eversion         5/5                  5/5  Hip abduction            3+/5               3+/5    Neuro/Sensation:   unremarkable  Special Test: (-) slump test, (-) SLR test, 6 minute walk test 725 feet      Function: Patient reports (14/50) 28% disability based on score of the Oswestry Low back Pain questionnaire Scale.    ASSESSMENT:  The patient is a 38 y.o. year old  female who presents to physical therapy with complaints of minimal back pain but severe cramping in bilateral anterior thigh, peroneals and calf musculature. Reports intermittent cramping is worst with prolonged walking and standing as well as when attempting to sleep in any position.  EMG and NCV testing reveals L5-S1 nerve compression. Patient's impairments include morbid obesity, bilateral LE weakness, flexibility defecits of bilateral achilles and hamstrings, trunk flexion/extension weakness and flexed trunk posture which exacerbates her condition. Right SI and LBP with LE cramping with prolonged standing and walking as well as at night. Six minute walk test 725 feet recreated peroneal cramping on left however was alleviated with stretching.. These impairments are limiting patient's ability to stand for prolonged periods, participate in social activities, sleep and perform occupational duties.  Patient's prognosis is good.  Patient will benefit from skilled physical therapy intervention to improve core stabilization, flexibility and posture to reduce intensity of cramping to allow for community involvement. Clinical presentation is stale requiring a low complexity evaluation. MRI scheduled next week.    Short Term Goals:    1.I with HEP  2.Pt to increase lumbar ROM to 75% painfree    3. Pt to increase BLE plantarflexion, hip abduction strength To  4+/5  4.Pt to have decreased reports of cramping with activity.  5.Pt to score less than 20 % impaired on the Oswestry back pain questionnaire  Long Term Goals:  1.Pt to score  18% On Oswestry Low back pain disability questionnaire  2. Patient to ambulate 900 feet on 6 minute walk test painfree    TREATMENT PROVIDED:  -Manual Therapy:    -Therapeutic Exercise:  Posterior pelvic tilts, SLR with pelvic tilt, achilles stretch, hamstring stretch, LTR, SKTC, neural flossing, 6 minute walk test  -Evaluation  -Education on proper posture, body mechanics and condition    PLAN:   Patient will benefit from physical therapy (1-2) x/week for (4) weeks including manual therapy, therapeutic exercise, functional activities, modalities, and patient education.    Thank you for this referral.    These services are reasonable and necessary for the conditions set forth above while under my care.

## 2018-04-16 ENCOUNTER — TELEPHONE (OUTPATIENT)
Dept: FAMILY MEDICINE | Facility: CLINIC | Age: 38
End: 2018-04-16

## 2018-04-16 ENCOUNTER — CLINICAL SUPPORT (OUTPATIENT)
Dept: FAMILY MEDICINE | Facility: CLINIC | Age: 38
End: 2018-04-16
Payer: COMMERCIAL

## 2018-04-16 ENCOUNTER — LAB VISIT (OUTPATIENT)
Dept: LAB | Facility: HOSPITAL | Age: 38
End: 2018-04-16
Attending: FAMILY MEDICINE
Payer: COMMERCIAL

## 2018-04-16 ENCOUNTER — CLINICAL SUPPORT (OUTPATIENT)
Dept: OTOLARYNGOLOGY | Facility: CLINIC | Age: 38
End: 2018-04-16
Payer: COMMERCIAL

## 2018-04-16 DIAGNOSIS — E55.9 VITAMIN D DEFICIENCY: ICD-10-CM

## 2018-04-16 DIAGNOSIS — D72.829 LEUKOCYTOSIS, UNSPECIFIED TYPE: ICD-10-CM

## 2018-04-16 DIAGNOSIS — J30.9 ALLERGIC RHINITIS, UNSPECIFIED CHRONICITY, UNSPECIFIED SEASONALITY, UNSPECIFIED TRIGGER: Primary | ICD-10-CM

## 2018-04-16 LAB
25(OH)D3+25(OH)D2 SERPL-MCNC: 18 NG/ML
BASOPHILS # BLD AUTO: 0.07 K/UL
BASOPHILS NFR BLD: 0.7 %
DIFFERENTIAL METHOD: ABNORMAL
EOSINOPHIL # BLD AUTO: 0.2 K/UL
EOSINOPHIL NFR BLD: 1.6 %
ERYTHROCYTE [DISTWIDTH] IN BLOOD BY AUTOMATED COUNT: 14.2 %
HCT VFR BLD AUTO: 40.4 %
HGB BLD-MCNC: 12.4 G/DL
IMM GRANULOCYTES # BLD AUTO: 0.01 K/UL
IMM GRANULOCYTES NFR BLD AUTO: 0.1 %
LYMPHOCYTES # BLD AUTO: 3.4 K/UL
LYMPHOCYTES NFR BLD: 32.3 %
MCH RBC QN AUTO: 29 PG
MCHC RBC AUTO-ENTMCNC: 30.7 G/DL
MCV RBC AUTO: 94 FL
MONOCYTES # BLD AUTO: 0.5 K/UL
MONOCYTES NFR BLD: 4.5 %
NEUTROPHILS # BLD AUTO: 6.4 K/UL
NEUTROPHILS NFR BLD: 60.8 %
NRBC BLD-RTO: 0 /100 WBC
PLATELET # BLD AUTO: 326 K/UL
PMV BLD AUTO: 9.8 FL
RBC # BLD AUTO: 4.28 M/UL
WBC # BLD AUTO: 10.53 K/UL

## 2018-04-16 PROCEDURE — 99999 PR PBB SHADOW E&M-EST. PATIENT-LVL II: CPT | Mod: PBBFAC,,,

## 2018-04-16 PROCEDURE — 95199 UNLISTED ALL/IMMLG SVC/PX: CPT | Mod: ,,, | Performed by: ORTHOPAEDIC SURGERY

## 2018-04-16 PROCEDURE — 36415 COLL VENOUS BLD VENIPUNCTURE: CPT | Mod: PO

## 2018-04-16 PROCEDURE — 82306 VITAMIN D 25 HYDROXY: CPT

## 2018-04-16 PROCEDURE — 85025 COMPLETE CBC W/AUTO DIFF WBC: CPT

## 2018-04-16 NOTE — PROGRESS NOTES
Patient was given verbal and written instructions  on how to use the sublingual build- up  vials.  Patient received first dose in the clinic and waited 20 minutes patient did not have a reaction. Patient had no further questions and will call the clinic when she is ready for her next maintenance vial.

## 2018-04-17 ENCOUNTER — HOSPITAL ENCOUNTER (OUTPATIENT)
Dept: RADIOLOGY | Facility: HOSPITAL | Age: 38
Discharge: HOME | End: 2018-04-17
Attending: FAMILY MEDICINE
Payer: COMMERCIAL

## 2018-04-17 DIAGNOSIS — M54.16 LUMBAR RADICULOPATHY: ICD-10-CM

## 2018-04-17 PROCEDURE — 72148 MRI LUMBAR SPINE W/O DYE: CPT | Mod: 26,,, | Performed by: RADIOLOGY

## 2018-04-17 PROCEDURE — 72148 MRI LUMBAR SPINE W/O DYE: CPT | Mod: TC

## 2018-04-18 ENCOUNTER — CLINICAL SUPPORT (OUTPATIENT)
Dept: REHABILITATION | Facility: HOSPITAL | Age: 38
End: 2018-04-18
Attending: FAMILY MEDICINE
Payer: COMMERCIAL

## 2018-04-18 DIAGNOSIS — M54.5 ACUTE LOW BACK PAIN, UNSPECIFIED BACK PAIN LATERALITY, WITH SCIATICA PRESENCE UNSPECIFIED: Primary | ICD-10-CM

## 2018-04-18 PROCEDURE — 97110 THERAPEUTIC EXERCISES: CPT

## 2018-04-18 NOTE — PROGRESS NOTES
PHYSICAL THERAPY INITIAL ROUTINE VISIT    Referring Provider:  Dr. Sarah Beck*    Diagnosis:       ICD-10-CM ICD-9-CM    1. Acute low back pain, unspecified back pain laterality, with sciatica presence unspecified M54.5 724.2        Orders:  Evaluate and Treat    Date of Initial Evaluation: 4/11/18    Visit # 2    SUBJECTIVE: Patient reports decreased intensity of calf, anterior thigh and adductor cramps as well as reduced back pain. Reports pain at end of her day.  PMH: Graves disease, HTN, anxiety, obesity, asthma, multiple PVC on EKG, depression  IMAGING: No significant degenerative changes.  Specifically no neural foraminal narrowing or spinal canal stenosis is noted at L4-5 or L5-S1 per MRI 4/17/18  OBJECTIVE    PAIN: 0/10  OCCUPATION: LPN    Posture/Structure:   Genu recurvatum bilaterally with flexed trunk and pes planus however ambulates with supination bilaterally with increasing trendelenburg     L/sp AROM:   % Pain Present (Y/N)   FB 50% N   RSB 50% N   LSB 50% N   BB 25% Y     Palpation: minimal tenderness to right SI       Function: Patient reports (14/50) 28% disability based on score of the Oswestry Low back Pain questionnaire Scale.    ASSESSMENT: Patient presents to clinic in NAD. Reports decreased intensity of cramping and back pain but continues to be present at end of the day. Point tenderness noted to bilateral SI joint and no cramping noted with todays treatment. MRI revealed no neural foraminal narrowing or spinal canal stenosis is noted at L4-5 or L5-S1. Participated in lumbar stabilization program and flexibility training with emphasis on postural reeducation. Patient educated positioning in sitting to reduce anterior pelvic tilt thus increasing symptoms. Patient will continue to benefit from flexibility training of bilateral hamstrings, achilles, piriformis and quadriceps as well as core and proximal hip strengthening to allow for reduced muscle fatigue and improved stabilization.       Short Term Goals:    1.I with HEP  2.Pt to increase lumbar ROM to 75% painfree    3. Pt to increase BLE plantarflexion, hip abduction strength To  4+/5  4.Pt to have decreased reports of cramping with activity.  5.Pt to score less than 20 % impaired on the Oswestry back pain questionnaire  Long Term Goals:  1.Pt to score  18% On Oswestry Low back pain disability questionnaire  2. Patient to ambulate 900 feet on 6 minute walk test painfree    TREATMENT PROVIDED:  -Therapeutic Exercise:            45 minutes individual time     -Posterior pelvic tilts 10 sec hold X 10     -SLR with pelvic tilt                  10 each     -achilles stretch,                       2 min     -hamstring stretch,                  20 sec X 5 each     -LTR,                                       10     -SKTC                                     10     -Bridges                                  10x2 with ball squeeze     -ball squeeze                          10x2     -SLR ext prone                       10 each     -Donkey kicks                         10 each     -press ups                               10     -clams                                    10     -piriformis stretch                    3 minutes     -nu step                                    5 minutes  -Education on proper posture, body mechanics and condition    PLAN:  Patient will benefit from physical therapy (1-2) x/week for (4) weeks including manual therapy, therapeutic exercise, functional activities, modalities, and patient education.    Thank you for this referral.    These services are reasonable and necessary for the conditions set forth above while under my care.

## 2018-04-19 ENCOUNTER — TELEPHONE (OUTPATIENT)
Dept: FAMILY MEDICINE | Facility: CLINIC | Age: 38
End: 2018-04-19

## 2018-04-19 VITALS — HEART RATE: 92 BPM | DIASTOLIC BLOOD PRESSURE: 81 MMHG | SYSTOLIC BLOOD PRESSURE: 136 MMHG | OXYGEN SATURATION: 98 %

## 2018-04-19 NOTE — PROGRESS NOTES
Patient presents in office today for a blood pressure check. Blood pressure taken on right arm with a BP of 136/81 and a pulse of 92. Patient taking medication as prescribed.

## 2018-04-25 ENCOUNTER — CLINICAL SUPPORT (OUTPATIENT)
Dept: REHABILITATION | Facility: HOSPITAL | Age: 38
End: 2018-04-25
Attending: FAMILY MEDICINE
Payer: COMMERCIAL

## 2018-04-25 DIAGNOSIS — M54.42 LEFT-SIDED LOW BACK PAIN WITH LEFT-SIDED SCIATICA, UNSPECIFIED CHRONICITY: Primary | ICD-10-CM

## 2018-04-25 PROCEDURE — 97110 THERAPEUTIC EXERCISES: CPT

## 2018-04-25 NOTE — PROGRESS NOTES
PHYSICAL THERAPY INITIAL ROUTINE VISIT    Referring Provider:  Dr. Sarah Beck*    Diagnosis:       ICD-10-CM ICD-9-CM    1. Left-sided low back pain with left-sided sciatica, unspecified chronicity M54.42 724.3        Orders:  Evaluate and Treat    Date of Initial Evaluation: 4/11/18    Visit # 3    SUBJECTIVE: Patient reports increased back pain due to prolonged sitting but denies cramps.  PMH: Graves disease, HTN, anxiety, obesity, asthma, multiple PVC on EKG, depression  IMAGING: No significant degenerative changes.  Specifically no neural foraminal narrowing or spinal canal stenosis is noted at L4-5 or L5-S1 per MRI 4/17/18  OBJECTIVE    PAIN: 2/10  OCCUPATION: LPN    Posture/Structure:   Genu recurvatum bilaterally with flexed trunk and pes planus however ambulates with supination bilaterally with increasing trendelenburg     L/sp AROM:   % Pain Present (Y/N)   % N   RSB 75% N   LSB 75% N   BB 75% Y     Palpation: minimal tenderness to right SI       Function: Patient reports (14/50) 28% disability based on score of the Oswestry Low back Pain questionnaire Scale.    ASSESSMENT: Patient presents to clinic in NAD. Reports increased back pain but no cramping since previous visit. Soreness noted for 24 hrs after previous session. Exhibits improved trunk and bilateral LE ROM with enhanced core stabilization. Trunk ROM improved in all planes with pain elicited with end range trunk extension but no radicular symptoms. Patient was able to perform there ex with minimal increase in pain with trunk extension located to bilateral SI joints. No increase in there ex due to soreness following previous session. Patient is progressing well but continues to require further core strength and muscular endurance training to allow for adequate positioning in sitting and standing to reduce lumbar and bilateral SI joint stress.    Short Term Goals:    1.I with HEP  2.Pt to increase lumbar ROM to 75% painfree                    MET  3. Pt to increase BLE plantarflexion, hip abduction strength To  4+/5  4.Pt to have decreased reports of cramping with activity.           MET  5.Pt to score less than 20 % impaired on the Oswestry back pain questionnaire  Long Term Goals:  1.Pt to score  18% On Oswestry Low back pain disability questionnaire  2. Patient to ambulate 900 feet on 6 minute walk test painfree    TREATMENT PROVIDED:  -Therapeutic Exercise:            45 minutes individual time     -Posterior pelvic tilts               10 sec hold X 10     -SLR with pelvic tilt                  10x2 each     -achilles stretch,                       2 min     -hamstring stretch,                  20 sec X 5 each     -LTR,                                       2 minutes on ball     -SKTC                                     2 minutes on ball     -Bridges                                  10x2 with ball squeeze     -SLR ext prone                       10 each     -Donkey kicks                         10 each     -press ups                               10     -clams                                    10     -piriformis stretch                    3 minutes     -nu step                                    5 minutes  -MH  -Education on proper posture, body mechanics and condition    PLAN:  Patient will benefit from physical therapy (1-2) x/week for (4) weeks including manual therapy, therapeutic exercise, functional activities, modalities, and patient education.    Thank you for this referral.    These services are reasonable and necessary for the conditions set forth above while under my care.

## 2018-05-01 ENCOUNTER — PATIENT MESSAGE (OUTPATIENT)
Dept: ENDOCRINOLOGY | Facility: CLINIC | Age: 38
End: 2018-05-01

## 2018-05-04 ENCOUNTER — CLINICAL SUPPORT (OUTPATIENT)
Dept: REHABILITATION | Facility: HOSPITAL | Age: 38
End: 2018-05-04
Attending: FAMILY MEDICINE
Payer: COMMERCIAL

## 2018-05-04 DIAGNOSIS — M54.50 CHRONIC MIDLINE LOW BACK PAIN WITHOUT SCIATICA: Primary | ICD-10-CM

## 2018-05-04 DIAGNOSIS — M54.50 ACUTE RIGHT-SIDED LOW BACK PAIN WITHOUT SCIATICA: ICD-10-CM

## 2018-05-04 DIAGNOSIS — G89.29 CHRONIC MIDLINE LOW BACK PAIN WITHOUT SCIATICA: Primary | ICD-10-CM

## 2018-05-04 PROCEDURE — 97110 THERAPEUTIC EXERCISES: CPT

## 2018-05-04 NOTE — PROGRESS NOTES
PHYSICAL THERAPY INITIAL ROUTINE VISIT    Referring Provider:  Dr. Sarah Beck*    Diagnosis:       ICD-10-CM ICD-9-CM    1. Chronic midline low back pain without sciatica M54.5 724.2     G89.29 338.29        Orders:  Evaluate and Treat    Date of Initial Evaluation: 4/11/18    Visit # 4    SUBJECTIVE: Patient reports no current pain or cramps  PMH: Graves disease, HTN, anxiety, obesity, asthma, multiple PVC on EKG, depression  IMAGING: No significant degenerative changes.  Specifically no neural foraminal narrowing or spinal canal stenosis is noted at L4-5 or L5-S1 per MRI 4/17/18  OBJECTIVE    PAIN: 0/10  OCCUPATION: LPN    Posture/Structure:   Genu recurvatum bilaterally with flexed trunk and pes planus however ambulates with supination bilaterally with increasing trendelenburg     L/sp AROM:   % Pain Present (Y/N)   % N   RSB 75% N   LSB 75% N   BB 75% Y     Palpation: minimal tenderness to right SI       Function: Patient reports (14/50) 28% disability based on score of the Oswestry Low back Pain questionnaire Scale.    ASSESSMENT: Patient presents to clinic in NAD. Reports no pain or cramping. Soreness noted for <24 hrs after previous session. Exhibits improved trunk and bilateral LE ROM.. Trunk ROM improved in all planes painfree without radicular symptoms. Participated in enhanced lumbar stabilization program and postural reeducation painfree. Patient progressing well as evidence by no current pain with WNL trunk and bilateral LE ROM and strength.   Short Term Goals:    1.I with HEP  2.Pt to increase lumbar ROM to 75% painfree                   MET  3. Pt to increase BLE plantarflexion, hip abduction strength To  4+/5  4.Pt to have decreased reports of cramping with activity.           MET  5.Pt to score less than 20 % impaired on the Oswestry back pain questionnaire  Long Term Goals:  1.Pt to score  18% On Oswestry Low back pain disability questionnaire  2. Patient to ambulate 900 feet on 6  minute walk test painfree    TREATMENT PROVIDED:  -Therapeutic Exercise:            45 minutes individual time     -Posterior pelvic tilts               10 sec hold X 10     -SLR with pelvic tilt                  10x2 each     -achilles stretch,                       2 min     -hamstring stretch,                  20 sec X 5 each     -LTR,                                       2 minutes on ball     -SKTC                                     2 minutes on ball     -Bridges                                  10x2 with ball squeeze     -SLR ext prone                       10x2 each     -Donkey kicks                         10 x2each     -press ups                               10x2     -clams                                    10X2     -piriformis stretch                    3 minutes     -nu step                                    5 minutes     -superman                               10 with 5 sec hold    -Education on proper posture, body mechanics and condition    PLAN:  Patient will benefit from physical therapy (1-2) x/week for (4) weeks including manual therapy, therapeutic exercise, functional activities, modalities, and patient education.    Thank you for this referral.    These services are reasonable and necessary for the conditions set forth above while under my care.

## 2018-05-07 DIAGNOSIS — M79.10 MYALGIA: ICD-10-CM

## 2018-05-07 DIAGNOSIS — F41.9 ANXIETY: ICD-10-CM

## 2018-05-07 RX ORDER — DULOXETIN HYDROCHLORIDE 60 MG/1
60 CAPSULE, DELAYED RELEASE ORAL DAILY
Qty: 30 CAPSULE | Refills: 0 | Status: SHIPPED | OUTPATIENT
Start: 2018-05-07 | End: 2018-06-03 | Stop reason: SDUPTHER

## 2018-05-09 ENCOUNTER — CLINICAL SUPPORT (OUTPATIENT)
Dept: REHABILITATION | Facility: HOSPITAL | Age: 38
End: 2018-05-09
Attending: FAMILY MEDICINE
Payer: COMMERCIAL

## 2018-05-09 DIAGNOSIS — M54.50 CHRONIC MIDLINE LOW BACK PAIN WITHOUT SCIATICA: Primary | ICD-10-CM

## 2018-05-09 DIAGNOSIS — G89.29 CHRONIC MIDLINE LOW BACK PAIN WITHOUT SCIATICA: Primary | ICD-10-CM

## 2018-05-09 PROCEDURE — 97110 THERAPEUTIC EXERCISES: CPT

## 2018-05-09 NOTE — PROGRESS NOTES
PHYSICAL THERAPY INITIAL ROUTINE VISIT    Referring Provider:  Dr. Sarah Beck*    Diagnosis:       ICD-10-CM ICD-9-CM    1. Chronic midline low back pain without sciatica M54.5 724.2     G89.29 338.29        Orders:  Evaluate and Treat    Date of Initial Evaluation: 4/11/18    Visit # 5    SUBJECTIVE: Patient reports no  pain or cramps after previous treatment until this morning. Reports right sided back pain which resolved with stretching.  PMH: Graves disease, HTN, anxiety, obesity, asthma, multiple PVC on EKG, depression  IMAGING: No significant degenerative changes.  Specifically no neural foraminal narrowing or spinal canal stenosis is noted at L4-5 or L5-S1 per MRI 4/17/18  OBJECTIVE    PAIN: 0/10  OCCUPATION: LPN    Posture/Structure:   Genu recurvatum bilaterally with flexed trunk and pes planus however ambulates with supination bilaterally with increasing trendelenburg     L/sp AROM:   % Pain Present (Y/N)   % N   RSB 75% N   LSB 75% N   BB 75% Y     Palpation: minimal tenderness to right SI       Function: Patient reports (14/50) 28% disability based on score of the Oswestry Low back Pain questionnaire Scale.    ASSESSMENT: Patient presents with right sided back pain which she reports began this morning. Patient reported no pain since previous treatment. Exhibits right IT band tightness which relieved pain after stretching. Participated in enhanced lumbar stabilzation program with improved pain. Patient educated on importance of routine flexibility training.      Short Term Goals:    1.I with HEP  2.Pt to increase lumbar ROM to 75% painfree                   MET  3. Pt to increase BLE plantarflexion, hip abduction strength To  4+/5  4.Pt to have decreased reports of cramping with activity.           MET  5.Pt to score less than 20 % impaired on the Oswestry back pain questionnaire  Long Term Goals:  1.Pt to score  18% On Oswestry Low back pain disability questionnaire  2. Patient to ambulate  900 feet on 6 minute walk test painfree    TREATMENT PROVIDED:  -Therapeutic Exercise:            35 minutes individual time     -Posterior pelvic tilts               10 sec hold X 10     -SLR with pelvic tilt                  10x2 each     -achilles stretch,                       2 min     -hamstring stretch,                  20 sec X 5 each     -LTR,                                       2 minutes on ball     -SKTC                                     2 minutes on ball     -Bridges                                  10x2 with ball squeeze     -SLR ext prone                       10x2 each     -Donkey kicks                              -press ups                               10x2     -clams                                    10X2     -piriformis stretch                    3 minutes     -nu step                                    5 minutes     -superman                                  -Education on proper posture, body mechanics and condition    PLAN:  Patient will benefit from physical therapy (1-2) x/week for (4) weeks including manual therapy, therapeutic exercise, functional activities, modalities, and patient education.    Thank you for this referral.    These services are reasonable and necessary for the conditions set forth above while under my care.

## 2018-05-10 RX ORDER — METHYLPREDNISOLONE 4 MG/1
TABLET ORAL
COMMUNITY
Start: 2018-03-26 | End: 2018-07-05 | Stop reason: ALTCHOICE

## 2018-05-10 RX ORDER — PREGABALIN 100 MG/1
100 CAPSULE ORAL DAILY
COMMUNITY
End: 2018-05-10 | Stop reason: SDUPTHER

## 2018-05-10 RX ORDER — PREGABALIN 100 MG/1
100 CAPSULE ORAL DAILY
Qty: 90 CAPSULE | Refills: 0 | Status: SHIPPED | OUTPATIENT
Start: 2018-05-10 | End: 2018-08-10 | Stop reason: SDUPTHER

## 2018-05-10 RX ORDER — CYCLOBENZAPRINE HCL 10 MG
10 TABLET ORAL 3 TIMES DAILY PRN
Refills: 0 | COMMUNITY
Start: 2018-02-20 | End: 2018-08-10 | Stop reason: SDUPTHER

## 2018-05-16 ENCOUNTER — CLINICAL SUPPORT (OUTPATIENT)
Dept: REHABILITATION | Facility: HOSPITAL | Age: 38
End: 2018-05-16
Attending: FAMILY MEDICINE
Payer: COMMERCIAL

## 2018-05-16 DIAGNOSIS — G89.29 CHRONIC MIDLINE LOW BACK PAIN WITHOUT SCIATICA: Primary | ICD-10-CM

## 2018-05-16 DIAGNOSIS — M54.50 CHRONIC MIDLINE LOW BACK PAIN WITHOUT SCIATICA: Primary | ICD-10-CM

## 2018-05-16 PROCEDURE — 97110 THERAPEUTIC EXERCISES: CPT

## 2018-05-16 NOTE — PROGRESS NOTES
PHYSICAL THERAPY DISCHARGE SUMMARY    Referring Provider:  Dr. Sarah Beck*    Diagnosis:       ICD-10-CM ICD-9-CM    1. Chronic midline low back pain without sciatica M54.5 724.2     G89.29 338.29        Orders:  Evaluate and Treat    Date of Initial Evaluation: 4/11/18    Visit # 6    SUBJECTIVE: Patient reports no  pain   PMH: Graves disease, HTN, anxiety, obesity, asthma, multiple PVC on EKG, depression  IMAGING: No significant degenerative changes.  Specifically no neural foraminal narrowing or spinal canal stenosis is noted at L4-5 or L5-S1 per MRI 4/17/18  OBJECTIVE    PAIN: 0/10  OCCUPATION: LPN    Posture/Structure:   Genu recurvatum bilaterally with flexed trunk and pes planus however ambulates with supination bilaterally with increasing trendelenburg     L/sp AROM:   % Pain Present (Y/N)   % N   % N   % N   % N     Palpation: minimal tenderness to right SI       Function: Patient reports (1/50) 2% disability based on score of the Oswestry Low back Pain questionnaire Scale.    ASSESSMENT: Patient presents to clinic with 0/10 pain. Denies cramps or back pain recently that allows her to perform all occupational duties and participate in community activities. Patient has WFL back ROM and strength. Bilateral LE strength 4+/5 grossly. Oswestry improved from 28% impairment to 2% impairment. Patient is independent with HEP. Patient to be discharged to [facility] for further strengthening with all goals met.   Short Term Goals:    1.I with HEP  2.Pt to increase lumbar ROM to 75% painfree                   MET  3. Pt to increase BLE plantarflexion, hip abduction strength To  4+/5  MET  4.Pt to have decreased reports of cramping with activity.           MET  5.Pt to score less than 20 % impaired on the Oswestry back pain questionnaire MET  Long Term Goals:  1.Pt to score  18% On Oswestry Low back pain disability questionnaire MET  2. Patient to ambulate 900 feet on 6 minute walk test  painfree        MET    TREATMENT PROVIDED:  -Therapeutic Exercise:            45 minutes individual time     -Posterior pelvic tilts               10 sec hold X 10     -SLR with pelvic tilt                  10x2 each     -achilles stretch,                       2 min     -hamstring stretch,                  20 sec X 5 each     -LTR,                                       2 minutes on ball     -SKTC                                     2 minutes on ball     -Bridges                                  10x2 with ball squeeze     -SLR ext prone                       10x2 each     -Donkey kicks                         10X2     -press ups                               10x2     -clams                                    10X2     -piriformis stretch                    3 minutes     -nu step                                    5 minutes     -superman                              10 sec hold x10             -Education on proper posture, body mechanics and condition    PLAN:  Patient will benefit from physical therapy (1-2) x/week for (4) weeks including manual therapy, therapeutic exercise, functional activities, modalities, and patient education.    Thank you for this referral.    These services are reasonable and necessary for the conditions set forth above while under my care.

## 2018-05-22 ENCOUNTER — PATIENT MESSAGE (OUTPATIENT)
Dept: SLEEP MEDICINE | Facility: CLINIC | Age: 38
End: 2018-05-22

## 2018-05-22 DIAGNOSIS — M54.2 NECK PAIN: ICD-10-CM

## 2018-05-22 DIAGNOSIS — E04.2 MULTINODULAR GOITER: Primary | ICD-10-CM

## 2018-05-28 ENCOUNTER — TELEPHONE (OUTPATIENT)
Dept: RADIOLOGY | Facility: HOSPITAL | Age: 38
End: 2018-05-28

## 2018-05-29 ENCOUNTER — APPOINTMENT (OUTPATIENT)
Dept: RADIOLOGY | Facility: HOSPITAL | Age: 38
End: 2018-05-29
Attending: INTERNAL MEDICINE
Payer: COMMERCIAL

## 2018-05-29 DIAGNOSIS — M54.2 NECK PAIN: ICD-10-CM

## 2018-05-29 DIAGNOSIS — E04.2 MULTINODULAR GOITER: ICD-10-CM

## 2018-05-29 PROCEDURE — 76536 US EXAM OF HEAD AND NECK: CPT | Mod: TC,PO

## 2018-05-29 PROCEDURE — 76536 US EXAM OF HEAD AND NECK: CPT | Mod: 26,,, | Performed by: RADIOLOGY

## 2018-06-01 ENCOUNTER — OFFICE VISIT (OUTPATIENT)
Dept: PSYCHIATRY | Facility: CLINIC | Age: 38
End: 2018-06-01
Payer: COMMERCIAL

## 2018-06-01 DIAGNOSIS — F48.9 DEFERRED DIAGNOSIS ON AXIS I: Primary | ICD-10-CM

## 2018-06-01 PROCEDURE — 90834 PSYTX W PT 45 MINUTES: CPT | Mod: S$GLB,,, | Performed by: SOCIAL WORKER

## 2018-06-03 ENCOUNTER — TELEPHONE (OUTPATIENT)
Dept: FAMILY MEDICINE | Facility: CLINIC | Age: 38
End: 2018-06-03

## 2018-06-03 DIAGNOSIS — F41.9 ANXIETY: ICD-10-CM

## 2018-06-03 DIAGNOSIS — M79.10 MYALGIA: ICD-10-CM

## 2018-06-04 RX ORDER — TRIAMCINOLONE ACETONIDE 1 MG/G
CREAM TOPICAL 2 TIMES DAILY
Qty: 80 G | Refills: 0 | Status: SHIPPED | OUTPATIENT
Start: 2018-06-04 | End: 2019-06-04

## 2018-06-04 RX ORDER — DULOXETIN HYDROCHLORIDE 60 MG/1
60 CAPSULE, DELAYED RELEASE ORAL DAILY
Qty: 30 CAPSULE | Refills: 0 | Status: SHIPPED | OUTPATIENT
Start: 2018-06-04 | End: 2018-07-03 | Stop reason: SDUPTHER

## 2018-06-12 ENCOUNTER — PATIENT MESSAGE (OUTPATIENT)
Dept: ENDOCRINOLOGY | Facility: CLINIC | Age: 38
End: 2018-06-12

## 2018-06-12 DIAGNOSIS — L98.9 BENIGN SKIN LESION OF NECK: Primary | ICD-10-CM

## 2018-06-12 DIAGNOSIS — Q89.2 ECTOPIC THYROID TISSUE: Primary | ICD-10-CM

## 2018-06-29 RX ORDER — MONTELUKAST SODIUM 10 MG/1
10 TABLET ORAL DAILY
Qty: 30 TABLET | Refills: 3 | Status: SHIPPED | OUTPATIENT
Start: 2018-06-29 | End: 2018-07-05 | Stop reason: SDUPTHER

## 2018-07-03 DIAGNOSIS — F41.9 ANXIETY: ICD-10-CM

## 2018-07-03 DIAGNOSIS — M79.10 MYALGIA: ICD-10-CM

## 2018-07-03 RX ORDER — METHIMAZOLE 5 MG/1
5 TABLET ORAL DAILY
Qty: 90 TABLET | Refills: 3 | Status: SHIPPED | OUTPATIENT
Start: 2018-07-03 | End: 2018-07-09 | Stop reason: SDUPTHER

## 2018-07-03 RX ORDER — DULOXETIN HYDROCHLORIDE 60 MG/1
60 CAPSULE, DELAYED RELEASE ORAL DAILY
Qty: 90 CAPSULE | Refills: 0 | Status: SHIPPED | OUTPATIENT
Start: 2018-07-03 | End: 2018-08-13

## 2018-07-05 ENCOUNTER — HOSPITAL ENCOUNTER (OUTPATIENT)
Dept: RADIOLOGY | Facility: HOSPITAL | Age: 38
Discharge: HOME | End: 2018-07-05
Attending: INTERNAL MEDICINE
Payer: COMMERCIAL

## 2018-07-05 ENCOUNTER — OFFICE VISIT (OUTPATIENT)
Dept: OPHTHALMOLOGY | Facility: CLINIC | Age: 38
End: 2018-07-05
Payer: COMMERCIAL

## 2018-07-05 DIAGNOSIS — Q89.2 ECTOPIC THYROID TISSUE: ICD-10-CM

## 2018-07-05 DIAGNOSIS — H33.321 RETINAL ROUND HOLE WITHOUT DETACHMENT, RIGHT: ICD-10-CM

## 2018-07-05 DIAGNOSIS — H40.053 OCULAR HYPERTENSION, BILATERAL: ICD-10-CM

## 2018-07-05 DIAGNOSIS — H52.13 MYOPIA, BILATERAL: ICD-10-CM

## 2018-07-05 DIAGNOSIS — H35.411 LATTICE DEGENERATION, RIGHT: ICD-10-CM

## 2018-07-05 DIAGNOSIS — H33.322 RETINAL ROUND HOLE WITHOUT DETACHMENT, LEFT: Primary | ICD-10-CM

## 2018-07-05 DIAGNOSIS — E05.00 GRAVES' DISEASE: ICD-10-CM

## 2018-07-05 PROCEDURE — 92310 CONTACT LENS FITTING OU: CPT | Mod: ,,, | Performed by: OPTOMETRIST

## 2018-07-05 PROCEDURE — 92015 DETERMINE REFRACTIVE STATE: CPT | Mod: S$GLB,,, | Performed by: OPTOMETRIST

## 2018-07-05 PROCEDURE — A9509 IODINE I-123 SOD IODIDE MIL: HCPCS

## 2018-07-05 PROCEDURE — 92014 COMPRE OPH EXAM EST PT 1/>: CPT | Mod: S$GLB,,, | Performed by: OPTOMETRIST

## 2018-07-05 PROCEDURE — 99999 PR PBB SHADOW E&M-EST. PATIENT-LVL II: CPT | Mod: PBBFAC,,, | Performed by: OPTOMETRIST

## 2018-07-05 NOTE — PROGRESS NOTES
HPI     No visual complaints.  6 months DFE, refract, and SDP.  Update glasses and contact lenses RX.  Patient has missed using drops 20% of the time.   New patient to [facility].  Medication eye drops if any: Laatanoprost  Date last eye visit: 02/16/2018 DNL  Last full exam: 07/05/2016  Last IOP : 20/19  Last dilated eye exam and SDP's: 07/05/2016  Last HVF and HRT : 02/16/2018  High IOP: 29/26/03/17/2017  CCT: 599/594  Family Hx POAG: none    Last edited by Teodoro Latham, OD on 7/5/2018  9:21 AM. (History)            Assessment /Plan     For exam results, see Encounter Report.    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 Last VF/gOCT in Feb 2018 without defects.  Discussed more consistent use of latanoprost.    Discussed CL charges.  Dispense Final Rx for glasses  No changes CL Rx  RTC 1 week, consult [facility] retinal eval, retinal images and SDP's  Discussed above and answered questions.

## 2018-07-05 NOTE — PATIENT INSTRUCTIONS
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 Last VF/gOCT in Feb 2018 without defects.  Discussed more consistent use of latanoprost.     Discussed CL charges.  Dispense Final Rx for glasses  No changes CL Rx  RTC 1 week, consult [facility] retinal eval, retinal images and SDP's  Discussed above and answered questions.

## 2018-07-06 ENCOUNTER — PATIENT MESSAGE (OUTPATIENT)
Dept: ENDOCRINOLOGY | Facility: CLINIC | Age: 38
End: 2018-07-06

## 2018-07-10 ENCOUNTER — OFFICE VISIT (OUTPATIENT)
Dept: OPHTHALMOLOGY | Facility: CLINIC | Age: 38
End: 2018-07-10
Payer: COMMERCIAL

## 2018-07-10 DIAGNOSIS — H44.23 MALIGNANT MYOPIA OF BOTH EYES: ICD-10-CM

## 2018-07-10 DIAGNOSIS — H33.322 ROUND HOLE OF LEFT RETINA WITHOUT DETACHMENT: Primary | ICD-10-CM

## 2018-07-10 DIAGNOSIS — H35.413 BILATERAL RETINAL LATTICE DEGENERATION: ICD-10-CM

## 2018-07-10 PROCEDURE — 92014 COMPRE OPH EXAM EST PT 1/>: CPT | Mod: S$GLB,,, | Performed by: OPHTHALMOLOGY

## 2018-07-10 PROCEDURE — 99999 PR PBB SHADOW E&M-EST. PATIENT-LVL II: CPT | Mod: PBBFAC,,, | Performed by: OPHTHALMOLOGY

## 2018-07-10 PROCEDURE — 92250 FUNDUS PHOTOGRAPHY W/I&R: CPT | Mod: S$GLB,,, | Performed by: OPHTHALMOLOGY

## 2018-07-10 RX ORDER — METHIMAZOLE 5 MG/1
5 TABLET ORAL DAILY
Qty: 90 TABLET | Refills: 3 | Status: SHIPPED | OUTPATIENT
Start: 2018-07-10 | End: 2018-09-20

## 2018-07-10 RX ORDER — LATANOPROST 50 UG/ML
1 SOLUTION/ DROPS OPHTHALMIC NIGHTLY
Qty: 2.5 ML | Refills: 11 | Status: SHIPPED | OUTPATIENT
Start: 2018-07-10 | End: 2023-11-29 | Stop reason: ALTCHOICE

## 2018-07-10 NOTE — PROGRESS NOTES
===============================  07/10/2018   Shaina Olson,   38 y.o. female   Last visit [facility]: :9/9/2016   Last visit eye dept. 2/16/2018  [facility]:  Corrected distance visual acuity was 20/20 in the right eye and 20/20 in the left eye.  Tonometry     Tonometry (Applanation, 8:24 AM)       Right Left    Pressure 24 23               Not recorded         Not recorded        Chief Complaint   Patient presents with    RETINAL ROUND HOLE  OD     RETINAL EVAL PER DR. NICOLE     Ophthalmic Medications     Ophthalmic-Intraocular Pressure Reducing Agents, Prostaglandin Analogs Start End    latanoprost 0.005 % ophthalmic solution 8/17/2017     Sig: Place 1 drop into both eyes every evening.    Class: Historical Med    Route: Both Eyes         HPI     RETINAL ROUND HOLE  OD    Additional comments: RETINAL EVAL PER DR. NICOLE           Comments   NO DM  Graves Disease  Lattice degeneration od  Retina hole OD 11:00  S coag       Last edited by Cherie Grace on 7/10/2018  8:06 AM. 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

## 2018-07-11 ENCOUNTER — PATIENT MESSAGE (OUTPATIENT)
Dept: ENDOCRINOLOGY | Facility: CLINIC | Age: 38
End: 2018-07-11

## 2018-07-12 ENCOUNTER — LAB VISIT (OUTPATIENT)
Dept: LAB | Facility: HOSPITAL | Age: 38
End: 2018-07-12
Attending: INTERNAL MEDICINE
Payer: COMMERCIAL

## 2018-07-12 DIAGNOSIS — L98.9 BENIGN SKIN LESION OF NECK: ICD-10-CM

## 2018-07-12 DIAGNOSIS — D72.829 LEUKOCYTOSIS, UNSPECIFIED TYPE: ICD-10-CM

## 2018-07-12 LAB
ALBUMIN SERPL BCP-MCNC: 3.5 G/DL
ALP SERPL-CCNC: 105 U/L
ALT SERPL W/O P-5'-P-CCNC: 10 U/L
AST SERPL-CCNC: 15 U/L
BILIRUB DIRECT SERPL-MCNC: 0.3 MG/DL
BILIRUB SERPL-MCNC: 0.6 MG/DL
CALCIUM SERPL-MCNC: 9.4 MG/DL
PROT SERPL-MCNC: 7.1 G/DL
PTH-INTACT SERPL-MCNC: 171 PG/ML
T3FREE SERPL-MCNC: 2.9 PG/ML
T4 FREE SERPL-MCNC: 1.05 NG/DL
TSH SERPL DL<=0.005 MIU/L-ACNC: 0.85 UIU/ML

## 2018-07-12 PROCEDURE — 82310 ASSAY OF CALCIUM: CPT

## 2018-07-12 PROCEDURE — 84439 ASSAY OF FREE THYROXINE: CPT

## 2018-07-12 PROCEDURE — 84443 ASSAY THYROID STIM HORMONE: CPT

## 2018-07-12 PROCEDURE — 84481 FREE ASSAY (FT-3): CPT

## 2018-07-12 PROCEDURE — 36415 COLL VENOUS BLD VENIPUNCTURE: CPT | Mod: PO

## 2018-07-12 PROCEDURE — 83970 ASSAY OF PARATHORMONE: CPT

## 2018-07-12 PROCEDURE — 80076 HEPATIC FUNCTION PANEL: CPT

## 2018-07-19 ENCOUNTER — OFFICE VISIT (OUTPATIENT)
Dept: ENDOCRINOLOGY | Facility: CLINIC | Age: 38
End: 2018-07-19
Payer: COMMERCIAL

## 2018-07-19 ENCOUNTER — LAB VISIT (OUTPATIENT)
Dept: LAB | Facility: HOSPITAL | Age: 38
End: 2018-07-19
Attending: INTERNAL MEDICINE
Payer: COMMERCIAL

## 2018-07-19 VITALS
TEMPERATURE: 99 F | SYSTOLIC BLOOD PRESSURE: 118 MMHG | WEIGHT: 293 LBS | HEIGHT: 64 IN | BODY MASS INDEX: 50.02 KG/M2 | HEART RATE: 106 BPM | DIASTOLIC BLOOD PRESSURE: 78 MMHG

## 2018-07-19 DIAGNOSIS — E55.9 VITAMIN D DEFICIENCY: ICD-10-CM

## 2018-07-19 DIAGNOSIS — E05.00 GRAVES' DISEASE: ICD-10-CM

## 2018-07-19 DIAGNOSIS — E05.90 HYPERTHYROIDISM: Primary | ICD-10-CM

## 2018-07-19 DIAGNOSIS — E04.2 MULTIPLE THYROID NODULES: ICD-10-CM

## 2018-07-19 DIAGNOSIS — E21.3 HYPERPARATHYROIDISM: ICD-10-CM

## 2018-07-19 LAB
25(OH)D3+25(OH)D2 SERPL-MCNC: 15 NG/ML
CA-I BLDV-SCNC: 1.26 MMOL/L
PHOSPHATE SERPL-MCNC: 2.2 MG/DL

## 2018-07-19 PROCEDURE — 99999 PR PBB SHADOW E&M-EST. PATIENT-LVL IV: CPT | Mod: PBBFAC,,, | Performed by: INTERNAL MEDICINE

## 2018-07-19 PROCEDURE — 99214 OFFICE O/P EST MOD 30 MIN: CPT | Mod: S$GLB,,, | Performed by: INTERNAL MEDICINE

## 2018-07-19 PROCEDURE — 3008F BODY MASS INDEX DOCD: CPT | Mod: CPTII,S$GLB,, | Performed by: INTERNAL MEDICINE

## 2018-07-19 PROCEDURE — 3074F SYST BP LT 130 MM HG: CPT | Mod: CPTII,S$GLB,, | Performed by: INTERNAL MEDICINE

## 2018-07-19 PROCEDURE — 82330 ASSAY OF CALCIUM: CPT

## 2018-07-19 PROCEDURE — 36415 COLL VENOUS BLD VENIPUNCTURE: CPT | Mod: PO

## 2018-07-19 PROCEDURE — 3078F DIAST BP <80 MM HG: CPT | Mod: CPTII,S$GLB,, | Performed by: INTERNAL MEDICINE

## 2018-07-19 PROCEDURE — 84100 ASSAY OF PHOSPHORUS: CPT

## 2018-07-19 PROCEDURE — 82306 VITAMIN D 25 HYDROXY: CPT

## 2018-07-19 NOTE — PROGRESS NOTES
Patient ID: Shaina Olson is a 38 y.o. female.  Patient is here for follow up        Chief Complaint: Hyperthyroidism      HPI    HPI      Diagnosed: diagnosed in 2014 and started on antithyroid medications immediately due to severe symptoms; she shared a picture of how she looked initially and patient had significant stare and enlarged thyroid which both have improved over time on treatment    Previous radiology tests:  Thyroid ultrasound : Yes - status post benign FNA left sided thyroid nodule that is hyperechoic in 2015. I directly looked at the images of her last ultrasound done December 2017 and compared to her previous and although the radiologist has not compared the nodule it appears similar, and because it is a hyperechoic nodule and not hypoechoic solid nodule without any other worrisome features no FNA is indicated and patient has had a benign FNA in the past.  It measured 1.2 cm last year and 1.3 cm this year.  I requested multiple times for radiologist to make a comparison via radiology department    After last visit she had anoother ultrasound done May 2018 that showed the following:    Again noted and unchanged in sizes a heterogeneously hyperechoic solid nodule in the lower pole of the left lobe measuring 10 mm.  A uniformly hyperechoic 5 mm nodule in the lower pole of the right lobe was not seen previously, perhaps due to technical differences.  No microcalcifications.    There is an isoechoic nodule measuring 2.2 cm with similar echotexture to thyroid parenchyma located in the midline anterior and superior to the thyroid gland, perhaps representing ectopic thyroid tissue or less likely parathyroid adenoma.  No cervical lymphadenopathy.  NM uptake and scan:  Yes:  Because of the findings on ultrasound done May 2018 she had a thyroid uptake and scan to rule out any ectopic thyroid tissue but because she is on methimazole she held it for about 5 days prior and it did not show any ectopic tissue  and it only showed increased uptake at 6 and 24 hr consistent with Graves disease    She had recent labs and her PTH level was elevated at 171 but her calcium is normal.  She does have a history though of low vitamin D and her vitamin-D level in April was 18-she used to take high-dose vitamin-D but has been off of this for several months and has not been consistently taking over-the-counter vitamin-D    She reports that her mother had hyperparathyroidism and osteoporosis    Her last TSI antibody was negative December 2017    Previous thyroid surgery: No      Thyroid symptoms:denies fatigue, weight changes, heat/cold intolerance, bowel/skin changes or CVS symptoms    Patient has chronic hoarseness but has ALLERGIES and sinusitis and will be starting ALLERGY shots again.  Had sinus surgery last year.  Also takes protonix.  Her thyroid gland is enlarged also and she does have a vocal cord nodule but she reports that she was told the nodule has resolved and is wondering whether her thyroid which she feels overall is better than her initial diagnosis but is wondering if this is related to her hoarseness.  Her ENT may consider speech therapy as well.                Thyroid medications: methimazole 5 mg daily      Takes medication appropriately: Yes  Her A1c was normal.  She has joined weight watchers on line.  She has not yet joined [employer].  She is contemplating gastric sleeve but still is difficult to come up with the financial co-pay that is require    She has been having chronic for several months leg cramps and has discussed with her PCP and her recent electrolytes and magnesium levels were normal and she will be getting an EMG and she states she's been having the cramping ever since she has a diagnosis of hyperthyroidism    Uses CPAP for obstructive sleep apnea    I have reviewed the past medical, family and social history    Review of Systems   Constitutional: Negative for appetite change, fatigue, fever  and unexpected weight change.   HENT: Negative for sore throat and trouble swallowing.    Eyes: Negative for visual disturbance.   Respiratory: Negative for shortness of breath and wheezing.    Cardiovascular: Negative for chest pain, palpitations and leg swelling.   Gastrointestinal: Negative for diarrhea, nausea and vomiting.   Endocrine: Negative for cold intolerance, heat intolerance, polydipsia, polyphagia and polyuria.   Genitourinary: Negative for difficulty urinating, dysuria and menstrual problem.   Musculoskeletal: Positive for arthralgias and myalgias. Negative for joint swelling.   Skin: Negative for rash.   Neurological: Negative for dizziness, weakness, numbness and headaches.   Psychiatric/Behavioral: Negative for confusion, dysphoric mood and sleep disturbance.       Objective:      Physical Exam   Constitutional: She appears well-developed. No distress.   HENT:   Head: Normocephalic and atraumatic.   Eyes: Conjunctivae are normal.   Neck: Thyromegaly present.   Neurological: She is alert. No sensory deficit.        Skin: Skin is warm and dry. No rash noted. She is not diaphoretic. No erythema.   Psychiatric: She has a normal mood and affect. Her behavior is normal.   Vitals reviewed.        Lab Review:   Lab Visit on 07/12/2018   Component Date Value    TSH 07/12/2018 0.850     Free T4 07/12/2018 1.05     T3, Free 07/12/2018 2.9     Total Protein 07/12/2018 7.1     Albumin 07/12/2018 3.5     Total Bilirubin 07/12/2018 0.6     Bilirubin, Direct 07/12/2018 0.3     AST 07/12/2018 15     ALT 07/12/2018 10     Alkaline Phosphatase 07/12/2018 105     PTH, Intact 07/12/2018 171.0*    Calcium 07/12/2018 9.4    Lab Visit on 04/16/2018   Component Date Value    WBC 04/16/2018 10.53     RBC 04/16/2018 4.28     Hemoglobin 04/16/2018 12.4     Hematocrit 04/16/2018 40.4     MCV 04/16/2018 94     MCH 04/16/2018 29.0     MCHC 04/16/2018 30.7*    RDW 04/16/2018 14.2     Platelets 04/16/2018 326      MPV 04/16/2018 9.8     Immature Granulocytes 04/16/2018 0.1     Gran # (ANC) 04/16/2018 6.4     Immature Grans (Abs) 04/16/2018 0.01     Lymph # 04/16/2018 3.4     Mono # 04/16/2018 0.5     Eos # 04/16/2018 0.2     Baso # 04/16/2018 0.07     nRBC 04/16/2018 0     Gran% 04/16/2018 60.8     Lymph% 04/16/2018 32.3     Mono% 04/16/2018 4.5     Eosinophil% 04/16/2018 1.6     Basophil% 04/16/2018 0.7     Differential Method 04/16/2018 Automated     Vit D, 25-Hydroxy 04/16/2018 18*   Lab Visit on 03/12/2018   Component Date Value    TSH 03/12/2018 0.820     Free T4 03/12/2018 1.03     T3, Free 03/12/2018 2.9     WBC 03/12/2018 13.93*    RBC 03/12/2018 4.18     Hemoglobin 03/12/2018 12.2     Hematocrit 03/12/2018 38.9     MCV 03/12/2018 93     MCH 03/12/2018 29.2     MCHC 03/12/2018 31.4*    RDW 03/12/2018 15.1*    Platelets 03/12/2018 389*    MPV 03/12/2018 9.7     Immature Granulocytes 03/12/2018 0.3     Gran # (ANC) 03/12/2018 9.0*    Immature Grans (Abs) 03/12/2018 0.04     Lymph # 03/12/2018 4.1     Mono # 03/12/2018 0.5     Eos # 03/12/2018 0.1     Baso # 03/12/2018 0.08     nRBC 03/12/2018 0     Gran% 03/12/2018 64.7     Lymph% 03/12/2018 29.6     Mono% 03/12/2018 3.9*    Eosinophil% 03/12/2018 0.9     Basophil% 03/12/2018 0.6     Differential Method 03/12/2018 Automated     Total Protein 03/12/2018 7.2     Albumin 03/12/2018 3.4*    Total Bilirubin 03/12/2018 0.5     Bilirubin, Direct 03/12/2018 0.2     AST 03/12/2018 13     ALT 03/12/2018 13     Alkaline Phosphatase 03/12/2018 97    Lab Visit on 03/08/2018   Component Date Value    Sodium 03/08/2018 140     Potassium 03/08/2018 4.0     Chloride 03/08/2018 108     CO2 03/08/2018 24     Glucose 03/08/2018 82     BUN, Bld 03/08/2018 10     Creatinine 03/08/2018 0.8     Calcium 03/08/2018 9.1     Total Protein 03/08/2018 6.8     Albumin 03/08/2018 3.3*    Total Bilirubin 03/08/2018 0.5     Alkaline  Phosphatase 03/08/2018 87     AST 03/08/2018 13     ALT 03/08/2018 12     Anion Gap 03/08/2018 8     eGFR if African American 03/08/2018 >60.0     eGFR if non  Amer* 03/08/2018 >60.0     Magnesium 03/08/2018 1.8    Office Visit on 01/29/2018   Component Date Value    Aerobic Bacterial Culture 02/13/2018                      Value:METHICILLIN RESISTANT STAPHYLOCOCCUS AUREUS  Rare  Skin shantanu also present         Assessment:     1. Hyperthyroidism      Stable, continue methimazole   2. Graves' disease      Stable on medication   3. Vitamin D deficiency  Phosphorus    Vitamin D    CALCIUM, IONIZED    This likely is the cause of the elevated PTH so will check vitamin D   4. Hyperparathyroidism  Phosphorus    Vitamin D    CALCIUM, IONIZED    Check vitamin-D level, off of vitamin-D supplements   5. Multiple thyroid nodules  US Soft Tissue Head Neck Thyroid    Stable, since not very symptomatic will continue to observe    in addition to above we did discuss other possibilities to treat Graves disease such as radioactive iodine and thyroidectomy    Also the anterior nodule that was seen on last ultrasound did not appear to be ectopic thyroid tissue so I plan to follow this closely.  She will get an ultrasound prior to next visit  Plan:   Hyperthyroidism  Comments:  Stable, continue methimazole    Graves' disease  Comments:  Stable on medication    Vitamin D deficiency  Comments:  This likely is the cause of the elevated PTH so will check vitamin D  Orders:  -     Phosphorus; Future; Expected date: 07/19/2018  -     Vitamin D; Future; Expected date: 07/19/2018  -     CALCIUM, IONIZED; Future; Expected date: 07/19/2018    Hyperparathyroidism  Comments:  Check vitamin-D level, off of vitamin-D supplements  Orders:  -     Phosphorus; Future; Expected date: 07/19/2018  -     Vitamin D; Future; Expected date: 07/19/2018  -     CALCIUM, IONIZED; Future; Expected date: 07/19/2018    Multiple thyroid  nodules  Comments:  Stable, since not very symptomatic will continue to observe  Orders:  -     US Soft Tissue Head Neck Thyroid; Future; Expected date: 07/19/2018          Follow-up in about 4 months (around 11/19/2018).    Labs prior to appointment? yes     Disclaimer:  This note may have been partially prepared using voice recognition software and  it may have not been extensively proofed, as such there could be errors within the text such as sound alike errors.

## 2018-07-22 ENCOUNTER — PATIENT MESSAGE (OUTPATIENT)
Dept: PULMONOLOGY | Facility: HOSPITAL | Age: 38
End: 2018-07-22

## 2018-07-22 ENCOUNTER — PATIENT MESSAGE (OUTPATIENT)
Dept: ENDOCRINOLOGY | Facility: CLINIC | Age: 38
End: 2018-07-22

## 2018-07-22 DIAGNOSIS — E55.9 VITAMIN D DEFICIENCY: Primary | ICD-10-CM

## 2018-07-22 DIAGNOSIS — E05.90 HYPERTHYROIDISM: ICD-10-CM

## 2018-07-22 RX ORDER — ERGOCALCIFEROL 1.25 MG/1
50000 CAPSULE ORAL
Qty: 4 CAPSULE | Refills: 3 | Status: SHIPPED | OUTPATIENT
Start: 2018-07-22 | End: 2018-07-23

## 2018-07-23 ENCOUNTER — TELEPHONE (OUTPATIENT)
Dept: ENDOCRINOLOGY | Facility: CLINIC | Age: 38
End: 2018-07-23

## 2018-07-23 RX ORDER — ERGOCALCIFEROL 1.25 MG/1
50000 CAPSULE ORAL
Qty: 12 CAPSULE | Refills: 2 | Status: SHIPPED | OUTPATIENT
Start: 2018-07-23 | End: 2018-11-06 | Stop reason: ALTCHOICE

## 2018-07-26 DIAGNOSIS — I10 HYPERTENSION GOAL BP (BLOOD PRESSURE) < 140/90: ICD-10-CM

## 2018-07-27 RX ORDER — AMLODIPINE BESYLATE 5 MG/1
5 TABLET ORAL DAILY
Qty: 90 TABLET | Refills: 1 | Status: SHIPPED | OUTPATIENT
Start: 2018-07-27 | End: 2019-01-28 | Stop reason: SDUPTHER

## 2018-08-09 RX ORDER — CYCLOBENZAPRINE HCL 10 MG
10 TABLET ORAL 3 TIMES DAILY PRN
Refills: 0 | Status: CANCELLED | OUTPATIENT
Start: 2018-08-09

## 2018-08-09 RX ORDER — PREGABALIN 100 MG/1
100 CAPSULE ORAL DAILY
Qty: 90 CAPSULE | Refills: 0 | Status: CANCELLED | OUTPATIENT
Start: 2018-08-09

## 2018-08-10 ENCOUNTER — PATIENT MESSAGE (OUTPATIENT)
Dept: FAMILY MEDICINE | Facility: CLINIC | Age: 38
End: 2018-08-10

## 2018-08-10 RX ORDER — CYCLOBENZAPRINE HCL 10 MG
10 TABLET ORAL 3 TIMES DAILY PRN
Qty: 30 TABLET | Refills: 0 | Status: SHIPPED | OUTPATIENT
Start: 2018-08-10 | End: 2018-12-09 | Stop reason: SDUPTHER

## 2018-08-10 RX ORDER — PREGABALIN 100 MG/1
100 CAPSULE ORAL DAILY
Qty: 90 CAPSULE | Refills: 3 | Status: SHIPPED | OUTPATIENT
Start: 2018-08-10 | End: 2019-02-26 | Stop reason: SDUPTHER
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
## 2018-08-13 ENCOUNTER — OFFICE VISIT (OUTPATIENT)
Dept: PSYCHIATRY | Facility: CLINIC | Age: 38
End: 2018-08-13
Payer: COMMERCIAL

## 2018-08-13 DIAGNOSIS — F51.04 PSYCHOPHYSIOLOGICAL INSOMNIA: Primary | ICD-10-CM

## 2018-08-13 PROCEDURE — 99999 PR PBB SHADOW E&M-EST. PATIENT-LVL I: CPT | Mod: PBBFAC,,, | Performed by: PSYCHIATRY & NEUROLOGY

## 2018-08-13 PROCEDURE — 90792 PSYCH DIAG EVAL W/MED SRVCS: CPT | Mod: S$GLB,,, | Performed by: PSYCHIATRY & NEUROLOGY

## 2018-08-13 RX ORDER — DULOXETIN HYDROCHLORIDE 30 MG/1
30 CAPSULE, DELAYED RELEASE ORAL DAILY
Qty: 30 CAPSULE | Refills: 2 | Status: SHIPPED | OUTPATIENT
Start: 2018-08-13 | End: 2018-10-10 | Stop reason: DRUGHIGH

## 2018-08-13 RX ORDER — DULOXETIN HYDROCHLORIDE 60 MG/1
60 CAPSULE, DELAYED RELEASE ORAL DAILY
Qty: 30 CAPSULE | Refills: 0 | Status: SHIPPED | OUTPATIENT
Start: 2018-08-13 | End: 2018-10-10 | Stop reason: SDUPTHER

## 2018-08-20 DIAGNOSIS — J30.9 ALLERGIC RHINITIS: ICD-10-CM

## 2018-08-20 RX ORDER — LEVOCETIRIZINE DIHYDROCHLORIDE 5 MG/1
5 TABLET, FILM COATED ORAL NIGHTLY
Qty: 90 TABLET | Refills: 1 | Status: SHIPPED | OUTPATIENT
Start: 2018-08-20 | End: 2019-02-17 | Stop reason: SDUPTHER

## 2018-08-21 ENCOUNTER — PATIENT MESSAGE (OUTPATIENT)
Dept: PSYCHIATRY | Facility: CLINIC | Age: 38
End: 2018-08-21

## 2018-08-21 NOTE — TELEPHONE ENCOUNTER
Please see pt response, since she has sent it again I presume she would like you to respond again.

## 2018-08-28 ENCOUNTER — PATIENT MESSAGE (OUTPATIENT)
Dept: OBSTETRICS AND GYNECOLOGY | Facility: CLINIC | Age: 38
End: 2018-08-28

## 2018-08-28 RX ORDER — BUSPIRONE HYDROCHLORIDE 30 MG/1
30 TABLET ORAL 2 TIMES DAILY
Qty: 60 TABLET | Refills: 2 | Status: SHIPPED | OUTPATIENT
Start: 2018-08-28 | End: 2018-10-10 | Stop reason: ALTCHOICE

## 2018-08-29 ENCOUNTER — HOSPITAL ENCOUNTER (OUTPATIENT)
Dept: RADIOLOGY | Facility: HOSPITAL | Age: 38
Discharge: HOME | End: 2018-08-29
Attending: NURSE PRACTITIONER
Payer: COMMERCIAL

## 2018-08-29 DIAGNOSIS — M25.562 LEFT KNEE PAIN, UNSPECIFIED CHRONICITY: ICD-10-CM

## 2018-08-29 DIAGNOSIS — M25.562 LEFT KNEE PAIN, UNSPECIFIED CHRONICITY: Primary | ICD-10-CM

## 2018-08-29 PROCEDURE — 73560 X-RAY EXAM OF KNEE 1 OR 2: CPT | Mod: TC,PO,RT

## 2018-08-29 PROCEDURE — 73560 X-RAY EXAM OF KNEE 1 OR 2: CPT | Mod: 26,RT,, | Performed by: RADIOLOGY

## 2018-08-29 PROCEDURE — 73562 X-RAY EXAM OF KNEE 3: CPT | Mod: 26,LT,, | Performed by: RADIOLOGY

## 2018-08-29 PROCEDURE — 73562 X-RAY EXAM OF KNEE 3: CPT | Mod: TC,PO,LT

## 2018-09-05 DIAGNOSIS — J01.90 ACUTE SINUSITIS, RECURRENCE NOT SPECIFIED, UNSPECIFIED LOCATION: ICD-10-CM

## 2018-09-05 RX ORDER — FLUTICASONE PROPIONATE 50 MCG
SPRAY, SUSPENSION (ML) NASAL
Qty: 16 ML | Refills: 6 | Status: CANCELLED | OUTPATIENT
Start: 2018-09-05

## 2018-09-10 DIAGNOSIS — J01.90 ACUTE SINUSITIS, RECURRENCE NOT SPECIFIED, UNSPECIFIED LOCATION: ICD-10-CM

## 2018-09-11 RX ORDER — FLUTICASONE PROPIONATE 50 MCG
2 SPRAY, SUSPENSION (ML) NASAL DAILY
Qty: 16 G | Refills: 6 | Status: SHIPPED | OUTPATIENT
Start: 2018-09-11 | End: 2019-07-15 | Stop reason: SDUPTHER

## 2018-09-18 ENCOUNTER — PATIENT MESSAGE (OUTPATIENT)
Dept: ENDOCRINOLOGY | Facility: CLINIC | Age: 38
End: 2018-09-18

## 2018-09-18 DIAGNOSIS — E05.90 HYPERTHYROIDISM: Primary | ICD-10-CM

## 2018-09-19 ENCOUNTER — LAB VISIT (OUTPATIENT)
Dept: LAB | Facility: HOSPITAL | Age: 38
End: 2018-09-19
Attending: FAMILY MEDICINE
Payer: COMMERCIAL

## 2018-09-19 ENCOUNTER — TELEPHONE (OUTPATIENT)
Dept: ENDOCRINOLOGY | Facility: CLINIC | Age: 38
End: 2018-09-19

## 2018-09-19 DIAGNOSIS — E05.90 HYPERTHYROIDISM: ICD-10-CM

## 2018-09-19 LAB
T3FREE SERPL-MCNC: 5.4 PG/ML
T4 FREE SERPL-MCNC: 2.29 NG/DL
TSH SERPL DL<=0.005 MIU/L-ACNC: <0.01 UIU/ML

## 2018-09-19 PROCEDURE — 84445 ASSAY OF TSI GLOBULIN: CPT

## 2018-09-19 PROCEDURE — 84443 ASSAY THYROID STIM HORMONE: CPT

## 2018-09-19 PROCEDURE — 84439 ASSAY OF FREE THYROXINE: CPT

## 2018-09-19 PROCEDURE — 84481 FREE ASSAY (FT-3): CPT

## 2018-09-19 PROCEDURE — 36415 COLL VENOUS BLD VENIPUNCTURE: CPT | Mod: PO

## 2018-09-19 NOTE — TELEPHONE ENCOUNTER
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

## 2018-09-20 ENCOUNTER — PATIENT MESSAGE (OUTPATIENT)
Dept: ENDOCRINOLOGY | Facility: CLINIC | Age: 38
End: 2018-09-20

## 2018-09-20 DIAGNOSIS — E05.90 HYPERTHYROIDISM: Primary | ICD-10-CM

## 2018-09-20 RX ORDER — DULOXETIN HYDROCHLORIDE 60 MG/1
60 CAPSULE, DELAYED RELEASE ORAL DAILY
Qty: 30 CAPSULE | Refills: 0 | OUTPATIENT
Start: 2018-09-20 | End: 2019-09-20

## 2018-09-20 RX ORDER — METHIMAZOLE 10 MG/1
10 TABLET ORAL 2 TIMES DAILY
Qty: 60 TABLET | Refills: 3 | Status: SHIPPED | OUTPATIENT
Start: 2018-09-20 | End: 2019-01-28 | Stop reason: SDUPTHER

## 2018-09-21 LAB — TSI SER-ACNC: 6.16 IU/L

## 2018-09-24 ENCOUNTER — TELEPHONE (OUTPATIENT)
Dept: ENDOCRINOLOGY | Facility: CLINIC | Age: 38
End: 2018-09-24

## 2018-09-25 RX ORDER — FLUCONAZOLE 150 MG/1
150 TABLET ORAL DAILY
Qty: 1 TABLET | Refills: 0 | Status: SHIPPED | OUTPATIENT
Start: 2018-09-25 | End: 2018-09-26

## 2018-09-25 RX ORDER — AMOXICILLIN AND CLAVULANATE POTASSIUM 875; 125 MG/1; MG/1
1 TABLET, FILM COATED ORAL EVERY 12 HOURS
Qty: 20 TABLET | Refills: 0 | Status: SHIPPED | OUTPATIENT
Start: 2018-09-25 | End: 2018-10-25

## 2018-09-26 ENCOUNTER — OFFICE VISIT (OUTPATIENT)
Dept: PSYCHIATRY | Facility: CLINIC | Age: 38
End: 2018-09-26
Payer: COMMERCIAL

## 2018-09-26 DIAGNOSIS — F48.9 DEFERRED DIAGNOSIS ON AXIS I: Primary | ICD-10-CM

## 2018-09-26 PROCEDURE — 90834 PSYTX W PT 45 MINUTES: CPT | Mod: S$GLB,,, | Performed by: SOCIAL WORKER

## 2018-09-27 ENCOUNTER — TELEPHONE (OUTPATIENT)
Dept: PODIATRY | Facility: CLINIC | Age: 38
End: 2018-09-27

## 2018-09-27 ENCOUNTER — PATIENT MESSAGE (OUTPATIENT)
Dept: FAMILY MEDICINE | Facility: CLINIC | Age: 38
End: 2018-09-27

## 2018-09-27 ENCOUNTER — OFFICE VISIT (OUTPATIENT)
Dept: PODIATRY | Facility: CLINIC | Age: 38
End: 2018-09-27
Payer: COMMERCIAL

## 2018-09-27 VITALS
DIASTOLIC BLOOD PRESSURE: 82 MMHG | SYSTOLIC BLOOD PRESSURE: 144 MMHG | HEART RATE: 105 BPM | BODY MASS INDEX: 50.02 KG/M2 | HEIGHT: 64 IN | WEIGHT: 293 LBS

## 2018-09-27 DIAGNOSIS — L03.031 ACUTE PARONYCHIA OF TOE OF RIGHT FOOT: ICD-10-CM

## 2018-09-27 DIAGNOSIS — M79.674 PAIN OF TOE OF RIGHT FOOT: ICD-10-CM

## 2018-09-27 DIAGNOSIS — L60.0 INGROWN TOENAIL OF RIGHT FOOT WITH INFECTION: Primary | ICD-10-CM

## 2018-09-27 PROCEDURE — 3008F BODY MASS INDEX DOCD: CPT | Mod: CPTII,S$GLB,, | Performed by: PODIATRIST

## 2018-09-27 PROCEDURE — 99999 PR PBB SHADOW E&M-EST. PATIENT-LVL III: CPT | Mod: PBBFAC,,, | Performed by: PODIATRIST

## 2018-09-27 PROCEDURE — 99204 OFFICE O/P NEW MOD 45 MIN: CPT | Mod: 25,S$GLB,, | Performed by: PODIATRIST

## 2018-09-27 PROCEDURE — 3079F DIAST BP 80-89 MM HG: CPT | Mod: CPTII,S$GLB,, | Performed by: PODIATRIST

## 2018-09-27 PROCEDURE — 11730 AVULSION NAIL PLATE SIMPLE 1: CPT | Mod: T5,S$GLB,, | Performed by: PODIATRIST

## 2018-09-27 PROCEDURE — 3077F SYST BP >= 140 MM HG: CPT | Mod: CPTII,S$GLB,, | Performed by: PODIATRIST

## 2018-09-27 RX ORDER — OXYCODONE AND ACETAMINOPHEN 5; 325 MG/1; MG/1
1 TABLET ORAL EVERY 4 HOURS PRN
Qty: 10 TABLET | Refills: 0 | Status: SHIPPED | OUTPATIENT
Start: 2018-09-27 | End: 2018-10-25

## 2018-09-27 NOTE — TELEPHONE ENCOUNTER
----- Message from Divina Aleman sent at 9/27/2018  2:27 PM CDT -----  Contact: [employer] request  Message     ----- Message from [facility] System Message sent at 9/26/2018  7:01 PM CDT -----    Appointment Request From: Shaina Olson    With Provider: Dr Ceron    Preferred Date Range: 9/27/2018 - 9/27/2018    Preferred Times: Any time    Reason for visit: Painful ingrown toenail    Comments:  Painful ingrown toenail

## 2018-09-29 NOTE — PROGRESS NOTES
Subjective:      Patient ID: Shaina Olson is a 38 y.o. female.    Chief Complaint: Ingrown Toenail (right great ingrown toenail )    Shaina is a 38 y.o. female who presents to the clinic complaining of painful ingrown toenail on the right foot. Patient states the nail feels like its digging into the side of her skin. Patient states the pain has been present for several days. Patient does not relate a history of trauma. Patient rates pain 10/10. When asked to indicate where the pain is located, patient points to medial right nail border. Patient denies other complaints at this time. Patient states she is taking Augmentin.       Patient Active Problem List   Diagnosis    Graves' disease    Hypertension    NOE (generalized anxiety disorder)    Iatrogenic cushingoid features    Vitamin D deficiency    Morbid obesity with BMI of 70 and over, adult    Dysphonia    History of asthma    Psychophysiological insomnia    Myalgia    Arthritis of right knee    Round hole of right retina without detachment    Abnormal ECG    PVC's (premature ventricular contractions)    TREVOR on CPAP    Chronic pansinusitis    Lumbosacral radiculopathy at S1          Medication List           Accurate as of 9/27/18 11:59 PM. If you have any questions, ask your nurse or doctor.               START taking these medications    oxyCODONE-acetaminophen 5-325 mg per tablet  Commonly known as:  PERCOCET  Take 1 tablet by mouth every 4 (four) hours as needed for Pain.  Started by:  Cristy Michael DPM        CONTINUE taking these medications    albuterol 90 mcg/actuation inhaler  Commonly known as:  PROVENTIL/VENTOLIN HFA  Inhale 2 puffs into the lungs every 6 (six) hours as needed for Wheezing. Rescue     Allergy Mix  Patient specific SLIT - formulation in notes     * amLODIPine 2.5 MG tablet  Commonly known as:  NORVASC  TAKE 1 TABLET BY MOUTH EVERY DAY WITH 5MG FOR A TOTAL OF 7.5 MG PER DAY     * amLODIPine 5 MG tablet  Commonly  known as:  NORVASC  TAKE 1 TABLET (5 MG TOTAL) BY MOUTH ONCE DAILY.     amoxicillin-clavulanate 875-125mg 875-125 mg per tablet  Commonly known as:  AUGMENTIN  Take 1 tablet by mouth every 12 (twelve) hours.     busPIRone 30 MG Tab  Commonly known as:  BUSPAR  Take 1 tablet (30 mg total) by mouth 2 (two) times daily.     cyclobenzaprine 10 MG tablet  Commonly known as:  FLEXERIL  Take 1 tablet (10 mg total) by mouth 3 (three) times daily as needed.     * DULoxetine 60 MG capsule  Commonly known as:  CYMBALTA  Take 1 capsule (60 mg total) by mouth once daily.     * DULoxetine 30 MG capsule  Commonly known as:  CYMBALTA  Take 1 capsule (30 mg total) by mouth once daily. Take with 1 60 mg capsule     fluticasone 50 mcg/actuation nasal spray  Commonly known as:  FLONASE  2 sprays (100 mcg total) by Each Nare route once daily.     hydrOXYzine HCl 25 MG tablet  Commonly known as:  ATARAX  Take 1 tablet (25 mg total) by mouth 3 (three) times daily as needed.     L norgest/e.estradiol-e.estrad 0.15 mg-30 mcg (84)/10 mcg (7) 3mpk  Commonly known as:  CAMRESE  Take 1 capsule by mouth once daily.     latanoprost 0.005 % ophthalmic solution  Place 1 drop into both eyes every evening.     levocetirizine 5 MG tablet  Commonly known as:  XYZAL  Take 1 tablet (5 mg total) by mouth every evening.     methIMAzole 10 MG Tab  Commonly known as:  TAPAZOLE  Take 1 tablet (10 mg total) by mouth 2 (two) times daily.     montelukast 10 mg tablet  Commonly known as:  SINGULAIR  Take 1 tablet (10 mg total) by mouth once daily.     pantoprazole 40 MG tablet  Commonly known as:  PROTONIX  TAKE 1 TABLET EVERY DAY     pregabalin 100 MG capsule  Commonly known as:  LYRICA  Take 1 capsule (100 mg total) by mouth once daily.     triamcinolone acetonide 0.1% 0.1 % cream  Commonly known as:  KENALOG  Apply topically 2 (two) times daily.     VITAMIN D2 50,000 unit Cap  Generic drug:  ergocalciferol  Take 1 capsule (50,000 Units total) by mouth every 7  days.         * This list has 4 medication(s) that are the same as other medications prescribed for you. Read the directions carefully, and ask your doctor or other care provider to review them with you.               Where to Get Your Medications      These medications were sent to [facility]  [address] [place] [place] [address]    Hours:  Mon-Fri, 8a-5:30p Phone:  [phone]   · oxyCODONE-acetaminophen 5-325 mg per tablet         Review of patient's allergies indicates:   Allergen Reactions    Demerol [meperidine] Other (See Comments)     blackout       Past Surgical History:   Procedure Laterality Date    FRACTURE SURGERY Right 2013    right wrist    NASAL SEPTUM SURGERY  2001    SINUS SURGERY  2001    SINUS SURGERY  11/22/2017    SINUS SURGERY FUNCTIONAL ENDOSCOPIC N/A 11/22/2017    Performed by [facility]    TONSILLECTOMY, ADENOIDECTOMY  2001    WRIST FRACTURE SURGERY Right 2013    Right wrist       Family History   Problem Relation Age of Onset    Hypertension Mother     Lupus Mother     Diverticulosis Mother     Cancer Sister 30        stg 1 boderline? ov cancer    Cancer Maternal Grandmother     Cancer Maternal Grandfather     Cataracts Maternal Grandfather     Diabetes Paternal Grandmother     Stroke Paternal Grandmother     Cancer Paternal Grandmother     Cancer Paternal Grandfather     Diabetes Paternal Aunt     Hypertension Paternal Aunt     Thyroid disease Neg Hx     Migraines Neg Hx     Asthma Neg Hx        Social History     Socioeconomic History    Marital status: Single     Spouse name: Not on file    Number of children: 0    Years of education: Not on file    Highest education level: Not on file   Social Needs    Financial resource strain: Not on file    Food insecurity - worry: Not on file    Food insecurity - inability: Not on file    Transportation needs - medical: Not on file    Transportation needs - non-medical: Not on file  "  Occupational History    Occupation: LPN     Comment: [employer]   Tobacco Use    Smoking status: Never Smoker    Smokeless tobacco: Never Used   Substance and Sexual Activity    Alcohol use: Yes     Alcohol/week: 0.0 oz     Comment: occasionally    Drug use: No    Sexual activity: Yes     Partners: Male     Birth control/protection: OCP   Other Topics Concern    Not on file   Social History Narrative    Wears a seatbelt.       Vitals:    09/27/18 1627   BP: (!) 144/82   Pulse: 105   Weight: (!) 183.6 kg (404 lb 12.2 oz)   Height: 5' 4" (1.626 m)   PainSc: 10-Worst pain ever       Hemoglobin A1C   Date Value Ref Range Status   10/26/2017 4.6 4.0 - 5.6 % Final     Comment:     According to ADA guidelines, hemoglobin A1c <7.0% represents  optimal control in non-pregnant diabetic patients. Different  metrics may apply to specific patient populations.   Standards of Medical Care in Diabetes-2016.  For the purpose of screening for the presence of diabetes:  <5.7%     Consistent with the absence of diabetes  5.7-6.4%  Consistent with increasing risk for diabetes   (prediabetes)  >or=6.5%  Consistent with diabetes  Currently, no consensus exists for use of hemoglobin A1c  for diagnosis of diabetes for children.  This Hemoglobin A1c assay has significant interference with fetal   hemoglobin   (HbF). The results are invalid for patients with abnormal amounts of   HbF,   including those with known Hereditary Persistence   of Fetal Hemoglobin. Heterozygous hemoglobin variants (HbAS, HbAC,   HbAD, HbAE, HbA2) do not significantly interfere with this assay;   however, presence of multiple variants in a sample may impact the %   interference.         Review of Systems   Constitutional: Negative for chills and fever.   Respiratory: Negative for shortness of breath.    Cardiovascular: Negative for chest pain, palpitations, orthopnea, claudication and leg swelling.   Gastrointestinal: Negative for diarrhea, nausea and vomiting. "   Musculoskeletal: Negative for joint pain.   Skin: Negative for rash.   Neurological: Negative for dizziness, tingling, sensory change, focal weakness and weakness.   Psychiatric/Behavioral: Negative.            Objective:   PHYSICAL EXAM: Apperance: Alert and orient in no distress,well developed, and with good attention to grooming and body habits  Lower Extremity Exam   VASCULAR: Dorsalis pedis pulses 2/4 right and Posterior Tibial pulses 2/4 right. Capillary fill time <3 seconds right. NO edema observed right. Varicosities absent right. Skin temperature of the lower extremities is warm to warm, proximal to distal. Hair growth WNL right.  DERMATOLOGICAL: No skin rash, subcutaneous nodules, lesions or ulcers observed. Right hallux nail observed to be mildly incurvated at medial borders and slight obstructed in the nail grooves with soft tissue. The right hallux medial nail fold is grossly edematous and firm from chronic inflammation and scarring. No purulent drainage, no odor, and no increased temperature observed to right hallux.   NEUROLOGICAL: Light touch, sharp-dull, proprioception all present and equal bilaterally.    MUSCULOSKELETAL: Muscle strength 5/5 for all foot inverters, everters, plantarflexors, and  dorsiflexors bilateral. Pain on palpation of right hallux medial nail border. No pain on palpation of dorsal nail plate right hallux.           Assessment:       Ingrown toenail of right foot with infection  -     oxyCODONE-acetaminophen (PERCOCET) 5-325 mg per tablet; Take 1 tablet by mouth every 4 (four) hours as needed for Pain.  Dispense: 10 tablet; Refill: 0    Acute paronychia of toe of right foot    Pain of toe of right foot          Plan:   Ingrown toenail of right foot with infection  -     oxyCODONE-acetaminophen (PERCOCET) 5-325 mg per tablet; Take 1 tablet by mouth every 4 (four) hours as needed for Pain.  Dispense: 10 tablet; Refill: 0    Acute paronychia of toe of right foot    Pain of toe  of right foot      I counseled the patient on her conditions, regarding findings of my examination, my impressions, and usual treatment plan.   Patient consented verbal and written to temporary partial nail avulsion of right hallux.  Procedure performed: A local digital bock was administered to the right hallux of  6cc of 1% Lidocaine plain. Attention was then directed to the right hallux to check for adequate anesthesia. A penrose drain tourniquet was applied to the base of the right hallux for hemostasis.  Attention was then directed the medial nail border to separate using a spatula the most proximal nail border at the eponychium was released to the area of the matrix. Then the border was released at the medial aspect and at the plantar aspect distally to proximally. Using the English anvil, a cut was then made approximately 3mm lateral to the medial nail fold in a longitudinal manner at the distal aspect extending to the proximal end of the nail plate. Using a straight hemostat, the free nail plate segment was clamped and removed. Using a tissue nipper, residual tissue was then removed. The nail groove area was inspected and probed proximally with a curette for any spicules. The area was flushed with copious amounts of sterile normal saline. Betadine was applied to the area and dry sterile dressing of gauze and Coflex. The penrose drain tourniquet was released. Neurovascular status was assessed and noted to be intact. Patient tolerated procedure well.   The patient was given oral and written instructions for post-op care including BID soaks of water and Epson salt followed by application of antibiotic ointment  and light dressing.  Prescription written for Percocet 5-325mg to be taken as needed for pain.   The patient was instructed to take Tylenol over-the-counter q4h prn pain and to call clinic immediately if there is increased pain, increased redness, pus, fever, chills, nausea, or vomiting present.  Patient  to return 2 weeks or sooner if needed.                 Cristy Michael DPM  [facility]

## 2018-10-01 RX ORDER — FLUCONAZOLE 150 MG/1
150 TABLET ORAL DAILY
Qty: 1 TABLET | Refills: 0 | Status: SHIPPED | OUTPATIENT
Start: 2018-10-01 | End: 2018-10-02

## 2018-10-08 RX ORDER — METRONIDAZOLE 250 MG/1
500 TABLET ORAL EVERY 12 HOURS
Qty: 28 TABLET | Refills: 0 | Status: SHIPPED | OUTPATIENT
Start: 2018-10-08 | End: 2018-10-15

## 2018-10-08 RX ORDER — METRONIDAZOLE 500 MG/1
500 TABLET ORAL EVERY 12 HOURS
Qty: 14 TABLET | Refills: 0 | Status: SHIPPED | OUTPATIENT
Start: 2018-10-08 | End: 2018-10-08 | Stop reason: ALTCHOICE

## 2018-10-10 ENCOUNTER — OFFICE VISIT (OUTPATIENT)
Dept: PSYCHIATRY | Facility: CLINIC | Age: 38
End: 2018-10-10
Payer: COMMERCIAL

## 2018-10-10 DIAGNOSIS — F41.1 GAD (GENERALIZED ANXIETY DISORDER): Primary | ICD-10-CM

## 2018-10-10 PROCEDURE — 99213 OFFICE O/P EST LOW 20 MIN: CPT | Mod: S$GLB,,, | Performed by: PSYCHIATRY & NEUROLOGY

## 2018-10-10 RX ORDER — TRAZODONE HYDROCHLORIDE 100 MG/1
TABLET ORAL
Qty: 45 TABLET | Refills: 2 | Status: SHIPPED | OUTPATIENT
Start: 2018-10-10 | End: 2019-01-04 | Stop reason: SDUPTHER

## 2018-10-10 RX ORDER — DULOXETIN HYDROCHLORIDE 60 MG/1
60 CAPSULE, DELAYED RELEASE ORAL DAILY
Qty: 30 CAPSULE | Refills: 2 | Status: SHIPPED | OUTPATIENT
Start: 2018-10-10 | End: 2019-01-04
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
## 2018-10-11 ENCOUNTER — OFFICE VISIT (OUTPATIENT)
Dept: PODIATRY | Facility: CLINIC | Age: 38
End: 2018-10-11
Payer: COMMERCIAL

## 2018-10-11 VITALS — BODY MASS INDEX: 50.02 KG/M2 | HEIGHT: 64 IN | WEIGHT: 293 LBS

## 2018-10-11 DIAGNOSIS — L60.0 INGROWN TOENAIL OF RIGHT FOOT WITH INFECTION: Primary | ICD-10-CM

## 2018-10-11 DIAGNOSIS — L03.031 ACUTE PARONYCHIA OF TOE OF RIGHT FOOT: ICD-10-CM

## 2018-10-11 PROCEDURE — 99999 PR PBB SHADOW E&M-EST. PATIENT-LVL II: CPT | Mod: PBBFAC,,, | Performed by: PODIATRIST

## 2018-10-11 PROCEDURE — 99212 OFFICE O/P EST SF 10 MIN: CPT | Mod: S$GLB,,, | Performed by: PODIATRIST

## 2018-10-11 PROCEDURE — 3008F BODY MASS INDEX DOCD: CPT | Mod: CPTII,S$GLB,, | Performed by: PODIATRIST

## 2018-10-12 NOTE — PROGRESS NOTES
Subjective:     Patient ID: Shaina Olson is a 38 y.o. female.    Chief Complaint: Follow-up (2 wk F/U right great toenail avulsion)    HPI: This 38 year old female returns to the clinic 2 weeks post nail procedure of right great toe.  Patient has no complaints of fever chills or sweats.  Patient denies pain.  Patient has been dressing as instructed.     Patient Active Problem List   Diagnosis    Graves' disease    Hypertension    NOE (generalized anxiety disorder)    Iatrogenic cushingoid features    Vitamin D deficiency    Morbid obesity with BMI of 70 and over, adult    Dysphonia    History of asthma    Psychophysiological insomnia    Myalgia    Arthritis of right knee    Round hole of right retina without detachment    Abnormal ECG    PVC's (premature ventricular contractions)    TREVOR on CPAP    Chronic pansinusitis    Lumbosacral radiculopathy at S1          Medication List           Accurate as of 10/11/18 11:59 PM. If you have any questions, ask your nurse or doctor.               CONTINUE taking these medications    albuterol 90 mcg/actuation inhaler  Commonly known as:  PROVENTIL/VENTOLIN HFA  Inhale 2 puffs into the lungs every 6 (six) hours as needed for Wheezing. Rescue     Allergy Mix  Patient specific SLIT - formulation in notes     * amLODIPine 2.5 MG tablet  Commonly known as:  NORVASC  TAKE 1 TABLET BY MOUTH EVERY DAY WITH 5MG FOR A TOTAL OF 7.5 MG PER DAY     * amLODIPine 5 MG tablet  Commonly known as:  NORVASC  TAKE 1 TABLET (5 MG TOTAL) BY MOUTH ONCE DAILY.     amoxicillin-clavulanate 875-125mg 875-125 mg per tablet  Commonly known as:  AUGMENTIN  Take 1 tablet by mouth every 12 (twelve) hours.     cyclobenzaprine 10 MG tablet  Commonly known as:  FLEXERIL  Take 1 tablet (10 mg total) by mouth 3 (three) times daily as needed.     DULoxetine 60 MG capsule  Commonly known as:  CYMBALTA  Take 1 capsule (60 mg total) by mouth once daily.     fluticasone 50 mcg/actuation nasal  spray  Commonly known as:  FLONASE  2 sprays (100 mcg total) by Each Nare route once daily.     hydrOXYzine HCl 25 MG tablet  Commonly known as:  ATARAX  Take 1 tablet (25 mg total) by mouth 3 (three) times daily as needed.     L norgest/e.estradiol-e.estrad 0.15 mg-30 mcg (84)/10 mcg (7) 3mpk  Commonly known as:  CAMRESE  Take 1 capsule by mouth once daily.     latanoprost 0.005 % ophthalmic solution  Place 1 drop into both eyes every evening.     levocetirizine 5 MG tablet  Commonly known as:  XYZAL  Take 1 tablet (5 mg total) by mouth every evening.     methIMAzole 10 MG Tab  Commonly known as:  TAPAZOLE  Take 1 tablet (10 mg total) by mouth 2 (two) times daily.     metroNIDAZOLE 250 MG tablet  Commonly known as:  FLAGYL  Take 2 tablets (500 mg total) by mouth every 12 (twelve) hours. for 7 days     montelukast 10 mg tablet  Commonly known as:  SINGULAIR  Take 1 tablet (10 mg total) by mouth once daily.     oxyCODONE-acetaminophen 5-325 mg per tablet  Commonly known as:  PERCOCET  Take 1 tablet by mouth every 4 (four) hours as needed for Pain.     pantoprazole 40 MG tablet  Commonly known as:  PROTONIX  TAKE 1 TABLET EVERY DAY     pregabalin 100 MG capsule  Commonly known as:  LYRICA  Take 1 capsule (100 mg total) by mouth once daily.     traZODone 100 MG tablet  Commonly known as:  DESYREL  Take 1/2 to 1 and 1/2 tablets at bedtime as needed for sleep.     triamcinolone acetonide 0.1% 0.1 % cream  Commonly known as:  KENALOG  Apply topically 2 (two) times daily.     VITAMIN D2 50,000 unit Cap  Generic drug:  ergocalciferol  Take 1 capsule (50,000 Units total) by mouth every 7 days.         * This list has 2 medication(s) that are the same as other medications prescribed for you. Read the directions carefully, and ask your doctor or other care provider to review them with you.                Review of patient's allergies indicates:   Allergen Reactions    Demerol [meperidine] Other (See Comments)     blackout  "      Past Surgical History:   Procedure Laterality Date    FRACTURE SURGERY Right 2013    right wrist    NASAL SEPTUM SURGERY  2001    SINUS SURGERY  2001    SINUS SURGERY  11/22/2017    SINUS SURGERY FUNCTIONAL ENDOSCOPIC N/A 11/22/2017    Per[facility]    TONSILLECTOMY, ADENOIDECTOMY  2001    WRIST FRACTURE SURGERY Right 2013    Right wrist       Family History   Problem Relation Age of Onset    Hypertension Mother     Lupus Mother     Diverticulosis Mother     Cancer Sister 30        stg 1 boderline? ov cancer    Cancer Maternal Grandmother     Cancer Maternal Grandfather     Cataracts Maternal Grandfather     Diabetes Paternal Grandmother     Stroke Paternal Grandmother     Cancer Paternal Grandmother     Cancer Paternal Grandfather     Diabetes Paternal Aunt     Hypertension Paternal Aunt     Thyroid disease Neg Hx     Migraines Neg Hx     Asthma Neg Hx        Social History     Socioeconomic History    Marital status: Single     Spouse name: Not on file    Number of children: 0    Years of education: Not on file    Highest education level: Not on file   Social Needs    Financial resource strain: Not on file    Food insecurity - worry: Not on file    Food insecurity - inability: Not on file    Transportation needs - medical: Not on file    Transportation needs - non-medical: Not on file   Occupational History    Occupation: LPN     Comment: [employer]   Tobacco Use    Smoking status: Never Smoker    Smokeless tobacco: Never Used   Substance and Sexual Activity    Alcohol use: Yes     Alcohol/week: 0.0 oz     Comment: occasionally    Drug use: No    Sexual activity: Yes     Partners: Male     Birth control/protection: OCP   Other Topics Concern    Not on file   Social History Narrative    Wears a seatbelt.       Vitals:    10/11/18 1608   Weight: (!) 182.9 kg (403 lb 3.5 oz)   Height: 5' 4" (1.626 m)       Hemoglobin A1C   Date Value Ref Range " Status   10/26/2017 4.6 4.0 - 5.6 % Final     Comment:     According to ADA guidelines, hemoglobin A1c <7.0% represents  optimal control in non-pregnant diabetic patients. Different  metrics may apply to specific patient populations.   Standards of Medical Care in Diabetes-2016.  For the purpose of screening for the presence of diabetes:  <5.7%     Consistent with the absence of diabetes  5.7-6.4%  Consistent with increasing risk for diabetes   (prediabetes)  >or=6.5%  Consistent with diabetes  Currently, no consensus exists for use of hemoglobin A1c  for diagnosis of diabetes for children.  This Hemoglobin A1c assay has significant interference with fetal   hemoglobin   (HbF). The results are invalid for patients with abnormal amounts of   HbF,   including those with known Hereditary Persistence   of Fetal Hemoglobin. Heterozygous hemoglobin variants (HbAS, HbAC,   HbAD, HbAE, HbA2) do not significantly interfere with this assay;   however, presence of multiple variants in a sample may impact the %   interference.         Review of Systems   Constitutional: Negative for chills and fever.   Respiratory: Negative for shortness of breath.    Cardiovascular: Negative for chest pain, palpitations, orthopnea, claudication and leg swelling.   Gastrointestinal: Negative for diarrhea, nausea and vomiting.   Musculoskeletal: Negative for joint pain.   Skin: Negative for rash.   Neurological: Negative for dizziness, tingling, sensory change, focal weakness and weakness.   Psychiatric/Behavioral: Negative.              Objective:      PHYSICAL EXAM: Apperance: Alert and orient in no distress,well developed, and with good attention to grooming and body habits  Lower Extremity Exam   VASCULAR: Dorsalis pedis pulses 2/4 right and Posterior Tibial pulses 2/4 right. Capillary fill time <4 seconds right. No edema observed right. Varicosities absent right. Skin temperature of the lower extremities is warm to warm, proximal to distal.  Hair growth WNL right.  DERMATOLOGICAL: No skin rash, subcutaneous nodules, lesions or ulcers observed. Right hallux medial nail border observed to be clean with pink epithealized tissue noted. Minimal erythema noted. No purulent drainage, no odor, and no increased temperature observed to right hallux.   NEUROLOGICAL: Light touch, sharp-dull, proprioception all present and equal bilaterally.    MUSCULOSKELETAL: Muscle strength 5/5 for all foot inverters, everters, plantarflexors, and  dorsiflexors bilateral. No pain on palpation of right hallux medial nail border. No pain on palpation of dorsal nail plate right hallux.        Assessment:       Encounter Diagnoses   Name Primary?    Ingrown toenail of right foot with infection Yes    Acute paronychia of toe of right foot          Plan:   Ingrown toenail of right foot with infection    Acute paronychia of toe of right foot      I counseled the patient on her conditions, regarding findings of my examination, my impressions, and usual treatment plan.   Patient to continue local care until completely healed with no drainage and no redness.  Patient should call the clinic immediately if any signs of infection such as fever chills sweats increased redness or pain but was otherwise discharged.              Cristy Michael DPM  [facility]

## 2018-10-15 DIAGNOSIS — Z11.3 SCREENING FOR STDS (SEXUALLY TRANSMITTED DISEASES): Primary | ICD-10-CM

## 2018-10-19 ENCOUNTER — PATIENT MESSAGE (OUTPATIENT)
Dept: ADMINISTRATIVE | Facility: OTHER | Age: 38
End: 2018-10-19

## 2018-10-22 ENCOUNTER — LAB VISIT (OUTPATIENT)
Dept: LAB | Facility: HOSPITAL | Age: 38
End: 2018-10-22
Attending: INTERNAL MEDICINE
Payer: COMMERCIAL

## 2018-10-22 DIAGNOSIS — Z11.3 SCREENING FOR STDS (SEXUALLY TRANSMITTED DISEASES): ICD-10-CM

## 2018-10-22 DIAGNOSIS — E05.90 HYPERTHYROIDISM: ICD-10-CM

## 2018-10-22 LAB
T3FREE SERPL-MCNC: 2.4 PG/ML
T4 FREE SERPL-MCNC: 0.94 NG/DL
TSH SERPL DL<=0.005 MIU/L-ACNC: <0.01 UIU/ML

## 2018-10-22 PROCEDURE — 86703 HIV-1/HIV-2 1 RESULT ANTBDY: CPT

## 2018-10-22 PROCEDURE — 84439 ASSAY OF FREE THYROXINE: CPT

## 2018-10-22 PROCEDURE — 36415 COLL VENOUS BLD VENIPUNCTURE: CPT | Mod: PO

## 2018-10-22 PROCEDURE — 84481 FREE ASSAY (FT-3): CPT

## 2018-10-22 PROCEDURE — 84443 ASSAY THYROID STIM HORMONE: CPT

## 2018-10-22 PROCEDURE — 86592 SYPHILIS TEST NON-TREP QUAL: CPT

## 2018-10-22 PROCEDURE — 87340 HEPATITIS B SURFACE AG IA: CPT

## 2018-10-23 ENCOUNTER — PATIENT MESSAGE (OUTPATIENT)
Dept: OBSTETRICS AND GYNECOLOGY | Facility: HOSPITAL | Age: 38
End: 2018-10-23

## 2018-10-23 LAB
HBV SURFACE AG SERPL QL IA: NEGATIVE
HIV 1+2 AB+HIV1 P24 AG SERPL QL IA: NEGATIVE
RPR SER QL: NORMAL

## 2018-10-25 ENCOUNTER — OFFICE VISIT (OUTPATIENT)
Dept: OBSTETRICS AND GYNECOLOGY | Facility: CLINIC | Age: 38
End: 2018-10-25
Payer: COMMERCIAL

## 2018-10-25 ENCOUNTER — PATIENT MESSAGE (OUTPATIENT)
Dept: PULMONOLOGY | Facility: CLINIC | Age: 38
End: 2018-10-25

## 2018-10-25 ENCOUNTER — PATIENT MESSAGE (OUTPATIENT)
Dept: OTOLARYNGOLOGY | Facility: CLINIC | Age: 38
End: 2018-10-25

## 2018-10-25 VITALS
WEIGHT: 293 LBS | HEIGHT: 64 IN | DIASTOLIC BLOOD PRESSURE: 61 MMHG | SYSTOLIC BLOOD PRESSURE: 131 MMHG | BODY MASS INDEX: 50.02 KG/M2

## 2018-10-25 DIAGNOSIS — Z01.419 WELL WOMAN EXAM WITH ROUTINE GYNECOLOGICAL EXAM: Primary | ICD-10-CM

## 2018-10-25 DIAGNOSIS — Z11.3 SCREENING FOR STDS (SEXUALLY TRANSMITTED DISEASES): ICD-10-CM

## 2018-10-25 PROCEDURE — 87491 CHLMYD TRACH DNA AMP PROBE: CPT

## 2018-10-25 PROCEDURE — 3078F DIAST BP <80 MM HG: CPT | Mod: CPTII,S$GLB,, | Performed by: ADVANCED PRACTICE MIDWIFE

## 2018-10-25 PROCEDURE — 88175 CYTOPATH C/V AUTO FLUID REDO: CPT

## 2018-10-25 PROCEDURE — 99999 PR PBB SHADOW E&M-EST. PATIENT-LVL III: CPT | Mod: PBBFAC,,, | Performed by: ADVANCED PRACTICE MIDWIFE

## 2018-10-25 PROCEDURE — 3075F SYST BP GE 130 - 139MM HG: CPT | Mod: CPTII,S$GLB,, | Performed by: ADVANCED PRACTICE MIDWIFE

## 2018-10-25 PROCEDURE — 99395 PREV VISIT EST AGE 18-39: CPT | Mod: S$GLB,,, | Performed by: ADVANCED PRACTICE MIDWIFE

## 2018-10-25 RX ORDER — BUSPIRONE HYDROCHLORIDE 15 MG/1
TABLET ORAL
Refills: 2 | COMMUNITY
Start: 2018-08-28 | End: 2018-11-06

## 2018-10-26 LAB
C TRACH DNA SPEC QL NAA+PROBE: NOT DETECTED
N GONORRHOEA DNA SPEC QL NAA+PROBE: NOT DETECTED

## 2018-10-29 RX ORDER — MONTELUKAST SODIUM 10 MG/1
TABLET ORAL
Qty: 90 TABLET | Refills: 3 | Status: SHIPPED | OUTPATIENT
Start: 2018-10-29 | End: 2018-11-29 | Stop reason: SDUPTHER

## 2018-10-29 RX ORDER — MONTELUKAST SODIUM 10 MG/1
10 TABLET ORAL DAILY
Qty: 30 TABLET | Refills: 11 | Status: SHIPPED | OUTPATIENT
Start: 2018-10-29 | End: 2020-10-29 | Stop reason: SDUPTHER

## 2018-10-30 ENCOUNTER — OFFICE VISIT (OUTPATIENT)
Dept: PSYCHIATRY | Facility: CLINIC | Age: 38
End: 2018-10-30
Payer: COMMERCIAL

## 2018-10-30 DIAGNOSIS — F48.9 DEFERRED DIAGNOSIS ON AXIS I: Primary | ICD-10-CM

## 2018-10-30 PROCEDURE — 90834 PSYTX W PT 45 MINUTES: CPT | Mod: S$GLB,,, | Performed by: SOCIAL WORKER

## 2018-10-31 NOTE — TELEPHONE ENCOUNTER
Allergen MTII Wheal   SIZE Dilution #   to be tested Intradermal   Wheal Size MQT   ENDPOINT   Histamine   (+ control) 11         *Alternaria 3 5 7 5   *Apergillus 5 5 7 5   Fusarium 5 5 9 6   *Cladosporium Sphaer 9 - - 6   Cladosporium Herb 7 5 11 6   *Penicillium Frances 9 - - 6   Mucor Race 5 5 9 6   *Bipolaris 3 5 5 4   Glycerin   (- control) 0                     *American Elm 3 5 3 4   *Enfield 5 5 5 4   [place] 3 5 5 4   *Pecan 3 5 9 6   *[place] 9 - - 6   Mikey 5 5 7 5   Steele Birch 3 5 7 5   Red [place] 9 - - 6   Bald [place] 7 5 3 4   Red [place] 9 - - 6               *Short Ragweed 3 5 5 4   English Plantain 3 5 5 4   Marsh Elder 3 5 0 4   Mugwort Enzo 5 5 9 6   Dock-Sorrel Mix 9 - - 6   *Spiny Pigweed 7 5 7 5   Baccharis Sumner 9 - - 6   *Cocklebur [place] 9 - - 6   *Lambs Quarter 9 - - 6   Nettle [place] 9 - - 6               Nico Grass 5 5 5 4   Bermuda Grass 3 5 5 4   *Vj Grass 3 5 9 6   *[place] Tensed 3 5 7 5   *Farinae Mite 9 - - 6   *Pteronyssiunus Mite 9 - - 6   Cockroach 9 - - 6   Cat Hair 7 5 7 5   Dog Epithelia 7 5 7 5   Feather Mix 9 - - 6

## 2018-11-02 ENCOUNTER — TELEPHONE (OUTPATIENT)
Dept: OBSTETRICS AND GYNECOLOGY | Facility: CLINIC | Age: 38
End: 2018-11-02

## 2018-11-02 DIAGNOSIS — M79.10 MYALGIA: ICD-10-CM

## 2018-11-02 DIAGNOSIS — F41.9 ANXIETY: ICD-10-CM

## 2018-11-02 RX ORDER — DULOXETIN HYDROCHLORIDE 30 MG/1
CAPSULE, DELAYED RELEASE ORAL
Qty: 30 CAPSULE | Refills: 3 | Status: SHIPPED | OUTPATIENT
Start: 2018-11-02 | End: 2018-11-06 | Stop reason: DRUGHIGH

## 2018-11-05 ENCOUNTER — CLINICAL SUPPORT (OUTPATIENT)
Dept: FAMILY MEDICINE | Facility: CLINIC | Age: 38
End: 2018-11-05
Payer: COMMERCIAL

## 2018-11-05 ENCOUNTER — PATIENT MESSAGE (OUTPATIENT)
Dept: FAMILY MEDICINE | Facility: CLINIC | Age: 38
End: 2018-11-05

## 2018-11-05 ENCOUNTER — TELEPHONE (OUTPATIENT)
Dept: FAMILY MEDICINE | Facility: CLINIC | Age: 38
End: 2018-11-05

## 2018-11-05 PROCEDURE — 96372 THER/PROPH/DIAG INJ SC/IM: CPT | Mod: S$GLB,,, | Performed by: FAMILY MEDICINE

## 2018-11-05 PROCEDURE — 99999 PR PBB SHADOW E&M-EST. PATIENT-LVL III: CPT | Mod: PBBFAC,,,

## 2018-11-05 RX ORDER — METHYLPREDNISOLONE ACETATE 80 MG/ML
80 INJECTION, SUSPENSION INTRA-ARTICULAR; INTRALESIONAL; INTRAMUSCULAR; SOFT TISSUE ONCE
Status: COMPLETED | OUTPATIENT
Start: 2018-11-05 | End: 2018-11-05

## 2018-11-05 RX ORDER — DOXYCYCLINE 100 MG/1
100 CAPSULE ORAL 2 TIMES DAILY
Qty: 20 CAPSULE | Refills: 0 | Status: SHIPPED | OUTPATIENT
Start: 2018-11-05 | End: 2018-11-15

## 2018-11-05 RX ORDER — PREDNISONE 20 MG/1
TABLET ORAL
Qty: 20 TABLET | Refills: 0 | Status: SHIPPED | OUTPATIENT
Start: 2018-11-05 | End: 2018-11-29 | Stop reason: ALTCHOICE

## 2018-11-05 RX ADMIN — METHYLPREDNISOLONE ACETATE 80 MG: 80 INJECTION, SUSPENSION INTRA-ARTICULAR; INTRALESIONAL; INTRAMUSCULAR; SOFT TISSUE at 09:11

## 2018-11-05 NOTE — PROGRESS NOTES
Pt in the office for nurve visit for Depo 80mg injection. Injection given in pts right ventrogluteal. Pt tolerated well. Advised pt to wait 15 minutes to observe for any adverse reactions.

## 2018-11-06 ENCOUNTER — OFFICE VISIT (OUTPATIENT)
Dept: FAMILY MEDICINE | Facility: CLINIC | Age: 38
End: 2018-11-06
Payer: COMMERCIAL

## 2018-11-06 ENCOUNTER — HOSPITAL ENCOUNTER (OUTPATIENT)
Dept: RADIOLOGY | Facility: HOSPITAL | Age: 38
Discharge: HOME | End: 2018-11-06
Attending: FAMILY MEDICINE
Payer: COMMERCIAL

## 2018-11-06 VITALS
BODY MASS INDEX: 50.02 KG/M2 | WEIGHT: 293 LBS | TEMPERATURE: 100 F | HEIGHT: 64 IN | HEART RATE: 113 BPM | DIASTOLIC BLOOD PRESSURE: 80 MMHG | OXYGEN SATURATION: 98 % | SYSTOLIC BLOOD PRESSURE: 128 MMHG

## 2018-11-06 DIAGNOSIS — J45.901 EXACERBATION OF ASTHMA, UNSPECIFIED ASTHMA SEVERITY, UNSPECIFIED WHETHER PERSISTENT: ICD-10-CM

## 2018-11-06 DIAGNOSIS — R06.02 SOB (SHORTNESS OF BREATH): Primary | ICD-10-CM

## 2018-11-06 DIAGNOSIS — R06.02 SOB (SHORTNESS OF BREATH): ICD-10-CM

## 2018-11-06 PROCEDURE — 3074F SYST BP LT 130 MM HG: CPT | Mod: CPTII,S$GLB,, | Performed by: FAMILY MEDICINE

## 2018-11-06 PROCEDURE — 3079F DIAST BP 80-89 MM HG: CPT | Mod: CPTII,S$GLB,, | Performed by: FAMILY MEDICINE

## 2018-11-06 PROCEDURE — 99214 OFFICE O/P EST MOD 30 MIN: CPT | Mod: S$GLB,,, | Performed by: FAMILY MEDICINE

## 2018-11-06 PROCEDURE — 3008F BODY MASS INDEX DOCD: CPT | Mod: CPTII,S$GLB,, | Performed by: FAMILY MEDICINE

## 2018-11-06 PROCEDURE — 71046 X-RAY EXAM CHEST 2 VIEWS: CPT | Mod: 26,,, | Performed by: RADIOLOGY

## 2018-11-06 PROCEDURE — 94640 AIRWAY INHALATION TREATMENT: CPT | Mod: S$GLB,,, | Performed by: FAMILY MEDICINE

## 2018-11-06 PROCEDURE — 99999 PR PBB SHADOW E&M-EST. PATIENT-LVL III: CPT | Mod: PBBFAC,,, | Performed by: FAMILY MEDICINE

## 2018-11-06 PROCEDURE — 71046 X-RAY EXAM CHEST 2 VIEWS: CPT | Mod: TC,PO

## 2018-11-06 RX ORDER — IPRATROPIUM BROMIDE AND ALBUTEROL SULFATE 2.5; .5 MG/3ML; MG/3ML
3 SOLUTION RESPIRATORY (INHALATION)
Status: COMPLETED | OUTPATIENT
Start: 2018-11-06 | End: 2018-11-06

## 2018-11-06 RX ORDER — ALBUTEROL SULFATE 0.63 MG/3ML
0.63 SOLUTION RESPIRATORY (INHALATION) EVERY 6 HOURS PRN
Qty: 1 BOX | Refills: 0 | Status: SHIPPED | OUTPATIENT
Start: 2018-11-06 | End: 2019-01-23

## 2018-11-06 RX ADMIN — IPRATROPIUM BROMIDE AND ALBUTEROL SULFATE 3 ML: 2.5; .5 SOLUTION RESPIRATORY (INHALATION) at 02:11

## 2018-11-06 NOTE — PROGRESS NOTES
Subjective:       Patient ID: Shaina Olson is a 38 y.o. female.    Chief Complaint: Chest Congestion (with SOB) and Cough (dry)    38 y old female with Mild intermittent asthma now with sob and non productive cough  for 1 d. Taking  prednisone and doxy with no relief . No fever , no rhinorrhea, no ST       Review of Systems   Constitutional: Negative.    HENT: Negative.    Eyes: Negative.    Respiratory: Positive for shortness of breath.    Cardiovascular: Negative.    Gastrointestinal: Negative.    Genitourinary: Negative.    Musculoskeletal: Negative.    Skin: Negative.    Hematological: Negative.        Objective:      Physical Exam   Constitutional: She is oriented to person, place, and time. She appears well-developed and well-nourished. No distress.   HENT:   Head: Normocephalic and atraumatic.   Right Ear: External ear normal.   Left Ear: External ear normal.   Mouth/Throat: No oropharyngeal exudate.   Eyes: Conjunctivae and EOM are normal. Pupils are equal, round, and reactive to light. Right eye exhibits no discharge. Left eye exhibits no discharge. No scleral icterus.   Neck: Normal range of motion. Neck supple. No JVD present. No tracheal deviation present. No thyromegaly present.   Cardiovascular: Normal rate, regular rhythm and normal heart sounds. Exam reveals no gallop and no friction rub.   No murmur heard.  Pulmonary/Chest: Effort normal. No stridor. No respiratory distress. She has no wheezes. She has rhonchi in the right middle field. She has no rales. She exhibits no tenderness.   Abdominal: Soft. Bowel sounds are normal. She exhibits no distension. There is no tenderness. There is no rebound and no guarding.   Musculoskeletal: Normal range of motion.   Lymphadenopathy:     She has no cervical adenopathy.   Neurological: She is alert and oriented to person, place, and time.   Skin: Skin is warm and dry. She is not diaphoretic.   Psychiatric: She has a normal mood and affect. Her behavior is  normal. Judgment and thought content normal.       Assessment:       Shaina was seen today for chest congestion and cough.    Diagnoses and all orders for this visit:    SOB (shortness of breath)  -     X-Ray Chest PA And Lateral; Future    Exacerbation of asthma, unspecified asthma severity, unspecified whether persistent    Other orders  -     albuterol-ipratropium 2.5 mg-0.5 mg/3 mL nebulizer solution 3 mL  -     albuterol (ACCUNEB) 0.63 mg/3 mL Nebu; Take 3 mLs (0.63 mg total) by nebulization every 6 (six) hours as needed. Rescue      Plan:     Shaina was seen today for chest congestion and cough.    Diagnoses and all orders for this visit:    SOB (shortness of breath)  -     X-Ray Chest PA And Lateral; Future    Other orders  -     albuterol-ipratropium 2.5 mg-0.5 mg/3 mL nebulizer solution 3 mL     Cont Pred and albuterol .   WS discussed

## 2018-11-08 RX ORDER — CODEINE PHOSPHATE AND GUAIFENESIN 10; 100 MG/5ML; MG/5ML
5 SOLUTION ORAL EVERY 8 HOURS PRN
Qty: 118 ML | Refills: 0 | Status: SHIPPED | OUTPATIENT
Start: 2018-11-08 | End: 2018-11-18

## 2018-11-12 ENCOUNTER — LAB VISIT (OUTPATIENT)
Dept: LAB | Facility: HOSPITAL | Age: 38
End: 2018-11-12
Attending: INTERNAL MEDICINE
Payer: COMMERCIAL

## 2018-11-12 DIAGNOSIS — E05.90 HYPERTHYROIDISM: ICD-10-CM

## 2018-11-12 DIAGNOSIS — E55.9 VITAMIN D DEFICIENCY: ICD-10-CM

## 2018-11-12 LAB
25(OH)D3+25(OH)D2 SERPL-MCNC: 15 NG/ML
ANION GAP SERPL CALC-SCNC: 6 MMOL/L
BUN SERPL-MCNC: 10 MG/DL
CALCIUM SERPL-MCNC: 9.2 MG/DL
CHLORIDE SERPL-SCNC: 105 MMOL/L
CO2 SERPL-SCNC: 29 MMOL/L
CREAT SERPL-MCNC: 0.8 MG/DL
EST. GFR  (AFRICAN AMERICAN): >60 ML/MIN/1.73 M^2
EST. GFR  (NON AFRICAN AMERICAN): >60 ML/MIN/1.73 M^2
GLUCOSE SERPL-MCNC: 82 MG/DL
PHOSPHATE SERPL-MCNC: 2.8 MG/DL
POTASSIUM SERPL-SCNC: 3.8 MMOL/L
PTH-INTACT SERPL-MCNC: 162 PG/ML
SODIUM SERPL-SCNC: 140 MMOL/L
T3FREE SERPL-MCNC: 2.3 PG/ML
T4 FREE SERPL-MCNC: 0.81 NG/DL
TSH SERPL DL<=0.005 MIU/L-ACNC: 0.67 UIU/ML

## 2018-11-12 PROCEDURE — 82306 VITAMIN D 25 HYDROXY: CPT

## 2018-11-12 PROCEDURE — 80048 BASIC METABOLIC PNL TOTAL CA: CPT

## 2018-11-12 PROCEDURE — 84100 ASSAY OF PHOSPHORUS: CPT

## 2018-11-12 PROCEDURE — 84481 FREE ASSAY (FT-3): CPT

## 2018-11-12 PROCEDURE — 83970 ASSAY OF PARATHORMONE: CPT

## 2018-11-12 PROCEDURE — 36415 COLL VENOUS BLD VENIPUNCTURE: CPT | Mod: PO

## 2018-11-12 PROCEDURE — 84443 ASSAY THYROID STIM HORMONE: CPT

## 2018-11-12 PROCEDURE — 84439 ASSAY OF FREE THYROXINE: CPT

## 2018-11-12 RX ORDER — PANTOPRAZOLE SODIUM 40 MG/1
TABLET, DELAYED RELEASE ORAL
Qty: 30 TABLET | Refills: 10 | Status: SHIPPED | OUTPATIENT
Start: 2018-11-12 | End: 2019-10-15 | Stop reason: SDUPTHER

## 2018-11-13 ENCOUNTER — PATIENT MESSAGE (OUTPATIENT)
Dept: ENDOCRINOLOGY | Facility: CLINIC | Age: 38
End: 2018-11-13

## 2018-11-13 ENCOUNTER — APPOINTMENT (OUTPATIENT)
Dept: RADIOLOGY | Facility: HOSPITAL | Age: 38
End: 2018-11-13
Attending: INTERNAL MEDICINE
Payer: COMMERCIAL

## 2018-11-13 DIAGNOSIS — E04.2 MULTIPLE THYROID NODULES: ICD-10-CM

## 2018-11-13 PROCEDURE — 76536 US EXAM OF HEAD AND NECK: CPT | Mod: 26,,, | Performed by: RADIOLOGY

## 2018-11-13 PROCEDURE — 76536 US EXAM OF HEAD AND NECK: CPT | Mod: TC,PO

## 2018-11-13 RX ORDER — ASPIRIN 325 MG
50000 TABLET, DELAYED RELEASE (ENTERIC COATED) ORAL WEEKLY
Qty: 4 CAPSULE | Refills: 3 | COMMUNITY
Start: 2018-11-13 | End: 2018-11-29 | Stop reason: SDUPTHER

## 2018-11-19 ENCOUNTER — CLINICAL SUPPORT (OUTPATIENT)
Dept: OTOLARYNGOLOGY | Facility: CLINIC | Age: 38
End: 2018-11-19
Payer: COMMERCIAL

## 2018-11-19 DIAGNOSIS — J30.89 CHRONIC NONSEASONAL ALLERGIC RHINITIS DUE TO POLLEN: Primary | ICD-10-CM

## 2018-11-19 PROCEDURE — 95165 ANTIGEN THERAPY SERVICES: CPT | Mod: S$GLB,,, | Performed by: ORTHOPAEDIC SURGERY

## 2018-11-19 PROCEDURE — 95117 IMMUNOTHERAPY INJECTIONS: CPT | Mod: S$GLB,,, | Performed by: ORTHOPAEDIC SURGERY

## 2018-11-19 PROCEDURE — 99999 PR PBB SHADOW E&M-EST. PATIENT-LVL III: CPT | Mod: PBBFAC,,,

## 2018-11-19 NOTE — PROGRESS NOTES
Past Medical History:   Diagnosis Date    Allergy      Anxiety 2004    Arthritis      Arthritis of right knee      Graves' disease 6/2014    Hypertension 2004    Vitamin D deficiency              Review of patient's allergies indicates:   Allergen Reactions    Demerol [meperidine] Other (See Comments)       blackout       Ordering Physician:  Dr. Wilkinson  Order Type:  Written Order  Initial SNOT-20 score:  SNOT-20 score:    Treatment Sets:  Mix #1 #[id number] Exp: 11/8/2019  Allergens: Molds, mites, cockroach, dog, cat, feathers  Mix #2 #[id number] Exp:11/8/2019 Allergens: Trees  Mix #3 #[id number] Exp: 11/8/2019 Allergens: Weeds & grasses    [facility] [place]    At 0755 I administered 0.02ml intradermally, mix #1 (GREEN VIAL) and mix #2 (GREEN VIAL) in right arm, mix #3 (GREEN VIAL) in left arm.  Pt tolerated injections well.  No complaints of pain or discomfort.  Pt monitored for 20 minutes, after no minutes no reaction was noted.

## 2018-11-25 ENCOUNTER — PATIENT MESSAGE (OUTPATIENT)
Dept: PULMONOLOGY | Facility: CLINIC | Age: 38
End: 2018-11-25

## 2018-11-26 ENCOUNTER — CLINICAL SUPPORT (OUTPATIENT)
Dept: OTOLARYNGOLOGY | Facility: CLINIC | Age: 38
End: 2018-11-26
Payer: COMMERCIAL

## 2018-11-26 DIAGNOSIS — J30.89 CHRONIC NONSEASONAL ALLERGIC RHINITIS DUE TO POLLEN: Primary | ICD-10-CM

## 2018-11-26 PROCEDURE — 99999 PR PBB SHADOW E&M-EST. PATIENT-LVL III: CPT | Mod: PBBFAC,,,

## 2018-11-26 PROCEDURE — 95117 IMMUNOTHERAPY INJECTIONS: CPT | Mod: S$GLB,,, | Performed by: ORTHOPAEDIC SURGERY

## 2018-11-26 NOTE — PROGRESS NOTES
Past Medical History:   Diagnosis Date    Allergy      Anxiety 2004    Arthritis      Arthritis of right knee      Graves' disease 6/2014    Hypertension 2004    Vitamin D deficiency              Review of patient's allergies indicates:   Allergen Reactions    Demerol [meperidine] Other (See Comments)       blackout       Ordering Physician:  Dr. Wilkinson  Order Type:  Written Order  Initial SNOT-20 score:  SNOT-20 score:    Treatment Sets:  Mix #1 #[id number] Exp: 11/8/2019  Allergens: Molds, mites, cockroach, dog, cat, feathers  Mix #2 #[id number] Exp:11/8/2019 Allergens: Trees  Mix #3 #[id number] Exp: 11/8/2019 Allergens: Weeds & grasses    Manufactured by [facility], [place]    At 0751 I administered 0.05ml intradermally, mix #1 (GREEN VIAL) and mix #2 (GREEN VIAL) in right arm, mix #3 (GREEN VIAL) in left arm.  Pt tolerated injections well.  No complaints of pain or discomfort.  Pt monitored for 20 minutes, after no minutes no reaction was noted.

## 2018-11-29 ENCOUNTER — OFFICE VISIT (OUTPATIENT)
Dept: ENDOCRINOLOGY | Facility: CLINIC | Age: 38
End: 2018-11-29
Payer: COMMERCIAL

## 2018-11-29 VITALS
HEIGHT: 64 IN | HEART RATE: 94 BPM | SYSTOLIC BLOOD PRESSURE: 130 MMHG | TEMPERATURE: 99 F | BODY MASS INDEX: 50.02 KG/M2 | WEIGHT: 293 LBS | DIASTOLIC BLOOD PRESSURE: 76 MMHG

## 2018-11-29 DIAGNOSIS — E04.2 MULTIPLE THYROID NODULES: ICD-10-CM

## 2018-11-29 DIAGNOSIS — E21.3 HYPERPARATHYROIDISM: ICD-10-CM

## 2018-11-29 DIAGNOSIS — E05.90 HYPERTHYROIDISM: Primary | ICD-10-CM

## 2018-11-29 DIAGNOSIS — E55.9 VITAMIN D DEFICIENCY: ICD-10-CM

## 2018-11-29 DIAGNOSIS — E66.01 MORBID OBESITY WITH BMI OF 70 AND OVER, ADULT: ICD-10-CM

## 2018-11-29 PROCEDURE — 99999 PR PBB SHADOW E&M-EST. PATIENT-LVL III: CPT | Mod: PBBFAC,,, | Performed by: INTERNAL MEDICINE

## 2018-11-29 PROCEDURE — 3008F BODY MASS INDEX DOCD: CPT | Mod: CPTII,S$GLB,, | Performed by: INTERNAL MEDICINE

## 2018-11-29 PROCEDURE — 99214 OFFICE O/P EST MOD 30 MIN: CPT | Mod: S$GLB,,, | Performed by: INTERNAL MEDICINE

## 2018-11-29 PROCEDURE — 3075F SYST BP GE 130 - 139MM HG: CPT | Mod: CPTII,S$GLB,, | Performed by: INTERNAL MEDICINE

## 2018-11-29 PROCEDURE — 3078F DIAST BP <80 MM HG: CPT | Mod: CPTII,S$GLB,, | Performed by: INTERNAL MEDICINE

## 2018-11-29 RX ORDER — ASPIRIN 325 MG
50000 TABLET, DELAYED RELEASE (ENTERIC COATED) ORAL WEEKLY
Qty: 4 CAPSULE | Refills: 3 | COMMUNITY
Start: 2018-11-29 | End: 2018-12-04 | Stop reason: SDUPTHER

## 2018-11-29 NOTE — PATIENT INSTRUCTIONS
Lorcaserin oral tablets  What is this medicine?  LORCASERIN (keesha ca SER in) is used to promote and maintain weight loss in obese patients. This medicine should be used with a reduced calorie diet and, if appropriate, an exercise program.  How should I use this medicine?  Take this medicine by mouth with a glass of water. Follow the directions on the prescription label. You can take it with or without food. Take your medicine at regular intervals. Do not take it more often than directed. Do not stop taking except on your doctor's advice.  Talk to your pediatrician regarding the use of this medicine in children. Special care may be needed.  What side effects may I notice from receiving this medicine?  Side effects that you should report to your doctor or health care professional as soon as possible:  · allergic reactions like skin rash, itching or hives, swelling of the face, lips, or tongue  · abnormal production of milk  · breast enlargement in both males and females  · breathing problems  · changes in emotions or moods  · changes in vision  · confusion  · erection lasting more than 4 hours or a painful erection  · fast or irregular heart beat  · feeling faint or lightheaded, falls  · fever or chills, sore throat  · hallucination, loss of contact with reality  · high or low blood pressure  · menstrual changes  · restlessness  · slow or irregular heartbeat  · stiff muscles  · sweating  · suicidal thoughts or other mood changes  · swelling of the ankles, feet, hands  · unusually weak or tired  · vomiting  Side effects that usually do not require medical attention (Report these to your doctor or health care professional if they continue or are bothersome.):  · back pain  · constipation  · cough  · dry mouth  · nausea  · tiredness  What may interact with this medicine?  · cabergoline  · certain medicines for depression, anxiety, or psychotic disturbances  · certain medicines for erectile dysfunction  · certain  medicines for migraine headache like almotriptan, eletriptan, frovatriptan, naratriptan, rizatriptan, sumatriptan, zolmitriptan  · dextromethorphan  · linezolid  · lithium  · medicines for diabetes  · other weight loss products  · tramadol  · [place]'s Wort  · stimulant medicines for attention disorders, weight loss, or to stay awake  · tryptophan  What if I miss a dose?  If you miss a dose, take it as soon as you can. If it is almost time for your next dose, take only that dose. Do not take double or extra doses.  Where should I keep my medicine?  Keep out of the reach of children. This medicine can be abused. Keep your medicine in a safe place to protect it from theft. Do not share this medicine with anyone. Selling or giving away this medicine is dangerous and against the law.  Store at room temperature between 15 and 30 degrees C (59 and 86 degrees F). Throw away any unused medicine after the expiration date.  What should I tell my health care provider before I take this medicine?  They need to know if you have any of these conditions:  · anatomical deformation of the penis, Peyronie's disease, or history of priapism (painful and prolonged erection)  · diabetes  · heart disease  · history of blood diseases, like sickle cell anemia or leukemia  · history of irregular heartbeat  · kidney disease  · liver disease  · suicidal thoughts, plans, or attempt; a previous suicide attempt by you or a family member  · an unusual or allergic reaction to lorcaserin, other medicines, foods, dyes, or preservatives  · pregnant or trying to get pregnant  · breast-feeding  What should I watch for while using this medicine?  This medicine is intended to be used in addition to a healthy diet and appropriate exercise. The best results are achieved this way. Your doctor should instruct you to stop taking this medicine if you do not lose a certain amount of weight within the first 12 weeks of treatment, but it is important that you do  not change your dose in any way without consulting your doctor or health care professional. Visit your doctor or health care professional for regular checkups. Your doctor may order blood tests or other tests to see how you are doing.  Do not drive, use machinery, or do anything that needs mental alertness until you know how this medicine affects you.  This medicine may affect blood sugar levels. If you have diabetes, check with your doctor or health care professional before you change your diet or the dose of your diabetic medicine.  Patients and their families should watch out for worsening depression or thoughts of suicide. Also watch out for sudden changes in feelings such as feeling anxious, agitated, panicky, irritable, hostile, aggressive, impulsive, severely restless, overly excited and hyperactive, or not being able to sleep. If this happens, especially at the beginning of treatment or after a change in dose, call your health care professional.  Contact your doctor or health care professional right away if you are a man with an erection that lasts longer than 4 hours or if the erection becomes painful. This may be a sign of serious problem and must be treated right away to prevent permanent damage.  NOTE:This sheet is a summary. It may not cover all possible information. If you have questions about this medicine, talk to your doctor, pharmacist, or health care provider. Copyright© 2017 [employer]        Lorcaserin oral tablets  What is this medicine?  LORCASERIN (keesha ca SER in) is used to promote and maintain weight loss in obese patients. This medicine should be used with a reduced calorie diet and, if appropriate, an exercise program.  How should I use this medicine?  Take this medicine by mouth with a glass of water. Follow the directions on the prescription label. You can take it with or without food. Take your medicine at regular intervals. Do not take it more often than directed. Do not stop taking  except on your doctor's advice.  Talk to your pediatrician regarding the use of this medicine in children. Special care may be needed.  What side effects may I notice from receiving this medicine?  Side effects that you should report to your doctor or health care professional as soon as possible:  · allergic reactions like skin rash, itching or hives, swelling of the face, lips, or tongue  · abnormal production of milk  · breast enlargement in both males and females  · breathing problems  · changes in emotions or moods  · changes in vision  · confusion  · erection lasting more than 4 hours or a painful erection  · fast or irregular heart beat  · feeling faint or lightheaded, falls  · fever or chills, sore throat  · hallucination, loss of contact with reality  · high or low blood pressure  · menstrual changes  · restlessness  · slow or irregular heartbeat  · stiff muscles  · sweating  · suicidal thoughts or other mood changes  · swelling of the ankles, feet, hands  · unusually weak or tired  · vomiting  Side effects that usually do not require medical attention (Report these to your doctor or health care professional if they continue or are bothersome.):  · back pain  · constipation  · cough  · dry mouth  · nausea  · tiredness  What may interact with this medicine?  · cabergoline  · certain medicines for depression, anxiety, or psychotic disturbances  · certain medicines for erectile dysfunction  · certain medicines for migraine headache like almotriptan, eletriptan, frovatriptan, naratriptan, rizatriptan, sumatriptan, zolmitriptan  · dextromethorphan  · linezolid  · lithium  · medicines for diabetes  · other weight loss products  · tramadol  · [place]'s Wort  · stimulant medicines for attention disorders, weight loss, or to stay awake  · tryptophan  What if I miss a dose?  If you miss a dose, take it as soon as you can. If it is almost time for your next dose, take only that dose. Do not take double or extra  doses.  Where should I keep my medicine?  Keep out of the reach of children. This medicine can be abused. Keep your medicine in a safe place to protect it from theft. Do not share this medicine with anyone. Selling or giving away this medicine is dangerous and against the law.  Store at room temperature between 15 and 30 degrees C (59 and 86 degrees F). Throw away any unused medicine after the expiration date.  What should I tell my health care provider before I take this medicine?  They need to know if you have any of these conditions:  · anatomical deformation of the penis, Peyronie's disease, or history of priapism (painful and prolonged erection)  · diabetes  · heart disease  · history of blood diseases, like sickle cell anemia or leukemia  · history of irregular heartbeat  · kidney disease  · liver disease  · suicidal thoughts, plans, or attempt; a previous suicide attempt by you or a family member  · an unusual or allergic reaction to lorcaserin, other medicines, foods, dyes, or preservatives  · pregnant or trying to get pregnant  · breast-feeding  What should I watch for while using this medicine?  This medicine is intended to be used in addition to a healthy diet and appropriate exercise. The best results are achieved this way. Your doctor should instruct you to stop taking this medicine if you do not lose a certain amount of weight within the first 12 weeks of treatment, but it is important that you do not change your dose in any way without consulting your doctor or health care professional. Visit your doctor or health care professional for regular checkups. Your doctor may order blood tests or other tests to see how you are doing.  Do not drive, use machinery, or do anything that needs mental alertness until you know how this medicine affects you.  This medicine may affect blood sugar levels. If you have diabetes, check with your doctor or health care professional before you change your diet or the dose  of your diabetic medicine.  Patients and their families should watch out for worsening depression or thoughts of suicide. Also watch out for sudden changes in feelings such as feeling anxious, agitated, panicky, irritable, hostile, aggressive, impulsive, severely restless, overly excited and hyperactive, or not being able to sleep. If this happens, especially at the beginning of treatment or after a change in dose, call your health care professional.  Contact your doctor or health care professional right away if you are a man with an erection that lasts longer than 4 hours or if the erection becomes painful. This may be a sign of serious problem and must be treated right away to prevent permanent damage.  NOTE:This sheet is a summary. It may not cover all possible information. If you have questions about this medicine, talk to your doctor, pharmacist, or health care provider. Copyright© 2017 [employer]

## 2018-11-29 NOTE — PROGRESS NOTES
Patient ID: Shaina Olson is a 38 y.o. female.  Patient is here for follow up        Chief Complaint: Graves' Disease      HPI    Diagnosed: diagnosed in 2014 and started on antithyroid medications immediately due to severe symptoms; she shared a picture of how she looked initially and patient had significant stare and enlarged thyroid which both have improved over time on treatment    Previous radiology tests:  Thyroid ultrasound : Yes - status post benign FNA left sided thyroid nodule. Most recent ultrasound 11/2018 shows left thyroid nodule is smaller- only 8mm, stable subcentimeter right nodule.  There is with again noted to be stable nodule projecting along the medial margin superior pole left lobe demonstrating isoechoic appearance compared to adjacent thyroid.  This is not in contact or connected to the thyroid.  This measures 2.2 x 0.8 x 1.4 cm compared to 2.2 x 0.8 x 1.3 cm previously.  Appearance is again suggestive of possible ectopic thyroid tissue versus parathyroid adenoma.    NM uptake and scan:  Yes:  Because of the findings on ultrasound done May 2018 showing possible ectopic thyroid tissue she had a thyroid uptake and scan to rule out any ectopic thyroid tissue but because she is on methimazole she held it for about 5 days prior and it did not show any ectopic tissue and it only showed increased uptake at 6 and 24 hr consistent with Graves disease    She had recent labs and her PTH level was elevated at 171 but her calcium is normal.  She does have a history though of low vitamin D and her vitamin-D level in April was 18-she used to take high-dose vitamin-D but has been off of this for several months and has not been consistently taking over-the-counter vitamin-D    She reports that her mother had hyperparathyroidism and osteoporosis    Interval history: She deloped acute hyperthyroidism after last visit so I increased her methimazole to 10mg. She denies increased iodine intake orally or IV  from CT scan or otherwise and she was compliant with her medication. Her TSI also was elevated. Her prior  TSI antibody was negative December 2017    Previous thyroid surgery: No      Thyroid symptoms:denies fatigue, weight changes, heat/cold intolerance, bowel/skin changes or CVS symptoms    Patient has chronic hoarseness but has ALLERGIES and sinusitis and will be starting ALLERGY shots again.  Had sinus surgery last year.  Also takes protonix.  Her thyroid gland is enlarged also and she does have a vocal cord nodule but she reports that she was told the nodule has resolved and is wondering whether her thyroid which she feels overall is bigger than her initial diagnosis but is related to her hoarseness.  Her ENT may consider speech therapy as well.                Thyroid medications: methimazole 10 mg daily      Takes medication appropriately: Yes  Her A1c was normal.  She has joined weight watchers on line.  She has not yet joined [employer] gym.  She is contemplating gastric sleeve but still is difficult to come up with the financial co-pay that is require  Uses CPAP for obstructive sleep apnea    She is interested in weight loss medication- tried phentermine in past without success    Had 2d ECHO 2016 that showed mild TR  Denies SOB or significant swelling  I have reviewed the past medical, family and social history    Review of Systems   Constitutional: Negative for appetite change, fatigue, fever and unexpected weight change.   HENT: Negative for sore throat and trouble swallowing.    Eyes: Negative for visual disturbance.   Respiratory: Negative for shortness of breath and wheezing.    Cardiovascular: Negative for chest pain, palpitations and leg swelling.   Gastrointestinal: Negative for diarrhea, nausea and vomiting.   Endocrine: Negative for cold intolerance, heat intolerance, polydipsia, polyphagia and polyuria.   Genitourinary: Negative for difficulty urinating, dysuria and menstrual problem.    Musculoskeletal: Negative for arthralgias and joint swelling.   Skin: Negative for rash.   Neurological: Negative for dizziness, weakness, numbness and headaches.   Psychiatric/Behavioral: Negative for confusion, dysphoric mood and sleep disturbance.       Objective:      Physical Exam   Constitutional: She appears well-developed and well-nourished. No distress.   HENT:   Head: Normocephalic and atraumatic.   Eyes: Conjunctivae are normal.   Neck: No JVD present. No tracheal deviation present. Thyromegaly present.   Cardiovascular: Normal rate, regular rhythm, normal heart sounds and intact distal pulses. Exam reveals no gallop and no friction rub.   No murmur heard.  Pulmonary/Chest: Effort normal and breath sounds normal. No stridor. No respiratory distress. She has no wheezes. She has no rales. She exhibits no tenderness.   Musculoskeletal: She exhibits no edema or deformity.   Lymphadenopathy:     She has no cervical adenopathy.   Neurological: She is alert.   Skin: She is not diaphoretic.   Vitals reviewed.        Lab Review:   Lab Visit on 11/12/2018   Component Date Value    TSH 11/12/2018 0.671     Free T4 11/12/2018 0.81     T3, Free 11/12/2018 2.3     PTH, Intact 11/12/2018 162.0*    Vit D, 25-Hydroxy 11/12/2018 15*    Phosphorus 11/12/2018 2.8     Sodium 11/12/2018 140     Potassium 11/12/2018 3.8     Chloride 11/12/2018 105     CO2 11/12/2018 29     Glucose 11/12/2018 82     BUN, Bld 11/12/2018 10     Creatinine 11/12/2018 0.8     Calcium 11/12/2018 9.2     Anion Gap 11/12/2018 6*    eGFR if African American 11/12/2018 >60.0     eGFR if non African Amer* 11/12/2018 >60.0    Office Visit on 10/25/2018   Component Date Value    Chlamydia, Amplified DNA 10/25/2018 Not Detected     N gonorrhoeae, amplified* 10/25/2018 Not Detected    Lab Visit on 10/22/2018   Component Date Value    T3, Free 10/22/2018 2.4     Free T4 10/22/2018 0.94     TSH 10/22/2018 <0.010*    RPR 10/22/2018  Non-reactive     HIV 1/2 Ag/Ab 10/22/2018 Negative     Hepatitis B Surface Ag 10/22/2018 Negative    Lab Visit on 09/19/2018   Component Date Value    T3, Free 09/19/2018 5.4*    Free T4 09/19/2018 2.29*    TSH 09/19/2018 <0.010*    Thyroid-Stim IG [facility]* 09/19/2018 6.16*   Lab Visit on 07/19/2018   Component Date Value    Phosphorus 07/19/2018 2.2*    Vit D, 25-Hydroxy 07/19/2018 15*    Calcium, Ion 07/19/2018 1.26    Lab Visit on 07/12/2018   Component Date Value    TSH 07/12/2018 0.850     Free T4 07/12/2018 1.05     T3, Free 07/12/2018 2.9     Total Protein 07/12/2018 7.1     Albumin 07/12/2018 3.5     Total Bilirubin 07/12/2018 0.6     Bilirubin, Direct 07/12/2018 0.3     AST 07/12/2018 15     ALT 07/12/2018 10     Alkaline Phosphatase 07/12/2018 105     PTH, Intact 07/12/2018 171.0*    Calcium 07/12/2018 9.4        Assessment:     1. Hyperthyroidism  TSH    T4, free    T3, free    continue methimazole- recent labs normal   2. Vitamin D deficiency  Vitamin D    still low-switch to D3   3. Hyperparathyroidism  PTH, intact    Phosphorus    Vitamin D    Basic metabolic panel    May be secondary to vid d def    4. Multiple thyroid nodules      stable ultrasound   5. Morbid obesity with BMI of 70 and over, adult        Plan:   Hyperthyroidism  Comments:  continue methimazole- recent labs normal  Orders:  -     TSH; Future; Expected date: 11/29/2018  -     T4, free; Future; Expected date: 11/29/2018  -     T3, free; Future; Expected date: 11/29/2018    Vitamin D deficiency  Comments:  still low-switch to D3  Orders:  -     Vitamin D; Future; Expected date: 11/29/2018    Hyperparathyroidism  Comments:  May be secondary to vid d def   Orders:  -     PTH, intact; Future; Expected date: 11/29/2018  -     Phosphorus; Future; Expected date: 11/29/2018  -     Vitamin D; Future; Expected date: 11/29/2018  -     Basic metabolic panel; Future    Multiple thyroid nodules  Comments:  stable  ultrasound    Morbid obesity with BMI of 70 and over, adult    Other orders  -     cholecalciferol, vitamin D3, 50,000 unit capsule; Take 1 capsule (50,000 Units total) by mouth once a week.  Dispense: 4 capsule; Refill: 3  -     lorcaserin (BELVIQ) 10 mg Tab; Take 10 mg by mouth 2 (two) times daily.  Dispense: 60 tablet; Refill: 3    In addition discussed side effects of belviq- rare cause of valvular issues, depression, serotonin syndrome, etc and handout given      Follow-up in about 3 months (around 2/28/2019).    Labs prior to appointment? yes     Disclaimer:  This note may have been partially prepared using voice recognition software and  it may have not been extensively proofed, as such there could be errors within the text such as sound alike errors.

## 2018-12-03 ENCOUNTER — CLINICAL SUPPORT (OUTPATIENT)
Dept: OTOLARYNGOLOGY | Facility: CLINIC | Age: 38
End: 2018-12-03
Payer: COMMERCIAL

## 2018-12-03 ENCOUNTER — PATIENT MESSAGE (OUTPATIENT)
Dept: ENDOCRINOLOGY | Facility: CLINIC | Age: 38
End: 2018-12-03

## 2018-12-03 DIAGNOSIS — J30.89 CHRONIC NONSEASONAL ALLERGIC RHINITIS DUE TO POLLEN: Primary | ICD-10-CM

## 2018-12-03 PROCEDURE — 95117 IMMUNOTHERAPY INJECTIONS: CPT | Mod: S$GLB,,, | Performed by: ORTHOPAEDIC SURGERY

## 2018-12-03 PROCEDURE — 99999 PR PBB SHADOW E&M-EST. PATIENT-LVL III: CPT | Mod: PBBFAC,,,

## 2018-12-03 RX ORDER — LEVONORGESTREL / ETHINYL ESTRADIOL AND ETHINYL ESTRADIOL 150-30(84)
1 KIT ORAL DAILY
Qty: 30 EACH | Refills: 11 | Status: SHIPPED | OUTPATIENT
Start: 2018-12-03 | End: 2020-05-06

## 2018-12-03 NOTE — PROGRESS NOTES
Past Medical History:   Diagnosis Date    Allergy      Anxiety 2004    Arthritis      Arthritis of right knee      Graves' disease 6/2014    Hypertension 2004    Vitamin D deficiency              Review of patient's allergies indicates:   Allergen Reactions    Demerol [meperidine] Other (See Comments)       blackout       Ordering Physician:  Dr. Wilkinson  Order Type:  Written Order  Initial SNOT-20 score:  SNOT-20 score:    Treatment Sets:  Mix #1 #[id number] Exp: 11/8/2019  Allergens: Molds, mites, cockroach, dog, cat, feathers  Mix #2 #[id number] Exp:11/8/2019 Allergens: Trees  Mix #3 #[id number] Exp: 11/8/2019 Allergens: Weeds & grasses    Manufact[facility] by [facility]    At 0803 I administered 0.10ml intradermally, mix #1 (GREEN VIAL) and mix #2 (GREEN VIAL) in right arm, mix #3 (GREEN VIAL) in left arm.  Pt tolerated injections well.  No complaints of pain or discomfort.  Pt monitored for 20 minutes, after no minutes no reaction was noted.

## 2018-12-04 RX ORDER — ASPIRIN 325 MG
50000 TABLET, DELAYED RELEASE (ENTERIC COATED) ORAL WEEKLY
Qty: 12 CAPSULE | Refills: 1 | COMMUNITY
Start: 2018-12-04 | End: 2018-12-04 | Stop reason: SDUPTHER

## 2018-12-04 RX ORDER — ASPIRIN 325 MG
50000 TABLET, DELAYED RELEASE (ENTERIC COATED) ORAL WEEKLY
Qty: 12 CAPSULE | Refills: 1 | Status: SHIPPED | OUTPATIENT
Start: 2018-12-04 | End: 2019-07-22 | Stop reason: SDUPTHER

## 2018-12-06 ENCOUNTER — PATIENT MESSAGE (OUTPATIENT)
Dept: PULMONOLOGY | Facility: CLINIC | Age: 38
End: 2018-12-06

## 2018-12-10 ENCOUNTER — CLINICAL SUPPORT (OUTPATIENT)
Dept: OTOLARYNGOLOGY | Facility: CLINIC | Age: 38
End: 2018-12-10
Payer: COMMERCIAL

## 2018-12-10 DIAGNOSIS — J30.89 CHRONIC NONSEASONAL ALLERGIC RHINITIS DUE TO POLLEN: Primary | ICD-10-CM

## 2018-12-10 PROCEDURE — 99999 PR PBB SHADOW E&M-EST. PATIENT-LVL II: CPT | Mod: PBBFAC,,,

## 2018-12-10 PROCEDURE — 95117 IMMUNOTHERAPY INJECTIONS: CPT | Mod: S$GLB,,, | Performed by: ORTHOPAEDIC SURGERY

## 2018-12-10 RX ORDER — CYCLOBENZAPRINE HCL 10 MG
TABLET ORAL
Qty: 30 TABLET | Refills: 0 | Status: SHIPPED | OUTPATIENT
Start: 2018-12-10 | End: 2020-09-28 | Stop reason: SDUPTHER

## 2018-12-10 NOTE — PROGRESS NOTES
Past Medical History:   Diagnosis Date    Allergy      Anxiety 2004    Arthritis      Arthritis of right knee      Graves' disease 6/2014    Hypertension 2004    Vitamin D deficiency              Review of patient's allergies indicates:   Allergen Reactions    Demerol [meperidine] Other (See Comments)       blackout       Ordering Physician:  Dr. Wilkinson  Order Type:  Written Order  Initial SNOT-20 score:  SNOT-20 score:    Treatment Sets:  Mix #1 #[id number] Exp: 11/8/2019  Allergens: Molds, mites, cockroach, dog, cat, feathers  Mix #2 #[id number] Exp:11/8/2019 Allergens: Trees  Mix #3 #[id number] Exp: 11/8/2019 Allergens: Weeds & grasses    Manufactured by [facility] [employer] [place]    At 0752 I administered 0.15ml intradermally, mix #1 (GREEN VIAL) and mix #2 (GREEN VIAL) in right arm, mix #3 (GREEN VIAL) in left arm.  Pt tolerated injections well.  No complaints of pain or discomfort.  Pt monitored for 20 minutes, after no minutes no reaction was noted.

## 2018-12-17 ENCOUNTER — CLINICAL SUPPORT (OUTPATIENT)
Dept: OTOLARYNGOLOGY | Facility: CLINIC | Age: 38
End: 2018-12-17
Payer: COMMERCIAL

## 2018-12-17 DIAGNOSIS — J30.89 CHRONIC NONSEASONAL ALLERGIC RHINITIS DUE TO POLLEN: Primary | ICD-10-CM

## 2018-12-17 PROCEDURE — 95117 IMMUNOTHERAPY INJECTIONS: CPT | Mod: S$GLB,,, | Performed by: ORTHOPAEDIC SURGERY

## 2018-12-17 PROCEDURE — 99999 PR PBB SHADOW E&M-EST. PATIENT-LVL II: CPT | Mod: PBBFAC,,,

## 2018-12-21 NOTE — PROGRESS NOTES
Past Medical History:   Diagnosis Date    Allergy      Anxiety 2004    Arthritis      Arthritis of right knee      Graves' disease 6/2014    Hypertension 2004    Vitamin D deficiency              Review of patient's allergies indicates:   Allergen Reactions    Demerol [meperidine] Other (See Comments)       blackout       Ordering Physician:  Dr. Wilkinson  Order Type:  Written Order  Initial SNOT-20 score:  SNOT-20 score:    Treatment Sets:  Mix #1 #[id number] Exp: 11/8/2019  Allergens: Molds, mites, cockroach, dog, cat, feathers  Mix #2 #[id number] Exp:11/8/2019 Allergens: Trees  Mix #3 #[id number] Exp: 11/8/2019 Allergens: Weeds & grasses    Manufactured by [facility]    At 0801 I administered 0.20ml intradermally, mix #1 (GREEN VIAL) and mix #2 (GREEN VIAL) in right arm, mix #3 (GREEN VIAL) in left arm.  Pt tolerated injections well.  No complaints of pain or discomfort.  Pt monitored for 20 minutes, after no minutes no reaction was noted.

## 2018-12-24 ENCOUNTER — PATIENT MESSAGE (OUTPATIENT)
Dept: OTOLARYNGOLOGY | Facility: CLINIC | Age: 38
End: 2018-12-24

## 2019-01-02 ENCOUNTER — PATIENT MESSAGE (OUTPATIENT)
Dept: ENDOCRINOLOGY | Facility: CLINIC | Age: 39
End: 2019-01-02

## 2019-01-04 ENCOUNTER — OFFICE VISIT (OUTPATIENT)
Dept: PSYCHIATRY | Facility: CLINIC | Age: 39
End: 2019-01-04
Payer: COMMERCIAL

## 2019-01-04 ENCOUNTER — OFFICE VISIT (OUTPATIENT)
Dept: OTOLARYNGOLOGY | Facility: CLINIC | Age: 39
End: 2019-01-04
Payer: COMMERCIAL

## 2019-01-04 VITALS
DIASTOLIC BLOOD PRESSURE: 80 MMHG | SYSTOLIC BLOOD PRESSURE: 142 MMHG | TEMPERATURE: 99 F | HEIGHT: 64 IN | WEIGHT: 293 LBS | HEART RATE: 106 BPM | BODY MASS INDEX: 50.02 KG/M2

## 2019-01-04 DIAGNOSIS — R49.0 DYSPHONIA: Primary | ICD-10-CM

## 2019-01-04 DIAGNOSIS — F31.31 BIPOLAR AFFECTIVE DISORDER, CURRENTLY DEPRESSED, MILD: Primary | ICD-10-CM

## 2019-01-04 DIAGNOSIS — E21.3 HYPERPARATHYROIDISM: ICD-10-CM

## 2019-01-04 DIAGNOSIS — F41.1 GAD (GENERALIZED ANXIETY DISORDER): ICD-10-CM

## 2019-01-04 DIAGNOSIS — E05.00 GRAVES' DISEASE: Chronic | ICD-10-CM

## 2019-01-04 PROCEDURE — 99214 OFFICE O/P EST MOD 30 MIN: CPT | Mod: S$GLB,,, | Performed by: PSYCHIATRY & NEUROLOGY

## 2019-01-04 PROCEDURE — 3079F DIAST BP 80-89 MM HG: CPT | Mod: CPTII,S$GLB,, | Performed by: OTOLARYNGOLOGY

## 2019-01-04 PROCEDURE — 3077F PR MOST RECENT SYSTOLIC BLOOD PRESSURE >= 140 MM HG: ICD-10-PCS | Mod: CPTII,S$GLB,, | Performed by: OTOLARYNGOLOGY

## 2019-01-04 PROCEDURE — 99999 PR PBB SHADOW E&M-EST. PATIENT-LVL III: ICD-10-PCS | Mod: PBBFAC,,, | Performed by: OTOLARYNGOLOGY

## 2019-01-04 PROCEDURE — 99999 PR PBB SHADOW E&M-EST. PATIENT-LVL III: CPT | Mod: PBBFAC,,, | Performed by: OTOLARYNGOLOGY

## 2019-01-04 PROCEDURE — 3078F DIAST BP <80 MM HG: CPT | Mod: CPTII,S$GLB,, | Performed by: PSYCHIATRY & NEUROLOGY

## 2019-01-04 PROCEDURE — 3008F PR BODY MASS INDEX (BMI) DOCUMENTED: ICD-10-PCS | Mod: CPTII,S$GLB,, | Performed by: OTOLARYNGOLOGY

## 2019-01-04 PROCEDURE — 3077F PR MOST RECENT SYSTOLIC BLOOD PRESSURE >= 140 MM HG: ICD-10-PCS | Mod: CPTII,S$GLB,, | Performed by: PSYCHIATRY & NEUROLOGY

## 2019-01-04 PROCEDURE — 99999 PR PBB SHADOW E&M-EST. PATIENT-LVL I: CPT | Mod: PBBFAC,,, | Performed by: PSYCHIATRY & NEUROLOGY

## 2019-01-04 PROCEDURE — 3079F PR MOST RECENT DIASTOLIC BLOOD PRESSURE 80-89 MM HG: ICD-10-PCS | Mod: CPTII,S$GLB,, | Performed by: OTOLARYNGOLOGY

## 2019-01-04 PROCEDURE — 3077F SYST BP >= 140 MM HG: CPT | Mod: CPTII,S$GLB,, | Performed by: OTOLARYNGOLOGY

## 2019-01-04 PROCEDURE — 3077F SYST BP >= 140 MM HG: CPT | Mod: CPTII,S$GLB,, | Performed by: PSYCHIATRY & NEUROLOGY

## 2019-01-04 PROCEDURE — 99214 OFFICE O/P EST MOD 30 MIN: CPT | Mod: S$GLB,,, | Performed by: OTOLARYNGOLOGY

## 2019-01-04 PROCEDURE — 3078F PR MOST RECENT DIASTOLIC BLOOD PRESSURE < 80 MM HG: ICD-10-PCS | Mod: CPTII,S$GLB,, | Performed by: PSYCHIATRY & NEUROLOGY

## 2019-01-04 PROCEDURE — 99214 PR OFFICE/OUTPT VISIT, EST, LEVL IV, 30-39 MIN: ICD-10-PCS | Mod: S$GLB,,, | Performed by: OTOLARYNGOLOGY

## 2019-01-04 PROCEDURE — 99214 PR OFFICE/OUTPT VISIT, EST, LEVL IV, 30-39 MIN: ICD-10-PCS | Mod: S$GLB,,, | Performed by: PSYCHIATRY & NEUROLOGY

## 2019-01-04 PROCEDURE — 99999 PR PBB SHADOW E&M-EST. PATIENT-LVL I: ICD-10-PCS | Mod: PBBFAC,,, | Performed by: PSYCHIATRY & NEUROLOGY

## 2019-01-04 PROCEDURE — 3008F BODY MASS INDEX DOCD: CPT | Mod: CPTII,S$GLB,, | Performed by: OTOLARYNGOLOGY

## 2019-01-04 RX ORDER — TRAZODONE HYDROCHLORIDE 100 MG/1
TABLET ORAL
Qty: 45 TABLET | Refills: 2 | Status: SHIPPED | OUTPATIENT
Start: 2019-01-04 | End: 2019-09-12 | Stop reason: SDUPTHER

## 2019-01-04 RX ORDER — LAMOTRIGINE 25 MG/1
TABLET ORAL
Qty: 84 TABLET | Refills: 0 | Status: SHIPPED | OUTPATIENT
Start: 2019-01-04 | End: 2019-03-12

## 2019-01-04 RX ORDER — LAMOTRIGINE 100 MG/1
100 TABLET ORAL DAILY
Qty: 30 TABLET | Refills: 1 | Status: SHIPPED | OUTPATIENT
Start: 2019-02-17 | End: 2019-03-19 | Stop reason: ALTCHOICE

## 2019-01-04 RX ORDER — DULOXETIN HYDROCHLORIDE 30 MG/1
CAPSULE, DELAYED RELEASE ORAL
Qty: 60 CAPSULE | Refills: 2 | Status: SHIPPED | OUTPATIENT
Start: 2019-01-04 | End: 2019-03-19 | Stop reason: ALTCHOICE

## 2019-01-04 RX ORDER — METHYLPREDNISOLONE 4 MG/1
TABLET ORAL
Qty: 1 PACKAGE | Refills: 0 | Status: SHIPPED | OUTPATIENT
Start: 2019-01-04 | End: 2019-03-12

## 2019-01-04 NOTE — PROGRESS NOTES
Subjective:   Patient: Shaina Olson [id number], :1980   Visit date:2019 10:47 AM    Chief Complaint:  Laryngitis (pt c/o loss of voice for 3 wks vocal cord nodules)    HPI:  Shaina is a 38 y.o. female who is here for follow-up.  Patient reports that she has had about 3 weeks of severe dysphonia.  This was accompanied by upper respiratory illness symptoms.  She reports that she has had a slow improvement where she had almost no voice at the beginning of this.  She has a history of vocal cord nodules and underwent speech therapy for this about 1 year ago.  She reports that her voice has never returned fully to her baseline but that she had a fairly good voice prior to the last 3 weeks.  Her symptoms of upper respiratory illness have resolved except for the persistent dysphonia.  She also has a history of Graves disease and is being treated by Endocrinology for this.  She is on thyroid suppression and has been fairly brittle in terms of her control of this.  Recently, she had a thyroid uptake scan in had to discontinue the medication for just a few days and this was accompanied by significant hyperthyroid symptoms and changes in her laboratory findings.  In the course of her workup for her hyperthyroidism, she had an ultrasound which demonstrated either ectopic thyroid tissue or a parathyroid adenoma.  She has had significant elevation of her parathyroid hormone levels but has also fairly severe vitamin-D deficiency.  Endocrinology is also managing these issues with vitamin-D supplementation.        Review of Systems:  -     Allergic/Immunologic: is allergic to demerol [meperidine]..  -     Constitutional: Current temp: 99.1 °F (37.3 °C) (Tympanic)    Her meds, allergies, medical, surgical, social & family histories were reviewed & updated:  -     She has a current medication list which includes the following prescription(s): albuterol, allergy mix, amlodipine, amlodipine, cholecalciferol (vitamin d3),  "cyclobenzaprine, duloxetine, fluticasone, l norgest/e.estradiol-e.estrad, lamotrigine, lamotrigine, latanoprost, levocetirizine, lorcaserin, methimazole, montelukast, pantoprazole, pregabalin, trazodone, methylprednisolone, and triamcinolone acetonide 0.1%.  -     She  has a past medical history of Allergy, Anxiety (2004), Arthritis, Arthritis of right knee, Graves' disease (6/2014), Hypertension (2004), and Vitamin D deficiency.   -     She does not have any pertinent problems on file.   -     She  has a past surgical history that includes Wrist fracture surgery (Right, 2013); TONSILLECTOMY, ADENOIDECTOMY (2001); Nasal septum surgery (2001); Fracture surgery (Right, 2013); Sinus surgery (2001); and Sinus surgery (11/22/2017).  -     She  reports that  has never smoked. she has never used smokeless tobacco. She reports that she drinks alcohol. She reports that she does not use drugs.  -     Her family history includes Cancer in her maternal grandfather, maternal grandmother, paternal grandfather, and paternal grandmother; Cancer (age of onset: 30) in her sister; Cataracts in her maternal grandfather; Diabetes in her paternal aunt and paternal grandmother; Diverticulosis in her mother; Hypertension in her mother and paternal aunt; Lupus in her mother; Stroke in her paternal grandmother.  -     She is allergic to demerol [meperidine].    Objective:     Physical Exam:  Vitals:  BP (!) 142/80 (BP Location: Right arm, Patient Position: Sitting)   Pulse 106   Temp 99.1 °F (37.3 °C) (Tympanic)   Ht 5' 4" (1.626 m)   Wt (!) 187.8 kg (414 lb 0.4 oz)   BMI 71.07 kg/m²   Appearance:  Well-developed, well-nourished.  Communication:  Severe dysphonia  Head & Face:  Normocephalic, atraumatic, no sinus tenderness, normal facial strength.  Eyes:  Extraocular motions intact.  Ears:  Otoscopy of external auditory canals and tympanic membranes was normal, clinical speech reception thresholds grossly intact, no mass/lesion of " auricle.  Nose:  No masses/lesions of external nose, nasal mucosa, septum, and turbinates were within normal limits.  Mouth:  No mass/lesion of lips, teeth, gums, hard/soft palate, tongue, tonsils, or oropharynx.  Neck & Lymphatics:  No cervical lymphadenopathy, no neck mass/crepitus/ asymmetry, trachea is midline, no thyroid enlargement/tenderness/mass.  Neuro/Psych: Alert with normal mood and affect.   Abdominal: Normal appearance.   Respiration/Chest:  Symmetric expansion during respiration, normal respiratory effort.  Skin:  Warm and intact  Cardiovascular:  No peripheral vascular edema or varicosities.    Assessment & Plan:   Shaina was seen today for laryngitis.    Diagnoses and all orders for this visit:    Dysphonia    Graves' disease    Hyperparathyroidism    Other orders  -     methylPREDNISolone (MEDROL DOSEPACK) 4 mg tablet; use as directed      Dysphonia- I do not appreciate any evidence of nodules on exam today.  I have recommended a course methylprednisolone as well as good hydration status.  She inquired as to whether the thyroid itself or the parathyroid could be contributing to her dysphonia.  At this point is very difficult to tell whether she has chronic vocal changes related to the thyroid.  I would not suspect any contribution from the parathyroid.    Hyperthyroidism- currently managed by Endocrinology with methimazole.  They have discussed radioactive iodine versus surgery. I will defer to Endocrinology as to which of these would be preferable.  We discussed that if she has surgery she has guarantee that she will need thyroid hormone replacement for the rest of her life where is there is a possibility with radioactive iodine of bringing her in to a euthyroid state.  However, surgery is much more difficult if she undergoes radioactive iodine and remains refractory or develops ocular symptoms.    Hyperparathyroidism- I think she almost certainly has primary hyperparathyroidism.  She does have  accompanying vitamin-D deficiency but her PTH levels are elevated beyond what would be expected for vitamin D associated elevations of the parathyroid hormone.  Ultimately, she will need further workup of this but I think that this should be delayed until she makes a decision with her endocrinologist is about the definitive management of her thyroid disease.  If she is planning to undergo radioactive iodine, I would recommend having parathyroidectomy performed prior to radioactive iodine as the radioactive iodine results and fairly significant fibrosis in surgical alterations making it more difficult.  However, if she is planning to have surgical management of this, I would recommend combining the parathyroidectomy with total thyroidectomy.  In either case, I would recommend a parathyroid scan prior to any surgical planning and the patient states that this has been discussed with her by her endocrinologist.

## 2019-01-07 ENCOUNTER — CLINICAL SUPPORT (OUTPATIENT)
Dept: OTOLARYNGOLOGY | Facility: CLINIC | Age: 39
End: 2019-01-07
Payer: COMMERCIAL

## 2019-01-07 DIAGNOSIS — J30.9 ALLERGIC RHINITIS, UNSPECIFIED SEASONALITY, UNSPECIFIED TRIGGER: Primary | ICD-10-CM

## 2019-01-07 PROCEDURE — 95117 PR IMMU2THERAPY, 2+ INJECTIONS: ICD-10-PCS | Mod: S$GLB,,, | Performed by: ORTHOPAEDIC SURGERY

## 2019-01-07 PROCEDURE — 99999 PR PBB SHADOW E&M-EST. PATIENT-LVL III: ICD-10-PCS | Mod: PBBFAC,,,

## 2019-01-07 PROCEDURE — 95117 IMMUNOTHERAPY INJECTIONS: CPT | Mod: S$GLB,,, | Performed by: ORTHOPAEDIC SURGERY

## 2019-01-07 PROCEDURE — 99999 PR PBB SHADOW E&M-EST. PATIENT-LVL III: CPT | Mod: PBBFAC,,,

## 2019-01-09 ENCOUNTER — PATIENT MESSAGE (OUTPATIENT)
Dept: PSYCHIATRY | Facility: CLINIC | Age: 39
End: 2019-01-09

## 2019-01-09 NOTE — PROGRESS NOTES
Past Medical History:   Diagnosis Date    Allergy      Anxiety 2004    Arthritis      Arthritis of right knee      Graves' disease 6/2014    Hypertension 2004    Vitamin D deficiency              Review of patient's allergies indicates:   Allergen Reactions    Demerol [meperidine] Other (See Comments)       blackout       Ordering Physician:  Dr. Wilkinson  Order Type:  Written Order  Initial SNOT-20 score:  SNOT-20 score:    Treatment Sets:  Mix #1 #[id number] Exp: 11/8/2019  Allergens: Molds, mites, cockroach, dog, cat, feathers  Mix #2 #[id number] Exp:11/8/2019 Allergens: Trees  Mix #3 #[id number] Exp: 11/8/2019 Allergens: Weeds & grasses    [facility]    At 0804 I administered 0.20ml intradermally, mix #1 (GREEN VIAL) and mix #2 (GREEN VIAL) in right arm, mix #3 (GREEN VIAL) in left arm.  Pt tolerated injections well.  No complaints of pain or discomfort.  Pt monitored for 20 minutes, after no minutes no reaction was noted.

## 2019-01-10 ENCOUNTER — PATIENT MESSAGE (OUTPATIENT)
Dept: OTOLARYNGOLOGY | Facility: CLINIC | Age: 39
End: 2019-01-10

## 2019-01-15 ENCOUNTER — PATIENT MESSAGE (OUTPATIENT)
Dept: OTOLARYNGOLOGY | Facility: CLINIC | Age: 39
End: 2019-01-15

## 2019-01-18 PROBLEM — F31.31 BIPOLAR AFFECTIVE DISORDER, CURRENTLY DEPRESSED, MILD: Status: ACTIVE | Noted: 2019-01-18

## 2019-01-21 ENCOUNTER — PATIENT MESSAGE (OUTPATIENT)
Dept: ENDOCRINOLOGY | Facility: CLINIC | Age: 39
End: 2019-01-21

## 2019-01-23 ENCOUNTER — OFFICE VISIT (OUTPATIENT)
Dept: PSYCHIATRY | Facility: CLINIC | Age: 39
End: 2019-01-23
Payer: COMMERCIAL

## 2019-01-23 ENCOUNTER — PATIENT MESSAGE (OUTPATIENT)
Dept: PULMONOLOGY | Facility: CLINIC | Age: 39
End: 2019-01-23

## 2019-01-23 ENCOUNTER — OFFICE VISIT (OUTPATIENT)
Dept: OTOLARYNGOLOGY | Facility: CLINIC | Age: 39
End: 2019-01-23
Payer: COMMERCIAL

## 2019-01-23 VITALS
DIASTOLIC BLOOD PRESSURE: 83 MMHG | BODY MASS INDEX: 68.76 KG/M2 | SYSTOLIC BLOOD PRESSURE: 142 MMHG | WEIGHT: 293 LBS | TEMPERATURE: 98 F

## 2019-01-23 DIAGNOSIS — F48.9 DEFERRED DIAGNOSIS ON AXIS I: Primary | ICD-10-CM

## 2019-01-23 DIAGNOSIS — J30.81 CHRONIC ALLERGIC RHINITIS DUE TO ANIMAL HAIR AND DANDER: ICD-10-CM

## 2019-01-23 DIAGNOSIS — J30.89 CHRONIC NONSEASONAL ALLERGIC RHINITIS DUE TO POLLEN: ICD-10-CM

## 2019-01-23 DIAGNOSIS — J30.89 ALLERGIC RHINITIS DUE TO DUST: ICD-10-CM

## 2019-01-23 DIAGNOSIS — J30.89 CHRONIC NONSEASONAL ALLERGIC RHINITIS DUE TO FUNGAL SPORES: ICD-10-CM

## 2019-01-23 DIAGNOSIS — J04.0 LARYNGITIS, ACUTE: ICD-10-CM

## 2019-01-23 DIAGNOSIS — R49.0 MUSCLE TENSION DYSPHONIA: Primary | ICD-10-CM

## 2019-01-23 PROCEDURE — 3079F DIAST BP 80-89 MM HG: CPT | Mod: CPTII,S$GLB,, | Performed by: ORTHOPAEDIC SURGERY

## 2019-01-23 PROCEDURE — 99214 PR OFFICE/OUTPT VISIT, EST, LEVL IV, 30-39 MIN: ICD-10-PCS | Mod: 25,S$GLB,, | Performed by: ORTHOPAEDIC SURGERY

## 2019-01-23 PROCEDURE — 99999 PR PBB SHADOW E&M-EST. PATIENT-LVL IV: ICD-10-PCS | Mod: PBBFAC,,, | Performed by: ORTHOPAEDIC SURGERY

## 2019-01-23 PROCEDURE — 90834 PSYTX W PT 45 MINUTES: CPT | Mod: S$GLB,,, | Performed by: SOCIAL WORKER

## 2019-01-23 PROCEDURE — 99214 OFFICE O/P EST MOD 30 MIN: CPT | Mod: 25,S$GLB,, | Performed by: ORTHOPAEDIC SURGERY

## 2019-01-23 PROCEDURE — 99999 PR PBB SHADOW E&M-EST. PATIENT-LVL IV: CPT | Mod: PBBFAC,,, | Performed by: ORTHOPAEDIC SURGERY

## 2019-01-23 PROCEDURE — 3008F PR BODY MASS INDEX (BMI) DOCUMENTED: ICD-10-PCS | Mod: CPTII,S$GLB,, | Performed by: ORTHOPAEDIC SURGERY

## 2019-01-23 PROCEDURE — 31575 PR LARYNGOSCOPY, FLEXIBLE; DIAGNOSTIC: ICD-10-PCS | Mod: S$GLB,,, | Performed by: ORTHOPAEDIC SURGERY

## 2019-01-23 PROCEDURE — 3079F PR MOST RECENT DIASTOLIC BLOOD PRESSURE 80-89 MM HG: ICD-10-PCS | Mod: CPTII,S$GLB,, | Performed by: ORTHOPAEDIC SURGERY

## 2019-01-23 PROCEDURE — 3077F PR MOST RECENT SYSTOLIC BLOOD PRESSURE >= 140 MM HG: ICD-10-PCS | Mod: CPTII,S$GLB,, | Performed by: ORTHOPAEDIC SURGERY

## 2019-01-23 PROCEDURE — 3008F BODY MASS INDEX DOCD: CPT | Mod: CPTII,S$GLB,, | Performed by: ORTHOPAEDIC SURGERY

## 2019-01-23 PROCEDURE — 31575 DIAGNOSTIC LARYNGOSCOPY: CPT | Mod: S$GLB,,, | Performed by: ORTHOPAEDIC SURGERY

## 2019-01-23 PROCEDURE — 90834 PR PSYCHOTHERAPY W/PATIENT, 45 MIN: ICD-10-PCS | Mod: S$GLB,,, | Performed by: SOCIAL WORKER

## 2019-01-23 PROCEDURE — 3077F SYST BP >= 140 MM HG: CPT | Mod: CPTII,S$GLB,, | Performed by: ORTHOPAEDIC SURGERY

## 2019-01-23 RX ORDER — AZITHROMYCIN 250 MG/1
TABLET, FILM COATED ORAL
Qty: 6 TABLET | Refills: 0 | Status: SHIPPED | OUTPATIENT
Start: 2019-01-23 | End: 2019-03-12

## 2019-01-23 NOTE — PROGRESS NOTES
Subjective:       Patient ID: Shaina Olson is a 39 y.o. female.    Chief Complaint: Other (horseness)    Patient is a very pleasant 39 year old female here to see me today in followup for evaluation of continued hoarseness.  She was seen by Dr. Rodriguez several weeks ago, and her scope at that time was consistent with laryngitis (no nodule) and she was given a course of oral steroids.  She says that her voice has been consistently hoarse and strained since Vijay.  She has been doing her vocal exercises.  She did have a few sessions of ST in 2016 that she found to be helpful.  She has a hard time making ST appointments due to scheduling.  She is following with Dr. Aviles for Grave's disease and now hyperparathyroidism.  She remains on SCIT, and has not had any side effects with regards to her injections.  She does find that her allergy symptoms have improved, and she is no longer clearing her throat or itching her throat as much.      Review of Systems   Constitutional: Negative for chills, fatigue, fever and unexpected weight change.   HENT: Positive for congestion and voice change. Negative for dental problem, ear discharge, ear pain, facial swelling, hearing loss, nosebleeds, postnasal drip, rhinorrhea, sinus pressure, sneezing, sore throat, tinnitus and trouble swallowing.    Eyes: Negative for redness, itching and visual disturbance.   Respiratory: Positive for cough. Negative for choking, shortness of breath and wheezing.    Cardiovascular: Negative for chest pain and palpitations.   Gastrointestinal: Negative for abdominal pain.        No reflux.   Musculoskeletal: Negative for gait problem.   Skin: Negative for rash.   Neurological: Negative for dizziness, light-headedness and headaches.       Objective:      Physical Exam   Constitutional: She is oriented to person, place, and time. She appears well-developed and well-nourished. No distress.   HENT:   Head: Normocephalic and atraumatic.   Right Ear:  Tympanic membrane, external ear and ear canal normal.   Left Ear: Tympanic membrane, external ear and ear canal normal.   Nose: Nose normal. No mucosal edema, rhinorrhea, nasal deformity or septal deviation. No epistaxis. Right sinus exhibits no maxillary sinus tenderness and no frontal sinus tenderness. Left sinus exhibits no maxillary sinus tenderness and no frontal sinus tenderness.   Mouth/Throat: Uvula is midline, oropharynx is clear and moist and mucous membranes are normal. Mucous membranes are not pale and not dry. No dental caries. No oropharyngeal exudate or posterior oropharyngeal erythema.   Voice is tight and strangled, interludes of more normal voice as well   Eyes: Conjunctivae, EOM and lids are normal. Pupils are equal, round, and reactive to light. Right eye exhibits no chemosis. Left eye exhibits no chemosis. Right conjunctiva is not injected. Left conjunctiva is not injected. No scleral icterus. Right eye exhibits normal extraocular motion and no nystagmus. Left eye exhibits normal extraocular motion and no nystagmus.   Neck: Trachea normal and phonation normal. No tracheal tenderness present. No tracheal deviation present. No thyroid mass and no thyromegaly present.   Cardiovascular: Intact distal pulses.   Pulmonary/Chest: Effort normal. No stridor. No respiratory distress.   Abdominal: She exhibits no distension.   Lymphadenopathy:        Head (right side): No submental, no submandibular, no preauricular, no posterior auricular and no occipital adenopathy present.        Head (left side): No submental, no submandibular, no preauricular, no posterior auricular and no occipital adenopathy present.     She has no cervical adenopathy.   Neurological: She is alert and oriented to person, place, and time. No cranial nerve deficit.   Skin: Skin is warm and dry. No rash noted. No erythema.   Psychiatric: She has a normal mood and affect. Her behavior is normal.       Due to indication in patient's  history, presentation or risk factors,  a fiber optic exam was performed.    SEPARATE PROCEDURE NOTE:    ANESTHESIA:  Topical xylocaine with hesham-synephrine  FINDINGS:  Diffuse erythema of the left vocal cord with edema, loss of AP diameter with phonation    PROCEDURE:  After verbal consent was obtained, the flexible scope was passed through the patient's nasal cavity without difficulty.  The nasopharynx (adenoid pad) and eustachian tube orifices were first visualized and were found to be normal, without masses or irregularity.  The posterior pharyngeal wall and base of tongue were then examined and no mass or irregular tissue was seen.  The scope was then advanced to the larynx, and the epiglottis, valleculae, and piriform sinuses were normal, without masses or mucosal irregularity.  The false vocal folds and true vocal folds were then examined and were found to have normal mobility (full abduction and adduction) and no masses or mucosal irregularity was seen.  She did have erythema and edema of the entire length of the left TVF.  She also had significant hyperfunction of the glottis, with loss of AP diameter on phonation.  The arytenoids and the interarytenoid area were normal.          Assessment:       1. Muscle tension dysphonia    2. Allergic rhinitis due to dust    3. Chronic allergic rhinitis due to animal hair and dander    4. Chronic nonseasonal allergic rhinitis due to fungal spores    5. Chronic nonseasonal allergic rhinitis due to pollen    6. Laryngitis, acute        Plan:       1.  MTD:  The larynx can be affected by abnormal hyperfunctional mechanisms and imbalance of tension in muscles involved in voice production leading to dysphonia.  It is unclear whether MTD represents a disorder primarily of incoordination (mis-timing) of muscles, or excessive muscle contraction. In either case, it can result in impaired vocal fold vibration and the sensation of extra effort when talking.  The most common  treatment for MTD is voice therapy.  I would recommend a course of speech therapy, and the patient is interested in pursuing at this time.  A referral will be sent to speech if the patient is interested.  2.  AR:  Continue with SCIT, she has noted improvement in her symptoms.  3. Acute laryngitis:  Rx for zithromax sent to her pharmacy.

## 2019-01-28 ENCOUNTER — CLINICAL SUPPORT (OUTPATIENT)
Dept: OTOLARYNGOLOGY | Facility: CLINIC | Age: 39
End: 2019-01-28
Payer: COMMERCIAL

## 2019-01-28 DIAGNOSIS — J30.81 CHRONIC ALLERGIC RHINITIS DUE TO ANIMAL HAIR AND DANDER: Primary | ICD-10-CM

## 2019-01-28 DIAGNOSIS — I10 HYPERTENSION GOAL BP (BLOOD PRESSURE) < 140/90: ICD-10-CM

## 2019-01-28 PROCEDURE — 95117 PR IMMU2THERAPY, 2+ INJECTIONS: ICD-10-PCS | Mod: S$GLB,,, | Performed by: ORTHOPAEDIC SURGERY

## 2019-01-28 PROCEDURE — 99999 PR PBB SHADOW E&M-EST. PATIENT-LVL III: ICD-10-PCS | Mod: PBBFAC,,,

## 2019-01-28 PROCEDURE — 95117 IMMUNOTHERAPY INJECTIONS: CPT | Mod: S$GLB,,, | Performed by: ORTHOPAEDIC SURGERY

## 2019-01-28 PROCEDURE — 99999 PR PBB SHADOW E&M-EST. PATIENT-LVL III: CPT | Mod: PBBFAC,,,

## 2019-01-28 RX ORDER — AMLODIPINE BESYLATE 5 MG/1
5 TABLET ORAL DAILY
Qty: 90 TABLET | Refills: 1 | Status: SHIPPED | OUTPATIENT
Start: 2019-01-28 | End: 2019-07-22 | Stop reason: SDUPTHER

## 2019-01-28 RX ORDER — METHIMAZOLE 10 MG/1
10 TABLET ORAL 2 TIMES DAILY
Qty: 60 TABLET | Refills: 3 | Status: SHIPPED | OUTPATIENT
Start: 2019-01-28 | End: 2019-05-26 | Stop reason: SDUPTHER

## 2019-01-28 NOTE — PROGRESS NOTES
Past Medical History:   Diagnosis Date    Allergy      Anxiety 2004    Arthritis      Arthritis of right knee      Graves' disease 6/2014    Hypertension 2004    Vitamin D deficiency              Review of patient's allergies indicates:   Allergen Reactions    Demerol [meperidine] Other (See Comments)       blackout       Ordering Physician:  Dr. Wilkinson  Order Type:  Written Order  Initial SNOT-20 score:  SNOT-20 score:    Treatment Sets:  Mix #1 #[id number] Exp: 11/8/2019  Allergens: Molds, mites, cockroach, dog, cat, feathers  Mix #2 #[id number] Exp:11/8/2019 Allergens: Trees  Mix #3 #[id number] Exp: 11/8/2019 Allergens: Weeds & grasses    [facility] by [facility]    At 0801 I administered 0.25ml intradermally, mix #1 (GREEN VIAL) and mix #2 (GREEN VIAL) in right arm, mix #3 (GREEN VIAL) in left arm.  Pt tolerated injections well.  No complaints of pain or discomfort.  Pt monitored for 20 minutes, after no minutes no reaction was noted.

## 2019-02-04 ENCOUNTER — CLINICAL SUPPORT (OUTPATIENT)
Dept: OTOLARYNGOLOGY | Facility: CLINIC | Age: 39
End: 2019-02-04
Payer: COMMERCIAL

## 2019-02-04 DIAGNOSIS — J30.9 ALLERGIC RHINITIS, UNSPECIFIED SEASONALITY, UNSPECIFIED TRIGGER: Primary | ICD-10-CM

## 2019-02-04 PROCEDURE — 99999 PR PBB SHADOW E&M-EST. PATIENT-LVL III: ICD-10-PCS | Mod: PBBFAC,,,

## 2019-02-04 PROCEDURE — 95117 PR IMMU2THERAPY, 2+ INJECTIONS: ICD-10-PCS | Mod: S$GLB,,, | Performed by: ORTHOPAEDIC SURGERY

## 2019-02-04 PROCEDURE — 99999 PR PBB SHADOW E&M-EST. PATIENT-LVL III: CPT | Mod: PBBFAC,,,

## 2019-02-04 PROCEDURE — 95117 IMMUNOTHERAPY INJECTIONS: CPT | Mod: S$GLB,,, | Performed by: ORTHOPAEDIC SURGERY

## 2019-02-05 NOTE — PROGRESS NOTES
Past Medical History:   Diagnosis Date    Allergy      Anxiety 2004    Arthritis      Arthritis of right knee      Graves' disease 6/2014    Hypertension 2004    Vitamin D deficiency              Review of patient's allergies indicates:   Allergen Reactions    Demerol [meperidine] Other (See Comments)       blackout       Ordering Physician:  Dr. Wilkinson  Order Type:  Written Order  Initial SNOT-20 score:  SNOT-20 score:    Treatment Sets:  Mix #1 #[id number] Exp: 11/8/2019  Allergens: Molds, mites, cockroach, dog, cat, feathers  Mix #2 #[id number] Exp:11/8/2019 Allergens: Trees  Mix #3 #[id number] Exp: 11/8/2019 Allergens: Weeds & grasses    [facility] by [facility]    At 0807 I administered 0.30ml subcutaneously, mix #1 (GREEN VIAL) and mix #2 (GREEN VIAL) in right arm, mix #3 (GREEN VIAL) in left arm.  Pt tolerated injections well.  No complaints of pain or discomfort.  Pt monitored for 20 minutes, after no minutes no reaction was noted.

## 2019-02-08 ENCOUNTER — CLINICAL SUPPORT (OUTPATIENT)
Dept: PULMONOLOGY | Facility: CLINIC | Age: 39
End: 2019-02-08
Payer: COMMERCIAL

## 2019-02-08 ENCOUNTER — OFFICE VISIT (OUTPATIENT)
Dept: PULMONOLOGY | Facility: CLINIC | Age: 39
End: 2019-02-08
Payer: COMMERCIAL

## 2019-02-08 ENCOUNTER — HOSPITAL ENCOUNTER (OUTPATIENT)
Dept: RADIOLOGY | Facility: HOSPITAL | Age: 39
Discharge: HOME | End: 2019-02-08
Attending: INTERNAL MEDICINE
Payer: COMMERCIAL

## 2019-02-08 VITALS
BODY MASS INDEX: 50.02 KG/M2 | SYSTOLIC BLOOD PRESSURE: 128 MMHG | OXYGEN SATURATION: 97 % | DIASTOLIC BLOOD PRESSURE: 80 MMHG | WEIGHT: 293 LBS | HEART RATE: 97 BPM | HEIGHT: 64 IN | RESPIRATION RATE: 12 BRPM

## 2019-02-08 DIAGNOSIS — Z87.09 HISTORY OF ASTHMA: ICD-10-CM

## 2019-02-08 DIAGNOSIS — G47.33 OSA ON CPAP: Chronic | ICD-10-CM

## 2019-02-08 DIAGNOSIS — J45.20 MILD INTERMITTENT ASTHMA WITHOUT COMPLICATION: Primary | ICD-10-CM

## 2019-02-08 DIAGNOSIS — E66.01 MORBID OBESITY WITH BMI OF 70 AND OVER, ADULT: ICD-10-CM

## 2019-02-08 DIAGNOSIS — Z87.09 HISTORY OF ASTHMA: Primary | ICD-10-CM

## 2019-02-08 LAB
BRPFT: NORMAL
FEF 25 75 CHG: 5 %
FEF 25 75 LLN: 1.62
FEF 25 75 POST REF: 84 %
FEF 25 75 PRE REF: 80 %
FEF 25 75 REF: 2.93
FET100 CHG: -12.4 %
FEV1 CHG: -1.8 %
FEV1 FVC CHG: 0.8 %
FEV1 FVC LLN: 73
FEV1 FVC POST REF: 95.9 %
FEV1 FVC PRE REF: 95.1 %
FEV1 FVC REF: 84
FEV1 LLN: 2.08
FEV1 POST REF: 87 %
FEV1 PRE REF: 88.6 %
FEV1 REF: 2.68
FEV6 CHG: -1.5 %
FEV6 LLN: 2.39
FEV6 POST REF: 92.3 %
FEV6 POST: 2.85 L (ref 2.39–3.79)
FEV6 PRE REF: 93.6 %
FEV6 PRE: 2.89 L (ref 2.39–3.79)
FEV6 REF: 3.09
FVC CHG: -2.6 %
FVC LLN: 2.52
FVC POST REF: 90.4 %
FVC PRE REF: 92.8 %
FVC REF: 3.21
PEF CHG: 20.2 %
PEF LLN: 4.71
PEF POST REF: 98.1 %
PEF PRE REF: 81.6 %
PEF REF: 6.73
POST FEF 25 75: 2.46 L/S (ref 1.62–4.24)
POST FET 100: 8.41 SEC
POST FEV1 FVC: 80.14 % (ref 72.69–94.39)
POST FEV1: 2.33 L (ref 2.08–3.27)
POST FVC: 2.9 L (ref 2.52–3.91)
POST PEF: 6.6 L/S (ref 4.71–8.74)
PRE FEF 25 75: 2.34 L/S (ref 1.62–4.24)
PRE FET 100: 9.6 SEC
PRE FEV1 FVC: 79.48 % (ref 72.69–94.39)
PRE FEV1: 2.37 L (ref 2.08–3.27)
PRE FVC: 2.98 L (ref 2.52–3.91)
PRE PEF: 5.49 L/S (ref 4.71–8.74)

## 2019-02-08 PROCEDURE — 3074F SYST BP LT 130 MM HG: CPT | Mod: CPTII,S$GLB,, | Performed by: INTERNAL MEDICINE

## 2019-02-08 PROCEDURE — 94060 PR EVAL OF BRONCHOSPASM: ICD-10-PCS | Mod: S$GLB,,, | Performed by: INTERNAL MEDICINE

## 2019-02-08 PROCEDURE — 99214 OFFICE O/P EST MOD 30 MIN: CPT | Mod: 25,S$GLB,, | Performed by: INTERNAL MEDICINE

## 2019-02-08 PROCEDURE — 99999 PR PBB SHADOW E&M-EST. PATIENT-LVL III: CPT | Mod: PBBFAC,,, | Performed by: INTERNAL MEDICINE

## 2019-02-08 PROCEDURE — 71046 XR CHEST PA AND LATERAL: ICD-10-PCS | Mod: 26,,, | Performed by: RADIOLOGY

## 2019-02-08 PROCEDURE — 3079F DIAST BP 80-89 MM HG: CPT | Mod: CPTII,S$GLB,, | Performed by: INTERNAL MEDICINE

## 2019-02-08 PROCEDURE — 3008F BODY MASS INDEX DOCD: CPT | Mod: CPTII,S$GLB,, | Performed by: INTERNAL MEDICINE

## 2019-02-08 PROCEDURE — 71046 X-RAY EXAM CHEST 2 VIEWS: CPT | Mod: 26,,, | Performed by: RADIOLOGY

## 2019-02-08 PROCEDURE — 3079F PR MOST RECENT DIASTOLIC BLOOD PRESSURE 80-89 MM HG: ICD-10-PCS | Mod: CPTII,S$GLB,, | Performed by: INTERNAL MEDICINE

## 2019-02-08 PROCEDURE — 3074F PR MOST RECENT SYSTOLIC BLOOD PRESSURE < 130 MM HG: ICD-10-PCS | Mod: CPTII,S$GLB,, | Performed by: INTERNAL MEDICINE

## 2019-02-08 PROCEDURE — 3008F PR BODY MASS INDEX (BMI) DOCUMENTED: ICD-10-PCS | Mod: CPTII,S$GLB,, | Performed by: INTERNAL MEDICINE

## 2019-02-08 PROCEDURE — 71046 X-RAY EXAM CHEST 2 VIEWS: CPT | Mod: TC

## 2019-02-08 PROCEDURE — 99999 PR PBB SHADOW E&M-EST. PATIENT-LVL III: ICD-10-PCS | Mod: PBBFAC,,, | Performed by: INTERNAL MEDICINE

## 2019-02-08 PROCEDURE — 94060 EVALUATION OF WHEEZING: CPT | Mod: S$GLB,,, | Performed by: INTERNAL MEDICINE

## 2019-02-08 PROCEDURE — 99214 PR OFFICE/OUTPT VISIT, EST, LEVL IV, 30-39 MIN: ICD-10-PCS | Mod: 25,S$GLB,, | Performed by: INTERNAL MEDICINE

## 2019-02-08 NOTE — PROGRESS NOTES
Subjective:       Shaina Olson is a 39 y.o. female   Follow-up visit, LV 01/15/2018  Asthma , BMI 70 and TREVOR on APAP  She has CPAP set at 4-20 cm water pressure.    Not used device recently  Worried about overcondesation  Encouraged to use; Raysal score 6  Surgical weight loss discussed and suggested  ACt score 22. No cough, No whezing , NO SOB, No exacebations  She has albuterol when necessary  We will do a spirometry when I see her next year  She will get refills if necessary       Previous Report(s) Reviewed: historical medical records and office notes     The following portions of the patient's history were reviewed and updated as appropriate:   She  has a past medical history of Allergy, Anxiety (2004), Arthritis, Arthritis of right knee, Graves' disease (6/2014), Hypertension (2004), and Vitamin D deficiency.  She does not have any pertinent problems on file.  She  has a past surgical history that includes Wrist fracture surgery (Right, 2013); TONSILLECTOMY, ADENOIDECTOMY (2001); Nasal septum surgery (2001); Fracture surgery (Right, 2013); Sinus surgery (2001); and Sinus surgery (11/22/2017).  Her family history includes Cancer in her maternal grandfather, maternal grandmother, paternal grandfather, and paternal grandmother; Cancer (age of onset: 30) in her sister; Cataracts in her maternal grandfather; Diabetes in her paternal aunt and paternal grandmother; Diverticulosis in her mother; Hypertension in her mother and paternal aunt; Lupus in her mother; Stroke in her paternal grandmother.  She  reports that  has never smoked. she has never used smokeless tobacco. She reports that she drinks alcohol. She reports that she does not use drugs.  She has a current medication list which includes the following prescription(s): allergy mix, amlodipine, amlodipine, cholecalciferol (vitamin d3), cyclobenzaprine, duloxetine, fluticasone, l norgest/e.estradiol-e.estrad, lamotrigine, lamotrigine, latanoprost,  levocetirizine, methimazole, montelukast, pantoprazole, pregabalin, trazodone, triamcinolone acetonide 0.1%, azithromycin, and methylprednisolone.  Current Outpatient Medications on File Prior to Visit   Medication Sig Dispense Refill    Allergy Mix New build-up SCIT - MQT from 03/2018 in notes 1 Package 6    amLODIPine (NORVASC) 2.5 MG tablet TAKE 1 TABLET BY MOUTH EVERY DAY WITH 5MG FOR A TOTAL OF 7.5 MG PER DAY 90 tablet 3    amLODIPine (NORVASC) 5 MG tablet Take 1 tablet (5 mg total) by mouth once daily. 90 tablet 1    cholecalciferol, vitamin D3, 50,000 unit capsule Take 1 capsule (50,000 Units total) by mouth once a week. 12 capsule 1    cyclobenzaprine (FLEXERIL) 10 MG tablet TAKE 1 TABLET BY MOUTH THREE TIMES A DAY AS NEEDED 30 tablet 0    DULoxetine (CYMBALTA) 30 MG capsule Take 1 tablet twice daily 60 capsule 2    fluticasone (FLONASE) 50 mcg/actuation nasal spray 2 sprays (100 mcg total) by Each Nare route once daily. 16 g 6    L norgest/e.estradiol-e.estrad (CAMRESE) 0.15 mg-30 mcg (84)/10 mcg (7) 3MPk Take 1 capsule by mouth once daily. 30 each 11    [START ON 2/17/2019] lamoTRIgine (LAMICTAL) 100 MG tablet Take 1 tablet (100 mg total) by mouth once daily. 30 tablet 1    lamoTRIgine (LAMICTAL) 25 MG tablet Take 1 tablet daily x 14 days then 2 tabs daily x 14 days then 3 tabs x 14 days then start new bottle. 84 tablet 0    latanoprost 0.005 % ophthalmic solution Place 1 drop into both eyes every evening. 2.5 mL 11    levocetirizine (XYZAL) 5 MG tablet Take 1 tablet (5 mg total) by mouth every evening. 90 tablet 1    methIMAzole (TAPAZOLE) 10 MG Tab Take 1 tablet (10 mg total) by mouth 2 (two) times daily. 60 tablet 3    montelukast (SINGULAIR) 10 mg tablet Take 1 tablet (10 mg total) by mouth once daily. 30 tablet 11    pantoprazole (PROTONIX) 40 MG tablet TAKE 1 TABLET EVERY DAY 30 tablet 10    pregabalin (LYRICA) 100 MG capsule Take 1 capsule (100 mg total) by mouth once daily. 90  "capsule 3    traZODone (DESYREL) 100 MG tablet Take 1/2 to 1 and 1/2 tablets at bedtime as needed for sleep. 45 tablet 2    triamcinolone acetonide 0.1% (KENALOG) 0.1 % cream Apply topically 2 (two) times daily. (Patient taking differently: Apply topically as needed. ) 80 g 0    azithromycin (Z-BARRY) 250 MG tablet Take as directed 6 tablet 0    methylPREDNISolone (MEDROL DOSEPACK) 4 mg tablet use as directed 1 Package 0     No current facility-administered medications on file prior to visit.      She is allergic to demerol [meperidine]..    Review of Systems  A comprehensive review of systems was negative.      Objective:      /80   Pulse 97   Resp 12   Ht 5' 4" (1.626 m)   Wt (!) 186.9 kg (412 lb)   SpO2 97%   BMI 70.72 kg/m²     General appearance: alert, appears stated age, cooperative, no distress and morbidly obese  Head: Normocephalic, without obvious abnormality  Eyes: negative findings: conjunctivae and sclerae normal  Lungs: clear to auscultation bilaterally and normal percussion bilaterally  Heart: regular rate and rhythm, S1, S2 normal, no murmur, click, rub or gallop  Extremities: extremities normal, atraumatic, no cyanosis or edema  Pulses: 2+ and symmetric  Skin: Skin color, texture, turgor normal. No rashes or lesions  Neurologic: Grossly normal      Download  12/6/2018 - 1/4/2019  YOB: 1980  Mask:  Compliance Summary  12/6/2018 - 1/4/2019 (30 days)  Days with Device Usage 1 day  Days without Device Usage 29 days  Percent Days with Device Usage 3.3%  Cumulative Usage 1 hrs. 53 mins. 19 secs.  Maximum Usage (1 Day) 1 hrs. 53 mins. 19 secs.  Average Usage (All Days) 3 mins. 46 secs.  Average Usage (Days Used) 1 hrs. 53 mins. 19 secs.  Minimum Usage (1 Day) 1 hrs. 53 mins. 19 secs.  Percent of Days with Usage >= 4 Hours 0.0%  Percent of Days with Usage < 4 Hours 100.0%  Date Range  Total Blower Time 1 hrs. 53 mins. 20 secs.  Average AHI 3.7  Auto-CPAP Summary  Auto-CPAP Mean " Pressure 5.5 cmH2O  Auto-CPAP Peak Average Pressure 5.5 cmH2O  Average Device Pressure <= 90% of Time 7.5 cmH2O  Average Time in Large Leak Per Day 0 secs.  1  FEV1: 2.37L ( 88.6%), FVC 2.98L( 92.8%)  FEV1/FVC 79    FEV1 improved from 2.21L to 2.37L  FVC improved from 2.82L to 2.98L  NORMAL SPIROMETRY  Assessment:      Problem List Items Addressed This Visit     TREVOR on CPAP (Chronic)    Morbid obesity with BMI of 70 and over, adult    Mild asthma - Primary    Relevant Orders    X-Ray Chest PA And Lateral    Spirometry with/without bronchodilator         Plan:     Consider bariatric surgery  Adherence with surgery  Sleep hygiene.  Continue asthma    Follow-up in about 1 year (around 2/8/2020), or CXR, [place].    This note was prepared using voice recognition system and is likely to have sound alike errors that may have been overlooked even after proof reading.  Please call me with any questions    Discussed diagnosis, its evaluation, treatment and usual course. All questions answered.    Thank you for the courtesy of participating in the care of this patient    Dakotah Aj MD

## 2019-02-11 ENCOUNTER — PATIENT MESSAGE (OUTPATIENT)
Dept: PSYCHIATRY | Facility: CLINIC | Age: 39
End: 2019-02-11

## 2019-02-11 ENCOUNTER — CLINICAL SUPPORT (OUTPATIENT)
Dept: OTOLARYNGOLOGY | Facility: CLINIC | Age: 39
End: 2019-02-11
Payer: COMMERCIAL

## 2019-02-11 DIAGNOSIS — J30.9 ALLERGIC RHINITIS, UNSPECIFIED SEASONALITY, UNSPECIFIED TRIGGER: Primary | ICD-10-CM

## 2019-02-11 PROCEDURE — 99999 PR PBB SHADOW E&M-EST. PATIENT-LVL III: CPT | Mod: PBBFAC,,,

## 2019-02-11 PROCEDURE — 95117 PR IMMU2THERAPY, 2+ INJECTIONS: ICD-10-PCS | Mod: S$GLB,,, | Performed by: ORTHOPAEDIC SURGERY

## 2019-02-11 PROCEDURE — 95117 IMMUNOTHERAPY INJECTIONS: CPT | Mod: S$GLB,,, | Performed by: ORTHOPAEDIC SURGERY

## 2019-02-11 PROCEDURE — 99999 PR PBB SHADOW E&M-EST. PATIENT-LVL III: ICD-10-PCS | Mod: PBBFAC,,,

## 2019-02-11 RX ORDER — CLONAZEPAM 0.5 MG/1
0.5 TABLET ORAL DAILY PRN
Qty: 30 TABLET | Refills: 1 | Status: SHIPPED | OUTPATIENT
Start: 2019-02-11 | End: 2019-09-12 | Stop reason: SDUPTHER

## 2019-02-11 NOTE — PROGRESS NOTES
Past Medical History:   Diagnosis Date    Allergy      Anxiety 2004    Arthritis      Arthritis of right knee      Graves' disease 6/2014    Hypertension 2004    Vitamin D deficiency              Review of patient's allergies indicates:   Allergen Reactions    Demerol [meperidine] Other (See Comments)       blackout       Ordering Physician:  Dr. Wilkinson  Order Type:  Written Order  Initial SNOT-20 score:  SNOT-20 score:    Treatment Sets:  Mix #1 #[id number] Exp: 11/8/2019  Allergens: Molds, mites, cockroach, dog, cat, feathers  Mix #2 #[id number] Exp:11/8/2019 Allergens: Trees  Mix #3 #[id number] Exp: 11/8/2019 Allergens: Weeds & grasses    [facility]    At 0821 I administered 0.35ml subcutaneously, mix #1 (GREEN VIAL) and mix #2 (GREEN VIAL) in right arm, mix #3 (GREEN VIAL) in left arm.  Pt tolerated injections well.  No complaints of pain or discomfort.  Pt monitored for 20 minutes, after no minutes no reaction was noted.

## 2019-02-14 ENCOUNTER — CLINICAL SUPPORT (OUTPATIENT)
Dept: SPEECH THERAPY | Facility: HOSPITAL | Age: 39
End: 2019-02-14
Payer: COMMERCIAL

## 2019-02-14 DIAGNOSIS — R49.0 MUSCLE TENSION DYSPHONIA: Primary | ICD-10-CM

## 2019-02-14 PROCEDURE — 92524 BEHAVRAL QUALIT ANALYS VOICE: CPT

## 2019-02-17 DIAGNOSIS — J30.9 ALLERGIC RHINITIS: ICD-10-CM

## 2019-02-18 ENCOUNTER — CLINICAL SUPPORT (OUTPATIENT)
Dept: OTOLARYNGOLOGY | Facility: CLINIC | Age: 39
End: 2019-02-18
Payer: COMMERCIAL

## 2019-02-18 DIAGNOSIS — J30.9 ALLERGIC RHINITIS, UNSPECIFIED SEASONALITY, UNSPECIFIED TRIGGER: Primary | ICD-10-CM

## 2019-02-18 PROCEDURE — 95117 PR IMMU2THERAPY, 2+ INJECTIONS: ICD-10-PCS | Mod: S$GLB,,, | Performed by: ORTHOPAEDIC SURGERY

## 2019-02-18 PROCEDURE — 99999 PR PBB SHADOW E&M-EST. PATIENT-LVL III: CPT | Mod: PBBFAC,,,

## 2019-02-18 PROCEDURE — 99999 PR PBB SHADOW E&M-EST. PATIENT-LVL III: ICD-10-PCS | Mod: PBBFAC,,,

## 2019-02-18 PROCEDURE — 95117 IMMUNOTHERAPY INJECTIONS: CPT | Mod: S$GLB,,, | Performed by: ORTHOPAEDIC SURGERY

## 2019-02-18 RX ORDER — LEVOCETIRIZINE DIHYDROCHLORIDE 5 MG/1
TABLET, FILM COATED ORAL
Qty: 90 TABLET | Refills: 1 | Status: SHIPPED | OUTPATIENT
Start: 2019-02-18 | End: 2019-08-20 | Stop reason: SDUPTHER

## 2019-02-21 ENCOUNTER — CLINICAL SUPPORT (OUTPATIENT)
Dept: SPEECH THERAPY | Facility: HOSPITAL | Age: 39
End: 2019-02-21
Attending: ORTHOPAEDIC SURGERY
Payer: COMMERCIAL

## 2019-02-21 DIAGNOSIS — R49.0 MUSCLE TENSION DYSPHONIA: Primary | ICD-10-CM

## 2019-02-21 PROCEDURE — 92507 TX SP LANG VOICE COMM INDIV: CPT

## 2019-02-21 NOTE — PROGRESS NOTES
"1 Hour Evaluation of Speech and Language    Reason for Referral: Shaina Olson was referred for a speech/language evaluation by Dr. Renée Wilkinson secondary to   1. Muscle tension dysphonia           Ms. Olson was unaccompanied and provided her own biographical and medical history. Ms. Olson described the problem as hoarse voice that goes in and out. Onset of symptoms was in December of last year (approx 3 months ago) when she had laryngitis. Pt reported that she was nearly aphonic for about 4 weeks over [place], she could only whisper or "squeak." Ms. Olson saw a ST back in 2016 after dx of a large left VF nodule mid-cord, again post laryngitis, at that time pt reported that she attended ST for 4-6 weeks and the nodule went away. Ms. Olson feels that her voice never completely recovered from that. Ms. Olson had chronic allergic rhinitis and reported that she takes allergy shots. She has a childhood hx of asthma.  Pt reported xerostomia and she cannot keep a water bottle at work. She presents with the following complaints:  voice worse in morning, fatigue with use, changes in modal pitch, difficulty with singing, tightness and reduced volume. The patient reports vocal use and dry mouth as exacerbating factors and vocal rest and warm drinks as alleviating factors.  Associated symptoms: coughing, throat clearing, heart burn and bad seasonal allergies/takes allergy shots. She has been dx with Grave's Disease.    ENT laryngeal exam findings per Dr. Wilkinson: MTD. She did have erythema and edema of the entire length of the left TVF.  She also had significant hyperfunction of the glottis, with loss of AP diameter on phonation.   Smoking history: Non-smoker  EtOH intake: Occassional   Caffeine intake: 2-3 cups/day   Water intake: 32-48 oz/day     Medical history includes:  Past Medical History:   Diagnosis Date    Allergy     Anxiety 2004    Arthritis     Arthritis of right knee     Graves' disease 6/2014    " Hypertension 2004    Vitamin D deficiency      Past Surgical History:   Procedure Laterality Date    FRACTURE SURGERY Right 2013    right wrist    NASAL SEPTUM SURGERY  2001    SINUS SURGERY  2001    SINUS SURGERY  11/22/2017    SINUS SURGERY FUNCTIONAL ENDOSCOPIC N/A 11/22/2017    Performed by Renée Wilkinson MD at [facility] [place]    TONSILLECTOMY, ADENOIDECTOMY  2001    WRIST FRACTURE SURGERY Right 2013    Right wrist       Social History: Ms. Olson lives at home independently. 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  Ms. Olson reports no smoking, occassional alcohol.    Family History   Problem Relation Age of Onset    Hypertension Mother     Lupus Mother     Diverticulosis Mother     Cancer Sister 30        stg 1 boderline? ov cancer    Cancer Maternal Grandmother     Cancer Maternal Grandfather     Cataracts Maternal Grandfather     Diabetes Paternal Grandmother     Stroke Paternal Grandmother     Cancer Paternal Grandmother     Cancer Paternal Grandfather     Diabetes Paternal Aunt     Hypertension Paternal Aunt     Thyroid disease Neg Hx     Migraines Neg Hx     Asthma Neg Hx        Hearing/Vision Status: WNL. Today, Ms. Olson readily responded to conversational speech in a quiet environment. No raghu visual deficits reported or noted.      Oral-Peripheral: An oral peripheral examination was completed. Dentition was WNL. Velar function was WNL. Lingual/labial strength, tone and ROM were WNL. No lingual deviation or fasciculation noted. Ms. Olson was able to protrude, retract, elevate, depress and lateralize the tongue as well as puff cheeks with air and smack lips together. Facial symmetry was noted.     Perceptually, the voice is characterized by hoarseness. Ms. Olson exhibited a normal DDK rate, repeating syllable combinations 8 times in 5 seconds (normal for an adult is 8 repetitions in 5 seconds).  Verbal fluency was WNL, voice was judged to be hoarse. Pt achieved maximum phonation time (MPT) of  "sustained "AHH" of 11.6 seconds (typical for an adult is between 15-25 seconds).      S/Z ratio (ratio of 1.4+ may indicate a degree of vocal fold dysfunction): 19 seconds/12 seconds = 1.58     Discussed importance of vocal hygiene including: hydration, conservation and reducing throat clearing, coughing, other phonotraumatic behaviors.     Swallowing: Ms. Olson reported to currently be tolerating a regular texture thin liquid consistency diet with no overt signs/symptoms of aspiration. She denied odynophagia.     Impressions: Ms. Olson presents with MTD as dx by Dr. Wilkinson. Prognosis with intervention is considered to be good.      Plan/Recommendations:   1. Recommend speech therapy at initial frequency of 1 time every 2 weeks for 50-60 minutes to address long term goals and short-term objectives described below.   2. Home program using techniques/strategies discussed in treatment sessions.  3. Patient to follow up with referring physician or PCP as needed or recommended.   4. Contact Speech Pathology with any further questions/concerns at [phone].    Long-term goal:  Ms. Olson will exhibit:  1. Improved vocal quality with fewer occurrences of vocal abuse and misuse.    Short-term objectives:  Ms. Olson will:  1. Engage in vocal exercises to reduce hyperfunction and reduce laryngeal focus (h words, easy onset, nasal focus) with 70% accuracy, min cues and models.  2. Engage in vocal hygiene education to address vocal misuse and abuse for improved self monitoring and carry over.  3. Engage in respiratory exercises for improved respiratory support for speech (dyiaphragmatic breathing, air flow coordination) with 70% accuracy, min cues and models.    Discussed results with Ms. Olson who verbalized agreement with treatment plan.      "

## 2019-02-22 ENCOUNTER — PATIENT MESSAGE (OUTPATIENT)
Dept: PSYCHIATRY | Facility: CLINIC | Age: 39
End: 2019-02-22

## 2019-02-25 ENCOUNTER — CLINICAL SUPPORT (OUTPATIENT)
Dept: OTOLARYNGOLOGY | Facility: CLINIC | Age: 39
End: 2019-02-25
Payer: COMMERCIAL

## 2019-02-25 DIAGNOSIS — J30.9 ALLERGIC RHINITIS, UNSPECIFIED SEASONALITY, UNSPECIFIED TRIGGER: Primary | ICD-10-CM

## 2019-02-25 PROCEDURE — 99999 PR PBB SHADOW E&M-EST. PATIENT-LVL III: CPT | Mod: PBBFAC,,,

## 2019-02-25 PROCEDURE — 95117 IMMUNOTHERAPY INJECTIONS: CPT | Mod: S$GLB,,, | Performed by: ORTHOPAEDIC SURGERY

## 2019-02-25 PROCEDURE — 95117 PR IMMU2THERAPY, 2+ INJECTIONS: ICD-10-PCS | Mod: S$GLB,,, | Performed by: ORTHOPAEDIC SURGERY

## 2019-02-25 PROCEDURE — 99999 PR PBB SHADOW E&M-EST. PATIENT-LVL III: ICD-10-PCS | Mod: PBBFAC,,,

## 2019-02-25 NOTE — PROGRESS NOTES
Past Medical History:   Diagnosis Date    Allergy      Anxiety 2004    Arthritis      Arthritis of right knee      Graves' disease 6/2014    Hypertension 2004    Vitamin D deficiency              Review of patient's allergies indicates:   Allergen Reactions    Demerol [meperidine] Other (See Comments)       blackout       Ordering Physician:  Dr. Wilkinson  Order Type:  Written Order  Initial SNOT-20 score:  SNOT-20 score:    Treatment Sets:  Mix #1 #[id number] Exp: 11/8/2019  Allergens: Molds, mites, cockroach, dog, cat, feathers  Mix #2 #[id number] Exp:11/8/2019 Allergens: Trees  Mix #3 #[id number] Exp: 11/8/2019 Allergens: Weeds & grasses    [facility] by [facility]    At 0815 I administered 0.40ml subcutaneously, mix #1 (GREEN VIAL) and mix #2 (GREEN VIAL) in right arm, mix #3 (GREEN VIAL) in left arm.  Pt tolerated injections well.  No complaints of pain or discomfort.  Pt monitored for 20 minutes, after no minutes no reaction was noted.

## 2019-02-25 NOTE — PROGRESS NOTES
Past Medical History:   Diagnosis Date    Allergy      Anxiety 2004    Arthritis      Arthritis of right knee      Graves' disease 6/2014    Hypertension 2004    Vitamin D deficiency              Review of patient's allergies indicates:   Allergen Reactions    Demerol [meperidine] Other (See Comments)       blackout       Ordering Physician:  Dr. Wilkinson  Order Type:  Written Order  Initial SNOT-20 score:  SNOT-20 score:    Treatment Sets:  Mix #1 #[id number] Exp: 11/8/2019  Allergens: Molds, mites, cockroach, dog, cat, feathers  Mix #2 #[id number] Exp:11/8/2019 Allergens: Trees  Mix #3 #[id number] Exp: 11/8/2019 Allergens: Weeds & grasses    [facility]    At 08:17 I administered 0.45ml subcutaneously, mix #1 (GREEN VIAL) and mix #2 (GREEN VIAL) in right arm, mix #3 (GREEN VIAL) in left arm.  Pt tolerated injections well.  No complaints of pain or discomfort.  Pt monitored for 20 minutes, after no minutes no reaction was noted.

## 2019-02-26 ENCOUNTER — PATIENT MESSAGE (OUTPATIENT)
Dept: PSYCHIATRY | Facility: CLINIC | Age: 39
End: 2019-02-26

## 2019-02-26 NOTE — PATIENT INSTRUCTIONS
Plan/Recommendations:   1. Recommend speech therapy at initial frequency of 1 time every 2 weeks for 50-60 minutes to address long term goals and short-term objectives described below.   2. Home program using techniques/strategies discussed in treatment sessions.  3. Patient to follow up with referring physician or PCP as needed or recommended.   4. Contact Speech Pathology with any further questions/concerns at [phone].

## 2019-02-26 NOTE — PROGRESS NOTES
1 Hour Treatment for Muscle Tension Dysphonia    Long-term goal:  Ms. Olson will exhibit:  1. Improved vocal quality with fewer occurrences of vocal abuse and misuse.    Short-term objectives:  Ms. Olson will:  1. Engage in vocal exercises to reduce hyperfunction and reduce laryngeal focus (h words, easy onset, nasal focus) with 70% accuracy, min cues and models. Pt very stimulable for improved vocal quality with humming/initial M words. After this exercise pt was able to achieve smoother vocal quality with word initial H and easy onset. Discussed w/ pt the importance of staying with smoother good vocal quality, hydration w/ exercises and abandoning exercises as needed. (No prior data)  2. Engage in vocal hygiene education to address vocal misuse and abuse for improved self monitoring and carry over. Pt educated on vocal hygiene specifically maintaining adequate hydration, reducing throat clearing, vocal rest, and abstaining from singing for the time being. (No prior data)  3. Engage in respiratory exercises for improved respiratory support for speech (dyiaphragmatic breathing, air flow coordination) with 70% accuracy, min cues and models. Pt trained on diaphragmatic breathing with mod cues and models, unable to carry over to other respiration exercises, achieved 3/6 with inhalation/exhalation practice. (no prior data)     Plan/Recommendations:   1. Recommend speech therapy at initial frequency of 1 time every 2 weeks for 50-60 minutes to address long term goals and short-term objectives described below.   2. Home program using techniques/strategies discussed in treatment sessions.  3. Patient to follow up with referring physician or PCP as needed or recommended.   4. Contact Speech Pathology with any further questions/concerns at [phone].    Discussed results with Ms. Olson who verbalized agreement with treatment plan.

## 2019-02-26 NOTE — PATIENT INSTRUCTIONS
Plan/Recommendations:   1. Recommend speech therapy at initial frequency of 1 time every 2 weeks for 50-60 minutes to address long term goals and short-term objectives described below.   2. Home program using techniques/strategies discussed in treatment sessions.  3. Patient to follow up with referring physician or PCP as needed or recommended.   4. Contact Speech Pathology with any further questions/concerns at [phone].    Long-term goal:  Ms. Olson will exhibit:  1. Improved vocal quality with fewer occurrences of vocal abuse and misuse.    Short-term objectives:  Ms. Olson will:  1. Engage in vocal exercises to reduce hyperfunction and reduce laryngeal focus (h words, easy onset, nasal focus) with 70% accuracy, min cues and models.  2. Engage in vocal hygiene education to address vocal misuse and abuse for improved self monitoring and carry over.  3. Engage in respiratory exercises for improved respiratory support for speech (dyiaphragmatic breathing, air flow coordination) with 70% accuracy, min cues and models.    Discussed results with Ms. Olson who verbalized agreement with treatment plan.

## 2019-02-27 ENCOUNTER — LAB VISIT (OUTPATIENT)
Dept: LAB | Facility: HOSPITAL | Age: 39
End: 2019-02-27
Attending: FAMILY MEDICINE
Payer: COMMERCIAL

## 2019-02-27 DIAGNOSIS — E05.90 HYPERTHYROIDISM: ICD-10-CM

## 2019-02-27 DIAGNOSIS — E55.9 VITAMIN D DEFICIENCY: ICD-10-CM

## 2019-02-27 DIAGNOSIS — E21.3 HYPERPARATHYROIDISM: ICD-10-CM

## 2019-02-27 LAB
25(OH)D3+25(OH)D2 SERPL-MCNC: 37 NG/ML
ANION GAP SERPL CALC-SCNC: 8 MMOL/L
BUN SERPL-MCNC: 12 MG/DL
CALCIUM SERPL-MCNC: 9.3 MG/DL
CHLORIDE SERPL-SCNC: 109 MMOL/L
CO2 SERPL-SCNC: 24 MMOL/L
CREAT SERPL-MCNC: 0.8 MG/DL
EST. GFR  (AFRICAN AMERICAN): >60 ML/MIN/1.73 M^2
EST. GFR  (NON AFRICAN AMERICAN): >60 ML/MIN/1.73 M^2
GLUCOSE SERPL-MCNC: 87 MG/DL
PHOSPHATE SERPL-MCNC: 2.8 MG/DL
POTASSIUM SERPL-SCNC: 4 MMOL/L
PTH-INTACT SERPL-MCNC: 131 PG/ML
SODIUM SERPL-SCNC: 141 MMOL/L
T3FREE SERPL-MCNC: 2.8 PG/ML
T4 FREE SERPL-MCNC: 0.86 NG/DL
TSH SERPL DL<=0.005 MIU/L-ACNC: 2.46 UIU/ML

## 2019-02-27 PROCEDURE — 84439 ASSAY OF FREE THYROXINE: CPT

## 2019-02-27 PROCEDURE — 80048 BASIC METABOLIC PNL TOTAL CA: CPT

## 2019-02-27 PROCEDURE — 84100 ASSAY OF PHOSPHORUS: CPT

## 2019-02-27 PROCEDURE — 84443 ASSAY THYROID STIM HORMONE: CPT

## 2019-02-27 PROCEDURE — 84481 FREE ASSAY (FT-3): CPT

## 2019-02-27 PROCEDURE — 36415 COLL VENOUS BLD VENIPUNCTURE: CPT | Mod: PO

## 2019-02-27 PROCEDURE — 83970 ASSAY OF PARATHORMONE: CPT

## 2019-02-27 PROCEDURE — 82306 VITAMIN D 25 HYDROXY: CPT

## 2019-02-27 RX ORDER — PREGABALIN 100 MG/1
CAPSULE ORAL
Qty: 30 CAPSULE | Refills: 0 | Status: SHIPPED | OUTPATIENT
Start: 2019-02-27 | End: 2019-04-15 | Stop reason: SDUPTHER

## 2019-03-07 ENCOUNTER — CLINICAL SUPPORT (OUTPATIENT)
Dept: SPEECH THERAPY | Facility: HOSPITAL | Age: 39
End: 2019-03-07
Attending: FAMILY MEDICINE
Payer: COMMERCIAL

## 2019-03-07 DIAGNOSIS — R49.0 MUSCLE TENSION DYSPHONIA: Primary | ICD-10-CM

## 2019-03-07 PROCEDURE — 92507 TX SP LANG VOICE COMM INDIV: CPT

## 2019-03-07 NOTE — PATIENT INSTRUCTIONS
Plan/Recommendations:   1. Recommend speech therapy at initial frequency of 1 time every 2 weeks for 50-60 minutes to address long term goals and short-term objectives described below.   2. Home program using techniques/strategies discussed in treatment sessions.  3. Patient to follow up with referring physician or PCP as needed or recommended.   4. Contact Speech Pathology with any further questions/concerns at [phone].

## 2019-03-07 NOTE — PROGRESS NOTES
1 Hour Treatment for Muscle Tension Dysphonia    Ms. Olson attended  today to address the above. Reported stressed, some home practice of voice exercises but not daily. Vocal quality noticeably less hoarse at onset of session (early am session) but quickly began to worsen with regular use. Able to achieve improved vocal quality quickly and easily with rec exercises. Good self-monitoring and awareness.    Long-term goal:  Ms. Olson will exhibit:  1. Improved vocal quality with fewer occurrences of vocal abuse and misuse.    Short-term objectives:  Ms. Olson will:  1. Engage in vocal exercises to reduce hyperfunction and reduce laryngeal focus (h words, easy onset, nasal focus) with 70% accuracy, min cues and models. Pt achieved 50% improved vocal quality with word initial /h/ as first exercise. Achieved closer to 75% improvement with word initial /m/. Attempted 2 syllable /h/ targets but abandoned as pt unable to achieve good enough vocal quality at this time. Provided with 2-syllable /m/ targets for home practice in addition to others (progress made)  2. Engage in vocal hygiene education to address vocal misuse and abuse for improved self monitoring and carry over. Discussed follow through w/ vocal hygiene. Pt reported difficulty conserving voice at work/having to project voice in lobby etc. Attempting to drink more water during work day, reported very thirsty in evening when returning from work. Discussed throat lozenges and throat clearing. (progress made)  3. Engage in respiratory exercises for improved respiratory support for speech (dyiaphragmatic breathing, air flow coordination) with 70% accuracy, min cues and models. Pt reviewed diaphragmatic breathing practice, pt able to perform with mod cue/models. Not yet performing in conjunction with extended inhale/exhale or speech (progress maintained)     Plan/Recommendations:   1. Recommend speech therapy at initial frequency of 1 time every 2 weeks for 50-60  minutes to address long term goals and short-term objectives described below.   2. Home program using techniques/strategies discussed in treatment sessions.  3. Patient to follow up with referring physician or PCP as needed or recommended.   4. Contact Speech Pathology with any further questions/concerns at [phone].    Discussed results with Ms. Olson who verbalized agreement with treatment plan.

## 2019-03-11 ENCOUNTER — CLINICAL SUPPORT (OUTPATIENT)
Dept: OTOLARYNGOLOGY | Facility: CLINIC | Age: 39
End: 2019-03-11
Payer: COMMERCIAL

## 2019-03-11 DIAGNOSIS — J30.9 ALLERGIC RHINITIS, UNSPECIFIED SEASONALITY, UNSPECIFIED TRIGGER: Primary | ICD-10-CM

## 2019-03-11 PROCEDURE — 95117 PR IMMU2THERAPY, 2+ INJECTIONS: ICD-10-PCS | Mod: S$GLB,,, | Performed by: ORTHOPAEDIC SURGERY

## 2019-03-11 PROCEDURE — 95117 IMMUNOTHERAPY INJECTIONS: CPT | Mod: S$GLB,,, | Performed by: ORTHOPAEDIC SURGERY

## 2019-03-11 PROCEDURE — 99999 PR PBB SHADOW E&M-EST. PATIENT-LVL III: ICD-10-PCS | Mod: PBBFAC,,,

## 2019-03-11 PROCEDURE — 99999 PR PBB SHADOW E&M-EST. PATIENT-LVL III: CPT | Mod: PBBFAC,,,

## 2019-03-11 NOTE — PROGRESS NOTES
Past Medical History:   Diagnosis Date    Allergy      Anxiety 2004    Arthritis      Arthritis of right knee      Graves' disease 6/2014    Hypertension 2004    Vitamin D deficiency              Review of patient's allergies indicates:   Allergen Reactions    Demerol [meperidine] Other (See Comments)       blackout       Ordering Physician:  Dr. Wilkinson  Order Type:  Written Order  Initial SNOT-20 score:  SNOT-20 score:    Treatment Sets:  Mix #1 #[id number] Exp: 11/8/2019  Allergens: Molds, mites, cockroach, dog, cat, feathers  Mix #2 #[id number] Exp:11/8/2019 Allergens: Trees  Mix #3 #[id number] Exp: 11/8/2019 Allergens: Weeds & grasses    Manufactured by [facility]    At 08:20 I administered 0.50ml subcutaneously, mix #1 (GREEN VIAL) and mix #2 (GREEN VIAL) in right arm, mix #3 (GREEN VIAL) in left arm.  Pt tolerated injections well.  No complaints of pain or discomfort.  Pt monitored for 20 minutes, after no minutes no reaction was noted.

## 2019-03-12 ENCOUNTER — OFFICE VISIT (OUTPATIENT)
Dept: ENDOCRINOLOGY | Facility: CLINIC | Age: 39
End: 2019-03-12
Payer: COMMERCIAL

## 2019-03-12 VITALS
BODY MASS INDEX: 50.02 KG/M2 | WEIGHT: 293 LBS | TEMPERATURE: 99 F | DIASTOLIC BLOOD PRESSURE: 84 MMHG | SYSTOLIC BLOOD PRESSURE: 136 MMHG | HEIGHT: 64 IN

## 2019-03-12 DIAGNOSIS — E55.9 VITAMIN D DEFICIENCY: ICD-10-CM

## 2019-03-12 DIAGNOSIS — E21.3 HYPERPARATHYROIDISM: ICD-10-CM

## 2019-03-12 DIAGNOSIS — E66.01 MORBID OBESITY WITH BMI OF 70 AND OVER, ADULT: ICD-10-CM

## 2019-03-12 DIAGNOSIS — E05.90 HYPERTHYROIDISM: Primary | ICD-10-CM

## 2019-03-12 DIAGNOSIS — E04.2 MULTIPLE THYROID NODULES: ICD-10-CM

## 2019-03-12 PROCEDURE — 99214 PR OFFICE/OUTPT VISIT, EST, LEVL IV, 30-39 MIN: ICD-10-PCS | Mod: S$GLB,,, | Performed by: INTERNAL MEDICINE

## 2019-03-12 PROCEDURE — 3079F DIAST BP 80-89 MM HG: CPT | Mod: CPTII,S$GLB,, | Performed by: INTERNAL MEDICINE

## 2019-03-12 PROCEDURE — 99214 OFFICE O/P EST MOD 30 MIN: CPT | Mod: S$GLB,,, | Performed by: INTERNAL MEDICINE

## 2019-03-12 PROCEDURE — 3008F BODY MASS INDEX DOCD: CPT | Mod: CPTII,S$GLB,, | Performed by: INTERNAL MEDICINE

## 2019-03-12 PROCEDURE — 3075F PR MOST RECENT SYSTOLIC BLOOD PRESS GE 130-139MM HG: ICD-10-PCS | Mod: CPTII,S$GLB,, | Performed by: INTERNAL MEDICINE

## 2019-03-12 PROCEDURE — 3079F PR MOST RECENT DIASTOLIC BLOOD PRESSURE 80-89 MM HG: ICD-10-PCS | Mod: CPTII,S$GLB,, | Performed by: INTERNAL MEDICINE

## 2019-03-12 PROCEDURE — 3075F SYST BP GE 130 - 139MM HG: CPT | Mod: CPTII,S$GLB,, | Performed by: INTERNAL MEDICINE

## 2019-03-12 PROCEDURE — 99999 PR PBB SHADOW E&M-EST. PATIENT-LVL IV: ICD-10-PCS | Mod: PBBFAC,,, | Performed by: INTERNAL MEDICINE

## 2019-03-12 PROCEDURE — 99999 PR PBB SHADOW E&M-EST. PATIENT-LVL IV: CPT | Mod: PBBFAC,,, | Performed by: INTERNAL MEDICINE

## 2019-03-12 PROCEDURE — 3008F PR BODY MASS INDEX (BMI) DOCUMENTED: ICD-10-PCS | Mod: CPTII,S$GLB,, | Performed by: INTERNAL MEDICINE

## 2019-03-12 RX ORDER — PEN NEEDLE, DIABETIC 30 GX3/16"
NEEDLE, DISPOSABLE MISCELLANEOUS
Qty: 100 EACH | Refills: 1 | Status: SHIPPED | OUTPATIENT
Start: 2019-03-12 | End: 2019-04-02

## 2019-03-12 NOTE — PATIENT INSTRUCTIONS
SAXENDA:  Liraglutide injection (Weight Management)  What is this medicine?  LIRAGLUTIDE (LIR a GLOO tide) is used with a reduced calorie diet and exercise to help you lose weight.  How should I use this medicine?  This medicine is for injection under the skin of your upper leg, stomach area, or upper arm. You will be taught how to prepare and give this medicine. Use exactly as directed. Take your medicine at regular intervals. Do not take it more often than directed.   It is important that you put your used needles and syringes in a special sharps container. Do not put them in a trash can. If you do not have a sharps container, call your pharmacist or healthcare provider to get one.   A special MedGuide will be given to you by the pharmacist with each prescription and refill. Be sure to read this information carefully each time.   Talk to your pediatrician regarding the use of this medicine in children. Special care may be needed.  What side effects may I notice from receiving this medicine?  Side effects that you should report to your doctor or health care professional as soon as possible:  · allergic reactions like skin rash, itching or hives, swelling of the face, lips, or tongue  · breathing problems  · fever, chills  · loss of appetite  · signs and symptoms of low blood sugar such as feeling anxious, confusion, dizziness, increased hunger, unusually weak or tired, sweating, shakiness, cold, irritable, headache, blurred vision, fast heartbeat, loss of consciousness  · trouble passing urine or change in the amount of urine  · unusual stomach pain or upset  · vomiting  Side effects that usually do not require medical attention (Report these to your doctor or health care professional if they continue or are bothersome.):  · constipation  · diarrhea  · fatigue  · headache  · nausea  What may interact with this medicine?  · acetaminophen  · atorvastatin  · birth control  pills  · digoxin  · griseofulvin  · lisinopril  What if I miss a dose?  If you miss a dose, take it as soon as you can. If it is almost time for your next dose, take only that dose. Do not take double or extra doses. If you miss your dose for 3 days or more, call your doctor or health care professional to talk about how to restart this medicine.  Where should I keep my medicine?  Keep out of the reach of children.  Store unopened pen in a refrigerator between 2 and 8 degrees C (36 and 46 degrees F). Do not freeze or use if the medicine has been frozen. Protect from light and excessive heat. After you first use the pen, it can be stored at room temperature between 15 and 30 degrees C (59 and 86 degrees F) or in a refrigerator. Throw away your used pen after 30 days or after the expiration date, whichever comes first.  Do not store your pen with the needle attached. If the needle is left on, medicine may leak from the pen.  What should I tell my health care provider before I take this medicine?  They need to know if you have any of these conditions:  · endocrine tumors (MEN 2) or if someone in your family had these tumors  · gallstones  · high cholesterol  · history of alcohol abuse problem  · history of pancreatitis  · kidney disease or if you are on dialysis  · liver disease  · previous swelling of the tongue, face, or lips with difficulty breathing, difficulty swallowing, hoarseness, or tightening of the throat  · stomach problems  · suicidal thoughts, plans, or attempt; a previous suicide attempt by you or a family member  · thyroid cancer or if someone in your family had thyroid cancer  · an unusual or allergic reaction to liraglutide, medicines, foods, dyes, or preservatives  · pregnant or trying to get pregnant  · breast-feeding  What should I watch for while using this medicine?  Visit your doctor or health care professional for regular checks on your progress. This medicine is intended to be used in addition  to a healthy diet and appropriate exercise. The best results are achieved this way. Do not increase or in any way change your dose without consulting your doctor or health care professional.  This medicine may affect blood sugar levels. If you have diabetes, check with your doctor or health care professional before you change your diet or the dose of your diabetic medicine.  Patients and their families should watch out for worsening depression or thoughts of suicide. Also watch out for sudden changes in feelings such as feeling anxious, agitated, panicky, irritable, hostile, aggressive, impulsive, severely restless, overly excited and hyperactive, or not being able to sleep. If this happens, especially at the beginning of treatment or after a change in dose, call your health care professional.  NOTE:This sheet is a summary. It may not cover all possible information. If you have questions about this medicine, talk to your doctor, pharmacist, or health care provider. Copyright© 2017 [employer]

## 2019-03-12 NOTE — PROGRESS NOTES
Patient ID: Shaina Olson is a 39 y.o. female.  Patient is here for follow up        Chief Complaint: Follow-up      HPI    Diagnosed: diagnosed in 2014 and started on antithyroid medications immediately due to severe symptoms; she shared a picture of how she looked initially and patient had significant stare and enlarged thyroid which both have improved over time on treatment    Previous radiology tests:  Thyroid ultrasound : Yes - status post benign FNA left sided thyroid nodule. Most recent ultrasound 11/2018 shows left thyroid nodule is smaller- only 8mm, stable subcentimeter right nodule.  There was again noted to be stable nodule projecting along the medial margin superior pole left lobe demonstrating isoechoic appearance compared to adjacent thyroid.  This is not in contact or connected to the thyroid.  This measures 2.2 x 0.8 x 1.4 cm compared to 2.2 x 0.8 x 1.3 cm previously.  Appearance is again suggestive of possible ectopic thyroid tissue versus parathyroid adenoma.    NM uptake and scan:  Yes:  Because of the findings on ultrasound done May 2018 showing possible ectopic thyroid tissue she had a thyroid uptake and scan to rule out any ectopic thyroid tissue but because she is on methimazole she held it for about 5 days prior and it did not show any ectopic tissue and it only showed increased uptake at 6 and 24 hr consistent with Graves disease    She had labs and her PTH level was elevated at 171 but her calcium is normal.  She does have a history though of low vitamin D and her vitamin-D level in April was 18-she used to take high-dose vitamin-D but had been off of this for several months and had not been consistently taking over-the-counter vitamin-D so I restarted high-dose vitamin-D at last visit  She reports that her mother had hyperparathyroidism and osteoporosis    Interval history: She deloped acute hyperthyroidism on labs done September 19, 2018 so I increased her methimazole to 10mg and  her labs have been normal since then. She denied increased iodine intake orally or IV from CT scan or otherwise and she was compliant with her medication. Her TSI also was elevated. Her prior  TSI antibody was negative December 2017    Previous thyroid surgery: No      Thyroid symptoms:denies fatigue, weight changes, heat/cold intolerance, bowel/skin changes or CVS symptoms    Patient has chronic hoarseness but has ALLERGIES and sinusitis and will be starting ALLERGY shots again.  Had sinus surgery last year.  Also takes protonix.  Her thyroid gland is enlarged also and she does have a vocal cord nodule but she reports that she was told the nodule has resolved and is wondering whether her thyroid which she feels overall is bigger than her initial diagnosis but is related to her hoarseness.  Her ENT may has arrange for speech therapy which she is currently undergoing               Thyroid medications: methimazole 10 mg daily      Takes medication appropriately: Yes  Her A1c was normal in the past.  She has joined weight [employer] on line.  She has not yet joined [employer].  She is contemplating gastric sleeve but still is difficult to come up with the financial co-pay that is require  Uses CPAP for obstructive sleep apnea    She is interested in weight loss medication- tried phentermine in past without success so I prescribed belviq last visit and this was too expensive for her    Had 2d ECHO 2016 that showed mild TR  Denies SOB or significant swelling  Patient asked about starting Victoza to help with weight.  She has noticed a darkening in her neck for long time but what is recent is some darkening of her skin in the right flank area    I have reviewed the past medical, family and social history    Review of Systems   Constitutional: Negative for appetite change, fatigue, fever and unexpected weight change.   HENT: Negative for sore throat and trouble swallowing.    Eyes: Negative for visual disturbance.    Respiratory: Negative for shortness of breath and wheezing.    Cardiovascular: Negative for chest pain, palpitations and leg swelling.   Gastrointestinal: Negative for diarrhea, nausea and vomiting.   Endocrine: Negative for cold intolerance, heat intolerance, polydipsia, polyphagia and polyuria.   Genitourinary: Negative for difficulty urinating, dysuria and menstrual problem.   Musculoskeletal: Negative for arthralgias and joint swelling.   Skin: Negative for rash.   Neurological: Negative for dizziness, weakness, numbness and headaches.   Psychiatric/Behavioral: Negative for confusion, dysphoric mood and sleep disturbance.       Objective:      Physical Exam   Constitutional: No distress.   Eyes: Conjunctivae are normal. Left eye exhibits no discharge. No scleral icterus.   Neck: No JVD present. No tracheal deviation present. Thyromegaly present.   Musculoskeletal: She exhibits no edema.   Lymphadenopathy:     She has no cervical adenopathy.   Neurological: She is alert.   Skin: Skin is warm and dry. She is not diaphoretic.   Hyperpigmentation in posterior neck area consistent with acanthosis and some hyperpigmentation in right flank area         Lab Review:   Lab Visit on 02/27/2019   Component Date Value    TSH 02/27/2019 2.456     Free T4 02/27/2019 0.86     T3, Free 02/27/2019 2.8     PTH, Intact 02/27/2019 131.0*    Phosphorus 02/27/2019 2.8     Vit D, 25-Hydroxy 02/27/2019 37     Sodium 02/27/2019 141     Potassium 02/27/2019 4.0     Chloride 02/27/2019 109     CO2 02/27/2019 24     Glucose 02/27/2019 87     BUN, Bld 02/27/2019 12     Creatinine 02/27/2019 0.8     Calcium 02/27/2019 9.3     Anion Gap 02/27/2019 8     eGFR if African American 02/27/2019 >60.0     eGFR if non  Amer* 02/27/2019 >60.0    Clinical Support on 02/08/2019   Component Date Value    Interpretation 02/08/2019                      Value:  Normal spirometry. (FEV1/VC greater than or equal to LLN and FVC  greater than or equal to LLN)  Normal airflow. (FEV1/VC greater than or equal to LLN)      Post FEV1 02/08/2019 2.33     Post FEV1 FVC 02/08/2019 80.14     Post FVC 02/08/2019 2.90     Post PEF 02/08/2019 6.60     FEV6 POST 02/08/2019 2.85     Post FEF 25 75 02/08/2019 2.46     Post  02/08/2019 8.41     Pre FEV1 02/08/2019 2.37     Pre FEV1 FVC 02/08/2019 79.48     Pre FVC 02/08/2019 2.98     Pre PEF 02/08/2019 5.49     FEV6 PRE 02/08/2019 2.89     Pre FEF 25 75 02/08/2019 2.34     Pre  02/08/2019 9.60     FVC Ref 02/08/2019 3.21     FVC LLN 02/08/2019 2.52     FVC Pre Ref 02/08/2019 92.8     FVC Post Ref 02/08/2019 90.4     FVC Chg 02/08/2019 -2.6     FEV1 Ref 02/08/2019 2.68     FEV1 LLN 02/08/2019 2.08     FEV1 Pre Ref 02/08/2019 88.6     FEV1 Post Ref 02/08/2019 87.0     FEV1 Chg 02/08/2019 -1.8     FEV1 FVC Ref 02/08/2019 84     FEV1 FVC LLN 02/08/2019 73     FEV1 FVC Pre Ref 02/08/2019 95.1     FEV1 FVC Post Ref 02/08/2019 95.9     FEV1 FVC Chg 02/08/2019 0.8     FEV6 Ref 02/08/2019 3.09     FEV6 LLN 02/08/2019 2.39     FEV6 Pre Ref 02/08/2019 93.6     FEV6 Post Ref 02/08/2019 92.3     FEV6 Chg 02/08/2019 -1.5     FEF 25 75 Ref 02/08/2019 2.93     FEF 25 75 LLN 02/08/2019 1.62     FEF 25 75 Pre Ref 02/08/2019 80.0     FEF 25 75 Post Ref 02/08/2019 84.0     FEF 25 75 Chg 02/08/2019 5.0     PEF Ref 02/08/2019 6.73     PEF LLN 02/08/2019 4.71     PEF Pre Ref 02/08/2019 81.6     PEF Post Ref 02/08/2019 98.1     PEF Chg 02/08/2019 20.2     [id number] Chg 02/08/2019 -12.4    Lab Visit on 11/12/2018   Component Date Value    TSH 11/12/2018 0.671     Free T4 11/12/2018 0.81     T3, Free 11/12/2018 2.3     PTH, Intact 11/12/2018 162.0*    Vit D, 25-Hydroxy 11/12/2018 15*    Phosphorus 11/12/2018 2.8     Sodium 11/12/2018 140     Potassium 11/12/2018 3.8     Chloride 11/12/2018 105     CO2 11/12/2018 29     Glucose 11/12/2018 82     BUN, Bld 11/12/2018  10     Creatinine 11/12/2018 0.8     Calcium 11/12/2018 9.2     Anion Gap 11/12/2018 6*    eGFR if African American 11/12/2018 >60.0     eGFR if non African Amer* 11/12/2018 >60.0    Office Visit on 10/25/2018   Component Date Value    Chlamydia, Amplified DNA 10/25/2018 Not Detected     N gonorrhoeae, amplified* 10/25/2018 Not Detected    Lab Visit on 10/22/2018   Component Date Value    T3, Free 10/22/2018 2.4     Free T4 10/22/2018 0.94     TSH 10/22/2018 <0.010*    RPR 10/22/2018 Non-reactive     HIV 1/2 Ag/Ab 10/22/2018 Negative     Hepatitis B Surface Ag 10/22/2018 Negative    Lab Visit on 09/19/2018   Component Date Value    T3, Free 09/19/2018 5.4*    Free T4 09/19/2018 2.29*    TSH 09/19/2018 <0.010*    Thyroid-Stim IG [facility]* 09/19/2018 6.16*       Assessment:     1. Hyperthyroidism  TSH    T4, free    T3, free    CBC auto differential    Hepatic function panel    Continue methimazole   2. Multiple thyroid nodules  TSH    T4, free    T3, free    Stable   3. Vitamin D deficiency  PTH, intact    Phosphorus    Vitamin D    Albumin    Alkaline phosphatase    Continue high-dose vitamin-D weekly.  I will consider bone density in about a year as vitamin-D deficiency and can lead to some thinning of her bones   4. Hyperparathyroidism  PTH, intact    Phosphorus    Vitamin D    Albumin    Alkaline phosphatase    Her PTH is still high though improved in spite of adequate vitamin D now also she may have primary hyperparathyroidism but with normal calcium.  Observe   5. Morbid obesity with BMI of 70 and over, adult      Will try for saxenda-discussed side effects such as nausea vomiting and pancreatitis.  No contraindications      Plan:   Hyperthyroidism  Comments:  Continue methimazole  Orders:  -     TSH; Future; Expected date: 06/25/2019  -     T4, free; Future; Expected date: 06/25/2019  -     T3, free; Future; Expected date: 06/25/2019  -     CBC auto differential; Future; Expected date:  "06/25/2019  -     Hepatic function panel; Future; Expected date: 06/25/2019    Multiple thyroid nodules  Comments:  Stable  Orders:  -     TSH; Future; Expected date: 06/25/2019  -     T4, free; Future; Expected date: 06/25/2019  -     T3, free; Future; Expected date: 06/25/2019    Vitamin D deficiency  Comments:  Continue high-dose vitamin-D weekly.  I will consider bone density in about a year as vitamin-D deficiency and can lead to some thinning of her bones  Orders:  -     PTH, intact; Future; Expected date: 06/25/2019  -     Phosphorus; Future; Expected date: 06/25/2019  -     Vitamin D; Future; Expected date: 06/25/2019  -     Albumin; Future; Expected date: 06/25/2019  -     Alkaline phosphatase; Future; Expected date: 06/25/2019    Hyperparathyroidism  Comments:  Her PTH is still high though improved in spite of adequate vitamin D now also she may have primary hyperparathyroidism but with normal calcium.  Observe  Orders:  -     PTH, intact; Future; Expected date: 06/25/2019  -     Phosphorus; Future; Expected date: 06/25/2019  -     Vitamin D; Future; Expected date: 06/25/2019  -     Albumin; Future; Expected date: 06/25/2019  -     Alkaline phosphatase; Future; Expected date: 06/25/2019    Morbid obesity with BMI of 70 and over, adult  Comments:  Will try for saxenda-discussed side effects such as nausea vomiting and pancreatitis.  No contraindications    Other orders  -     liraglutide (SAXENDA) 3 mg/0.5 mL (18 mg/3 mL) PnIj; Week 1 inject 0.6mg SC, week 2 inject 1.2mg SC, week 3 inject 1.8mg SC, week 4 inject 2.4mg SC, and week 5 inject 3 mg SC  as tolerated  Dispense: 15 mL; Refill: 1  -     pen needle, diabetic 31 gauge x 5/16" Ndle; Use once daily with  saxenda pen  Dispense: 100 each; Refill: 1          Follow-up in about 4 months (around 7/12/2019).    Labs prior to appointment? yes     Disclaimer:  This note may have been partially prepared using voice recognition software and  it may have not been " extensively proofed, as such there could be errors within the text such as sound alike errors.

## 2019-03-13 ENCOUNTER — HOSPITAL ENCOUNTER (OUTPATIENT)
Dept: RADIOLOGY | Facility: HOSPITAL | Age: 39
Discharge: HOME | End: 2019-03-13
Attending: FAMILY MEDICINE
Payer: COMMERCIAL

## 2019-03-13 ENCOUNTER — TELEPHONE (OUTPATIENT)
Dept: FAMILY MEDICINE | Facility: CLINIC | Age: 39
End: 2019-03-13

## 2019-03-13 DIAGNOSIS — M25.531 WRIST PAIN, RIGHT: Primary | ICD-10-CM

## 2019-03-13 DIAGNOSIS — M25.531 WRIST PAIN, RIGHT: ICD-10-CM

## 2019-03-13 PROCEDURE — 73110 XR WRIST COMPLETE 3 VIEWS RIGHT: ICD-10-PCS | Mod: 26,RT,, | Performed by: RADIOLOGY

## 2019-03-13 PROCEDURE — 73110 X-RAY EXAM OF WRIST: CPT | Mod: TC,PO,RT

## 2019-03-13 PROCEDURE — 73110 X-RAY EXAM OF WRIST: CPT | Mod: 26,RT,, | Performed by: RADIOLOGY

## 2019-03-18 ENCOUNTER — CLINICAL SUPPORT (OUTPATIENT)
Dept: OTOLARYNGOLOGY | Facility: CLINIC | Age: 39
End: 2019-03-18
Payer: COMMERCIAL

## 2019-03-18 DIAGNOSIS — J30.9 ALLERGIC RHINITIS, UNSPECIFIED SEASONALITY, UNSPECIFIED TRIGGER: Primary | ICD-10-CM

## 2019-03-18 PROCEDURE — 95117 IMMUNOTHERAPY INJECTIONS: CPT | Mod: S$GLB,,, | Performed by: ORTHOPAEDIC SURGERY

## 2019-03-18 PROCEDURE — 99999 PR PBB SHADOW E&M-EST. PATIENT-LVL III: CPT | Mod: PBBFAC,,,

## 2019-03-18 PROCEDURE — 95117 PR IMMU2THERAPY, 2+ INJECTIONS: ICD-10-PCS | Mod: S$GLB,,, | Performed by: ORTHOPAEDIC SURGERY

## 2019-03-18 PROCEDURE — 99999 PR PBB SHADOW E&M-EST. PATIENT-LVL III: ICD-10-PCS | Mod: PBBFAC,,,

## 2019-03-18 NOTE — PROGRESS NOTES
Past Medical History:   Diagnosis Date    Allergy      Anxiety 2004    Arthritis      Arthritis of right knee      Graves' disease 6/2014    Hypertension 2004    Vitamin D deficiency              Review of patient's allergies indicates:   Allergen Reactions    Demerol [meperidine] Other (See Comments)       blackout       Ordering Physician:  Dr. Wilkinson  Order Type:  Written Order  Initial SNOT-20 score:  SNOT-20 score:    Treatment Sets:  Mix #1 #999232 Exp: 11/8/2019  Allergens: Molds, mites, cockroach, dog, cat, feathers  Mix #2 #[id number] Exp:11/8/2019 Allergens: Trees  Mix #3 #[id number] Exp: 11/8/2019 Allergens: Weeds & grasses    Manufactured by [facility]    At 08:05 I administered 0.05ml subcutaneously, mix #1 (BLUE VIAL) and mix #2 (BLUE VIAL) in right arm, mix #3 (BLUE VIAL) in left arm.  Pt tolerated injections well.  No complaints of pain or discomfort.  Pt monitored for 20 minutes, after no minutes no reaction was noted.

## 2019-03-19 ENCOUNTER — OFFICE VISIT (OUTPATIENT)
Dept: PSYCHIATRY | Facility: CLINIC | Age: 39
End: 2019-03-19
Payer: COMMERCIAL

## 2019-03-19 VITALS
SYSTOLIC BLOOD PRESSURE: 154 MMHG | BODY MASS INDEX: 71.45 KG/M2 | HEART RATE: 101 BPM | WEIGHT: 293 LBS | DIASTOLIC BLOOD PRESSURE: 98 MMHG

## 2019-03-19 DIAGNOSIS — F41.1 GAD (GENERALIZED ANXIETY DISORDER): ICD-10-CM

## 2019-03-19 DIAGNOSIS — F31.32 BIPOLAR AFFECTIVE DISORDER, CURRENTLY DEPRESSED, MODERATE: Primary | ICD-10-CM

## 2019-03-19 PROCEDURE — 3077F SYST BP >= 140 MM HG: CPT | Mod: CPTII,S$GLB,, | Performed by: PSYCHIATRY & NEUROLOGY

## 2019-03-19 PROCEDURE — 3080F DIAST BP >= 90 MM HG: CPT | Mod: CPTII,S$GLB,, | Performed by: PSYCHIATRY & NEUROLOGY

## 2019-03-19 PROCEDURE — 99999 PR PBB SHADOW E&M-EST. PATIENT-LVL II: CPT | Mod: PBBFAC,,, | Performed by: PSYCHIATRY & NEUROLOGY

## 2019-03-19 PROCEDURE — 3077F PR MOST RECENT SYSTOLIC BLOOD PRESSURE >= 140 MM HG: ICD-10-PCS | Mod: CPTII,S$GLB,, | Performed by: PSYCHIATRY & NEUROLOGY

## 2019-03-19 PROCEDURE — 99214 OFFICE O/P EST MOD 30 MIN: CPT | Mod: S$GLB,,, | Performed by: PSYCHIATRY & NEUROLOGY

## 2019-03-19 PROCEDURE — 99999 PR PBB SHADOW E&M-EST. PATIENT-LVL II: ICD-10-PCS | Mod: PBBFAC,,, | Performed by: PSYCHIATRY & NEUROLOGY

## 2019-03-19 PROCEDURE — 3080F PR MOST RECENT DIASTOLIC BLOOD PRESSURE >= 90 MM HG: ICD-10-PCS | Mod: CPTII,S$GLB,, | Performed by: PSYCHIATRY & NEUROLOGY

## 2019-03-19 PROCEDURE — 3008F BODY MASS INDEX DOCD: CPT | Mod: CPTII,S$GLB,, | Performed by: PSYCHIATRY & NEUROLOGY

## 2019-03-19 PROCEDURE — 99214 PR OFFICE/OUTPT VISIT, EST, LEVL IV, 30-39 MIN: ICD-10-PCS | Mod: S$GLB,,, | Performed by: PSYCHIATRY & NEUROLOGY

## 2019-03-19 PROCEDURE — 3008F PR BODY MASS INDEX (BMI) DOCUMENTED: ICD-10-PCS | Mod: CPTII,S$GLB,, | Performed by: PSYCHIATRY & NEUROLOGY

## 2019-03-19 RX ORDER — ESCITALOPRAM OXALATE 10 MG/1
10 TABLET ORAL DAILY
Qty: 30 TABLET | Refills: 2 | Status: SHIPPED | OUTPATIENT
Start: 2019-03-19 | End: 2019-04-12 | Stop reason: SDUPTHER

## 2019-03-19 RX ORDER — OXCARBAZEPINE 150 MG/1
TABLET, FILM COATED ORAL
Qty: 120 TABLET | Refills: 2 | Status: SHIPPED | OUTPATIENT
Start: 2019-03-19 | End: 2019-05-15 | Stop reason: SDUPTHER
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
## 2019-03-25 ENCOUNTER — CLINICAL SUPPORT (OUTPATIENT)
Dept: OTOLARYNGOLOGY | Facility: CLINIC | Age: 39
End: 2019-03-25
Payer: COMMERCIAL

## 2019-03-25 DIAGNOSIS — J30.9 ALLERGIC RHINITIS, UNSPECIFIED SEASONALITY, UNSPECIFIED TRIGGER: Primary | ICD-10-CM

## 2019-03-25 PROCEDURE — 95117 PR IMMU2THERAPY, 2+ INJECTIONS: ICD-10-PCS | Mod: S$GLB,,, | Performed by: ORTHOPAEDIC SURGERY

## 2019-03-25 PROCEDURE — 95117 IMMUNOTHERAPY INJECTIONS: CPT | Mod: S$GLB,,, | Performed by: ORTHOPAEDIC SURGERY

## 2019-03-25 PROCEDURE — 99999 PR PBB SHADOW E&M-EST. PATIENT-LVL III: ICD-10-PCS | Mod: PBBFAC,,,

## 2019-03-25 PROCEDURE — 99999 PR PBB SHADOW E&M-EST. PATIENT-LVL III: CPT | Mod: PBBFAC,,,

## 2019-03-28 PROBLEM — F31.32 BIPOLAR AFFECTIVE DISORDER, CURRENTLY DEPRESSED, MODERATE: Status: ACTIVE | Noted: 2019-01-18

## 2019-03-30 ENCOUNTER — PATIENT MESSAGE (OUTPATIENT)
Dept: PULMONOLOGY | Facility: HOSPITAL | Age: 39
End: 2019-03-30

## 2019-04-01 ENCOUNTER — CLINICAL SUPPORT (OUTPATIENT)
Dept: OTOLARYNGOLOGY | Facility: CLINIC | Age: 39
End: 2019-04-01
Payer: COMMERCIAL

## 2019-04-01 DIAGNOSIS — J30.9 ALLERGIC RHINITIS, UNSPECIFIED SEASONALITY, UNSPECIFIED TRIGGER: Primary | ICD-10-CM

## 2019-04-01 PROCEDURE — 95117 IMMUNOTHERAPY INJECTIONS: CPT | Mod: S$GLB,,, | Performed by: ORTHOPAEDIC SURGERY

## 2019-04-01 PROCEDURE — 99999 PR PBB SHADOW E&M-EST. PATIENT-LVL III: ICD-10-PCS | Mod: PBBFAC,,,

## 2019-04-01 PROCEDURE — 95117 PR IMMU2THERAPY, 2+ INJECTIONS: ICD-10-PCS | Mod: S$GLB,,, | Performed by: ORTHOPAEDIC SURGERY

## 2019-04-01 PROCEDURE — 99999 PR PBB SHADOW E&M-EST. PATIENT-LVL III: CPT | Mod: PBBFAC,,,

## 2019-04-01 NOTE — PROGRESS NOTES
Past Medical History:   Diagnosis Date    Allergy      Anxiety 2004    Arthritis      Arthritis of right knee      Graves' disease 6/2014    Hypertension 2004    Vitamin D deficiency              Review of patient's allergies indicates:   Allergen Reactions    Demerol [meperidine] Other (See Comments)       blackout       Ordering Physician:  Dr. Wilkinson  Order Type:  Written Order  Initial SNOT-20 score:  SNOT-20 score:    Treatment Sets:  Mix #1 #[id number] Exp: 11/8/2019  Allergens: Molds, mites, cockroach, dog, cat, feathers  Mix #2 #[id number] Exp:11/8/2019 Allergens: Trees  Mix #3 #[id number] Exp: 11/8/2019 Allergens: Weeds & grasses    [facility]ufact[facility] by [facility]    At 08:00 I administered 0.15ml subcutaneously, mix #1 (BLUE VIAL) and mix #2 (BLUE VIAL) in right arm, mix #3 (BLUE VIAL) in left arm.  Pt tolerated injections well.  No complaints of pain or discomfort.  Pt monitored for 20 minutes, after no minutes no reaction was noted.

## 2019-04-01 NOTE — PROGRESS NOTES
Past Medical History:   Diagnosis Date    Allergy      Anxiety 2004    Arthritis      Arthritis of right knee      Graves' disease 6/2014    Hypertension 2004    Vitamin D deficiency              Review of patient's allergies indicates:   Allergen Reactions    Demerol [meperidine] Other (See Comments)       blackout       Ordering Physician:  Dr. Wilkinson  Order Type:  Written Order  Initial SNOT-20 score:  SNOT-20 score:    Treatment Sets:  Mix #1 #[id number] Exp: 11/8/2019  Allergens: Molds, mites, cockroach, dog, cat, feathers  Mix #2 #[id number] Exp:11/8/2019 Allergens: Trees  Mix #3 #[id number] Exp: 11/8/2019 Allergens: Weeds & grasses    [facility]    At 08:00 I administered 0.10ml subcutaneously, mix #1 (BLUE VIAL) and mix #2 (BLUE VIAL) in right arm, mix #3 (BLUE VIAL) in left arm.  Pt tolerated injections well.  No complaints of pain or discomfort.  Pt monitored for 20 minutes, after no minutes no reaction was noted.

## 2019-04-02 ENCOUNTER — OFFICE VISIT (OUTPATIENT)
Dept: FAMILY MEDICINE | Facility: CLINIC | Age: 39
End: 2019-04-02
Attending: FAMILY MEDICINE
Payer: COMMERCIAL

## 2019-04-02 VITALS
HEART RATE: 99 BPM | DIASTOLIC BLOOD PRESSURE: 82 MMHG | WEIGHT: 293 LBS | TEMPERATURE: 100 F | OXYGEN SATURATION: 99 % | HEIGHT: 64 IN | SYSTOLIC BLOOD PRESSURE: 132 MMHG | BODY MASS INDEX: 50.02 KG/M2

## 2019-04-02 DIAGNOSIS — F31.71 BIPOLAR DISORDER, IN PARTIAL REMISSION, MOST RECENT EPISODE HYPOMANIC: ICD-10-CM

## 2019-04-02 DIAGNOSIS — F41.1 GAD (GENERALIZED ANXIETY DISORDER): Primary | ICD-10-CM

## 2019-04-02 PROCEDURE — 99214 PR OFFICE/OUTPT VISIT, EST, LEVL IV, 30-39 MIN: ICD-10-PCS | Mod: S$GLB,,, | Performed by: FAMILY MEDICINE

## 2019-04-02 PROCEDURE — 3075F PR MOST RECENT SYSTOLIC BLOOD PRESS GE 130-139MM HG: ICD-10-PCS | Mod: CPTII,S$GLB,, | Performed by: FAMILY MEDICINE

## 2019-04-02 PROCEDURE — 3008F BODY MASS INDEX DOCD: CPT | Mod: CPTII,S$GLB,, | Performed by: FAMILY MEDICINE

## 2019-04-02 PROCEDURE — 3075F SYST BP GE 130 - 139MM HG: CPT | Mod: CPTII,S$GLB,, | Performed by: FAMILY MEDICINE

## 2019-04-02 PROCEDURE — 3079F DIAST BP 80-89 MM HG: CPT | Mod: CPTII,S$GLB,, | Performed by: FAMILY MEDICINE

## 2019-04-02 PROCEDURE — 3079F PR MOST RECENT DIASTOLIC BLOOD PRESSURE 80-89 MM HG: ICD-10-PCS | Mod: CPTII,S$GLB,, | Performed by: FAMILY MEDICINE

## 2019-04-02 PROCEDURE — 99999 PR PBB SHADOW E&M-EST. PATIENT-LVL III: CPT | Mod: PBBFAC,,, | Performed by: FAMILY MEDICINE

## 2019-04-02 PROCEDURE — 99214 OFFICE O/P EST MOD 30 MIN: CPT | Mod: S$GLB,,, | Performed by: FAMILY MEDICINE

## 2019-04-02 PROCEDURE — 99999 PR PBB SHADOW E&M-EST. PATIENT-LVL III: ICD-10-PCS | Mod: PBBFAC,,, | Performed by: FAMILY MEDICINE

## 2019-04-02 PROCEDURE — 3008F PR BODY MASS INDEX (BMI) DOCUMENTED: ICD-10-PCS | Mod: CPTII,S$GLB,, | Performed by: FAMILY MEDICINE

## 2019-04-02 RX ORDER — HYDROCODONE BITARTRATE AND ACETAMINOPHEN 7.5; 325 MG/1; MG/1
1 TABLET ORAL EVERY 6 HOURS PRN
COMMUNITY
Start: 2019-04-01 | End: 2019-06-04

## 2019-04-02 NOTE — PROGRESS NOTES
Subjective:       Patient ID: Shaina Olson is a 39 y.o. female.    Chief Complaint: FMLA paperwork    39 y old female with NOE and Bipolar  disorder currently under  psychiatric  care here to  Discuss FMLA  Required for follow up appointments, lexie attacks   and manic episodes. Doing better on trileptal and lexapro . Not getting easily irritaed as before. Still has insomnia and is overwhelmed with school .     Review of Systems   Constitutional: Negative.    HENT: Negative.    Eyes: Negative.    Respiratory: Negative.    Cardiovascular: Negative.    Gastrointestinal: Negative.    Genitourinary: Negative.    Musculoskeletal: Negative.    Skin: Negative.    Hematological: Negative.    Psychiatric/Behavioral: Positive for agitation.       Objective:      Physical Exam   Constitutional: She is oriented to person, place, and time. No distress.   HENT:   Head: Normocephalic and atraumatic.   Right Ear: External ear normal.   Left Ear: External ear normal.   Mouth/Throat: No oropharyngeal exudate.   Eyes: Pupils are equal, round, and reactive to light. Conjunctivae and EOM are normal. Right eye exhibits no discharge. Left eye exhibits no discharge. No scleral icterus.   Neck: Normal range of motion. Neck supple. No JVD present. No tracheal deviation present. No thyromegaly present.   Cardiovascular: Normal rate, regular rhythm and normal heart sounds. Exam reveals no gallop and no friction rub.   No murmur heard.  Pulmonary/Chest: Effort normal and breath sounds normal. No stridor. No respiratory distress. She has no wheezes. She has no rales. She exhibits no tenderness.   Abdominal: Soft. Bowel sounds are normal. She exhibits no distension. There is no tenderness. There is no rebound and no guarding.   Musculoskeletal: Normal range of motion.   Lymphadenopathy:     She has no cervical adenopathy.   Neurological: She is alert and oriented to person, place, and time.   Skin: Skin is warm and dry. She is not  diaphoretic.   Psychiatric: She has a normal mood and affect. Her behavior is normal. Judgment and thought content normal.       Assessment:     Shaina was seen today for fmla paperwork.    Diagnoses and all orders for this visit:    NOE (generalized anxiety disorder)    Bipolar disorder, in partial remission, most recent episode hypomanic      Plan:   Time spent: 25 minutes in face to face discussion concerning diagnosis, prognosis, review of lab and test results, benefits of treatment as well as management of disease, counseling of patient and coordination of care between various health care providers . Greater than half the time spent was used for coordination of care and counseling of patient.  FMLA form completed

## 2019-04-08 ENCOUNTER — CLINICAL SUPPORT (OUTPATIENT)
Dept: OTOLARYNGOLOGY | Facility: CLINIC | Age: 39
End: 2019-04-08
Payer: COMMERCIAL

## 2019-04-08 DIAGNOSIS — J30.9 ALLERGIC RHINITIS, UNSPECIFIED SEASONALITY, UNSPECIFIED TRIGGER: Primary | ICD-10-CM

## 2019-04-08 PROCEDURE — 99999 PR PBB SHADOW E&M-EST. PATIENT-LVL II: ICD-10-PCS | Mod: PBBFAC,,,

## 2019-04-08 PROCEDURE — 95117 PR IMMU2THERAPY, 2+ INJECTIONS: ICD-10-PCS | Mod: S$GLB,,, | Performed by: ORTHOPAEDIC SURGERY

## 2019-04-08 PROCEDURE — 95117 IMMUNOTHERAPY INJECTIONS: CPT | Mod: S$GLB,,, | Performed by: ORTHOPAEDIC SURGERY

## 2019-04-08 PROCEDURE — 99999 PR PBB SHADOW E&M-EST. PATIENT-LVL II: CPT | Mod: PBBFAC,,,

## 2019-04-08 NOTE — PROGRESS NOTES
Past Medical History:   Diagnosis Date    Allergy      Anxiety 2004    Arthritis      Arthritis of right knee      Graves' disease 6/2014    Hypertension 2004    Vitamin D deficiency              Review of patient's allergies indicates:   Allergen Reactions    Demerol [meperidine] Other (See Comments)       blackout       Ordering Physician:  Dr. Wilkinson  Order Type:  Written Order  Initial SNOT-20 score:  SNOT-20 score:    Treatment Sets:  Mix #1 #[id number] Exp: 11/8/2019  Allergens: Molds, mites, cockroach, dog, cat, feathers  Mix #2 #[id number] Exp:11/8/2019 Allergens: Trees  Mix #3 #[id number] Exp: 11/8/2019 Allergens: Weeds & grasses    [facility]    At 08:00 I administered 0.20ml subcutaneously, mix #1 (BLUE VIAL) and mix #2 (BLUE VIAL) in right arm, mix #3 (BLUE VIAL) in left arm.  Pt tolerated injections well.  No complaints of pain or discomfort.  Pt monitored for 20 minutes, after no minutes no reaction was noted.

## 2019-04-10 ENCOUNTER — PATIENT MESSAGE (OUTPATIENT)
Dept: PSYCHIATRY | Facility: CLINIC | Age: 39
End: 2019-04-10

## 2019-04-10 RX ORDER — AMLODIPINE BESYLATE 2.5 MG/1
TABLET ORAL
Qty: 90 TABLET | Refills: 1 | Status: SHIPPED | OUTPATIENT
Start: 2019-04-10 | End: 2019-09-29 | Stop reason: SDUPTHER

## 2019-04-12 RX ORDER — ESCITALOPRAM OXALATE 10 MG/1
15 TABLET ORAL DAILY
Qty: 15 TABLET | Refills: 2 | Status: SHIPPED | OUTPATIENT
Start: 2019-04-12 | End: 2019-04-18 | Stop reason: SDUPTHER

## 2019-04-15 ENCOUNTER — CLINICAL SUPPORT (OUTPATIENT)
Dept: OTOLARYNGOLOGY | Facility: CLINIC | Age: 39
End: 2019-04-15
Payer: COMMERCIAL

## 2019-04-15 DIAGNOSIS — J30.9 ALLERGIC RHINITIS, UNSPECIFIED SEASONALITY, UNSPECIFIED TRIGGER: Primary | ICD-10-CM

## 2019-04-15 PROCEDURE — 95117 IMMUNOTHERAPY INJECTIONS: CPT | Mod: S$GLB,,, | Performed by: ORTHOPAEDIC SURGERY

## 2019-04-15 PROCEDURE — 95117 PR IMMU2THERAPY, 2+ INJECTIONS: ICD-10-PCS | Mod: S$GLB,,, | Performed by: ORTHOPAEDIC SURGERY

## 2019-04-15 PROCEDURE — 99999 PR PBB SHADOW E&M-EST. PATIENT-LVL II: CPT | Mod: PBBFAC,,,

## 2019-04-15 PROCEDURE — 99999 PR PBB SHADOW E&M-EST. PATIENT-LVL II: ICD-10-PCS | Mod: PBBFAC,,,

## 2019-04-15 RX ORDER — PREGABALIN 100 MG/1
100 CAPSULE ORAL DAILY
Qty: 90 CAPSULE | Refills: 0 | Status: SHIPPED | OUTPATIENT
Start: 2019-04-15 | End: 2019-07-16 | Stop reason: SDUPTHER

## 2019-04-15 NOTE — PROGRESS NOTES
Past Medical History:   Diagnosis Date    Allergy      Anxiety 2004    Arthritis      Arthritis of right knee      Graves' disease 6/2014    Hypertension 2004    Vitamin D deficiency              Review of patient's allergies indicates:   Allergen Reactions    Demerol [meperidine] Other (See Comments)       blackout       Ordering Physician:  Dr. Wilkinson  Order Type:  Written Order  Initial SNOT-20 score:  SNOT-20 score:    Treatment Sets:  Mix #1 #[id number] Exp: 11/8/2019  Allergens: Molds, mites, cockroach, dog, cat, feathers  Mix #2 #[id number] Exp:11/8/2019 Allergens: Trees  Mix #3 #[id number] Exp: 11/8/2019 Allergens: Weeds & grasses    Manufactured by [facility]    At 07:50 I administered 0.25ml subcutaneously, mix #1 (BLUE VIAL) and mix #2 (BLUE VIAL) in right arm, mix #3 (BLUE VIAL) in left arm.  Pt tolerated injections well.  No complaints of pain or discomfort.  Pt monitored for 20 minutes, after no minutes no reaction was noted.

## 2019-04-16 ENCOUNTER — TELEPHONE (OUTPATIENT)
Dept: FAMILY MEDICINE | Facility: CLINIC | Age: 39
End: 2019-04-16

## 2019-04-18 RX ORDER — ESCITALOPRAM OXALATE 10 MG/1
15 TABLET ORAL DAILY
Qty: 45 TABLET | Refills: 2 | Status: SHIPPED | OUTPATIENT
Start: 2019-04-26 | End: 2019-05-15 | Stop reason: SDUPTHER

## 2019-04-29 ENCOUNTER — PATIENT MESSAGE (OUTPATIENT)
Dept: ENDOCRINOLOGY | Facility: CLINIC | Age: 39
End: 2019-04-29

## 2019-04-29 ENCOUNTER — CLINICAL SUPPORT (OUTPATIENT)
Dept: OTOLARYNGOLOGY | Facility: CLINIC | Age: 39
End: 2019-04-29
Payer: COMMERCIAL

## 2019-04-29 DIAGNOSIS — J30.9 ALLERGIC RHINITIS, UNSPECIFIED SEASONALITY, UNSPECIFIED TRIGGER: Primary | ICD-10-CM

## 2019-04-29 PROCEDURE — 95117 IMMUNOTHERAPY INJECTIONS: CPT | Mod: S$GLB,,, | Performed by: ORTHOPAEDIC SURGERY

## 2019-04-29 PROCEDURE — 95117 PR IMMU2THERAPY, 2+ INJECTIONS: ICD-10-PCS | Mod: S$GLB,,, | Performed by: ORTHOPAEDIC SURGERY

## 2019-04-29 PROCEDURE — 99999 PR PBB SHADOW E&M-EST. PATIENT-LVL III: CPT | Mod: PBBFAC,,,

## 2019-04-29 PROCEDURE — 99999 PR PBB SHADOW E&M-EST. PATIENT-LVL III: ICD-10-PCS | Mod: PBBFAC,,,

## 2019-04-30 ENCOUNTER — OFFICE VISIT (OUTPATIENT)
Dept: PSYCHIATRY | Facility: CLINIC | Age: 39
End: 2019-04-30
Payer: COMMERCIAL

## 2019-04-30 DIAGNOSIS — F48.9 DEFERRED DIAGNOSIS ON AXIS I: Primary | ICD-10-CM

## 2019-04-30 PROCEDURE — 90834 PR PSYCHOTHERAPY W/PATIENT, 45 MIN: ICD-10-PCS | Mod: S$GLB,,, | Performed by: SOCIAL WORKER

## 2019-04-30 PROCEDURE — 90834 PSYTX W PT 45 MINUTES: CPT | Mod: S$GLB,,, | Performed by: SOCIAL WORKER

## 2019-05-02 ENCOUNTER — PATIENT MESSAGE (OUTPATIENT)
Dept: PULMONOLOGY | Facility: CLINIC | Age: 39
End: 2019-05-02

## 2019-05-06 ENCOUNTER — CLINICAL SUPPORT (OUTPATIENT)
Dept: OTOLARYNGOLOGY | Facility: CLINIC | Age: 39
End: 2019-05-06
Payer: COMMERCIAL

## 2019-05-06 DIAGNOSIS — J30.9 ALLERGIC RHINITIS, UNSPECIFIED SEASONALITY, UNSPECIFIED TRIGGER: Primary | ICD-10-CM

## 2019-05-06 PROCEDURE — 99999 PR PBB SHADOW E&M-EST. PATIENT-LVL III: CPT | Mod: PBBFAC,,,

## 2019-05-06 PROCEDURE — 99999 PR PBB SHADOW E&M-EST. PATIENT-LVL III: ICD-10-PCS | Mod: PBBFAC,,,

## 2019-05-06 PROCEDURE — 95117 IMMUNOTHERAPY INJECTIONS: CPT | Mod: S$GLB,,, | Performed by: ORTHOPAEDIC SURGERY

## 2019-05-06 PROCEDURE — 95117 PR IMMU2THERAPY, 2+ INJECTIONS: ICD-10-PCS | Mod: S$GLB,,, | Performed by: ORTHOPAEDIC SURGERY

## 2019-05-07 NOTE — PROGRESS NOTES
Past Medical History:   Diagnosis Date    Allergy      Anxiety 2004    Arthritis      Arthritis of right knee      Graves' disease 6/2014    Hypertension 2004    Vitamin D deficiency              Review of patient's allergies indicates:   Allergen Reactions    Demerol [meperidine] Other (See Comments)       blackout       Ordering Physician:  Dr. Wilkinson  Order Type:  Written Order  Initial SNOT-20 score:  SNOT-20 score:    Treatment Sets:  Mix #1 #[id number] Exp: 11/8/2019  Allergens: Molds, mites, cockroach, dog, cat, feathers  Mix #2 #304735 Exp:11/8/2019 Allergens: Trees  Mix #3 #[id number] Exp: 11/8/2019 Allergens: Weeds & grasses    Manufact[facility] by [facility]    At 0820 I administered 0.35ml subcutaneously, mix #1 (BLUE VIAL) and mix #2 (BLUE VIAL) in right arm, mix #3 (BLUE VIAL) in left arm.  Pt tolerated injections well.  No complaints of pain or discomfort.  Pt monitored for 20 minutes, after no minutes no reaction was noted.

## 2019-05-07 NOTE — PROGRESS NOTES
Past Medical History:   Diagnosis Date    Allergy      Anxiety 2004    Arthritis      Arthritis of right knee      Graves' disease 6/2014    Hypertension 2004    Vitamin D deficiency              Review of patient's allergies indicates:   Allergen Reactions    Demerol [meperidine] Other (See Comments)       blackout       Ordering Physician:  Dr. Wilkinson  Order Type:  Written Order  Initial SNOT-20 score:  SNOT-20 score:    Treatment Sets:  Mix #1 #[id number] Exp: 11/8/2019  Allergens: Molds, mites, cockroach, dog, cat, feathers  Mix #2 #[id number] Exp:11/8/2019 Allergens: Trees  Mix #3 #[id number] Exp: 11/8/2019 Allergens: Weeds & grasses    [facility] by [facility]    At 0805 I administered 0.30ml subcutaneously, mix #1 (BLUE VIAL) and mix #2 (BLUE VIAL) in right arm, mix #3 (BLUE VIAL) in left arm.  Pt tolerated injections well.  No complaints of pain or discomfort.  Pt monitored for 20 minutes, after no minutes no reaction was noted.

## 2019-05-13 ENCOUNTER — OFFICE VISIT (OUTPATIENT)
Dept: PSYCHIATRY | Facility: CLINIC | Age: 39
End: 2019-05-13
Payer: COMMERCIAL

## 2019-05-13 VITALS
SYSTOLIC BLOOD PRESSURE: 126 MMHG | WEIGHT: 293 LBS | BODY MASS INDEX: 72.58 KG/M2 | DIASTOLIC BLOOD PRESSURE: 87 MMHG | HEART RATE: 89 BPM

## 2019-05-13 DIAGNOSIS — F48.9 DEFERRED DIAGNOSIS ON AXIS I: Primary | ICD-10-CM

## 2019-05-13 DIAGNOSIS — F41.1 GAD (GENERALIZED ANXIETY DISORDER): ICD-10-CM

## 2019-05-13 DIAGNOSIS — F31.32 BIPOLAR AFFECTIVE DISORDER, CURRENTLY DEPRESSED, MODERATE: Primary | ICD-10-CM

## 2019-05-13 PROCEDURE — 90834 PSYTX W PT 45 MINUTES: CPT | Mod: S$GLB,,, | Performed by: SOCIAL WORKER

## 2019-05-13 PROCEDURE — 99999 PR PBB SHADOW E&M-EST. PATIENT-LVL II: CPT | Mod: PBBFAC,,, | Performed by: PSYCHIATRY & NEUROLOGY

## 2019-05-13 PROCEDURE — 99999 PR PBB SHADOW E&M-EST. PATIENT-LVL II: ICD-10-PCS | Mod: PBBFAC,,, | Performed by: PSYCHIATRY & NEUROLOGY

## 2019-05-13 PROCEDURE — 90834 PR PSYCHOTHERAPY W/PATIENT, 45 MIN: ICD-10-PCS | Mod: S$GLB,,, | Performed by: SOCIAL WORKER

## 2019-05-13 PROCEDURE — 99213 OFFICE O/P EST LOW 20 MIN: CPT | Mod: S$GLB,,, | Performed by: PSYCHIATRY & NEUROLOGY

## 2019-05-13 PROCEDURE — 99213 PR OFFICE/OUTPT VISIT, EST, LEVL III, 20-29 MIN: ICD-10-PCS | Mod: S$GLB,,, | Performed by: PSYCHIATRY & NEUROLOGY
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
## 2019-05-15 RX ORDER — ESCITALOPRAM OXALATE 10 MG/1
15 TABLET ORAL DAILY
Qty: 45 TABLET | Refills: 2 | Status: SHIPPED | OUTPATIENT
Start: 2019-05-15 | End: 2019-05-23 | Stop reason: SDUPTHER

## 2019-05-15 RX ORDER — OXCARBAZEPINE 150 MG/1
TABLET, FILM COATED ORAL
Qty: 120 TABLET | Refills: 2 | Status: SHIPPED | OUTPATIENT
Start: 2019-05-15 | End: 2019-06-18 | Stop reason: SDUPTHER

## 2019-05-20 ENCOUNTER — CLINICAL SUPPORT (OUTPATIENT)
Dept: OTOLARYNGOLOGY | Facility: CLINIC | Age: 39
End: 2019-05-20
Payer: COMMERCIAL

## 2019-05-20 DIAGNOSIS — J30.9 ALLERGIC RHINITIS, UNSPECIFIED SEASONALITY, UNSPECIFIED TRIGGER: Primary | ICD-10-CM

## 2019-05-20 PROCEDURE — 95117 IMMUNOTHERAPY INJECTIONS: CPT | Mod: S$GLB,,, | Performed by: ORTHOPAEDIC SURGERY

## 2019-05-20 PROCEDURE — 95117 PR IMMU2THERAPY, 2+ INJECTIONS: ICD-10-PCS | Mod: S$GLB,,, | Performed by: ORTHOPAEDIC SURGERY

## 2019-05-20 PROCEDURE — 99999 PR PBB SHADOW E&M-EST. PATIENT-LVL III: CPT | Mod: PBBFAC,,,

## 2019-05-20 PROCEDURE — 99999 PR PBB SHADOW E&M-EST. PATIENT-LVL III: ICD-10-PCS | Mod: PBBFAC,,,

## 2019-05-22 ENCOUNTER — PATIENT MESSAGE (OUTPATIENT)
Dept: PSYCHIATRY | Facility: CLINIC | Age: 39
End: 2019-05-22

## 2019-05-23 ENCOUNTER — PATIENT MESSAGE (OUTPATIENT)
Dept: PSYCHIATRY | Facility: CLINIC | Age: 39
End: 2019-05-23

## 2019-05-23 RX ORDER — ESCITALOPRAM OXALATE 10 MG/1
15 TABLET ORAL DAILY
Qty: 45 TABLET | Refills: 2 | Status: SHIPPED | OUTPATIENT
Start: 2019-05-23 | End: 2019-06-18 | Stop reason: SDUPTHER

## 2019-05-24 NOTE — PROGRESS NOTES
Past Medical History:   Diagnosis Date    Allergy      Anxiety 2004    Arthritis      Arthritis of right knee      Graves' disease 6/2014    Hypertension 2004    Vitamin D deficiency              Review of patient's allergies indicates:   Allergen Reactions    Demerol [meperidine] Other (See Comments)       blackout       Ordering Physician:  Dr. Wilkinson  Order Type:  Written Order  Initial SNOT-20 score:  SNOT-20 score:    Treatment Sets:  Mix #1 #[id number] Exp: 11/8/2019  Allergens: Molds, mites, cockroach, dog, cat, feathers  Mix #2 #[id number] Exp:11/8/2019 Allergens: Trees  Mix #3 #[id number] Exp: 11/8/2019 Allergens: Weeds & grasses    Manufactured by [facility] [place] [place]    At 0754 I administered 0.40ml subcutaneously, mix #1 (BLUE VIAL) and mix #2 (BLUE VIAL) in right arm, mix #3 (BLUE VIAL) in left arm.  Pt tolerated injections well.  No complaints of pain or discomfort.  Pt monitored for 20 minutes, after no minutes no reaction was noted.

## 2019-05-26 RX ORDER — METHIMAZOLE 10 MG/1
TABLET ORAL
Qty: 60 TABLET | Refills: 3 | Status: SHIPPED | OUTPATIENT
Start: 2019-05-26 | End: 2019-10-01 | Stop reason: SDUPTHER

## 2019-05-27 ENCOUNTER — CLINICAL SUPPORT (OUTPATIENT)
Dept: OTOLARYNGOLOGY | Facility: CLINIC | Age: 39
End: 2019-05-27
Payer: COMMERCIAL

## 2019-05-27 DIAGNOSIS — J30.9 ALLERGIC RHINITIS, UNSPECIFIED SEASONALITY, UNSPECIFIED TRIGGER: Primary | ICD-10-CM

## 2019-05-27 PROCEDURE — 95117 IMMUNOTHERAPY INJECTIONS: CPT | Mod: S$GLB,,, | Performed by: ORTHOPAEDIC SURGERY

## 2019-05-27 PROCEDURE — 95117 PR IMMU2THERAPY, 2+ INJECTIONS: ICD-10-PCS | Mod: S$GLB,,, | Performed by: ORTHOPAEDIC SURGERY

## 2019-05-27 NOTE — PROGRESS NOTES
Past Medical History:   Diagnosis Date    Allergy      Anxiety 2004    Arthritis      Arthritis of right knee      Graves' disease 6/2014    Hypertension 2004    Vitamin D deficiency              Review of patient's allergies indicates:   Allergen Reactions    Demerol [meperidine] Other (See Comments)       blackout       Ordering Physician:  Dr. Wilkinson  Order Type:  Written Order  Initial SNOT-20 score:  SNOT-20 score:    Treatment Sets:  Mix #1 #[id number] Exp: 11/8/2019  Allergens: Molds, mites, cockroach, dog, cat, feathers  Mix #2 #[id number] Exp:11/8/2019 Allergens: Trees  Mix #3 #[id number] Exp: 11/8/2019 Allergens: Weeds & grasses    [facility]    At 0800 I administered 0.45ml subcutaneously, mix #1 (BLUE VIAL) and mix #2 (BLUE VIAL) in right arm, mix #3 (BLUE VIAL) in left arm.  Pt tolerated injections well.  No complaints of pain or discomfort.  Pt monitored for 20 minutes, after no minutes no reaction was noted.

## 2019-06-03 ENCOUNTER — TELEPHONE (OUTPATIENT)
Dept: ORTHOPEDICS | Facility: CLINIC | Age: 39
End: 2019-06-03

## 2019-06-03 ENCOUNTER — CLINICAL SUPPORT (OUTPATIENT)
Dept: OTOLARYNGOLOGY | Facility: CLINIC | Age: 39
End: 2019-06-03
Payer: COMMERCIAL

## 2019-06-03 ENCOUNTER — HOSPITAL ENCOUNTER (OUTPATIENT)
Dept: RADIOLOGY | Facility: HOSPITAL | Age: 39
Discharge: HOME | End: 2019-06-03
Attending: ORTHOPAEDIC SURGERY
Payer: COMMERCIAL

## 2019-06-03 DIAGNOSIS — M25.562 LEFT KNEE PAIN, UNSPECIFIED CHRONICITY: Primary | ICD-10-CM

## 2019-06-03 DIAGNOSIS — J30.9 ALLERGIC RHINITIS, UNSPECIFIED SEASONALITY, UNSPECIFIED TRIGGER: Primary | ICD-10-CM

## 2019-06-03 DIAGNOSIS — M25.562 LEFT KNEE PAIN, UNSPECIFIED CHRONICITY: ICD-10-CM

## 2019-06-03 PROCEDURE — 95117 PR IMMU2THERAPY, 2+ INJECTIONS: ICD-10-PCS | Mod: S$GLB,,, | Performed by: ORTHOPAEDIC SURGERY

## 2019-06-03 PROCEDURE — 73562 XR KNEE ORTHO LEFT WITH FLEXION: ICD-10-PCS | Mod: 26,59,LT, | Performed by: RADIOLOGY

## 2019-06-03 PROCEDURE — 95117 IMMUNOTHERAPY INJECTIONS: CPT | Mod: S$GLB,,, | Performed by: ORTHOPAEDIC SURGERY

## 2019-06-03 PROCEDURE — 99999 PR PBB SHADOW E&M-EST. PATIENT-LVL III: ICD-10-PCS | Mod: PBBFAC,,,

## 2019-06-03 PROCEDURE — 99999 PR PBB SHADOW E&M-EST. PATIENT-LVL III: CPT | Mod: PBBFAC,,,

## 2019-06-03 PROCEDURE — 73564 X-RAY EXAM KNEE 4 OR MORE: CPT | Mod: 26,LT,, | Performed by: RADIOLOGY

## 2019-06-03 PROCEDURE — 73562 X-RAY EXAM OF KNEE 3: CPT | Mod: 26,59,LT, | Performed by: RADIOLOGY

## 2019-06-03 PROCEDURE — 73564 XR KNEE ORTHO LEFT WITH FLEXION: ICD-10-PCS | Mod: 26,LT,, | Performed by: RADIOLOGY

## 2019-06-03 PROCEDURE — 73562 X-RAY EXAM OF KNEE 3: CPT | Mod: TC,PO,LT

## 2019-06-03 NOTE — PROGRESS NOTES
Past Medical History:   Diagnosis Date    Allergy      Anxiety 2004    Arthritis      Arthritis of right knee      Graves' disease 6/2014    Hypertension 2004    Vitamin D deficiency              Review of patient's allergies indicates:   Allergen Reactions    Demerol [meperidine] Other (See Comments)       blackout       Ordering Physician:  Dr. Wilkinson  Order Type:  Written Order  Initial SNOT-20 score:  SNOT-20 score:    Treatment Sets:  Mix #1 #[id number] Exp: 11/8/2019  Allergens: Molds, mites, cockroach, dog, cat, feathers  Mix #2 #[id number] Exp:11/8/2019 Allergens: Trees  Mix #3 #[id number] Exp: 11/8/2019 Allergens: Weeds & grasses    [facility]    At 0800 I administered 0.50ml subcutaneously, mix #1 (BLUE VIAL) and mix #2 (BLUE VIAL) in right arm, mix #3 (BLUE VIAL) in left arm.  Pt tolerated injections well.  No complaints of pain or discomfort.  Pt monitored for 20 minutes, after no minutes no reaction was noted.

## 2019-06-03 NOTE — TELEPHONE ENCOUNTER
Called pt to reschedule her apt due to provider being out of office pt is reschedule with SUSAN Qiu on 6/4 at 9am. Pt understood.

## 2019-06-04 ENCOUNTER — OFFICE VISIT (OUTPATIENT)
Dept: ORTHOPEDICS | Facility: CLINIC | Age: 39
End: 2019-06-04
Payer: COMMERCIAL

## 2019-06-04 VITALS
RESPIRATION RATE: 17 BRPM | HEIGHT: 64 IN | DIASTOLIC BLOOD PRESSURE: 80 MMHG | BODY MASS INDEX: 50.02 KG/M2 | SYSTOLIC BLOOD PRESSURE: 135 MMHG | WEIGHT: 293 LBS | HEART RATE: 88 BPM

## 2019-06-04 DIAGNOSIS — M17.12 PRIMARY OSTEOARTHRITIS OF LEFT KNEE: ICD-10-CM

## 2019-06-04 DIAGNOSIS — E66.01 MORBID OBESITY WITH BMI OF 70 AND OVER, ADULT: ICD-10-CM

## 2019-06-04 DIAGNOSIS — M25.562 ACUTE PAIN OF LEFT KNEE: Primary | ICD-10-CM

## 2019-06-04 PROCEDURE — 99214 OFFICE O/P EST MOD 30 MIN: CPT | Mod: 25,S$GLB,, | Performed by: PHYSICIAN ASSISTANT

## 2019-06-04 PROCEDURE — 99999 PR PBB SHADOW E&M-EST. PATIENT-LVL IV: CPT | Mod: PBBFAC,,, | Performed by: PHYSICIAN ASSISTANT

## 2019-06-04 PROCEDURE — 3075F SYST BP GE 130 - 139MM HG: CPT | Mod: CPTII,S$GLB,, | Performed by: PHYSICIAN ASSISTANT

## 2019-06-04 PROCEDURE — 20610 PR DRAIN/INJECT LARGE JOINT/BURSA: ICD-10-PCS | Mod: LT,S$GLB,, | Performed by: PHYSICIAN ASSISTANT

## 2019-06-04 PROCEDURE — 20610 DRAIN/INJ JOINT/BURSA W/O US: CPT | Mod: LT,S$GLB,, | Performed by: PHYSICIAN ASSISTANT

## 2019-06-04 PROCEDURE — 3075F PR MOST RECENT SYSTOLIC BLOOD PRESS GE 130-139MM HG: ICD-10-PCS | Mod: CPTII,S$GLB,, | Performed by: PHYSICIAN ASSISTANT

## 2019-06-04 PROCEDURE — 99999 PR PBB SHADOW E&M-EST. PATIENT-LVL IV: ICD-10-PCS | Mod: PBBFAC,,, | Performed by: PHYSICIAN ASSISTANT

## 2019-06-04 PROCEDURE — 3008F PR BODY MASS INDEX (BMI) DOCUMENTED: ICD-10-PCS | Mod: CPTII,S$GLB,, | Performed by: PHYSICIAN ASSISTANT

## 2019-06-04 PROCEDURE — 3079F PR MOST RECENT DIASTOLIC BLOOD PRESSURE 80-89 MM HG: ICD-10-PCS | Mod: CPTII,S$GLB,, | Performed by: PHYSICIAN ASSISTANT

## 2019-06-04 PROCEDURE — 3008F BODY MASS INDEX DOCD: CPT | Mod: CPTII,S$GLB,, | Performed by: PHYSICIAN ASSISTANT

## 2019-06-04 PROCEDURE — 99214 PR OFFICE/OUTPT VISIT, EST, LEVL IV, 30-39 MIN: ICD-10-PCS | Mod: 25,S$GLB,, | Performed by: PHYSICIAN ASSISTANT

## 2019-06-04 PROCEDURE — 3079F DIAST BP 80-89 MM HG: CPT | Mod: CPTII,S$GLB,, | Performed by: PHYSICIAN ASSISTANT

## 2019-06-04 RX ORDER — METHYLPREDNISOLONE ACETATE 80 MG/ML
80 INJECTION, SUSPENSION INTRA-ARTICULAR; INTRALESIONAL; INTRAMUSCULAR; SOFT TISSUE ONCE
Status: COMPLETED | OUTPATIENT
Start: 2019-06-04 | End: 2019-06-04

## 2019-06-04 RX ORDER — NAPROXEN 500 MG/1
500 TABLET ORAL 2 TIMES DAILY
Qty: 60 TABLET | Refills: 0 | Status: SHIPPED | OUTPATIENT
Start: 2019-06-04 | End: 2019-07-01 | Stop reason: SDUPTHER

## 2019-06-04 RX ADMIN — METHYLPREDNISOLONE ACETATE 80 MG: 80 INJECTION, SUSPENSION INTRA-ARTICULAR; INTRALESIONAL; INTRAMUSCULAR; SOFT TISSUE at 09:06

## 2019-06-04 NOTE — PROGRESS NOTES
Subjective:      Patient ID: Shaina Olson is a 39 y.o. female.    Chief Complaint: Pain of the Left Knee (Pt stated that she got out of here car wrong on Friday 5/31/2019 causing this pain )      HPI: Shaina Olson  is a 39 y.o. female who c/o Pain of the Left Knee (Pt stated that she got out of here car wrong on Friday 5/31/2019 causing this pain )   for duration of about 5 days.  She injured the left knee while getting out of her car.  She felt pop for a pull.  Pain level today is 5/10 in severity.  Improved with Aleve.  Quality is aching, throbbing, sharp.  She complains of associated clicking and catching.  Alleviating factors above. Aggravating factors include deep flexion and weight-bearing.    Past Medical History:   Diagnosis Date    Allergy     Anxiety 2004    Arthritis     Arthritis of right knee     Graves' disease 6/2014    Hypertension 2004    Vitamin D deficiency      Past Surgical History:   Procedure Laterality Date    FRACTURE SURGERY Right 2013    right wrist    NASAL SEPTUM SURGERY  2001    SINUS SURGERY  2001    SINUS SURGERY  11/22/2017    SINUS SURGERY FUNCTIONAL ENDOSCOPIC N/A 11/22/2017    Per[facility]    TONSILLECTOMY, ADENOIDECTOMY  2001    WRIST FRACTURE SURGERY Right 2013    Right wrist     Family History   Problem Relation Age of Onset    Hypertension Mother     Lupus Mother     Diverticulosis Mother     Cancer Sister 30        stg 1 boderline? ov cancer    Cancer Maternal Grandmother     Cancer Maternal Grandfather     Cataracts Maternal Grandfather     Diabetes Paternal Grandmother     Stroke Paternal Grandmother     Cancer Paternal Grandmother     Cancer Paternal Grandfather     Diabetes Paternal Aunt     Hypertension Paternal Aunt     Thyroid disease Neg Hx     Migraines Neg Hx     Asthma Neg Hx      Social History     Socioeconomic History    Marital status: Single     Spouse name: Not on file    Number of  children: 0    Years of education: Not on file    Highest education level: Not on file   Occupational History    Occupation: LPN     Comment: [employer]   Social Needs    Financial resource strain: Not on file    Food insecurity:     Worry: Not on file     Inability: Not on file    Transportation needs:     Medical: Not on file     Non-medical: Not on file   Tobacco Use    Smoking status: Never Smoker    Smokeless tobacco: Never Used   Substance and Sexual Activity    Alcohol use: Yes     Alcohol/week: 0.0 oz     Comment: occasionally    Drug use: No    Sexual activity: Yes     Partners: Male     Birth control/protection: OCP   Lifestyle    Physical activity:     Days per week: Not on file     Minutes per session: Not on file    Stress: Not on file   Relationships    Social connections:     Talks on phone: Not on file     Gets together: Not on file     Attends [employer] service: Not on file     Active member of club or organization: Not on file     Attends meetings of clubs or organizations: Not on file     Relationship status: Not on file   Other Topics Concern    Not on file   Social History Narrative    Wears a seatbelt.     Medication List with Changes/Refills   New Medications    NAPROXEN (NAPROSYN) 500 MG TABLET    Take 1 tablet (500 mg total) by mouth 2 (two) times daily.   Current Medications    ALLERGY MIX    New build-up SCIT - MQT from 03/2018 in notes    AMLODIPINE (NORVASC) 2.5 MG TABLET    TAKE 1 TABLET BY MOUTH EVERY DAY WITH 5MG FOR A TOTAL OF 7.5 MG PER DAY    AMLODIPINE (NORVASC) 5 MG TABLET    Take 1 tablet (5 mg total) by mouth once daily.    CHOLECALCIFEROL, VITAMIN D3, 50,000 UNIT CAPSULE    Take 1 capsule (50,000 Units total) by mouth once a week.    CLONAZEPAM (KLONOPIN) 0.5 MG TABLET    Take 1 tablet (0.5 mg total) by mouth daily as needed for Anxiety.    CYCLOBENZAPRINE (FLEXERIL) 10 MG TABLET    TAKE 1 TABLET BY MOUTH THREE TIMES A DAY AS NEEDED    ESCITALOPRAM OXALATE  (LEXAPRO) 10 MG TABLET    Take 1.5 tablets (15 mg total) by mouth once daily.    FLUTICASONE (FLONASE) 50 MCG/ACTUATION NASAL SPRAY    2 sprays (100 mcg total) by Each Nare route once daily.    L NORGEST/E.ESTRADIOL-E.ESTRAD (CAMRESE) 0.15 MG-30 MCG (84)/10 MCG (7) 3MPK    Take 1 capsule by mouth once daily.    LATANOPROST 0.005 % OPHTHALMIC SOLUTION    Place 1 drop into both eyes every evening.    LEVOCETIRIZINE (XYZAL) 5 MG TABLET    TAKE 1 TABLET BY MOUTH EVERY DAY IN THE EVENING    METHIMAZOLE (TAPAZOLE) 10 MG TAB    TAKE 1 TABLET BY MOUTH TWICE A DAY    MONTELUKAST (SINGULAIR) 10 MG TABLET    Take 1 tablet (10 mg total) by mouth once daily.    OXCARBAZEPINE (TRILEPTAL) 150 MG TAB    Take 1 twice daily for 7 days then 2 twice daily    PANTOPRAZOLE (PROTONIX) 40 MG TABLET    TAKE 1 TABLET EVERY DAY    PREGABALIN (LYRICA) 100 MG CAPSULE    Take 1 capsule (100 mg total) by mouth once daily.    TRAZODONE (DESYREL) 100 MG TABLET    Take 1/2 to 1 and 1/2 tablets at bedtime as needed for sleep.   Discontinued Medications    HYDROCODONE-ACETAMINOPHEN (NORCO) 7.5-325 MG PER TABLET    Take 1 tablet by mouth every 6 (six) hours as needed.     TRIAMCINOLONE ACETONIDE 0.1% (KENALOG) 0.1 % CREAM    Apply topically 2 (two) times daily.     Review of patient's allergies indicates:   Allergen Reactions    Demerol [meperidine] Other (See Comments)     [facility]       Review of Systems   Constitution: Negative for fever.   Cardiovascular: Negative for chest pain.   Respiratory: Negative for cough and shortness of breath.    Skin: Negative for rash.   Musculoskeletal: Positive for joint pain and stiffness. Negative for joint swelling.   Gastrointestinal: Negative for heartburn.   Neurological: Negative for headaches and numbness.         Objective:        General    Nursing note and vitals reviewed.  Constitutional: She is oriented to person, place, and time. She appears well-developed and well-nourished.   HENT:   Head:  Normocephalic and atraumatic.   Eyes: EOM are normal.   Cardiovascular: Normal rate and regular rhythm.    Pulmonary/Chest: Effort normal.   Abdominal: Soft.   Neurological: She is alert and oriented to person, place, and time.   Psychiatric: She has a normal mood and affect. Her behavior is normal.           Right Knee Exam     Range of Motion   Extension: normal   Flexion: normal     Tests   Ligament Examination Lachman: normal (-1 to 2mm) PCL-Posterior Drawer: normal (0 to 2mm)     MCL - Valgus: normal (0 to 2mm)  LCL - Varus: normal    Other   Sensation: normal    Left Knee Exam     Inspection   Erythema: absent  Swelling: absent  Effusion: absent  Deformity: absent  Bruising: absent    Tenderness   The patient tender to palpation of the medial joint line.    Range of Motion   Extension: normal   Flexion: normal     Tests   Meniscus   Delia:  Medial - positive Lateral - negative  Stability Lachman: normal (-1 to 2mm) PCL-Posterior Drawer: normal (0 to 2mm)  MCL - Valgus: normal (0 to 2mm)  LCL - Varus: normal (0 to 2mm)  Patella   Patellar apprehension: negative  Patellar Tracking: normal  Patellar Grind: negative    Other   Meniscal Cyst: absent  Popliteal (Baker's) Cyst: absent  Sensation: normal    Comments:  Comp soft, cap refill < 2 sec.    Muscle Strength   Right Lower Extremity   Quadriceps:  5/5   Hamstrin/5   Left Lower Extremity   Quadriceps:  5/5   Hamstrin/5     Vascular Exam       Edema  Right Lower Leg: absent  Left Lower Leg: absent            Xray:   Left knee from 6/3/19 images and report were reviewed today.  I agree with the radiologist's interpretation.  No joint effusion.  No patellar tilt.  No fracture or dislocation.  There are bilateral tricompartmental degenerative changes with medial compartment joint space narrowing and marginal spurring.  Findings are not significantly changed since 2018.    Assessment:       Encounter Diagnoses   Name Primary?    Acute pain of  left knee Yes    Primary osteoarthritis of left knee     Morbid obesity with BMI of 70 and over, adult           Plan:       Shaina was seen today for pain.    Diagnoses and all orders for this visit:    Acute pain of left knee  -     methylPREDNISolone acetate injection 80 mg  -     naproxen (NAPROSYN) 500 MG tablet; Take 1 tablet (500 mg total) by mouth 2 (two) times daily.    Primary osteoarthritis of left knee  -     methylPREDNISolone acetate injection 80 mg  -     naproxen (NAPROSYN) 500 MG tablet; Take 1 tablet (500 mg total) by mouth 2 (two) times daily.    Morbid obesity with BMI of 70 and over, adult        Shaina is an established patient with a new problem.  She has arthritic changes in the medial compartment of the knee.  However, she is having mechanical symptoms and positive findings on exam today. I would like to optimize conservative treatment in the form of a home exercise program, as well as an intra-articular steroid injection.  After discussing risks and benefits of the injection, she wished to proceed.  Additionally, I have given her prescription strength naproxen.  She will discontinue Aleve over-the-counter.  She will let me know if she has any side effects.  I will see her back in the office in 1 month to re-evaluate her progress.  She verbalizes understanding and agrees.    Follow up in about 1 month (around 7/4/2019).    Left Knee Injection Report:  After verbal consent was obtained for left knee injection, patient ID, site, and side were verified.  The  left  knee was sterilly prepped in the standard fashion.  A 22-gauge needle was introduced into left knee joint from an santino-lateral site without complication. The left knee was then injected with 20 mg lidocaine plain and 80 mg depomedrol.  A sterile bandaid was applied.  The patient was informed to apply an ice pack approximately 10min once arriving home and not to do anything strenuous for 24hours.  She was instructed to call if there  were any problems. The patient was discharged in stable condition.    The patient understands, chooses and consents to this plan and accepts all   the risks which include but are not limited to the risks mentioned above.     Disclaimer: This note was prepared using a voice recognition system and is likely to have sound alike errors within the text.

## 2019-06-04 NOTE — LETTER
June 4, 2019      Sarah Gonzalez MD  [address] [facility] [address]           [facility]  [address] [facility] [address]  Phone: [phone]  Fax: [phone]          Patient: Shaina Olson   MR Number: [id number]   YOB: 1980   Date of Visit: 6/4/2019       Dear Dr. Sarah Gonzalez:    Thank you for referring Shaina Olson to me for evaluation. Attached you will find relevant portions of my assessment and plan of care.    If you have questions, please do not hesitate to call me. I look forward to following Shaina Olson along with you.    Sincerely,    LINDA Tapiaosure  CC:  No Recipients    If you would like to receive this communication electronically, please contact [email or web address] or [phone] to request more information on [employer] Link access.    For providers and/or their staff who would like to refer a patient to [facility], please contact us through our one-stop-shop provider referral line, [facility] [place], at [phone].    If you feel you have received this communication in error or would no longer like to receive these types of communications, please e-mail [email or web address]

## 2019-06-10 ENCOUNTER — CLINICAL SUPPORT (OUTPATIENT)
Dept: OTOLARYNGOLOGY | Facility: CLINIC | Age: 39
End: 2019-06-10
Payer: COMMERCIAL

## 2019-06-10 DIAGNOSIS — J30.9 ALLERGIC RHINITIS, UNSPECIFIED SEASONALITY, UNSPECIFIED TRIGGER: Primary | ICD-10-CM

## 2019-06-10 PROCEDURE — 95117 IMMUNOTHERAPY INJECTIONS: CPT | Mod: S$GLB,,, | Performed by: ORTHOPAEDIC SURGERY

## 2019-06-10 PROCEDURE — 95117 PR IMMU2THERAPY, 2+ INJECTIONS: ICD-10-PCS | Mod: S$GLB,,, | Performed by: ORTHOPAEDIC SURGERY

## 2019-06-10 PROCEDURE — 99999 PR PBB SHADOW E&M-EST. PATIENT-LVL II: ICD-10-PCS | Mod: PBBFAC,,,

## 2019-06-10 PROCEDURE — 99999 PR PBB SHADOW E&M-EST. PATIENT-LVL II: CPT | Mod: PBBFAC,,,

## 2019-06-14 ENCOUNTER — PATIENT MESSAGE (OUTPATIENT)
Dept: PSYCHIATRY | Facility: CLINIC | Age: 39
End: 2019-06-14

## 2019-06-17 NOTE — PROGRESS NOTES
Past Medical History:   Diagnosis Date    Allergy      Anxiety 2004    Arthritis      Arthritis of right knee      Graves' disease 6/2014    Hypertension 2004    Vitamin D deficiency              Review of patient's allergies indicates:   Allergen Reactions    Demerol [meperidine] Other (See Comments)       blackout       Ordering Physician:  Dr. Wilkinson  Order Type:  Written Order  Initial SNOT-20 score:  SNOT-20 score:    Treatment Sets:  Mix #1 #259398 Exp: 11/8/2019  Allergens: Molds, mites, cockroach, dog, cat, feathers  Mix #2 #[id number] Exp:11/8/2019 Allergens: Trees  Mix #3 #[id number] Exp: 11/8/2019 Allergens: Weeds & grasses    [facility]    At 0800 I administered 0.05ml subcutaneously, mix #1 (YELLOW VIAL) and mix #2 (YELLOW VIAL) in right arm, mix #3 (YELLOW VIAL) in left arm.  Pt tolerated injections well.  No complaints of pain or discomfort.  Pt monitored for 20 minutes, after no minutes no reaction was noted.

## 2019-06-18 ENCOUNTER — OFFICE VISIT (OUTPATIENT)
Dept: PSYCHIATRY | Facility: CLINIC | Age: 39
End: 2019-06-18
Payer: COMMERCIAL

## 2019-06-18 DIAGNOSIS — F41.1 GAD (GENERALIZED ANXIETY DISORDER): ICD-10-CM

## 2019-06-18 DIAGNOSIS — F31.32 BIPOLAR AFFECTIVE DISORDER, CURRENTLY DEPRESSED, MODERATE: Primary | ICD-10-CM

## 2019-06-18 PROCEDURE — 99213 PR OFFICE/OUTPT VISIT, EST, LEVL III, 20-29 MIN: ICD-10-PCS | Mod: S$GLB,,, | Performed by: PSYCHIATRY & NEUROLOGY

## 2019-06-18 PROCEDURE — 99213 OFFICE O/P EST LOW 20 MIN: CPT | Mod: S$GLB,,, | Performed by: PSYCHIATRY & NEUROLOGY

## 2019-06-18 PROCEDURE — 99999 PR PBB SHADOW E&M-EST. PATIENT-LVL I: CPT | Mod: PBBFAC,,, | Performed by: PSYCHIATRY & NEUROLOGY

## 2019-06-18 PROCEDURE — 99999 PR PBB SHADOW E&M-EST. PATIENT-LVL I: ICD-10-PCS | Mod: PBBFAC,,, | Performed by: PSYCHIATRY & NEUROLOGY

## 2019-06-18 RX ORDER — OXCARBAZEPINE 150 MG/1
TABLET, FILM COATED ORAL
Qty: 120 TABLET | Refills: 2 | Status: SHIPPED | OUTPATIENT
Start: 2019-06-18 | End: 2019-09-12 | Stop reason: SDUPTHER

## 2019-06-18 RX ORDER — ESCITALOPRAM OXALATE 10 MG/1
15 TABLET ORAL DAILY
Qty: 45 TABLET | Refills: 2 | Status: SHIPPED | OUTPATIENT
Start: 2019-06-18 | End: 2019-09-12 | Stop reason: SDUPTHER
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
## 2019-06-19 ENCOUNTER — OFFICE VISIT (OUTPATIENT)
Dept: PSYCHIATRY | Facility: CLINIC | Age: 39
End: 2019-06-19
Payer: COMMERCIAL

## 2019-06-19 DIAGNOSIS — F48.9 DEFERRED DIAGNOSIS ON AXIS I: Primary | ICD-10-CM

## 2019-06-19 PROCEDURE — 90834 PR PSYCHOTHERAPY W/PATIENT, 45 MIN: ICD-10-PCS | Mod: S$GLB,,, | Performed by: SOCIAL WORKER

## 2019-06-19 PROCEDURE — 90834 PSYTX W PT 45 MINUTES: CPT | Mod: S$GLB,,, | Performed by: SOCIAL WORKER

## 2019-06-21 ENCOUNTER — PATIENT MESSAGE (OUTPATIENT)
Dept: PSYCHIATRY | Facility: CLINIC | Age: 39
End: 2019-06-21

## 2019-06-24 ENCOUNTER — CLINICAL SUPPORT (OUTPATIENT)
Dept: OTOLARYNGOLOGY | Facility: CLINIC | Age: 39
End: 2019-06-24
Payer: COMMERCIAL

## 2019-06-24 DIAGNOSIS — J30.9 ALLERGIC RHINITIS, UNSPECIFIED SEASONALITY, UNSPECIFIED TRIGGER: Primary | ICD-10-CM

## 2019-06-24 PROCEDURE — 99999 PR PBB SHADOW E&M-EST. PATIENT-LVL II: CPT | Mod: PBBFAC,,,

## 2019-06-24 PROCEDURE — 95117 PR IMMU2THERAPY, 2+ INJECTIONS: ICD-10-PCS | Mod: S$GLB,,, | Performed by: ORTHOPAEDIC SURGERY

## 2019-06-24 PROCEDURE — 95117 IMMUNOTHERAPY INJECTIONS: CPT | Mod: S$GLB,,, | Performed by: ORTHOPAEDIC SURGERY

## 2019-06-24 PROCEDURE — 99999 PR PBB SHADOW E&M-EST. PATIENT-LVL II: ICD-10-PCS | Mod: PBBFAC,,,

## 2019-06-24 NOTE — PROGRESS NOTES
Past Medical History:   Diagnosis Date    Allergy      Anxiety 2004    Arthritis      Arthritis of right knee      Graves' disease 6/2014    Hypertension 2004    Vitamin D deficiency              Review of patient's allergies indicates:   Allergen Reactions    Demerol [meperidine] Other (See Comments)       blackout       Ordering Physician:  Dr. Wilkinson  Order Type:  Written Order  Initial SNOT-20 score:  SNOT-20 score:    Treatment Sets:  Mix #1 #358494 Exp: 11/8/2019  Allergens: Molds, mites, cockroach, dog, cat, feathers  Mix #2 #[id number] Exp:11/8/2019 Allergens: Trees  Mix #3 #[id number] Exp: 11/8/2019 Allergens: Weeds & grasses    Manufactured by [facility]    At 0813 I administered 0.10ml subcutaneously, mix #1 (YELLOW VIAL) and mix #2 (YELLOW VIAL) in right arm, mix #3 (YELLOW VIAL) in left arm.  Pt tolerated injections well.  No complaints of pain or discomfort.  Pt monitored for 20 minutes, after no minutes no reaction was noted.

## 2019-06-27 ENCOUNTER — HOSPITAL ENCOUNTER (EMERGENCY)
Facility: HOSPITAL | Age: 39
Discharge: HOME | End: 2019-06-27
Attending: FAMILY MEDICINE
Payer: COMMERCIAL

## 2019-06-27 VITALS
WEIGHT: 293 LBS | OXYGEN SATURATION: 100 % | SYSTOLIC BLOOD PRESSURE: 146 MMHG | HEIGHT: 64 IN | RESPIRATION RATE: 15 BRPM | BODY MASS INDEX: 50.02 KG/M2 | TEMPERATURE: 98 F | HEART RATE: 83 BPM | DIASTOLIC BLOOD PRESSURE: 73 MMHG

## 2019-06-27 DIAGNOSIS — R19.7 NAUSEA VOMITING AND DIARRHEA: Primary | ICD-10-CM

## 2019-06-27 DIAGNOSIS — R11.2 NAUSEA VOMITING AND DIARRHEA: Primary | ICD-10-CM

## 2019-06-27 DIAGNOSIS — R10.84 GENERALIZED ABDOMINAL PAIN: ICD-10-CM

## 2019-06-27 LAB
ALBUMIN SERPL BCP-MCNC: 3.6 G/DL (ref 3.5–5.2)
ALP SERPL-CCNC: 102 U/L (ref 55–135)
ALT SERPL W/O P-5'-P-CCNC: 11 U/L (ref 10–44)
ANION GAP SERPL CALC-SCNC: 10 MMOL/L (ref 8–16)
AST SERPL-CCNC: 13 U/L (ref 10–40)
B-HCG UR QL: NEGATIVE
BASOPHILS # BLD AUTO: 0.01 K/UL (ref 0–0.2)
BASOPHILS NFR BLD: 0.1 % (ref 0–1.9)
BILIRUB SERPL-MCNC: 0.7 MG/DL (ref 0.1–1)
BILIRUB UR QL STRIP: NEGATIVE
BUN SERPL-MCNC: 11 MG/DL (ref 6–20)
CALCIUM SERPL-MCNC: 9 MG/DL (ref 8.7–10.5)
CHLORIDE SERPL-SCNC: 109 MMOL/L (ref 95–110)
CLARITY UR: CLEAR
CO2 SERPL-SCNC: 22 MMOL/L (ref 23–29)
COLOR UR: YELLOW
CREAT SERPL-MCNC: 0.8 MG/DL (ref 0.5–1.4)
DIFFERENTIAL METHOD: ABNORMAL
EOSINOPHIL # BLD AUTO: 0.2 K/UL (ref 0–0.5)
EOSINOPHIL NFR BLD: 1.5 % (ref 0–8)
ERYTHROCYTE [DISTWIDTH] IN BLOOD BY AUTOMATED COUNT: 15 % (ref 11.5–14.5)
EST. GFR  (AFRICAN AMERICAN): >60 ML/MIN/1.73 M^2
EST. GFR  (NON AFRICAN AMERICAN): >60 ML/MIN/1.73 M^2
GLUCOSE SERPL-MCNC: 95 MG/DL (ref 70–110)
GLUCOSE UR QL STRIP: NEGATIVE
HCT VFR BLD AUTO: 38.8 % (ref 37–48.5)
HGB BLD-MCNC: 12.4 G/DL (ref 12–16)
HGB UR QL STRIP: NEGATIVE
KETONES UR QL STRIP: NEGATIVE
LEUKOCYTE ESTERASE UR QL STRIP: NEGATIVE
LIPASE SERPL-CCNC: 94 U/L (ref 4–60)
LYMPHOCYTES # BLD AUTO: 2.5 K/UL (ref 1–4.8)
LYMPHOCYTES NFR BLD: 18.7 % (ref 18–48)
MCH RBC QN AUTO: 29.5 PG (ref 27–31)
MCHC RBC AUTO-ENTMCNC: 32 G/DL (ref 32–36)
MCV RBC AUTO: 92 FL (ref 82–98)
MONOCYTES # BLD AUTO: 0.5 K/UL (ref 0.3–1)
MONOCYTES NFR BLD: 4 % (ref 4–15)
NEUTROPHILS # BLD AUTO: 10.1 K/UL (ref 1.8–7.7)
NEUTROPHILS NFR BLD: 75.9 % (ref 38–73)
NITRITE UR QL STRIP: NEGATIVE
PH UR STRIP: 8 [PH] (ref 5–8)
PLATELET # BLD AUTO: 295 K/UL (ref 150–350)
PMV BLD AUTO: 9.3 FL (ref 9.2–12.9)
POCT GLUCOSE: 90 MG/DL (ref 70–110)
POTASSIUM SERPL-SCNC: 3.9 MMOL/L (ref 3.5–5.1)
PROT SERPL-MCNC: 6.8 G/DL (ref 6–8.4)
PROT UR QL STRIP: NEGATIVE
RBC # BLD AUTO: 4.2 M/UL (ref 4–5.4)
SODIUM SERPL-SCNC: 141 MMOL/L (ref 136–145)
SP GR UR STRIP: 1.01 (ref 1–1.03)
URN SPEC COLLECT METH UR: NORMAL
UROBILINOGEN UR STRIP-ACNC: NEGATIVE EU/DL
WBC # BLD AUTO: 13.35 K/UL (ref 3.9–12.7)

## 2019-06-27 PROCEDURE — P9612 CATHETERIZE FOR URINE SPEC: HCPCS

## 2019-06-27 PROCEDURE — 81025 URINE PREGNANCY TEST: CPT

## 2019-06-27 PROCEDURE — 96361 HYDRATE IV INFUSION ADD-ON: CPT

## 2019-06-27 PROCEDURE — 25000003 PHARM REV CODE 250: Performed by: FAMILY MEDICINE

## 2019-06-27 PROCEDURE — 96375 TX/PRO/DX INJ NEW DRUG ADDON: CPT

## 2019-06-27 PROCEDURE — 63600175 PHARM REV CODE 636 W HCPCS: Performed by: FAMILY MEDICINE

## 2019-06-27 PROCEDURE — 85025 COMPLETE CBC W/AUTO DIFF WBC: CPT

## 2019-06-27 PROCEDURE — 80053 COMPREHEN METABOLIC PANEL: CPT

## 2019-06-27 PROCEDURE — 83690 ASSAY OF LIPASE: CPT

## 2019-06-27 PROCEDURE — 96365 THER/PROPH/DIAG IV INF INIT: CPT

## 2019-06-27 PROCEDURE — 99284 EMERGENCY DEPT VISIT MOD MDM: CPT | Mod: 25

## 2019-06-27 PROCEDURE — 82962 GLUCOSE BLOOD TEST: CPT

## 2019-06-27 PROCEDURE — 36415 COLL VENOUS BLD VENIPUNCTURE: CPT

## 2019-06-27 PROCEDURE — 81003 URINALYSIS AUTO W/O SCOPE: CPT

## 2019-06-27 RX ORDER — ONDANSETRON 2 MG/ML
4 INJECTION INTRAMUSCULAR; INTRAVENOUS
Status: COMPLETED | OUTPATIENT
Start: 2019-06-27 | End: 2019-06-27

## 2019-06-27 RX ORDER — PROMETHAZINE HYDROCHLORIDE 25 MG/1
25 TABLET ORAL EVERY 8 HOURS PRN
Qty: 15 TABLET | Refills: 0 | Status: SHIPPED | OUTPATIENT
Start: 2019-06-27 | End: 2019-07-22

## 2019-06-27 RX ADMIN — ONDANSETRON 4 MG: 2 INJECTION INTRAMUSCULAR; INTRAVENOUS at 02:06

## 2019-06-27 RX ADMIN — PROMETHAZINE HYDROCHLORIDE 25 MG: 25 INJECTION INTRAMUSCULAR; INTRAVENOUS at 04:06

## 2019-06-27 RX ADMIN — SODIUM CHLORIDE 1000 ML: 0.9 INJECTION, SOLUTION INTRAVENOUS at 02:06

## 2019-06-27 NOTE — ED PROVIDER NOTES
SCRIBE #1 NOTE: I, Kristy Chacko, am scribing for, and in the presence of, Sherita Wilkes MD. I have scribed the HPI, ROS, and PEx.     SCRIBE #2 NOTE: I, Marisol Cabral, am scribing for, and in the presence of,  Juan Cortez MD. I have scribed the remaining portions of the note not scribed by Scribe #1.      History     Chief Complaint   Patient presents with    Abdominal Pain     n/v/d     Review of patient's allergies indicates:   Allergen Reactions    Demerol [meperidine] Other (See Comments)     blackout         History of Present Illness     HPI    6/27/2019, 3:38 AM  History obtained from the patient      History of Present Illness: Shaina Olson is a 39 y.o. female patient with a PMHx of HTN who presents to the Emergency Department for evaluation of generalized abd pain which onset 2 days ago. Pt reports she hasn't eaten anything out of the ordinary. Symptoms are constant and moderate in severity.  No mitigating or exacerbating factors reported. Associated sxs include N/V/D. Patient denies any fever, chills, hematochezia, hematuria, constipation, frequency, dysuria, and all other sxs at this time. No further complaints or concerns at this time.     Arrival mode: Personal vehicle     PCP: Sarah Gonzalez MD        Past Medical History:  Past Medical History:   Diagnosis Date    Allergy     Anxiety 2004    Arthritis     Arthritis of right knee     Graves' disease 6/2014    Hypertension 2004    Vitamin D deficiency        Past Surgical History:  Past Surgical History:   Procedure Laterality Date    FRACTURE SURGERY Right 2013    right wrist    NASAL SEPTUM SURGERY  2001    SINUS SURGERY  2001    SINUS SURGERY  11/22/2017    SINUS SURGERY FUNCTIONAL ENDOSCOPIC N/A 11/22/2017    Performed by Renée Wilkinson MD at [facility] [place]    TONSILLECTOMY, ADENOIDECTOMY  2001    WRIST FRACTURE SURGERY Right 2013    Right wrist         Family History:  Family History   Problem Relation Age of  Onset    Hypertension Mother     Lupus Mother     Diverticulosis Mother     Cancer Sister 30        stg 1 boderline? ov cancer    Cancer Maternal Grandmother     Cancer Maternal Grandfather     Cataracts Maternal Grandfather     Diabetes Paternal Grandmother     Stroke Paternal Grandmother     Cancer Paternal Grandmother     Cancer Paternal Grandfather     Diabetes Paternal Aunt     Hypertension Paternal Aunt     Thyroid disease Neg Hx     Migraines Neg Hx     Asthma Neg Hx        Social History:  Social History     Tobacco Use    Smoking status: Never Smoker    Smokeless tobacco: Never Used   Substance and Sexual Activity    Alcohol use: Yes     Alcohol/week: 0.0 oz     Comment: occasionally    Drug use: No    Sexual activity: Yes     Partners: Male     Birth control/protection: OCP        Review of Systems     Review of Systems   Constitutional: Negative for chills and fever.   HENT: Negative for sore throat.    Respiratory: Negative for shortness of breath.    Cardiovascular: Negative for chest pain.   Gastrointestinal: Positive for abdominal pain (generalized), diarrhea, nausea and vomiting. Negative for blood in stool and constipation.   Genitourinary: Negative for dysuria, frequency and hematuria.   Musculoskeletal: Negative for back pain.   Skin: Negative for rash.   Neurological: Negative for weakness.   Hematological: Does not bruise/bleed easily.   All other systems reviewed and are negative.       Physical Exam     Initial Vitals [06/27/19 0242]   BP Pulse Resp Temp SpO2   (!) 144/65 95 20 98.8 °F (37.1 °C) 95 %      MAP       --          Physical Exam  Nursing Notes and Vital Signs Reviewed.  Constitutional: Patient is in no acute distress. Obese  Head: Atraumatic. Normocephalic.  Eyes: PERRL. EOM intact. Conjunctivae are not pale. No scleral icterus.  ENT: Mucous membranes are moist. Oropharynx is clear and symmetric.    Neck: Supple. Full ROM. No lymphadenopathy.  Cardiovascular:  "Regular rate. Regular rhythm. No murmurs, rubs, or gallops. Distal pulses are 2+ and symmetric.  Pulmonary/Chest: No respiratory distress. Clear to auscultation bilaterally. No wheezing or rales.  Abdominal: Soft and non-distended.  There is diffused abd tenderness.  No rebound, guarding, or rigidity. Good bowel sounds.  Genitourinary: No CVA tenderness  Musculoskeletal: Moves all extremities. No obvious deformities. No edema. No calf tenderness.  Skin: Warm and dry.  Neurological:  Alert, awake, and appropriate.  Normal speech.  No acute focal neurological deficits are appreciated.  Psychiatric: Normal affect. Good eye contact. Appropriate in content.     ED Course   Procedures  ED Vital Signs:  Vitals:    06/27/19 0242 06/27/19 0300 06/27/19 0330 06/27/19 0400   BP: (!) 144/65 (!) 110/58 115/61 132/60   Pulse: 95 86 82 82   Resp: 20   19   Temp: 98.8 °F (37.1 °C)      TempSrc: Oral      SpO2: 95% 98% 100% 100%   Weight: (!) 192.6 kg (424 lb 9.7 oz)      Height: 5' 4" (1.626 m)       06/27/19 0430 06/27/19 0550 06/27/19 0600 06/27/19 0630   BP: (!) 136/93 123/68 (!) 153/78 (!) 146/73   Pulse: 84 82 80 83   Resp:  17  15   Temp:  98 °F (36.7 °C)  98 °F (36.7 °C)   TempSrc:  Oral  Oral   SpO2: 99% 99% 100% 100%   Weight:       Height:           Abnormal Lab Results:  Labs Reviewed   CBC W/ AUTO DIFFERENTIAL - Abnormal; Notable for the following components:       Result Value    WBC 13.35 (*)     RDW 15.0 (*)     Gran # (ANC) 10.1 (*)     Gran% 75.9 (*)     All other components within normal limits    Narrative:     For upper or mid abdominal pain.   COMPREHENSIVE METABOLIC PANEL - Abnormal; Notable for the following components:    CO2 22 (*)     All other components within normal limits    Narrative:     For upper or mid abdominal pain.   LIPASE - Abnormal; Notable for the following components:    Lipase 94 (*)     All other components within normal limits    Narrative:     For upper or mid abdominal pain. "   URINALYSIS, REFLEX TO URINE CULTURE    Narrative:     In and Out Cath as needed it patient unable to void  Preferred Collection Type->Urine, Clean Catch   PREGNANCY TEST, URINE RAPID   PREGNANCY TEST, URINE RAPID    Narrative:     In and Out Cath as needed it patient unable to void  Preferred Collection Type->Urine, Clean Catch   POCT GLUCOSE        All Lab Results:  Results for orders placed or performed during the hospital encounter of 06/27/19   CBC auto differential   Result Value Ref Range    WBC 13.35 (H) 3.90 - 12.70 K/uL    RBC 4.20 4.00 - 5.40 M/uL    Hemoglobin 12.4 12.0 - 16.0 g/dL    Hematocrit 38.8 37.0 - 48.5 %    Mean Corpuscular Volume 92 82 - 98 fL    Mean Corpuscular Hemoglobin 29.5 27.0 - 31.0 pg    Mean Corpuscular Hemoglobin Conc 32.0 32.0 - 36.0 g/dL    RDW 15.0 (H) 11.5 - 14.5 %    Platelets 295 150 - 350 K/uL    MPV 9.3 9.2 - 12.9 fL    Gran # (ANC) 10.1 (H) 1.8 - 7.7 K/uL    Lymph # 2.5 1.0 - 4.8 K/uL    Mono # 0.5 0.3 - 1.0 K/uL    Eos # 0.2 0.0 - 0.5 K/uL    Baso # 0.01 0.00 - 0.20 K/uL    Gran% 75.9 (H) 38.0 - 73.0 %    Lymph% 18.7 18.0 - 48.0 %    Mono% 4.0 4.0 - 15.0 %    Eosinophil% 1.5 0.0 - 8.0 %    Basophil% 0.1 0.0 - 1.9 %    Differential Method Automated    Comprehensive metabolic panel   Result Value Ref Range    Sodium 141 136 - 145 mmol/L    Potassium 3.9 3.5 - 5.1 mmol/L    Chloride 109 95 - 110 mmol/L    CO2 22 (L) 23 - 29 mmol/L    Glucose 95 70 - 110 mg/dL    BUN, Bld 11 6 - 20 mg/dL    Creatinine 0.8 0.5 - 1.4 mg/dL    Calcium 9.0 8.7 - 10.5 mg/dL    Total Protein 6.8 6.0 - 8.4 g/dL    Albumin 3.6 3.5 - 5.2 g/dL    Total Bilirubin 0.7 0.1 - 1.0 mg/dL    Alkaline Phosphatase 102 55 - 135 U/L    AST 13 10 - 40 U/L    ALT 11 10 - 44 U/L    Anion Gap 10 8 - 16 mmol/L    eGFR if African American >60 >60 mL/min/1.73 m^2    eGFR if non African American >60 >60 mL/min/1.73 m^2   Urinalysis, Reflex to Urine Culture Urine, Clean Catch   Result Value Ref Range    Specimen UA Urine,  Clean Catch     Color, UA Yellow Yellow, Straw, Ximena    Appearance, UA Clear Clear    pH, UA 8.0 5.0 - 8.0    Specific Gravity, UA 1.015 1.005 - 1.030    Protein, UA Negative Negative    Glucose, UA Negative Negative    Ketones, UA Negative Negative    Bilirubin (UA) Negative Negative    Occult Blood UA Negative Negative    Nitrite, UA Negative Negative    Urobilinogen, UA Negative <2.0 EU/dL    Leukocytes, UA Negative Negative   Lipase   Result Value Ref Range    Lipase 94 (H) 4 - 60 U/L   Pregnancy, urine rapid   Result Value Ref Range    Preg Test, Ur Negative    POCT glucose   Result Value Ref Range    POCT Glucose 90 70 - 110 mg/dL         Imaging Results:  Imaging Results          US Abdomen Limited (Final result)  Result time 06/27/19 06:19:33    Final result by Juan Armstrong MD (06/27/19 06:19:33)                 Impression:      Limited right upper quadrant ultrasound demonstrates mild hepatic steatosis otherwise normal.      Electronically signed by: Juan Armstrong MD  Date:    06/27/2019  Time:    06:19             Narrative:    EXAMINATION:  US ABDOMEN LIMITED    CLINICAL HISTORY:  abdominal pain;    COMPARISON:  None    FINDINGS:  Limited right upper quadrant ultrasound performed.  The liver measures 17 cm in length and increased in echogenicity consistent with hepatic steatosis, mild.  Portal vein is patent.  Common bile duct is within normal limits at 3.3 mm..  No gallstones, gallbladder wall thickening, or focal tenderness over the gallbladder.  Right kidney is unremarkable measuring 11.6 cm.  Visualized portions of pancreas are unremarkable with mild fatty infiltration suspected.                                 The Emergency Provider reviewed the vital signs and test results, which are outlined above.     ED Discussion     6:00 AM: Dr. Wilkes transfers care of pt to Dr. Cortez, pending imaging results.      6:51 AM: Reassessed pt at this time.  Pt states she is feeling better at this time. Pt  reports she thinks that phenergan worked better than Zofran and is requesting prescription for phenegran. Repeat abdominal exam benign with no RLQ tenderness. Discussed with pt all pertinent ED information and results. Discussed pt dx and plan of tx. Gave pt all f/u and return to the ED instructions. All questions and concerns were addressed at this time. Pt expresses understanding of information and instructions, and is comfortable with plan to discharge. Pt is stable for discharge.    I discussed with patient and/or family/caretaker that evaluation in the ED does not suggest any emergent or life threatening medical conditions requiring immediate intervention beyond what was provided in the ED, and I believe patient is safe for discharge.  Regardless, an unremarkable evaluation in the ED does not preclude the development or presence of a serious of life threatening condition. As such, patient was instructed to return immediately for any worsening or change in current symptoms.          ED Medication(s):  Medications   ondansetron injection 4 mg (4 mg Intravenous Given 6/27/19 0259)   sodium chloride 0.9% bolus 1,000 mL (0 mLs Intravenous Stopped 6/27/19 0423)   promethazine (PHENERGAN) 25 mg in dextrose 5 % 50 mL IVPB (0 mg Intravenous Stopped 6/27/19 0546)       New Prescriptions    PROMETHAZINE (PHENERGAN) 25 MG TABLET    Take 1 tablet (25 mg total) by mouth every 8 (eight) hours as needed for Nausea.       Follow-up Information     Sarah Gonzalez MD In 2 days.    Specialty:  Family Medicine  Contact information:  [address] [facility] [address] [phone]             [facility] - .    Specialty:  Emergency Medicine  Why:  As needed, If symptoms worsen  Contact information:  [address] [facility] [address] [phone]                       Medical Decision Making:   Clinical Tests:   Lab Tests: Ordered and Reviewed  Radiological Study: Ordered  and Reviewed             Scribe Attestation:   Scribe #1: I performed the above scribed service and the documentation accurately describes the services I performed. I attest to the accuracy of the note.     Attending:   Physician Attestation Statement for Scribe #1: I, Sherita Wilkes MD, personally performed the services described in this documentation, as scribed by Kristy Chacko, in my presence, and it is both accurate and complete.       Scribe Attestation:   Scribe #2: I performed the above scribed service and the documentation accurately describes the services I performed. I attest to the accuracy of the note.    Attending Attestation:           Physician Attestation for Scribe:    Physician Attestation Statement for Scribe #2: I, Juan Cortez MD, reviewed documentation, as scribed by Marisol Cabral in my presence, and it is both accurate and complete. I also acknowledge and confirm the content of the note done by Scribe #1.           Clinical Impression       ICD-10-CM ICD-9-CM   1. Nausea vomiting and diarrhea R11.2 787.91    R19.7 787.01   2. Generalized abdominal pain R10.84 789.07       Disposition:   Disposition: Discharged  Condition: Stable         Juan Cortez MD  06/27/19 0658

## 2019-07-01 ENCOUNTER — CLINICAL SUPPORT (OUTPATIENT)
Dept: OTOLARYNGOLOGY | Facility: CLINIC | Age: 39
End: 2019-07-01
Payer: COMMERCIAL

## 2019-07-01 DIAGNOSIS — M25.562 ACUTE PAIN OF LEFT KNEE: ICD-10-CM

## 2019-07-01 DIAGNOSIS — M17.12 PRIMARY OSTEOARTHRITIS OF LEFT KNEE: ICD-10-CM

## 2019-07-01 DIAGNOSIS — J30.9 ALLERGIC RHINITIS, UNSPECIFIED SEASONALITY, UNSPECIFIED TRIGGER: Primary | ICD-10-CM

## 2019-07-01 PROCEDURE — 95117 IMMUNOTHERAPY INJECTIONS: CPT | Mod: S$GLB,,, | Performed by: ORTHOPAEDIC SURGERY

## 2019-07-01 PROCEDURE — 99999 PR PBB SHADOW E&M-EST. PATIENT-LVL II: CPT | Mod: PBBFAC,,,

## 2019-07-01 PROCEDURE — 99999 PR PBB SHADOW E&M-EST. PATIENT-LVL II: ICD-10-PCS | Mod: PBBFAC,,,

## 2019-07-01 PROCEDURE — 95117 PR IMMU2THERAPY, 2+ INJECTIONS: ICD-10-PCS | Mod: S$GLB,,, | Performed by: ORTHOPAEDIC SURGERY

## 2019-07-01 RX ORDER — NAPROXEN 500 MG/1
TABLET ORAL
Qty: 60 TABLET | Refills: 0 | Status: SHIPPED | OUTPATIENT
Start: 2019-07-01 | End: 2019-12-19

## 2019-07-01 NOTE — PROGRESS NOTES
Past Medical History:   Diagnosis Date    Allergy      Anxiety 2004    Arthritis      Arthritis of right knee      Graves' disease 6/2014    Hypertension 2004    Vitamin D deficiency              Review of patient's allergies indicates:   Allergen Reactions    Demerol [meperidine] Other (See Comments)       blackout       Ordering Physician:  Dr. Wilkinson  Order Type:  Written Order  Initial SNOT-20 score:  SNOT-20 score:    Treatment Sets:  Mix #1 #[id number] Exp: 11/8/2019  Allergens: Molds, mites, cockroach, dog, cat, feathers  Mix #2 #[id number] Exp:11/8/2019 Allergens: Trees  Mix #3 #[id number] Exp: 11/8/2019 Allergens: Weeds & grasses    Manufact[facility] by [facility]    At 0800 I administered 0.15ml subcutaneously, mix #1 (YELLOW VIAL) and mix #2 (YELLOW VIAL) in right arm, mix #3 (YELLOW VIAL) in left arm.  Pt tolerated injections well.  No complaints of pain or discomfort.  Pt monitored for 20 minutes, after no minutes no reaction was noted.

## 2019-07-05 ENCOUNTER — PATIENT MESSAGE (OUTPATIENT)
Dept: OPHTHALMOLOGY | Facility: CLINIC | Age: 39
End: 2019-07-05

## 2019-07-08 ENCOUNTER — CLINICAL SUPPORT (OUTPATIENT)
Dept: OTOLARYNGOLOGY | Facility: CLINIC | Age: 39
End: 2019-07-08
Payer: COMMERCIAL

## 2019-07-08 DIAGNOSIS — J30.9 ALLERGIC RHINITIS, UNSPECIFIED SEASONALITY, UNSPECIFIED TRIGGER: Primary | ICD-10-CM

## 2019-07-08 PROCEDURE — 99999 PR PBB SHADOW E&M-EST. PATIENT-LVL II: CPT | Mod: PBBFAC,,,

## 2019-07-08 PROCEDURE — 99999 PR PBB SHADOW E&M-EST. PATIENT-LVL II: ICD-10-PCS | Mod: PBBFAC,,,

## 2019-07-08 PROCEDURE — 95117 IMMUNOTHERAPY INJECTIONS: CPT | Mod: S$GLB,,, | Performed by: ORTHOPAEDIC SURGERY

## 2019-07-08 PROCEDURE — 95117 PR IMMU2THERAPY, 2+ INJECTIONS: ICD-10-PCS | Mod: S$GLB,,, | Performed by: ORTHOPAEDIC SURGERY

## 2019-07-08 NOTE — PROGRESS NOTES
Past Medical History:   Diagnosis Date    Allergy      Anxiety 2004    Arthritis      Arthritis of right knee      Graves' disease 6/2014    Hypertension 2004    Vitamin D deficiency              Review of patient's allergies indicates:   Allergen Reactions    Demerol [meperidine] Other (See Comments)       blackout       Ordering Physician:  Dr. Wilkinson  Order Type:  Written Order  Initial SNOT-20 score:  SNOT-20 score:    Treatment Sets:  Mix #1 #[id number] Exp: 11/8/2019  Allergens: Molds, mites, cockroach, dog, cat, feathers  Mix #2 #[id number] Exp:11/8/2019 Allergens: Trees  Mix #3 #[id number] Exp: 11/8/2019 Allergens: Weeds & grasses    Manufactured by [facility] [place], [place]    At 0758 I administered 0.20ml subcutaneously, mix #1 (YELLOW VIAL) and mix #2 (YELLOW VIAL) in right arm, mix #3 (YELLOW VIAL) in left arm.  Pt tolerated injections well.  No complaints of pain or discomfort.  Pt monitored for 20 minutes, after no minutes no reaction was noted.

## 2019-07-11 ENCOUNTER — PATIENT MESSAGE (OUTPATIENT)
Dept: OPHTHALMOLOGY | Facility: CLINIC | Age: 39
End: 2019-07-11

## 2019-07-11 ENCOUNTER — OFFICE VISIT (OUTPATIENT)
Dept: OPHTHALMOLOGY | Facility: CLINIC | Age: 39
End: 2019-07-11
Payer: COMMERCIAL

## 2019-07-11 DIAGNOSIS — H35.411 LATTICE DEGENERATION, RIGHT: ICD-10-CM

## 2019-07-11 DIAGNOSIS — H52.13 MYOPIA, BILATERAL: ICD-10-CM

## 2019-07-11 DIAGNOSIS — H40.053 OCULAR HYPERTENSION, BILATERAL: Primary | ICD-10-CM

## 2019-07-11 PROCEDURE — 92015 DETERMINE REFRACTIVE STATE: CPT | Mod: S$GLB,,, | Performed by: OPTOMETRIST

## 2019-07-11 PROCEDURE — 92015 PR REFRACTION: ICD-10-PCS | Mod: S$GLB,,, | Performed by: OPTOMETRIST

## 2019-07-11 PROCEDURE — 92310 CONTACT LENS FITTING OU: CPT | Mod: CSM,,, | Performed by: OPTOMETRIST

## 2019-07-11 PROCEDURE — 99999 PR PBB SHADOW E&M-EST. PATIENT-LVL I: CPT | Mod: PBBFAC,,, | Performed by: OPTOMETRIST

## 2019-07-11 PROCEDURE — 99999 PR PBB SHADOW E&M-EST. PATIENT-LVL I: ICD-10-PCS | Mod: PBBFAC,,, | Performed by: OPTOMETRIST

## 2019-07-11 PROCEDURE — 92310 PR CONTACT LENS FITTING (NO CHANGE): ICD-10-PCS | Mod: CSM,,, | Performed by: OPTOMETRIST

## 2019-07-11 PROCEDURE — 92014 COMPRE OPH EXAM EST PT 1/>: CPT | Mod: S$GLB,,, | Performed by: OPTOMETRIST

## 2019-07-11 PROCEDURE — 92133 POSTERIOR SEGMENT OCT OPTIC NERVE(OCULAR COHERENCE TOMOGRAPHY) - OU - BOTH EYES: ICD-10-PCS | Mod: S$GLB,,, | Performed by: OPTOMETRIST

## 2019-07-11 PROCEDURE — 92014 PR EYE EXAM, EST PATIENT,COMPREHESV: ICD-10-PCS | Mod: S$GLB,,, | Performed by: OPTOMETRIST

## 2019-07-11 PROCEDURE — 92133 CPTRZD OPH DX IMG PST SGM ON: CPT | Mod: S$GLB,,, | Performed by: OPTOMETRIST

## 2019-07-11 NOTE — PROGRESS NOTES
HPI     Pts last exam was 7/10/18 with [facility].   Medication eye drops if any: latanoprost qhs OU  Last HVF: 2/16/18  Last gOCT: 7/11/19  Last SDP:   Graves Disease  Lattice degeneration od  Retina hole OD 11:00  HPI    Any vision changes since last exam: decreased overall va  Eye pain: no  Other ocular symptoms: no    Do you wear currently wear glasses or contacts? gls    Interested in contacts today? yes    Do you plan on getting new glasses today? yes      Last edited by Ashley Hudson MA on 7/11/2019  8:33 AM. (History)            Assessment /Plan     For exam results, see Encounter Report.    Ocular hypertension, bilateral  -     Posterior Segment OCT Optic Nerve- Both eyes  IOP stable today and within acceptable range OU  Continue current treatment    Latanoprost qhs OU    Lattice degeneration, right  Stable   Monitor 12 months    Myopia, bilateral  Eyeglass Final Rx     Eyeglass Final Rx       Sphere Cylinder Axis    Right -4.50 +0.75 075    Left -5.00 +0.75 095    Expiration Date:  7/11/2020              Contact Lens Prescription (7/11/2019)        Brand Sphere    Right Dailies Aqua Comfort Plus -4.25    Left Dailies Aqua Comfort Plus -4.50    Expiration Date:  7/11/2020    Replacement:  Daily    Wearing Schedule:  Daily wear        Dispensed trial contact lenses today. RTC 1 week for contact lens check.  Discussed torics if va not adequate, pt to call or message    RTC 6 months for 24-2 VF and IOP check or PRN  Discussed above and all questions were answered.

## 2019-07-15 ENCOUNTER — CLINICAL SUPPORT (OUTPATIENT)
Dept: OTOLARYNGOLOGY | Facility: CLINIC | Age: 39
End: 2019-07-15
Payer: COMMERCIAL

## 2019-07-15 ENCOUNTER — LAB VISIT (OUTPATIENT)
Dept: LAB | Facility: HOSPITAL | Age: 39
End: 2019-07-15
Attending: INTERNAL MEDICINE
Payer: COMMERCIAL

## 2019-07-15 ENCOUNTER — OFFICE VISIT (OUTPATIENT)
Dept: PSYCHIATRY | Facility: CLINIC | Age: 39
End: 2019-07-15
Payer: COMMERCIAL

## 2019-07-15 DIAGNOSIS — E04.2 MULTIPLE THYROID NODULES: ICD-10-CM

## 2019-07-15 DIAGNOSIS — F48.9 DEFERRED DIAGNOSIS ON AXIS I: Primary | ICD-10-CM

## 2019-07-15 DIAGNOSIS — E55.9 VITAMIN D DEFICIENCY: ICD-10-CM

## 2019-07-15 DIAGNOSIS — E05.90 HYPERTHYROIDISM: ICD-10-CM

## 2019-07-15 DIAGNOSIS — J01.90 ACUTE SINUSITIS, RECURRENCE NOT SPECIFIED, UNSPECIFIED LOCATION: ICD-10-CM

## 2019-07-15 DIAGNOSIS — E21.3 HYPERPARATHYROIDISM: ICD-10-CM

## 2019-07-15 DIAGNOSIS — J30.9 ALLERGIC RHINITIS, UNSPECIFIED SEASONALITY, UNSPECIFIED TRIGGER: Primary | ICD-10-CM

## 2019-07-15 LAB
25(OH)D3+25(OH)D2 SERPL-MCNC: 35 NG/ML (ref 30–96)
ALBUMIN SERPL BCP-MCNC: 3.7 G/DL (ref 3.5–5.2)
ALBUMIN SERPL BCP-MCNC: 3.7 G/DL (ref 3.5–5.2)
ALP SERPL-CCNC: 103 U/L (ref 55–135)
ALP SERPL-CCNC: 103 U/L (ref 55–135)
ALT SERPL W/O P-5'-P-CCNC: 10 U/L (ref 10–44)
AST SERPL-CCNC: 13 U/L (ref 10–40)
BASOPHILS # BLD AUTO: 0.09 K/UL (ref 0–0.2)
BASOPHILS NFR BLD: 1 % (ref 0–1.9)
BILIRUB DIRECT SERPL-MCNC: 0.2 MG/DL (ref 0.1–0.3)
BILIRUB SERPL-MCNC: 0.5 MG/DL (ref 0.1–1)
DIFFERENTIAL METHOD: ABNORMAL
EOSINOPHIL # BLD AUTO: 0.1 K/UL (ref 0–0.5)
EOSINOPHIL NFR BLD: 1.5 % (ref 0–8)
ERYTHROCYTE [DISTWIDTH] IN BLOOD BY AUTOMATED COUNT: 14.2 % (ref 11.5–14.5)
HCT VFR BLD AUTO: 42 % (ref 37–48.5)
HGB BLD-MCNC: 12.8 G/DL (ref 12–16)
IMM GRANULOCYTES # BLD AUTO: 0.02 K/UL (ref 0–0.04)
IMM GRANULOCYTES NFR BLD AUTO: 0.2 % (ref 0–0.5)
LYMPHOCYTES # BLD AUTO: 2.7 K/UL (ref 1–4.8)
LYMPHOCYTES NFR BLD: 30.8 % (ref 18–48)
MCH RBC QN AUTO: 29.1 PG (ref 27–31)
MCHC RBC AUTO-ENTMCNC: 30.5 G/DL (ref 32–36)
MCV RBC AUTO: 96 FL (ref 82–98)
MONOCYTES # BLD AUTO: 0.5 K/UL (ref 0.3–1)
MONOCYTES NFR BLD: 5.7 % (ref 4–15)
NEUTROPHILS # BLD AUTO: 5.3 K/UL (ref 1.8–7.7)
NEUTROPHILS NFR BLD: 60.8 % (ref 38–73)
NRBC BLD-RTO: 0 /100 WBC
PHOSPHATE SERPL-MCNC: 2.8 MG/DL (ref 2.7–4.5)
PLATELET # BLD AUTO: 353 K/UL (ref 150–350)
PMV BLD AUTO: 10.6 FL (ref 9.2–12.9)
PROT SERPL-MCNC: 7 G/DL (ref 6–8.4)
RBC # BLD AUTO: 4.4 M/UL (ref 4–5.4)
WBC # BLD AUTO: 8.67 K/UL (ref 3.9–12.7)

## 2019-07-15 PROCEDURE — 90834 PR PSYCHOTHERAPY W/PATIENT, 45 MIN: ICD-10-PCS | Mod: S$GLB,,, | Performed by: SOCIAL WORKER

## 2019-07-15 PROCEDURE — 83970 ASSAY OF PARATHORMONE: CPT

## 2019-07-15 PROCEDURE — 80076 HEPATIC FUNCTION PANEL: CPT

## 2019-07-15 PROCEDURE — 95117 IMMUNOTHERAPY INJECTIONS: CPT | Mod: S$GLB,,, | Performed by: ORTHOPAEDIC SURGERY

## 2019-07-15 PROCEDURE — 95117 PR IMMU2THERAPY, 2+ INJECTIONS: ICD-10-PCS | Mod: S$GLB,,, | Performed by: ORTHOPAEDIC SURGERY

## 2019-07-15 PROCEDURE — 84481 FREE ASSAY (FT-3): CPT

## 2019-07-15 PROCEDURE — 84439 ASSAY OF FREE THYROXINE: CPT

## 2019-07-15 PROCEDURE — 36415 COLL VENOUS BLD VENIPUNCTURE: CPT | Mod: PO

## 2019-07-15 PROCEDURE — 82306 VITAMIN D 25 HYDROXY: CPT

## 2019-07-15 PROCEDURE — 85025 COMPLETE CBC W/AUTO DIFF WBC: CPT

## 2019-07-15 PROCEDURE — 99999 PR PBB SHADOW E&M-EST. PATIENT-LVL II: CPT | Mod: PBBFAC,,,

## 2019-07-15 PROCEDURE — 84443 ASSAY THYROID STIM HORMONE: CPT

## 2019-07-15 PROCEDURE — 84100 ASSAY OF PHOSPHORUS: CPT

## 2019-07-15 PROCEDURE — 90834 PSYTX W PT 45 MINUTES: CPT | Mod: S$GLB,,, | Performed by: SOCIAL WORKER

## 2019-07-15 PROCEDURE — 99999 PR PBB SHADOW E&M-EST. PATIENT-LVL II: ICD-10-PCS | Mod: PBBFAC,,,

## 2019-07-15 NOTE — PROGRESS NOTES
Past Medical History:   Diagnosis Date    Allergy      Anxiety 2004    Arthritis      Arthritis of right knee      Graves' disease 6/2014    Hypertension 2004    Vitamin D deficiency              Review of patient's allergies indicates:   Allergen Reactions    Demerol [meperidine] Other (See Comments)       blackout       Ordering Physician:  Dr. Wilkinson  Order Type:  Written Order  Initial SNOT-20 score:  SNOT-20 score:    Treatment Sets:  Mix #1 #[id number] Exp: 11/8/2019  Allergens: Molds, mites, cockroach, dog, cat, feathers  Mix #2 #[id number] Exp:11/8/2019 Allergens: Trees  Mix #3 #050387 Exp: 11/8/2019 Allergens: Weeds & grasses    Manufactured by [facility] [facility]    At 0930 I administered 0.25ml subcutaneously, mix #1 (YELLOW VIAL) and mix #2 (YELLOW VIAL) in right arm, mix #3 (YELLOW VIAL) in left arm.  Pt tolerated injections well.  No complaints of pain or discomfort.  Pt monitored for 20 minutes, after no minutes no reaction was noted.

## 2019-07-16 LAB
PTH-INTACT SERPL-MCNC: 94 PG/ML (ref 9–77)
T3FREE SERPL-MCNC: 2.5 PG/ML (ref 2.3–4.2)
T4 FREE SERPL-MCNC: 0.79 NG/DL (ref 0.71–1.51)
TSH SERPL DL<=0.005 MIU/L-ACNC: 3.15 UIU/ML (ref 0.4–4)

## 2019-07-16 RX ORDER — FLUTICASONE PROPIONATE 50 MCG
2 SPRAY, SUSPENSION (ML) NASAL DAILY
Qty: 16 ML | Refills: 6 | Status: SHIPPED | OUTPATIENT
Start: 2019-07-16 | End: 2020-05-19 | Stop reason: SDUPTHER

## 2019-07-16 RX ORDER — PREGABALIN 100 MG/1
100 CAPSULE ORAL DAILY
Qty: 90 CAPSULE | Refills: 0 | Status: SHIPPED | OUTPATIENT
Start: 2019-07-16 | End: 2019-10-17 | Stop reason: SDUPTHER

## 2019-07-17 ENCOUNTER — TELEPHONE (OUTPATIENT)
Dept: FAMILY MEDICINE | Facility: CLINIC | Age: 39
End: 2019-07-17

## 2019-07-22 ENCOUNTER — CLINICAL SUPPORT (OUTPATIENT)
Dept: OTOLARYNGOLOGY | Facility: CLINIC | Age: 39
End: 2019-07-22
Payer: COMMERCIAL

## 2019-07-22 ENCOUNTER — PATIENT MESSAGE (OUTPATIENT)
Dept: ENDOCRINOLOGY | Facility: CLINIC | Age: 39
End: 2019-07-22

## 2019-07-22 ENCOUNTER — OFFICE VISIT (OUTPATIENT)
Dept: ENDOCRINOLOGY | Facility: CLINIC | Age: 39
End: 2019-07-22
Payer: COMMERCIAL

## 2019-07-22 VITALS
WEIGHT: 293 LBS | HEIGHT: 64 IN | BODY MASS INDEX: 50.02 KG/M2 | DIASTOLIC BLOOD PRESSURE: 83 MMHG | SYSTOLIC BLOOD PRESSURE: 136 MMHG

## 2019-07-22 DIAGNOSIS — E05.90 HYPERTHYROIDISM: Primary | ICD-10-CM

## 2019-07-22 DIAGNOSIS — I10 HYPERTENSION GOAL BP (BLOOD PRESSURE) < 140/90: ICD-10-CM

## 2019-07-22 DIAGNOSIS — R63.5 WEIGHT GAIN: ICD-10-CM

## 2019-07-22 DIAGNOSIS — E04.2 MULTIPLE THYROID NODULES: ICD-10-CM

## 2019-07-22 DIAGNOSIS — E21.3 HYPERPARATHYROIDISM: ICD-10-CM

## 2019-07-22 DIAGNOSIS — J30.9 ALLERGIC RHINITIS, UNSPECIFIED SEASONALITY, UNSPECIFIED TRIGGER: Primary | ICD-10-CM

## 2019-07-22 DIAGNOSIS — E55.9 VITAMIN D DEFICIENCY: ICD-10-CM

## 2019-07-22 PROCEDURE — 3079F DIAST BP 80-89 MM HG: CPT | Mod: CPTII,S$GLB,, | Performed by: INTERNAL MEDICINE

## 2019-07-22 PROCEDURE — 99214 PR OFFICE/OUTPT VISIT, EST, LEVL IV, 30-39 MIN: ICD-10-PCS | Mod: S$GLB,,, | Performed by: INTERNAL MEDICINE

## 2019-07-22 PROCEDURE — 99999 PR PBB SHADOW E&M-EST. PATIENT-LVL II: ICD-10-PCS | Mod: PBBFAC,,,

## 2019-07-22 PROCEDURE — 3075F PR MOST RECENT SYSTOLIC BLOOD PRESS GE 130-139MM HG: ICD-10-PCS | Mod: CPTII,S$GLB,, | Performed by: INTERNAL MEDICINE

## 2019-07-22 PROCEDURE — 95117 IMMUNOTHERAPY INJECTIONS: CPT | Mod: S$GLB,,, | Performed by: ORTHOPAEDIC SURGERY

## 2019-07-22 PROCEDURE — 95117 PR IMMU2THERAPY, 2+ INJECTIONS: ICD-10-PCS | Mod: S$GLB,,, | Performed by: ORTHOPAEDIC SURGERY

## 2019-07-22 PROCEDURE — 3008F PR BODY MASS INDEX (BMI) DOCUMENTED: ICD-10-PCS | Mod: CPTII,S$GLB,, | Performed by: INTERNAL MEDICINE

## 2019-07-22 PROCEDURE — 99214 OFFICE O/P EST MOD 30 MIN: CPT | Mod: S$GLB,,, | Performed by: INTERNAL MEDICINE

## 2019-07-22 PROCEDURE — 99999 PR PBB SHADOW E&M-EST. PATIENT-LVL III: CPT | Mod: PBBFAC,,, | Performed by: INTERNAL MEDICINE

## 2019-07-22 PROCEDURE — 99999 PR PBB SHADOW E&M-EST. PATIENT-LVL III: ICD-10-PCS | Mod: PBBFAC,,, | Performed by: INTERNAL MEDICINE

## 2019-07-22 PROCEDURE — 3008F BODY MASS INDEX DOCD: CPT | Mod: CPTII,S$GLB,, | Performed by: INTERNAL MEDICINE

## 2019-07-22 PROCEDURE — 99999 PR PBB SHADOW E&M-EST. PATIENT-LVL II: CPT | Mod: PBBFAC,,,

## 2019-07-22 PROCEDURE — 3075F SYST BP GE 130 - 139MM HG: CPT | Mod: CPTII,S$GLB,, | Performed by: INTERNAL MEDICINE

## 2019-07-22 PROCEDURE — 3079F PR MOST RECENT DIASTOLIC BLOOD PRESSURE 80-89 MM HG: ICD-10-PCS | Mod: CPTII,S$GLB,, | Performed by: INTERNAL MEDICINE

## 2019-07-22 RX ORDER — ASPIRIN 325 MG
50000 TABLET, DELAYED RELEASE (ENTERIC COATED) ORAL WEEKLY
Qty: 12 CAPSULE | Refills: 1 | Status: SHIPPED | OUTPATIENT
Start: 2019-07-22 | End: 2019-07-22 | Stop reason: SDUPTHER

## 2019-07-22 RX ORDER — ASPIRIN 325 MG
50000 TABLET, DELAYED RELEASE (ENTERIC COATED) ORAL WEEKLY
Qty: 12 CAPSULE | Refills: 1 | Status: SHIPPED | OUTPATIENT
Start: 2019-07-22 | End: 2020-05-27 | Stop reason: SDUPTHER

## 2019-07-22 NOTE — PROGRESS NOTES
Past Medical History:   Diagnosis Date    Allergy      Anxiety 2004    Arthritis      Arthritis of right knee      Graves' disease 6/2014    Hypertension 2004    Vitamin D deficiency              Review of patient's allergies indicates:   Allergen Reactions    Demerol [meperidine] Other (See Comments)       blackout       Ordering Physician:  Dr. Wilkinson  Order Type:  Written Order  Initial SNOT-20 score:  SNOT-20 score:    Treatment Sets:  Mix #1 #[id number] Exp: 11/8/2019  Allergens: Molds, mites, cockroach, dog, cat, feathers  Mix #2 #[id number] Exp:11/8/2019 Allergens: Trees  Mix #3 #[id number] Exp: 11/8/2019 Allergens: Weeds & grasses    Manufactured by [facility]    At 0835 I administered 0.30ml subcutaneously, mix #1 (YELLOW VIAL) and mix #2 (YELLOW VIAL) in right arm, mix #3 (YELLOW VIAL) in left arm.  Pt tolerated injections well.  No complaints of pain or discomfort.  Pt monitored for 20 minutes, after no minutes no reaction was noted.

## 2019-07-23 RX ORDER — AMLODIPINE BESYLATE 5 MG/1
TABLET ORAL
Qty: 90 TABLET | Refills: 1 | Status: SHIPPED | OUTPATIENT
Start: 2019-07-23 | End: 2021-04-01

## 2019-07-29 ENCOUNTER — CLINICAL SUPPORT (OUTPATIENT)
Dept: OTOLARYNGOLOGY | Facility: CLINIC | Age: 39
End: 2019-07-29
Payer: COMMERCIAL

## 2019-07-29 DIAGNOSIS — J30.9 ALLERGIC RHINITIS, UNSPECIFIED SEASONALITY, UNSPECIFIED TRIGGER: Primary | ICD-10-CM

## 2019-07-29 PROCEDURE — 95117 IMMUNOTHERAPY INJECTIONS: CPT | Mod: S$GLB,,, | Performed by: ORTHOPAEDIC SURGERY

## 2019-07-29 PROCEDURE — 95117 PR IMMU2THERAPY, 2+ INJECTIONS: ICD-10-PCS | Mod: S$GLB,,, | Performed by: ORTHOPAEDIC SURGERY

## 2019-07-29 PROCEDURE — 99999 PR PBB SHADOW E&M-EST. PATIENT-LVL II: ICD-10-PCS | Mod: PBBFAC,,,

## 2019-07-29 PROCEDURE — 99999 PR PBB SHADOW E&M-EST. PATIENT-LVL II: CPT | Mod: PBBFAC,,,

## 2019-07-29 NOTE — PROGRESS NOTES
Past Medical History:   Diagnosis Date    Allergy      Anxiety 2004    Arthritis      Arthritis of right knee      Graves' disease 6/2014    Hypertension 2004    Vitamin D deficiency              Review of patient's allergies indicates:   Allergen Reactions    Demerol [meperidine] Other (See Comments)       blackout       Ordering Physician:  Dr. Wilkinson  Order Type:  Written Order  Initial SNOT-20 score:  SNOT-20 score:    Treatment Sets:  Mix #1 #[id number] Exp: 11/8/2019  Allergens: Molds, mites, cockroach, dog, cat, feathers  Mix #2 #[id number] Exp:11/8/2019 Allergens: Trees  Mix #3 #604874 Exp: 11/8/2019 Allergens: Weeds & grasses    Manufactured by [facility]    At 0800 I administered 0.35ml subcutaneously, mix #1 (YELLOW VIAL) and mix #2 (YELLOW VIAL) in right arm, mix #3 (YELLOW VIAL) in left arm.  Pt tolerated injections well.  No complaints of pain or discomfort.  Pt monitored for 20 minutes, after no minutes no reaction was noted.

## 2019-07-30 ENCOUNTER — PATIENT MESSAGE (OUTPATIENT)
Dept: SLEEP MEDICINE | Facility: CLINIC | Age: 39
End: 2019-07-30

## 2019-07-30 ENCOUNTER — TELEPHONE (OUTPATIENT)
Dept: FAMILY MEDICINE | Facility: CLINIC | Age: 39
End: 2019-07-30

## 2019-07-30 RX ORDER — AZELASTINE 1 MG/ML
1 SPRAY, METERED NASAL 2 TIMES DAILY
Qty: 30 ML | Refills: 11 | Status: SHIPPED | OUTPATIENT
Start: 2019-07-30

## 2019-08-12 ENCOUNTER — CLINICAL SUPPORT (OUTPATIENT)
Dept: OTOLARYNGOLOGY | Facility: CLINIC | Age: 39
End: 2019-08-12
Payer: COMMERCIAL

## 2019-08-12 DIAGNOSIS — J30.9 ALLERGIC RHINITIS, UNSPECIFIED SEASONALITY, UNSPECIFIED TRIGGER: Primary | ICD-10-CM

## 2019-08-12 PROCEDURE — 99999 PR PBB SHADOW E&M-EST. PATIENT-LVL III: ICD-10-PCS | Mod: PBBFAC,,,

## 2019-08-12 PROCEDURE — 99999 PR PBB SHADOW E&M-EST. PATIENT-LVL III: CPT | Mod: PBBFAC,,,

## 2019-08-12 PROCEDURE — 95117 PR IMMU2THERAPY, 2+ INJECTIONS: ICD-10-PCS | Mod: S$GLB,,, | Performed by: ORTHOPAEDIC SURGERY

## 2019-08-12 PROCEDURE — 95117 IMMUNOTHERAPY INJECTIONS: CPT | Mod: S$GLB,,, | Performed by: ORTHOPAEDIC SURGERY

## 2019-08-12 NOTE — PROGRESS NOTES
Past Medical History:   Diagnosis Date    Allergy      Anxiety 2004    Arthritis      Arthritis of right knee      Graves' disease 6/2014    Hypertension 2004    Vitamin D deficiency              Review of patient's allergies indicates:   Allergen Reactions    Demerol [meperidine] Other (See Comments)       blackout       Ordering Physician:  Dr. Wilkinson  Order Type:  Written Order  Initial SNOT-20 score: 8/12/2019  29  SNOT-20 score:    Treatment Sets:  Mix #1 #551755 Exp: 11/8/2019  Allergens: Molds, mites, cockroach, dog, cat, feathers  Mix #2 #[id number] Exp:11/8/2019 Allergens: Trees  Mix #3 #[id number] Exp: 11/8/2019 Allergens: Weeds & grasses    Manufactured by [facility]    At 0808 I administered 0.40ml subcutaneously, mix #1 (YELLOW VIAL) and mix #2 (YELLOW VIAL) in right arm, mix #3 (YELLOW VIAL) in left arm.  Pt tolerated injections well.  No complaints of pain or discomfort.  Pt monitored for 20 minutes, after no minutes no reaction was noted.

## 2019-08-19 ENCOUNTER — CLINICAL SUPPORT (OUTPATIENT)
Dept: OTOLARYNGOLOGY | Facility: CLINIC | Age: 39
End: 2019-08-19
Payer: COMMERCIAL

## 2019-08-19 DIAGNOSIS — J30.9 ALLERGIC RHINITIS, UNSPECIFIED SEASONALITY, UNSPECIFIED TRIGGER: Primary | ICD-10-CM

## 2019-08-19 PROCEDURE — 95117 IMMUNOTHERAPY INJECTIONS: CPT | Mod: S$GLB,,, | Performed by: ORTHOPAEDIC SURGERY

## 2019-08-19 PROCEDURE — 99999 PR PBB SHADOW E&M-EST. PATIENT-LVL III: ICD-10-PCS | Mod: PBBFAC,,,

## 2019-08-19 PROCEDURE — 99999 PR PBB SHADOW E&M-EST. PATIENT-LVL III: CPT | Mod: PBBFAC,,,

## 2019-08-19 PROCEDURE — 95117 PR IMMU2THERAPY, 2+ INJECTIONS: ICD-10-PCS | Mod: S$GLB,,, | Performed by: ORTHOPAEDIC SURGERY

## 2019-08-19 NOTE — PROGRESS NOTES
Past Medical History:   Diagnosis Date    Allergy      Anxiety 2004    Arthritis      Arthritis of right knee      Graves' disease 6/2014    Hypertension 2004    Vitamin D deficiency              Review of patient's allergies indicates:   Allergen Reactions    Demerol [meperidine] Other (See Comments)       blackout       Ordering Physician:  Dr. Wilkinson  Order Type:  Written Order  Initial SNOT-20 score: 8/12/2019  29  SNOT-20 score:    Treatment Sets:  Mix #1 #881808 Exp: 11/8/2019  Allergens: Molds, mites, cockroach, dog, cat, feathers  Mix #2 #473009 Exp:11/8/2019 Allergens: Trees  Mix #3 #[id number] Exp: 11/8/2019 Allergens: Weeds & grasses    Manufact[facility] by [facility]    At 0801 I administered 0.45ml subcutaneously, mix #1 (YELLOW VIAL) and mix #2 (YELLOW VIAL) in right arm, mix #3 (YELLOW VIAL) in left arm.  Pt tolerated injections well.  No complaints of pain or discomfort.  Pt monitored for 20 minutes, after no minutes no reaction was noted.

## 2019-08-20 DIAGNOSIS — J30.9 ALLERGIC RHINITIS: ICD-10-CM

## 2019-08-22 ENCOUNTER — OFFICE VISIT (OUTPATIENT)
Dept: FAMILY MEDICINE | Facility: CLINIC | Age: 39
End: 2019-08-22
Attending: FAMILY MEDICINE
Payer: COMMERCIAL

## 2019-08-22 VITALS
DIASTOLIC BLOOD PRESSURE: 78 MMHG | OXYGEN SATURATION: 99 % | TEMPERATURE: 99 F | WEIGHT: 293 LBS | HEART RATE: 86 BPM | SYSTOLIC BLOOD PRESSURE: 126 MMHG | HEIGHT: 64 IN | BODY MASS INDEX: 50.02 KG/M2

## 2019-08-22 DIAGNOSIS — F31.74 BIPOLAR DISORDER, IN FULL REMISSION, MOST RECENT EPISODE MANIC: ICD-10-CM

## 2019-08-22 DIAGNOSIS — M54.16 LUMBAR RADICULOPATHY: Primary | ICD-10-CM

## 2019-08-22 DIAGNOSIS — J30.9 ALLERGIC RHINITIS: ICD-10-CM

## 2019-08-22 DIAGNOSIS — I10 HYPERTENSION, UNSPECIFIED TYPE: ICD-10-CM

## 2019-08-22 DIAGNOSIS — E66.01 MORBID OBESITY WITH BMI OF 70 AND OVER, ADULT: ICD-10-CM

## 2019-08-22 PROCEDURE — 96372 PR INJECTION,THERAP/PROPH/DIAG2ST, IM OR SUBCUT: ICD-10-PCS | Mod: S$GLB,,, | Performed by: FAMILY MEDICINE

## 2019-08-22 PROCEDURE — 99214 PR OFFICE/OUTPT VISIT, EST, LEVL IV, 30-39 MIN: ICD-10-PCS | Mod: 25,S$GLB,, | Performed by: FAMILY MEDICINE

## 2019-08-22 PROCEDURE — 3078F PR MOST RECENT DIASTOLIC BLOOD PRESSURE < 80 MM HG: ICD-10-PCS | Mod: CPTII,S$GLB,, | Performed by: FAMILY MEDICINE

## 2019-08-22 PROCEDURE — 3008F BODY MASS INDEX DOCD: CPT | Mod: CPTII,S$GLB,, | Performed by: FAMILY MEDICINE

## 2019-08-22 PROCEDURE — 99214 OFFICE O/P EST MOD 30 MIN: CPT | Mod: 25,S$GLB,, | Performed by: FAMILY MEDICINE

## 2019-08-22 PROCEDURE — 99999 PR PBB SHADOW E&M-EST. PATIENT-LVL III: ICD-10-PCS | Mod: PBBFAC,,, | Performed by: FAMILY MEDICINE

## 2019-08-22 PROCEDURE — 99999 PR PBB SHADOW E&M-EST. PATIENT-LVL III: CPT | Mod: PBBFAC,,, | Performed by: FAMILY MEDICINE

## 2019-08-22 PROCEDURE — 96372 THER/PROPH/DIAG INJ SC/IM: CPT | Mod: S$GLB,,, | Performed by: FAMILY MEDICINE

## 2019-08-22 PROCEDURE — 3078F DIAST BP <80 MM HG: CPT | Mod: CPTII,S$GLB,, | Performed by: FAMILY MEDICINE

## 2019-08-22 PROCEDURE — 3074F PR MOST RECENT SYSTOLIC BLOOD PRESSURE < 130 MM HG: ICD-10-PCS | Mod: CPTII,S$GLB,, | Performed by: FAMILY MEDICINE

## 2019-08-22 PROCEDURE — 3074F SYST BP LT 130 MM HG: CPT | Mod: CPTII,S$GLB,, | Performed by: FAMILY MEDICINE

## 2019-08-22 PROCEDURE — 3008F PR BODY MASS INDEX (BMI) DOCUMENTED: ICD-10-PCS | Mod: CPTII,S$GLB,, | Performed by: FAMILY MEDICINE

## 2019-08-22 RX ORDER — METHYLPREDNISOLONE ACETATE 80 MG/ML
80 INJECTION, SUSPENSION INTRA-ARTICULAR; INTRALESIONAL; INTRAMUSCULAR; SOFT TISSUE ONCE
Status: COMPLETED | OUTPATIENT
Start: 2019-08-22 | End: 2019-08-22

## 2019-08-22 RX ORDER — PREDNISONE 20 MG/1
TABLET ORAL
Qty: 20 TABLET | Refills: 0 | Status: SHIPPED | OUTPATIENT
Start: 2019-08-22 | End: 2019-10-02

## 2019-08-22 RX ORDER — LEVOCETIRIZINE DIHYDROCHLORIDE 5 MG/1
5 TABLET, FILM COATED ORAL NIGHTLY
Qty: 90 TABLET | Refills: 1 | Status: SHIPPED | OUTPATIENT
Start: 2019-08-22

## 2019-08-22 RX ORDER — LEVOCETIRIZINE DIHYDROCHLORIDE 5 MG/1
TABLET, FILM COATED ORAL
Qty: 30 TABLET | Refills: 5 | Status: SHIPPED | OUTPATIENT
Start: 2019-08-22 | End: 2020-01-09 | Stop reason: SDUPTHER

## 2019-08-22 RX ADMIN — METHYLPREDNISOLONE ACETATE 80 MG: 80 INJECTION, SUSPENSION INTRA-ARTICULAR; INTRALESIONAL; INTRAMUSCULAR; SOFT TISSUE at 01:08

## 2019-08-22 NOTE — PROGRESS NOTES
Subjective:       Patient ID: Shaina Olson is a 39 y.o. female.    Chief Complaint: Numbness (left leg)    39 y old female with MO ,HTN ,  Bipolar disorder and lumbar radiculopathy with LLE numbness and pain for 3 w. Pain has improved. Still bothered by  L 5th  toe numbness and mild LLE cramps . Has had few PT sessions  , had EMG last y . Also on Lyrica since last years which helps nocturnal cramps  some . Working on healthful diet . Mood is stable     Review of Systems   Constitutional: Negative.    HENT: Negative.    Eyes: Negative.    Respiratory: Negative.    Cardiovascular: Negative.    Gastrointestinal: Negative.    Genitourinary: Negative.    Musculoskeletal: Positive for arthralgias.   Skin: Negative.    Hematological: Negative.        Objective:      Physical Exam   Constitutional: She is oriented to person, place, and time. No distress.   HENT:   Head: Normocephalic and atraumatic.   Right Ear: External ear normal.   Left Ear: External ear normal.   Mouth/Throat: No oropharyngeal exudate.   Eyes: Pupils are equal, round, and reactive to light. Conjunctivae and EOM are normal. Right eye exhibits no discharge. Left eye exhibits no discharge. No scleral icterus.   Neck: Normal range of motion. Neck supple. No JVD present. No tracheal deviation present. No thyromegaly present.   Cardiovascular: Normal rate, regular rhythm and normal heart sounds. Exam reveals no gallop and no friction rub.   No murmur heard.  Pulmonary/Chest: Effort normal and breath sounds normal. No stridor. No respiratory distress. She has no wheezes. She has no rales. She exhibits no tenderness.   Abdominal: Soft. Bowel sounds are normal. She exhibits no distension. There is no tenderness. There is no rebound and no guarding.   Musculoskeletal:        Lumbar back: She exhibits decreased range of motion, tenderness and bony tenderness.   + LLE LASÈGUE    Lymphadenopathy:     She has no cervical adenopathy.   Neurological: She is alert  and oriented to person, place, and time. A sensory deficit is present.   Skin: Skin is warm and dry. She is not diaphoretic.   Psychiatric: She has a normal mood and affect. Her behavior is normal. Judgment and thought content normal.       Assessment:          Shaina was seen today for numbness.    Diagnoses and all orders for this visit:    Lumbar radiculopathy    Morbid obesity with BMI of 70 and over, adult    Bipolar disorder, in full remission, most recent episode manic    Hypertension, unspecified type    Other orders  -     methylPREDNISolone acetate injection 80 mg  -     predniSONE (DELTASONE) 20 MG tablet; Take 3 pills daily for 3 days, then 2 pills daily for 3 days, then 1 pill daily for 3 days, then 1/2 pill daily for 4 days.      Plan:     Shaina was seen today for numbness.    Diagnoses and all orders for this visit:    Lumbar radiculopathy    Other orders  -     methylPREDNISolone acetate injection 80 mg  -     predniSONE (DELTASONE) 20 MG tablet; Take 3 pills daily for 3 days, then 2 pills daily for 3 days, then 1 pill daily for 3 days, then 1/2 pill daily for 4 days.     Refused PM ref .   Will try steroid taper   Diet and ex   Monitor laura and high BP while taking prednisone

## 2019-09-09 ENCOUNTER — CLINICAL SUPPORT (OUTPATIENT)
Dept: OTOLARYNGOLOGY | Facility: CLINIC | Age: 39
End: 2019-09-09
Payer: COMMERCIAL

## 2019-09-09 ENCOUNTER — PATIENT MESSAGE (OUTPATIENT)
Dept: PULMONOLOGY | Facility: HOSPITAL | Age: 39
End: 2019-09-09

## 2019-09-09 DIAGNOSIS — J30.9 ALLERGIC RHINITIS, UNSPECIFIED SEASONALITY, UNSPECIFIED TRIGGER: Primary | ICD-10-CM

## 2019-09-09 PROCEDURE — 95117 PR IMMU2THERAPY, 2+ INJECTIONS: ICD-10-PCS | Mod: S$GLB,,, | Performed by: ORTHOPAEDIC SURGERY

## 2019-09-09 PROCEDURE — 99999 PR PBB SHADOW E&M-EST. PATIENT-LVL III: CPT | Mod: PBBFAC,,,

## 2019-09-09 PROCEDURE — 99999 PR PBB SHADOW E&M-EST. PATIENT-LVL III: ICD-10-PCS | Mod: PBBFAC,,,

## 2019-09-09 PROCEDURE — 95117 IMMUNOTHERAPY INJECTIONS: CPT | Mod: S$GLB,,, | Performed by: ORTHOPAEDIC SURGERY

## 2019-09-09 NOTE — PROGRESS NOTES
Past Medical History:   Diagnosis Date    Allergy      Anxiety 2004    Arthritis      Arthritis of right knee      Graves' disease 6/2014    Hypertension 2004    Vitamin D deficiency              Review of patient's allergies indicates:   Allergen Reactions    Demerol [meperidine] Other (See Comments)       blackout       Ordering Physician:  Dr. Wilkinson  Order Type:  Written Order  Initial SNOT-20 score: 8/12/2019  29  SNOT-20 score:    Treatment Sets:  Mix #1 #618914 Exp: 11/8/2019  Allergens: Molds, mites, cockroach, dog, cat, feathers  Mix #2 #[id number] Exp:11/8/2019 Allergens: Trees  Mix #3 #[id number] Exp: 11/8/2019 Allergens: Weeds & grasses    [facility]    At 0800 I administered 0.50ml subcutaneously, mix #1 (YELLOW VIAL) and mix #2 (YELLOW VIAL) in right arm, mix #3 (YELLOW VIAL) in left arm.  Pt tolerated injections well.  No complaints of pain or discomfort.  Pt monitored for 20 minutes, after no minutes no reaction was noted.

## 2019-09-12 ENCOUNTER — OFFICE VISIT (OUTPATIENT)
Dept: PSYCHIATRY | Facility: CLINIC | Age: 39
End: 2019-09-12
Payer: COMMERCIAL

## 2019-09-12 ENCOUNTER — OFFICE VISIT (OUTPATIENT)
Dept: ORTHOPEDICS | Facility: CLINIC | Age: 39
End: 2019-09-12
Payer: COMMERCIAL

## 2019-09-12 VITALS
BODY MASS INDEX: 50.02 KG/M2 | SYSTOLIC BLOOD PRESSURE: 132 MMHG | HEIGHT: 64 IN | DIASTOLIC BLOOD PRESSURE: 76 MMHG | HEART RATE: 94 BPM | WEIGHT: 293 LBS

## 2019-09-12 DIAGNOSIS — Z96.9 RETAINED ORTHOPEDIC HARDWARE: ICD-10-CM

## 2019-09-12 DIAGNOSIS — M17.12 PRIMARY OSTEOARTHRITIS OF LEFT KNEE: Primary | ICD-10-CM

## 2019-09-12 DIAGNOSIS — F41.1 GAD (GENERALIZED ANXIETY DISORDER): ICD-10-CM

## 2019-09-12 DIAGNOSIS — M25.531 RIGHT WRIST PAIN: ICD-10-CM

## 2019-09-12 DIAGNOSIS — F31.32 BIPOLAR AFFECTIVE DISORDER, CURRENTLY DEPRESSED, MODERATE: Primary | ICD-10-CM

## 2019-09-12 DIAGNOSIS — E66.01 MORBID OBESITY WITH BMI OF 70 AND OVER, ADULT: ICD-10-CM

## 2019-09-12 PROCEDURE — 99999 PR PBB SHADOW E&M-EST. PATIENT-LVL IV: CPT | Mod: PBBFAC,,, | Performed by: PHYSICIAN ASSISTANT

## 2019-09-12 PROCEDURE — 3078F PR MOST RECENT DIASTOLIC BLOOD PRESSURE < 80 MM HG: ICD-10-PCS | Mod: CPTII,S$GLB,, | Performed by: PHYSICIAN ASSISTANT

## 2019-09-12 PROCEDURE — 3074F SYST BP LT 130 MM HG: CPT | Mod: CPTII,S$GLB,, | Performed by: PSYCHIATRY & NEUROLOGY

## 2019-09-12 PROCEDURE — 99213 OFFICE O/P EST LOW 20 MIN: CPT | Mod: S$GLB,,, | Performed by: PSYCHIATRY & NEUROLOGY

## 2019-09-12 PROCEDURE — 99213 PR OFFICE/OUTPT VISIT, EST, LEVL III, 20-29 MIN: ICD-10-PCS | Mod: S$GLB,,, | Performed by: PSYCHIATRY & NEUROLOGY

## 2019-09-12 PROCEDURE — 3008F BODY MASS INDEX DOCD: CPT | Mod: CPTII,S$GLB,, | Performed by: PHYSICIAN ASSISTANT

## 2019-09-12 PROCEDURE — 3078F PR MOST RECENT DIASTOLIC BLOOD PRESSURE < 80 MM HG: ICD-10-PCS | Mod: CPTII,S$GLB,, | Performed by: PSYCHIATRY & NEUROLOGY

## 2019-09-12 PROCEDURE — 3008F PR BODY MASS INDEX (BMI) DOCUMENTED: ICD-10-PCS | Mod: CPTII,S$GLB,, | Performed by: PHYSICIAN ASSISTANT

## 2019-09-12 PROCEDURE — 99999 PR PBB SHADOW E&M-EST. PATIENT-LVL I: ICD-10-PCS | Mod: PBBFAC,,, | Performed by: PSYCHIATRY & NEUROLOGY

## 2019-09-12 PROCEDURE — 3075F PR MOST RECENT SYSTOLIC BLOOD PRESS GE 130-139MM HG: ICD-10-PCS | Mod: CPTII,S$GLB,, | Performed by: PHYSICIAN ASSISTANT

## 2019-09-12 PROCEDURE — 3074F PR MOST RECENT SYSTOLIC BLOOD PRESSURE < 130 MM HG: ICD-10-PCS | Mod: CPTII,S$GLB,, | Performed by: PSYCHIATRY & NEUROLOGY

## 2019-09-12 PROCEDURE — 3078F DIAST BP <80 MM HG: CPT | Mod: CPTII,S$GLB,, | Performed by: PHYSICIAN ASSISTANT

## 2019-09-12 PROCEDURE — 3078F DIAST BP <80 MM HG: CPT | Mod: CPTII,S$GLB,, | Performed by: PSYCHIATRY & NEUROLOGY

## 2019-09-12 PROCEDURE — 99999 PR PBB SHADOW E&M-EST. PATIENT-LVL I: CPT | Mod: PBBFAC,,, | Performed by: PSYCHIATRY & NEUROLOGY

## 2019-09-12 PROCEDURE — 99214 PR OFFICE/OUTPT VISIT, EST, LEVL IV, 30-39 MIN: ICD-10-PCS | Mod: S$GLB,,, | Performed by: PHYSICIAN ASSISTANT

## 2019-09-12 PROCEDURE — 3075F SYST BP GE 130 - 139MM HG: CPT | Mod: CPTII,S$GLB,, | Performed by: PHYSICIAN ASSISTANT

## 2019-09-12 PROCEDURE — 99999 PR PBB SHADOW E&M-EST. PATIENT-LVL IV: ICD-10-PCS | Mod: PBBFAC,,, | Performed by: PHYSICIAN ASSISTANT

## 2019-09-12 PROCEDURE — 99214 OFFICE O/P EST MOD 30 MIN: CPT | Mod: S$GLB,,, | Performed by: PHYSICIAN ASSISTANT

## 2019-09-12 RX ORDER — ESCITALOPRAM OXALATE 10 MG/1
15 TABLET ORAL DAILY
Qty: 45 TABLET | Refills: 3 | Status: SHIPPED | OUTPATIENT
Start: 2019-09-12 | End: 2019-12-19

## 2019-09-12 RX ORDER — CLONAZEPAM 0.5 MG/1
0.5 TABLET ORAL DAILY PRN
Qty: 30 TABLET | Refills: 3 | Status: SHIPPED | OUTPATIENT
Start: 2019-09-12 | End: 2020-05-07 | Stop reason: SDUPTHER

## 2019-09-12 RX ORDER — TRAZODONE HYDROCHLORIDE 100 MG/1
TABLET ORAL
Qty: 45 TABLET | Refills: 3 | Status: SHIPPED | OUTPATIENT
Start: 2019-09-12 | End: 2020-05-07 | Stop reason: SDUPTHER

## 2019-09-12 RX ORDER — OXCARBAZEPINE 150 MG/1
TABLET, FILM COATED ORAL
Qty: 120 TABLET | Refills: 3 | Status: SHIPPED | OUTPATIENT
Start: 2019-09-12 | End: 2020-01-09 | Stop reason: SDUPTHER
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
## 2019-09-13 ENCOUNTER — PATIENT MESSAGE (OUTPATIENT)
Dept: OPHTHALMOLOGY | Facility: CLINIC | Age: 39
End: 2019-09-13

## 2019-09-16 ENCOUNTER — CLINICAL SUPPORT (OUTPATIENT)
Dept: OTOLARYNGOLOGY | Facility: CLINIC | Age: 39
End: 2019-09-16
Payer: COMMERCIAL

## 2019-09-16 DIAGNOSIS — J30.9 ALLERGIC RHINITIS, UNSPECIFIED SEASONALITY, UNSPECIFIED TRIGGER: Primary | ICD-10-CM

## 2019-09-16 PROCEDURE — 95117 PR IMMU2THERAPY, 2+ INJECTIONS: ICD-10-PCS | Mod: S$GLB,,, | Performed by: ORTHOPAEDIC SURGERY

## 2019-09-16 PROCEDURE — 99999 PR PBB SHADOW E&M-EST. PATIENT-LVL III: CPT | Mod: PBBFAC,,,

## 2019-09-16 PROCEDURE — 95117 IMMUNOTHERAPY INJECTIONS: CPT | Mod: S$GLB,,, | Performed by: ORTHOPAEDIC SURGERY

## 2019-09-16 PROCEDURE — 99999 PR PBB SHADOW E&M-EST. PATIENT-LVL III: ICD-10-PCS | Mod: PBBFAC,,,

## 2019-09-16 NOTE — PROGRESS NOTES
Past Medical History:   Diagnosis Date    Allergy      Anxiety 2004    Arthritis      Arthritis of right knee      Graves' disease 6/2014    Hypertension 2004    Vitamin D deficiency              Review of patient's allergies indicates:   Allergen Reactions    Demerol [meperidine] Other (See Comments)       blackout       Ordering Physician:  Dr. Wilkinson  Order Type:  Written Order  Initial SNOT-20 score: 8/12/2019  29  SNOT-20 score:    Treatment Sets:  Mix #1 #[id number] Exp: 11/8/2019  Allergens: Molds, mites, cockroach, dog, cat, feathers  Mix #2 #[id number] Exp:11/8/2019 Allergens: Trees  Mix #3 #[id number] Exp: 11/8/2019 Allergens: Weeds & grasses    [facility]    At 0803 I administered 0.05ml subcutaneously, mix #1 (RED VIAL) and mix #2 (RED VIAL) in right arm, mix #3 (RED VIAL) in left arm.  Pt tolerated injections well.  No complaints of pain or discomfort.  Pt monitored for 20 minutes, after no minutes no reaction was noted.

## 2019-09-17 ENCOUNTER — PATIENT MESSAGE (OUTPATIENT)
Dept: OPHTHALMOLOGY | Facility: CLINIC | Age: 39
End: 2019-09-17

## 2019-09-19 ENCOUNTER — OFFICE VISIT (OUTPATIENT)
Dept: OPHTHALMOLOGY | Facility: CLINIC | Age: 39
End: 2019-09-19
Payer: COMMERCIAL

## 2019-09-19 DIAGNOSIS — H52.13 MYOPIA, BILATERAL: Primary | ICD-10-CM

## 2019-09-19 PROCEDURE — 99999 PR PBB SHADOW E&M-EST. PATIENT-LVL III: ICD-10-PCS | Mod: PBBFAC,,, | Performed by: OPTOMETRIST

## 2019-09-19 PROCEDURE — 99499 NO LOS: ICD-10-PCS | Mod: S$GLB,,, | Performed by: OPTOMETRIST

## 2019-09-19 PROCEDURE — 99999 PR PBB SHADOW E&M-EST. PATIENT-LVL III: CPT | Mod: PBBFAC,,, | Performed by: OPTOMETRIST

## 2019-09-19 PROCEDURE — 99499 UNLISTED E&M SERVICE: CPT | Mod: S$GLB,,, | Performed by: OPTOMETRIST

## 2019-09-19 NOTE — PROGRESS NOTES
HPI     Blurred Vision      Additional comments: Blurry VA with new glasses              Comments     PT C/O: Having trouble seeing at a distance with new glasses and sctls.    Images are not sharp and crisp and by the end of the day OU feels   strained.            Last edited by Courtney Scott, Patient Care Assistant on 9/19/2019  8:04   AM. (History)            Assessment /Plan     For exam results, see Encounter Report.    Myopia, bilateral      Pd was off on new specs from [place]  Gave pt new pd of 70  Remake with new pd    RTC PRN

## 2019-09-23 ENCOUNTER — CLINICAL SUPPORT (OUTPATIENT)
Dept: OTOLARYNGOLOGY | Facility: CLINIC | Age: 39
End: 2019-09-23
Payer: COMMERCIAL

## 2019-09-23 DIAGNOSIS — J30.9 ALLERGIC RHINITIS, UNSPECIFIED SEASONALITY, UNSPECIFIED TRIGGER: Primary | ICD-10-CM

## 2019-09-23 PROCEDURE — 99999 PR PBB SHADOW E&M-EST. PATIENT-LVL III: ICD-10-PCS | Mod: PBBFAC,,,

## 2019-09-23 PROCEDURE — 95117 IMMUNOTHERAPY INJECTIONS: CPT | Mod: S$GLB,,, | Performed by: ORTHOPAEDIC SURGERY

## 2019-09-23 PROCEDURE — 99999 PR PBB SHADOW E&M-EST. PATIENT-LVL III: CPT | Mod: PBBFAC,,,

## 2019-09-23 PROCEDURE — 95117 PR IMMU2THERAPY, 2+ INJECTIONS: ICD-10-PCS | Mod: S$GLB,,, | Performed by: ORTHOPAEDIC SURGERY

## 2019-09-23 NOTE — PROGRESS NOTES
Past Medical History:   Diagnosis Date    Allergy      Anxiety 2004    Arthritis      Arthritis of right knee      Graves' disease 6/2014    Hypertension 2004    Vitamin D deficiency              Review of patient's allergies indicates:   Allergen Reactions    Demerol [meperidine] Other (See Comments)       blackout       Ordering Physician:  Dr. Wilkinson  Order Type:  Written Order  Initial SNOT-20 score: 8/12/2019  29  SNOT-20 score:    Treatment Sets:  Mix #1 #[id number] Exp: 11/8/2019  Allergens: Molds, mites, cockroach, dog, cat, feathers  Mix #2 #[id number] Exp:11/8/2019 Allergens: Trees  Mix #3 #[id number] Exp: 11/8/2019 Allergens: Weeds & grasses    Manufactured by [facility] [place]    At 0756 I administered 0.10ml subcutaneously, mix #1 (RED VIAL) and mix #2 (RED VIAL) in right arm, mix #3 (RED VIAL) in left arm.  Pt tolerated injections well.  No complaints of pain or discomfort.  Pt monitored for 20 minutes, after no minutes no reaction was noted.

## 2019-09-30 ENCOUNTER — CLINICAL SUPPORT (OUTPATIENT)
Dept: OTOLARYNGOLOGY | Facility: CLINIC | Age: 39
End: 2019-09-30
Payer: COMMERCIAL

## 2019-09-30 DIAGNOSIS — J30.9 ALLERGIC RHINITIS, UNSPECIFIED SEASONALITY, UNSPECIFIED TRIGGER: Primary | ICD-10-CM

## 2019-09-30 PROCEDURE — 99999 PR PBB SHADOW E&M-EST. PATIENT-LVL III: ICD-10-PCS | Mod: PBBFAC,,,

## 2019-09-30 PROCEDURE — 99999 PR PBB SHADOW E&M-EST. PATIENT-LVL III: CPT | Mod: PBBFAC,,,

## 2019-09-30 PROCEDURE — 95117 PR IMMU2THERAPY, 2+ INJECTIONS: ICD-10-PCS | Mod: S$GLB,,, | Performed by: ORTHOPAEDIC SURGERY

## 2019-09-30 PROCEDURE — 95117 IMMUNOTHERAPY INJECTIONS: CPT | Mod: S$GLB,,, | Performed by: ORTHOPAEDIC SURGERY

## 2019-09-30 RX ORDER — AMLODIPINE BESYLATE 2.5 MG/1
TABLET ORAL
Qty: 90 TABLET | Refills: 1 | Status: SHIPPED | OUTPATIENT
Start: 2019-09-30 | End: 2021-03-30 | Stop reason: SDUPTHER

## 2019-09-30 NOTE — PROGRESS NOTES
Past Medical History:   Diagnosis Date    Allergy      Anxiety 2004    Arthritis      Arthritis of right knee      Graves' disease 6/2014    Hypertension 2004    Vitamin D deficiency              Review of patient's allergies indicates:   Allergen Reactions    Demerol [meperidine] Other (See Comments)       blackout       Ordering Physician:  Dr. Wilkinson  Order Type:  Written Order  Initial SNOT-20 score: 8/12/2019  29  SNOT-20 score:    Treatment Sets:  Mix #1 #[id number] Exp: 11/8/2019  Allergens: Molds, mites, cockroach, dog, cat, feathers  Mix #2 #[id number] Exp:11/8/2019 Allergens: Trees  Mix #3 #[id number] Exp: 11/8/2019 Allergens: Weeds & grasses    Manufactured by [facility] [place]    At 0753 I administered 0.15ml subcutaneously, mix #1 (RED VIAL) and mix #2 (RED VIAL) in right arm, mix #3 (RED VIAL) in left arm.  Pt tolerated injections well.  No complaints of pain or discomfort.  Pt monitored for 20 minutes, after no minutes no reaction was noted.

## 2019-10-01 RX ORDER — METHIMAZOLE 10 MG/1
TABLET ORAL
Qty: 60 TABLET | Refills: 3 | Status: SHIPPED | OUTPATIENT
Start: 2019-10-01 | End: 2020-02-24 | Stop reason: SDUPTHER

## 2019-10-02 ENCOUNTER — OFFICE VISIT (OUTPATIENT)
Dept: FAMILY MEDICINE | Facility: CLINIC | Age: 39
End: 2019-10-02
Payer: COMMERCIAL

## 2019-10-02 ENCOUNTER — HOSPITAL ENCOUNTER (OUTPATIENT)
Dept: RADIOLOGY | Facility: HOSPITAL | Age: 39
Discharge: HOME | End: 2019-10-02
Attending: NURSE PRACTITIONER
Payer: COMMERCIAL

## 2019-10-02 VITALS
TEMPERATURE: 100 F | DIASTOLIC BLOOD PRESSURE: 82 MMHG | SYSTOLIC BLOOD PRESSURE: 154 MMHG | HEART RATE: 98 BPM | HEIGHT: 64 IN | BODY MASS INDEX: 50.02 KG/M2 | WEIGHT: 293 LBS | OXYGEN SATURATION: 98 %

## 2019-10-02 DIAGNOSIS — R07.9 CHEST PAIN, UNSPECIFIED TYPE: ICD-10-CM

## 2019-10-02 DIAGNOSIS — G47.33 OSA ON CPAP: Chronic | ICD-10-CM

## 2019-10-02 DIAGNOSIS — R00.2 PALPITATIONS: Primary | ICD-10-CM

## 2019-10-02 DIAGNOSIS — E66.01 MORBID OBESITY WITH BMI OF 70 AND OVER, ADULT: ICD-10-CM

## 2019-10-02 DIAGNOSIS — F41.1 GAD (GENERALIZED ANXIETY DISORDER): ICD-10-CM

## 2019-10-02 DIAGNOSIS — E05.00 GRAVES' DISEASE: Chronic | ICD-10-CM

## 2019-10-02 PROCEDURE — 93005 EKG 12-LEAD: ICD-10-PCS | Mod: S$GLB,,, | Performed by: NURSE PRACTITIONER

## 2019-10-02 PROCEDURE — 3077F SYST BP >= 140 MM HG: CPT | Mod: CPTII,S$GLB,, | Performed by: NURSE PRACTITIONER

## 2019-10-02 PROCEDURE — 99999 PR PBB SHADOW E&M-EST. PATIENT-LVL IV: ICD-10-PCS | Mod: PBBFAC,,, | Performed by: NURSE PRACTITIONER

## 2019-10-02 PROCEDURE — 99214 PR OFFICE/OUTPT VISIT, EST, LEVL IV, 30-39 MIN: ICD-10-PCS | Mod: S$GLB,,, | Performed by: NURSE PRACTITIONER

## 2019-10-02 PROCEDURE — 93005 ELECTROCARDIOGRAM TRACING: CPT | Mod: S$GLB,,, | Performed by: NURSE PRACTITIONER

## 2019-10-02 PROCEDURE — 3079F DIAST BP 80-89 MM HG: CPT | Mod: CPTII,S$GLB,, | Performed by: NURSE PRACTITIONER

## 2019-10-02 PROCEDURE — 71046 X-RAY EXAM CHEST 2 VIEWS: CPT | Mod: 26,,, | Performed by: RADIOLOGY

## 2019-10-02 PROCEDURE — 99214 OFFICE O/P EST MOD 30 MIN: CPT | Mod: S$GLB,,, | Performed by: NURSE PRACTITIONER

## 2019-10-02 PROCEDURE — 93010 EKG 12-LEAD: ICD-10-PCS | Mod: S$GLB,,, | Performed by: INTERNAL MEDICINE

## 2019-10-02 PROCEDURE — 99999 PR PBB SHADOW E&M-EST. PATIENT-LVL IV: CPT | Mod: PBBFAC,,, | Performed by: NURSE PRACTITIONER

## 2019-10-02 PROCEDURE — 71046 XR CHEST PA AND LATERAL: ICD-10-PCS | Mod: 26,,, | Performed by: RADIOLOGY

## 2019-10-02 PROCEDURE — 3079F PR MOST RECENT DIASTOLIC BLOOD PRESSURE 80-89 MM HG: ICD-10-PCS | Mod: CPTII,S$GLB,, | Performed by: NURSE PRACTITIONER

## 2019-10-02 PROCEDURE — 3008F BODY MASS INDEX DOCD: CPT | Mod: CPTII,S$GLB,, | Performed by: NURSE PRACTITIONER

## 2019-10-02 PROCEDURE — 3008F PR BODY MASS INDEX (BMI) DOCUMENTED: ICD-10-PCS | Mod: CPTII,S$GLB,, | Performed by: NURSE PRACTITIONER

## 2019-10-02 PROCEDURE — 3077F PR MOST RECENT SYSTOLIC BLOOD PRESSURE >= 140 MM HG: ICD-10-PCS | Mod: CPTII,S$GLB,, | Performed by: NURSE PRACTITIONER

## 2019-10-02 PROCEDURE — 93010 ELECTROCARDIOGRAM REPORT: CPT | Mod: S$GLB,,, | Performed by: INTERNAL MEDICINE

## 2019-10-02 PROCEDURE — 71046 X-RAY EXAM CHEST 2 VIEWS: CPT | Mod: TC,PO

## 2019-10-03 ENCOUNTER — PATIENT MESSAGE (OUTPATIENT)
Dept: PSYCHIATRY | Facility: CLINIC | Age: 39
End: 2019-10-03

## 2019-10-04 ENCOUNTER — PATIENT MESSAGE (OUTPATIENT)
Dept: INTERNAL MEDICINE | Facility: CLINIC | Age: 39
End: 2019-10-04

## 2019-10-04 NOTE — PROGRESS NOTES
"Subjective:       Patient ID: Shaina Olson is a 39 y.o. female.    Chief Complaint: Chest Pain  Pt reports to clinic with chief complaint of chest pain and palpitations.  Onset several days.  Reports occurs daily, lasts for minutes.ocurs at rest and on exertion.   States, "feels like skipping beat".  Associated symptoms include SOB, no cough, no dizziness.  Reports daily intake of coffee, no other stimulant use.  PMH of obesity, GRAVES, NOE (tried taking klonopin without resolution of symptoms), PVC's, bipolor, HTN, TREVOR (compliant with CPAP).  Last tSH=therapeutic.  Has been evaluated by cardiology in past.   Palpitations    This is a recurrent problem. The current episode started 1 to 4 weeks ago. The problem occurs daily. The problem has been unchanged. The symptoms are aggravated by caffeine. Associated symptoms include anxiety, chest pain and shortness of breath. Pertinent negatives include no coughing. She has tried nothing for the symptoms. Risk factors include obesity and sedentary lifestyle. Her past medical history is significant for anxiety and hyperthyroidism.     Review of Systems   Constitutional: Negative.    HENT: Negative.    Respiratory: Positive for shortness of breath. Negative for cough.    Cardiovascular: Positive for chest pain and palpitations.   Gastrointestinal: Negative.    Genitourinary: Negative.    Musculoskeletal: Positive for arthralgias and myalgias.   Psychiatric/Behavioral: Positive for agitation. The patient is nervous/anxious.        Objective:      Physical Exam   Constitutional: She is oriented to person, place, and time. She appears well-developed and well-nourished.   HENT:   Head: Normocephalic.   Eyes: EOM are normal.   Neck: Neck supple.   Cardiovascular: Normal rate and normal heart sounds. Exam reveals no friction rub.   No murmur heard.  Pulmonary/Chest: Effort normal and breath sounds normal. No stridor. No respiratory distress.   Abdominal: Soft. Bowel sounds are " normal.   obese   Neurological: She is alert and oriented to person, place, and time.   Skin: Skin is warm and dry.   Vitals reviewed.      Assessment:       1. Palpitations    2. Chest pain, unspecified type    3. Graves' disease    4. TREVOR on CPAP    5. NOE (generalized anxiety disorder)    6. Morbid obesity with BMI of 70 and over, adult        Plan:   Palpitations  -     CBC auto differential; Future; Expected date: 10/02/2019  -     Comprehensive metabolic panel; Future; Expected date: 10/02/2019  -     Magnesium; Future; Expected date: 10/02/2019  -     TSH; Future; Expected date: 10/02/2019  -     Troponin I; Future; Expected date: 10/02/2019  -     CK; Future; Expected date: 10/02/2019  -     IN OFFICE EKG 12-LEAD (to [place])  Follow up with cardiology. May need BB? R/o anxiety  Chest pain, unspecified type  -     X-Ray Chest PA And Lateral; Future; Expected date: 10/02/2019    Graves' disease  Managed per endo.  Stable continue treatment plan  TREVOR on CPAP  Managed per pulmo continue treatment plan  NOE (generalized anxiety disorder)  Managed per psych continue treatment plan  Morbid obesity with BMI of 70 and over, adult  Uncontrolled diet and exercise to aid in management    No follow-ups on file.

## 2019-10-07 ENCOUNTER — CLINICAL SUPPORT (OUTPATIENT)
Dept: OTOLARYNGOLOGY | Facility: CLINIC | Age: 39
End: 2019-10-07
Payer: COMMERCIAL

## 2019-10-07 DIAGNOSIS — J30.9 ALLERGIC RHINITIS, UNSPECIFIED SEASONALITY, UNSPECIFIED TRIGGER: Primary | ICD-10-CM

## 2019-10-07 PROCEDURE — 95117 IMMUNOTHERAPY INJECTIONS: CPT | Mod: S$GLB,,, | Performed by: ORTHOPAEDIC SURGERY

## 2019-10-07 PROCEDURE — 99999 PR PBB SHADOW E&M-EST. PATIENT-LVL III: CPT | Mod: PBBFAC,,,

## 2019-10-07 PROCEDURE — 95117 PR IMMU2THERAPY, 2+ INJECTIONS: ICD-10-PCS | Mod: S$GLB,,, | Performed by: ORTHOPAEDIC SURGERY

## 2019-10-07 PROCEDURE — 99999 PR PBB SHADOW E&M-EST. PATIENT-LVL III: ICD-10-PCS | Mod: PBBFAC,,,

## 2019-10-07 NOTE — PROGRESS NOTES
Past Medical History:   Diagnosis Date    Allergy      Anxiety 2004    Arthritis      Arthritis of right knee      Graves' disease 6/2014    Hypertension 2004    Vitamin D deficiency              Review of patient's allergies indicates:   Allergen Reactions    Demerol [meperidine] Other (See Comments)       blackout       Ordering Physician:  Dr. Wilkinson  Order Type:  Written Order  Initial SNOT-20 score: 8/12/2019  29  SNOT-20 score:    Treatment Sets:  Mix #1 #[id number] Exp: 11/8/2019  Allergens: Molds, mites, cockroach, dog, cat, feathers  Mix #2 #[id number] Exp:11/8/2019 Allergens: Trees  Mix #3 #[id number] Exp: 11/8/2019 Allergens: Weeds & grasses    [facility]    At 0806 I administered 0.20ml subcutaneously, mix #1 (RED VIAL) and mix #2 (RED VIAL) in right arm, mix #3 (RED VIAL) in left arm.  Pt tolerated injections well.  No complaints of pain or discomfort.  Pt monitored for 20 minutes, after no minutes no reaction was noted.

## 2019-10-15 RX ORDER — PANTOPRAZOLE SODIUM 40 MG/1
TABLET, DELAYED RELEASE ORAL
Qty: 90 TABLET | Refills: 1 | Status: SHIPPED | OUTPATIENT
Start: 2019-10-15 | End: 2019-10-17 | Stop reason: SDUPTHER

## 2019-10-17 ENCOUNTER — PATIENT MESSAGE (OUTPATIENT)
Dept: FAMILY MEDICINE | Facility: CLINIC | Age: 39
End: 2019-10-17

## 2019-10-17 ENCOUNTER — TELEPHONE (OUTPATIENT)
Dept: FAMILY MEDICINE | Facility: CLINIC | Age: 39
End: 2019-10-17

## 2019-10-17 RX ORDER — PANTOPRAZOLE SODIUM 40 MG/1
40 TABLET, DELAYED RELEASE ORAL DAILY
Qty: 30 TABLET | Refills: 0 | Status: SHIPPED | OUTPATIENT
Start: 2019-10-17

## 2019-10-17 RX ORDER — PREGABALIN 100 MG/1
100 CAPSULE ORAL DAILY
Qty: 30 CAPSULE | Refills: 0 | Status: SHIPPED | OUTPATIENT
Start: 2019-10-17 | End: 2020-09-21

## 2019-10-17 NOTE — TELEPHONE ENCOUNTER
Will refill meds for 1 month. I cant continue to provide care due to patient offensive comments  On social media towards myself and staff at this clinic

## 2019-11-04 ENCOUNTER — PATIENT MESSAGE (OUTPATIENT)
Dept: OTOLARYNGOLOGY | Facility: CLINIC | Age: 39
End: 2019-11-04

## 2019-11-04 RX ORDER — MONTELUKAST SODIUM 10 MG/1
TABLET ORAL
Qty: 30 TABLET | Refills: 11 | OUTPATIENT
Start: 2019-11-04

## 2019-11-05 RX ORDER — MONTELUKAST SODIUM 10 MG/1
10 TABLET ORAL NIGHTLY
Qty: 30 TABLET | Refills: 12 | Status: SHIPPED | OUTPATIENT
Start: 2019-11-05 | End: 2019-12-05

## 2019-11-06 ENCOUNTER — PATIENT MESSAGE (OUTPATIENT)
Dept: OTOLARYNGOLOGY | Facility: CLINIC | Age: 39
End: 2019-11-06

## 2019-11-07 ENCOUNTER — PATIENT MESSAGE (OUTPATIENT)
Dept: OTOLARYNGOLOGY | Facility: CLINIC | Age: 39
End: 2019-11-07

## 2019-11-17 ENCOUNTER — PATIENT MESSAGE (OUTPATIENT)
Dept: ENDOCRINOLOGY | Facility: CLINIC | Age: 39
End: 2019-11-17

## 2019-11-20 ENCOUNTER — PATIENT MESSAGE (OUTPATIENT)
Dept: ENDOCRINOLOGY | Facility: CLINIC | Age: 39
End: 2019-11-20

## 2019-11-20 DIAGNOSIS — E05.90 HYPERTHYROIDISM: ICD-10-CM

## 2019-11-20 DIAGNOSIS — E55.9 VITAMIN D DEFICIENCY: Primary | ICD-10-CM

## 2019-11-20 DIAGNOSIS — E21.3 HYPERPARATHYROIDISM: ICD-10-CM

## 2019-11-20 DIAGNOSIS — E04.2 MULTIPLE THYROID NODULES: ICD-10-CM

## 2019-11-21 LAB
25(OH)D3+25(OH)D2 SERPL-MCNC: 44 NG/ML (ref 30–100)
ALBUMIN SERPL-MCNC: 4.4 G/DL (ref 3.5–5.5)
CALCIUM SERPL-MCNC: 9.4 MG/DL (ref 8.7–10.2)
CREAT SERPL-MCNC: 0.74 MG/DL (ref 0.57–1)
PHOSPHATE SERPL-MCNC: 2.5 MG/DL (ref 2.5–4.5)
PTH-INTACT SERPL-MCNC: 45 PG/ML (ref 15–65)
T4 FREE SERPL-MCNC: 1.03 NG/DL (ref 0.82–1.77)
TSH SERPL DL<=0.005 MIU/L-ACNC: 4.25 UIU/ML (ref 0.45–4.5)

## 2019-11-25 ENCOUNTER — OFFICE VISIT (OUTPATIENT)
Dept: ENDOCRINOLOGY | Facility: CLINIC | Age: 39
End: 2019-11-25
Payer: COMMERCIAL

## 2019-11-25 VITALS
WEIGHT: 293 LBS | TEMPERATURE: 98 F | DIASTOLIC BLOOD PRESSURE: 95 MMHG | BODY MASS INDEX: 73.75 KG/M2 | HEART RATE: 102 BPM | SYSTOLIC BLOOD PRESSURE: 152 MMHG | RESPIRATION RATE: 18 BRPM

## 2019-11-25 DIAGNOSIS — L83 ACANTHOSIS: ICD-10-CM

## 2019-11-25 DIAGNOSIS — G47.33 OSA ON CPAP: Chronic | ICD-10-CM

## 2019-11-25 DIAGNOSIS — E55.9 VITAMIN D DEFICIENCY: ICD-10-CM

## 2019-11-25 DIAGNOSIS — R53.83 FATIGUE, UNSPECIFIED TYPE: ICD-10-CM

## 2019-11-25 DIAGNOSIS — E66.01 MORBID OBESITY WITH BMI OF 70 AND OVER, ADULT: ICD-10-CM

## 2019-11-25 DIAGNOSIS — E05.90 HYPERTHYROIDISM: Primary | ICD-10-CM

## 2019-11-25 DIAGNOSIS — E04.1 THYROID NODULE: ICD-10-CM

## 2019-11-25 DIAGNOSIS — I10 ESSENTIAL HYPERTENSION: ICD-10-CM

## 2019-11-25 DIAGNOSIS — E05.00 GRAVES' DISEASE: ICD-10-CM

## 2019-11-25 PROCEDURE — 3080F DIAST BP >= 90 MM HG: CPT | Mod: CPTII,S$GLB,, | Performed by: INTERNAL MEDICINE

## 2019-11-25 PROCEDURE — 99999 PR PBB SHADOW E&M-EST. PATIENT-LVL V: ICD-10-PCS | Mod: PBBFAC,,, | Performed by: INTERNAL MEDICINE

## 2019-11-25 PROCEDURE — 3008F BODY MASS INDEX DOCD: CPT | Mod: CPTII,S$GLB,, | Performed by: INTERNAL MEDICINE

## 2019-11-25 PROCEDURE — 3077F PR MOST RECENT SYSTOLIC BLOOD PRESSURE >= 140 MM HG: ICD-10-PCS | Mod: CPTII,S$GLB,, | Performed by: INTERNAL MEDICINE

## 2019-11-25 PROCEDURE — 3080F PR MOST RECENT DIASTOLIC BLOOD PRESSURE >= 90 MM HG: ICD-10-PCS | Mod: CPTII,S$GLB,, | Performed by: INTERNAL MEDICINE

## 2019-11-25 PROCEDURE — 3008F PR BODY MASS INDEX (BMI) DOCUMENTED: ICD-10-PCS | Mod: CPTII,S$GLB,, | Performed by: INTERNAL MEDICINE

## 2019-11-25 PROCEDURE — 3077F SYST BP >= 140 MM HG: CPT | Mod: CPTII,S$GLB,, | Performed by: INTERNAL MEDICINE

## 2019-11-25 PROCEDURE — 99999 PR PBB SHADOW E&M-EST. PATIENT-LVL V: CPT | Mod: PBBFAC,,, | Performed by: INTERNAL MEDICINE

## 2019-11-25 PROCEDURE — 99214 OFFICE O/P EST MOD 30 MIN: CPT | Mod: S$GLB,,, | Performed by: INTERNAL MEDICINE

## 2019-11-25 PROCEDURE — 99214 PR OFFICE/OUTPT VISIT, EST, LEVL IV, 30-39 MIN: ICD-10-PCS | Mod: S$GLB,,, | Performed by: INTERNAL MEDICINE

## 2019-11-25 NOTE — PROGRESS NOTES
Patient ID: Shaina Olson is a 39 y.o. female.  Patient is here for follow up        Chief Complaint: Follow-up      HPI    Diagnosed: diagnosed in 2014 and started on antithyroid medications immediately due to severe symptoms; she shared a picture of how she looked initially and patient had significant stare and enlarged thyroid which both have improved over time on treatment    Previous radiology tests:  Thyroid ultrasound : Yes - status post benign FNA left sided thyroid nodule. Most recent ultrasound 11/2018 shows left thyroid nodule is smaller- only 8mm, stable subcentimeter right nodule.  There was again noted to be stable nodule projecting along the medial margin superior pole left lobe demonstrating isoechoic appearance compared to adjacent thyroid.  This is not in contact or connected to the thyroid.  This measures 2.2 x 0.8 x 1.4 cm compared to 2.2 x 0.8 x 1.3 cm previously.  Appearance is again suggestive of possible ectopic thyroid tissue versus parathyroid adenoma.    NM uptake and scan:  Yes:  Because of the findings on ultrasound done May 2018 showing possible ectopic thyroid tissue she had a thyroid uptake and scan to rule out any ectopic thyroid tissue but because she is on methimazole she held it for about 5 days prior and it did not show any ectopic tissue and it only showed increased uptake at 6 and 24 hr consistent with Graves disease    She had labs and her PTH level was elevated at 171 but her calcium is normal.  She does have a history though of low vitamin D and her vitamin-D level in April 2018 was 18-she used to take high-dose vitamin-D but had been off of this for several months and had not been consistently taking over-the-counter vitamin-D so I restarted high-dose vitamin-D at a previous visit her PTH remained high  in spite of her vitamin D level increasing to an adequate level however most recently her PTH has become normal as vitamin D gradually increased further.  She reports  that her mother had hyperparathyroidism and osteoporosis     She deloped acute hyperthyroidism on labs done September 19, 2018 so I increased her methimazole to 10mg and her labs have been normal since then. She denied increased iodine intake orally or IV from CT scan or otherwise and she was compliant with her medication. Her TSI also was elevated. Her prior  TSI antibody was negative December 2017    Previous thyroid surgery: No      Thyroid symptoms:  Feeling a little more sluggish lately, her TSH has increased to the 4 range    Patient has chronic hoarseness but has ALLERGIES and sinusitis and will be starting ALLERGY shots again.  Had sinus surgery last year.  Also takes protonix.  Her thyroid gland is enlarged also and she does have a vocal cord nodule but she reports that she was told the nodule has resolved and is wondering whether her thyroid which she feels overall is bigger than her initial diagnosis but is related to her hoarseness.  Her ENT  has arranged for speech therapy which she is currently undergoing She is doing her speech therapy exercises which have helped and she had to stop singing also     diagnosed with bipolar affective disorder and is on trileptal along with her Lexapro.  She is also studying for her master's program which will be complete by December     She no longer works at [employer]        Thyroid medications: methimazole 10 mg daily      Takes medication appropriately: Yes  Her A1c was normal in the past.  She has joined weight watchers on line.  She has not yet joined [employer].  She is contemplating gastric sleeve but still is difficult to come up with the financial co-pay that is require  On CPAP for obstructive sleep apnea but admits she has not been consistent with this    She is interested in weight loss medication- tried phentermine in past without success so I prescribed belviq last visit and this was too expensive for her    Had 2d ECHO 2016 that showed mild  TR  Denies SOB or significant swelling  Patient asked previously about starting Victoza to help with weight.  She has noticed a darkening in her neck for long time but what is recent is some darkening of her skin in the right flank area-unfortunately her insurance does not cover Victoza as she is not a diabetic or saxenda      She will start getting home delivery of sensible meals which or calorie controlled and portion controlled meals    I have reviewed the past medical, family and social history    Review of Systems   Constitutional: Positive for fatigue. Negative for appetite change, fever and unexpected weight change.   HENT: Negative for sore throat and trouble swallowing.    Eyes: Negative for visual disturbance.   Respiratory: Negative for shortness of breath and wheezing.    Cardiovascular: Negative for chest pain, palpitations and leg swelling.   Gastrointestinal: Negative for diarrhea, nausea and vomiting.   Endocrine: Negative for cold intolerance, heat intolerance, polydipsia, polyphagia and polyuria.   Genitourinary: Negative for difficulty urinating, dysuria and menstrual problem.   Musculoskeletal: Negative for arthralgias and joint swelling.   Skin: Negative for rash.   Neurological: Negative for dizziness, weakness, numbness and headaches.   Psychiatric/Behavioral: Negative for confusion, dysphoric mood and sleep disturbance.       Objective:      Physical Exam   Constitutional: No distress.   HENT:   Head: Normocephalic and atraumatic.   Eyes: Conjunctivae are normal. No scleral icterus.   Musculoskeletal: She exhibits no edema.   Neurological: She is alert. No sensory deficit.        Skin: Skin is warm and dry. No rash noted. She is not diaphoretic. No erythema.   Hyperpigmentation in  neck area consistent with acanthosis    Psychiatric: She has a normal mood and affect. Her behavior is normal.   Vitals reviewed.        Lab Review:   Lab Results   Component Value Date     10/02/2019    K  4.3 10/02/2019     10/02/2019    CO2 26 10/02/2019    BUN 11 10/02/2019    CREATININE 0.74 11/20/2019    CALCIUM 9.4 11/20/2019       Assessment:     1. Hyperthyroidism  TSH    T4, free   2. Vitamin D deficiency     3. Thyroid nodule  US Soft Tissue Head Neck Thyroid   4. Graves' disease  Hemoglobin A1c    Glucose, random   5. Fatigue, unspecified type     6. Essential hypertension  Lipid panel    Insulin, random   7. TREVOR on CPAP     8. Acanthosis     9. Morbid obesity with BMI of 70 and over, adult      recent TSH has moved for the hypothyroid range.  Will reduce methimazole to 5 days a week, can not take on the weekends.  Repeat thyroid labs in 4 weeks at that time will re-evaluate thyroid nodule and the nodule that was extrinsic to the thyroid with a neck ultrasound    And since she has acanthosis with obesity and fatigue screen again for diabetes and check lipid panel, her blood pressure is elevated.    Now that her stress level is better as she is not working and [employer] and will be transitioning to job encouraged her to increase physical activity      She will start getting home delivery of sensible meals which or calorie controlled and portion controlled meals  Also encouraged to use CPAP consistently to prevent complications such as pulmonary hypertension and this can also help in reduction of fatigue and help with metabolism  Plan:   Hyperthyroidism  -     TSH; Future; Expected date: 11/25/2019  -     T4, free; Future; Expected date: 11/25/2019    Vitamin D deficiency    Thyroid nodule  -     US Soft Tissue Head Neck Thyroid; Future; Expected date: 11/25/2019    Graves' disease  -     Hemoglobin A1c; Future; Expected date: 11/25/2019  -     Glucose, random; Future; Expected date: 11/25/2019    Fatigue, unspecified type    Essential hypertension  -     Lipid panel; Future; Expected date: 11/25/2019  -     Insulin, random; Future; Expected date: 11/25/2019    TREVOR on CPAP    Acanthosis    Morbid obesity  with BMI of 70 and over, adult          Follow up in about 4 months (around 3/25/2020) for hyperthyroidism.    Labs prior to appointment? yes     Disclaimer:  This note may have been partially prepared using voice recognition software and  it may have not been extensively proofed, as such there could be errors within the text such as sound alike errors.

## 2019-12-10 ENCOUNTER — PATIENT MESSAGE (OUTPATIENT)
Dept: PSYCHIATRY | Facility: CLINIC | Age: 39
End: 2019-12-10

## 2019-12-11 ENCOUNTER — PATIENT MESSAGE (OUTPATIENT)
Dept: PSYCHIATRY | Facility: CLINIC | Age: 39
End: 2019-12-11

## 2019-12-12 RX ORDER — ESCITALOPRAM OXALATE 20 MG/1
20 TABLET ORAL DAILY
Qty: 30 TABLET | Refills: 2 | Status: SHIPPED | OUTPATIENT
Start: 2019-12-12 | End: 2020-01-09 | Stop reason: SDUPTHER

## 2019-12-13 ENCOUNTER — TELEPHONE (OUTPATIENT)
Dept: ORTHOPEDICS | Facility: CLINIC | Age: 39
End: 2019-12-13

## 2019-12-13 NOTE — TELEPHONE ENCOUNTER
Spoke w/ patient in regards to getting an appt. Patient requested Monday 12/16 but we did not have anything. Patient was offered 12/19, patient agreed and is scheduled for 8am. She is graduating 12/20 and she is having left knee pain(severe). Patient verbalized understanding and was grateful for the call_DD

## 2019-12-19 ENCOUNTER — OFFICE VISIT (OUTPATIENT)
Dept: SPORTS MEDICINE | Facility: CLINIC | Age: 39
End: 2019-12-19
Payer: COMMERCIAL

## 2019-12-19 VITALS
HEIGHT: 65 IN | SYSTOLIC BLOOD PRESSURE: 126 MMHG | HEART RATE: 84 BPM | BODY MASS INDEX: 48.82 KG/M2 | WEIGHT: 293 LBS | DIASTOLIC BLOOD PRESSURE: 81 MMHG

## 2019-12-19 DIAGNOSIS — G89.29 CHRONIC PAIN OF LEFT KNEE: ICD-10-CM

## 2019-12-19 DIAGNOSIS — M25.562 CHRONIC PAIN OF LEFT KNEE: ICD-10-CM

## 2019-12-19 DIAGNOSIS — E66.01 MORBID OBESITY WITH BMI OF 70 AND OVER, ADULT: ICD-10-CM

## 2019-12-19 DIAGNOSIS — M17.11 PRIMARY OSTEOARTHRITIS OF RIGHT KNEE: ICD-10-CM

## 2019-12-19 DIAGNOSIS — M17.12 PRIMARY OSTEOARTHRITIS OF LEFT KNEE: Primary | ICD-10-CM

## 2019-12-19 PROCEDURE — 3079F PR MOST RECENT DIASTOLIC BLOOD PRESSURE 80-89 MM HG: ICD-10-PCS | Mod: CPTII,S$GLB,, | Performed by: PHYSICIAN ASSISTANT

## 2019-12-19 PROCEDURE — 3074F PR MOST RECENT SYSTOLIC BLOOD PRESSURE < 130 MM HG: ICD-10-PCS | Mod: CPTII,S$GLB,, | Performed by: PHYSICIAN ASSISTANT

## 2019-12-19 PROCEDURE — 99999 PR PBB SHADOW E&M-EST. PATIENT-LVL IV: CPT | Mod: PBBFAC,,, | Performed by: PHYSICIAN ASSISTANT

## 2019-12-19 PROCEDURE — 20610 PR DRAIN/INJECT LARGE JOINT/BURSA: ICD-10-PCS | Mod: LT,S$GLB,, | Performed by: PHYSICIAN ASSISTANT

## 2019-12-19 PROCEDURE — 99214 OFFICE O/P EST MOD 30 MIN: CPT | Mod: 25,S$GLB,, | Performed by: PHYSICIAN ASSISTANT

## 2019-12-19 PROCEDURE — 3008F BODY MASS INDEX DOCD: CPT | Mod: CPTII,S$GLB,, | Performed by: PHYSICIAN ASSISTANT

## 2019-12-19 PROCEDURE — 99999 PR PBB SHADOW E&M-EST. PATIENT-LVL IV: ICD-10-PCS | Mod: PBBFAC,,, | Performed by: PHYSICIAN ASSISTANT

## 2019-12-19 PROCEDURE — 3008F PR BODY MASS INDEX (BMI) DOCUMENTED: ICD-10-PCS | Mod: CPTII,S$GLB,, | Performed by: PHYSICIAN ASSISTANT

## 2019-12-19 PROCEDURE — 99214 PR OFFICE/OUTPT VISIT, EST, LEVL IV, 30-39 MIN: ICD-10-PCS | Mod: 25,S$GLB,, | Performed by: PHYSICIAN ASSISTANT

## 2019-12-19 PROCEDURE — 3079F DIAST BP 80-89 MM HG: CPT | Mod: CPTII,S$GLB,, | Performed by: PHYSICIAN ASSISTANT

## 2019-12-19 PROCEDURE — 20610 DRAIN/INJ JOINT/BURSA W/O US: CPT | Mod: LT,S$GLB,, | Performed by: PHYSICIAN ASSISTANT

## 2019-12-19 PROCEDURE — 3074F SYST BP LT 130 MM HG: CPT | Mod: CPTII,S$GLB,, | Performed by: PHYSICIAN ASSISTANT

## 2019-12-19 RX ORDER — METHYLPREDNISOLONE ACETATE 80 MG/ML
80 INJECTION, SUSPENSION INTRA-ARTICULAR; INTRALESIONAL; INTRAMUSCULAR; SOFT TISSUE ONCE
Status: COMPLETED | OUTPATIENT
Start: 2019-12-19 | End: 2019-12-19

## 2019-12-19 RX ADMIN — METHYLPREDNISOLONE ACETATE 80 MG: 80 INJECTION, SUSPENSION INTRA-ARTICULAR; INTRALESIONAL; INTRAMUSCULAR; SOFT TISSUE at 09:12

## 2019-12-19 NOTE — PROGRESS NOTES
Patient ID: Shaina Olson is a 39 y.o. female.    Chief Complaint: Pain of the Left Knee      HPI: Shaina Olson  is a 39 y.o. female who c/o Pain of the Left Knee   for duration of years but worse over last 1.5 weeks.  I saw her in the summertime and a corticosteroid injection in the left knee.  She had a great response to that.  Unfortunately, her knee pain has progressed since starting a new job recently it across.  She was taking a tore of the facility which lasted most of the day.  By the end of the day, she was in excruciating pain. Rated 10/10 today.  Quality aching and sharp.  Alleviating factors include prior steroid injections, ice, elevation.  Aggravating factors include getting up after prolonged inactivity, prolonged weight-bearing.  Majority of her pain is located medially.    Past Medical History:   Diagnosis Date    Allergy     Anxiety 2004    Arthritis     Arthritis of right knee     Graves' disease 6/2014    Hypertension 2004    Vitamin D deficiency      Past Surgical History:   Procedure Laterality Date    FRACTURE SURGERY Right 2013    right wrist    NASAL SEPTUM SURGERY  2001    SINUS SURGERY  2001    SINUS SURGERY  11/22/2017    TONSILLECTOMY, ADENOIDECTOMY  2001    WRIST FRACTURE SURGERY Right 2013    Right wrist     Family History   Problem Relation Age of Onset    Hypertension Mother     Lupus Mother     Diverticulosis Mother     Cancer Sister 30        stg 1 boderline? ov cancer    Cancer Maternal Grandmother     Cancer Maternal Grandfather     Cataracts Maternal Grandfather     Diabetes Paternal Grandmother     Stroke Paternal Grandmother     Cancer Paternal Grandmother     Cancer Paternal Grandfather     Diabetes Paternal Aunt     Hypertension Paternal Aunt     Thyroid disease Neg Hx     Migraines Neg Hx     Asthma Neg Hx      Social History     Socioeconomic History    Marital status: Single     Spouse name: Not on file    Number of children: 0     Years of education: Not on file    Highest education level: Not on file   Occupational History    Occupation: LPN     Comment: [employer]   Social Needs    Financial resource strain: Not on file    Food insecurity:     Worry: Not on file     Inability: Not on file    Transportation needs:     Medical: Not on file     Non-medical: Not on file   Tobacco Use    Smoking status: Never Smoker    Smokeless tobacco: Never Used   Substance and Sexual Activity    Alcohol use: Yes     Alcohol/week: 0.0 standard drinks     Comment: occasionally    Drug use: No    Sexual activity: Yes     Partners: Male     Birth control/protection: OCP   Lifestyle    Physical activity:     Days per week: Not on file     Minutes per session: Not on file    Stress: Not on file   Relationships    Social connections:     Talks on phone: Not on file     Gets together: Not on file     Attends [employer] service: Not on file     Active member of club or organization: Not on file     Attends meetings of clubs or organizations: Not on file     Relationship status: Not on file   Other Topics Concern    Not on file   Social History Narrative    Wears a seatbelt.     Medication List with Changes/Refills   Current Medications    ALLERGY MIX    Pt specific maintenance vials    AMLODIPINE (NORVASC) 2.5 MG TABLET    TAKE 1 TABLET BY MOUTH EVERY DAY WITH 5MG FOR A TOTAL OF 7.5 MG PER DAY    AMLODIPINE (NORVASC) 5 MG TABLET    TAKE 1 TABLET BY MOUTH EVERY DAY    AZELASTINE (ASTELIN) 137 MCG (0.1 %) NASAL SPRAY    1 spray (137 mcg total) by Nasal route 2 (two) times daily.    CHOLECALCIFEROL, VITAMIN D3, 50,000 UNIT CAPSULE    Take 1 capsule (50,000 Units total) by mouth once a week.    CLONAZEPAM (KLONOPIN) 0.5 MG TABLET    Take 1 tablet (0.5 mg total) by mouth daily as needed for Anxiety.    CYCLOBENZAPRINE (FLEXERIL) 10 MG TABLET    TAKE 1 TABLET BY MOUTH THREE TIMES A DAY AS NEEDED    ESCITALOPRAM OXALATE (LEXAPRO) 20 MG TABLET    Take 1 tablet (20  mg total) by mouth once daily.    FLUTICASONE PROPIONATE (FLONASE) 50 MCG/ACTUATION NASAL SPRAY    2 SPRAYS (100 MCG TOTAL) BY EACH NARE ROUTE ONCE DAILY.    L NORGEST/E.ESTRADIOL-E.ESTRAD (CAMRESE) 0.15 MG-30 MCG (84)/10 MCG (7) 3MPK    Take 1 capsule by mouth once daily.    LATANOPROST 0.005 % OPHTHALMIC SOLUTION    Place 1 drop into both eyes every evening.    LEVOCETIRIZINE (XYZAL) 5 MG TABLET    TAKE 1 TABLET BY MOUTH EVERY DAY IN THE EVENING    LEVOCETIRIZINE (XYZAL) 5 MG TABLET    Take 1 tablet (5 mg total) by mouth every evening.    METHIMAZOLE (TAPAZOLE) 10 MG TAB    TAKE 1 TABLET BY MOUTH TWICE A DAY    MONTELUKAST (SINGULAIR) 10 MG TABLET    Take 1 tablet (10 mg total) by mouth once daily.    OXCARBAZEPINE (TRILEPTAL) 150 MG TAB    Take 1 twice daily for 7 days then 2 twice daily    PANTOPRAZOLE (PROTONIX) 40 MG TABLET    Take 1 tablet (40 mg total) by mouth once daily.    PREGABALIN (LYRICA) 100 MG CAPSULE    Take 1 capsule (100 mg total) by mouth once daily.    TRAZODONE (DESYREL) 100 MG TABLET    Take 1/2 to 1 and 1/2 tablets at bedtime as needed for sleep.   Discontinued Medications    ESCITALOPRAM OXALATE (LEXAPRO) 10 MG TABLET    Take 1.5 tablets (15 mg total) by mouth once daily.    NAPROXEN (NAPROSYN) 500 MG TABLET    TAKE 1 TABLET BY MOUTH TWICE A DAY     Review of patient's allergies indicates:   Allergen Reactions    Demerol [meperidine] Other (See Comments)     blackout           Objective:        General    Nursing note and vitals reviewed.  Constitutional: She is oriented to person, place, and time. She appears well-developed and well-nourished.   HENT:   Head: Normocephalic and atraumatic.   Eyes: EOM are normal.   Cardiovascular: Normal rate and regular rhythm.    Pulmonary/Chest: Effort normal.   Abdominal: Soft.   Neurological: She is alert and oriented to person, place, and time.   Psychiatric: She has a normal mood and affect. Her behavior is normal.           Right Knee Exam      Range of Motion   Extension: normal   Flexion: normal     Tests   Ligament Examination Lachman: normal (-1 to 2mm) PCL-Posterior Drawer: normal (0 to 2mm)     MCL - Valgus: normal (0 to 2mm)  LCL - Varus: normal    Other   Sensation: normal    Left Knee Exam     Inspection   Erythema: absent  Swelling: absent  Effusion: absent  Deformity: absent  Bruising: absent    Tenderness   The patient tender to palpation of the medial joint line.    Range of Motion   Extension: normal   Flexion: normal     Tests   Meniscus   Delia:  Medial - positive Lateral - negative  Stability Lachman: normal (-1 to 2mm) PCL-Posterior Drawer: normal (0 to 2mm)  MCL - Valgus: normal (0 to 2mm)  LCL - Varus: normal (0 to 2mm)  Patella   Patellar apprehension: negative  Patellar Tracking: normal  Patellar Grind: negative    Other   Meniscal Cyst: absent  Popliteal (Baker's) Cyst: absent  Sensation: normal    Comments:  Comp soft, cap refill < 2 sec.    Muscle Strength   Right Lower Extremity   Quadriceps:  5/5   Hamstrin/5   Left Lower Extremity   Quadriceps:  5/5   Hamstrin/5     Vascular Exam       Edema  Right Lower Leg: absent  Left Lower Leg: absent              Assessment:       Encounter Diagnoses   Name Primary?    Primary osteoarthritis of left knee Yes    Chronic pain of left knee     Primary osteoarthritis of right knee     Morbid obesity with BMI of 70 and over, adult           Plan:       Shaina was seen today for pain.    Diagnoses and all orders for this visit:    Primary osteoarthritis of left knee  -     methylPREDNISolone acetate injection 80 mg  -     hylan g-f 20 (SYNVISC ONE) 48 mg/6 mL injection 48 mg  -     Prior Authorization Order    Chronic pain of left knee  -     methylPREDNISolone acetate injection 80 mg  -     hylan g-f 20 (SYNVISC ONE) 48 mg/6 mL injection 48 mg  -     Prior Authorization Order    Primary osteoarthritis of right knee    Morbid obesity with BMI of 70 and over, adult        Shaina  Yoanna Olson comes in today with the above problems as above.  We had a long discussion today regarding degenerative arthritis in the knees. The patient understands that arthritis is chronic and will worsen over time.  The patient also understands that arthritis may cause episodic flare-ups in pain. Management or if arthritis is achieved through a multi-modal approach including weight loss in obese individuals, activity modification, NSAIDs (topical vs oral) where appropriate, periodic intra-articular steroid injections, viscosupplementation, physical therapy, knee bracing, ambulatory aids, as well as geniculate nerve blocks.      After discussion of risks and benefits of all of the above were discussed, the decision was made to move forward with CSI today left knee.  I will also submit authorization request for Synvisc-One to be done in approximately 2 months.  She will continue a home exercise program.  She does not have time for formal therapy as she just started a new job recently.  If she has problems prior to follow-up, she will notify the office.  She will continue to work on weight loss, as well.  She verbalizes understanding and agrees.    Follow up in about 2 months (around 2/19/2020) for syn one left knee.    Left Knee Injection Report:  After verbal consent was obtained for left knee injection, patient ID, site, and side were verified.  The  left  knee was sterilly prepped in the standard fashion.  A 22-gauge needle was introduced into left knee joint from an santino-lateral site without complication. The left knee was then injected with 20 mg lidocaine plain and 80 mg depomedrol.  A sterile bandaid was applied.  The patient was informed to apply an ice pack approximately 10min once arriving home and not to do anything strenuous for 24hours.  She was instructed to call if there were any problems. The patient was discharged in stable condition.    The patient understands, chooses and consents to this plan  and accepts all   the risks which include but are not limited to the risks mentioned above.     Disclaimer: This note was prepared using a voice recognition system and is likely to have sound alike errors within the text.

## 2019-12-19 NOTE — PATIENT INSTRUCTIONS
If you are experiencing pain/discomfort or have questions after 5pm and would like to be connected to the [facility] on call team, please call this number [phone] and specify which Orthopedics/Sports Medicine provider is treating you.

## 2019-12-20 ENCOUNTER — TELEPHONE (OUTPATIENT)
Dept: RADIOLOGY | Facility: HOSPITAL | Age: 39
End: 2019-12-20

## 2019-12-23 ENCOUNTER — PATIENT MESSAGE (OUTPATIENT)
Dept: ENDOCRINOLOGY | Facility: CLINIC | Age: 39
End: 2019-12-23

## 2019-12-23 ENCOUNTER — HOSPITAL ENCOUNTER (OUTPATIENT)
Dept: RADIOLOGY | Facility: HOSPITAL | Age: 39
Discharge: HOME | End: 2019-12-23
Attending: INTERNAL MEDICINE
Payer: COMMERCIAL

## 2019-12-23 DIAGNOSIS — E04.1 THYROID NODULE: ICD-10-CM

## 2019-12-23 PROCEDURE — 76536 US SOFT TISSUE HEAD NECK THYROID: ICD-10-PCS | Mod: 26,,, | Performed by: RADIOLOGY

## 2019-12-23 PROCEDURE — 76536 US EXAM OF HEAD AND NECK: CPT | Mod: 26,,, | Performed by: RADIOLOGY

## 2019-12-23 PROCEDURE — 76536 US EXAM OF HEAD AND NECK: CPT | Mod: TC

## 2019-12-24 ENCOUNTER — PATIENT MESSAGE (OUTPATIENT)
Dept: ENDOCRINOLOGY | Facility: CLINIC | Age: 39
End: 2019-12-24

## 2019-12-26 ENCOUNTER — PATIENT MESSAGE (OUTPATIENT)
Dept: OTOLARYNGOLOGY | Facility: CLINIC | Age: 39
End: 2019-12-26

## 2019-12-27 ENCOUNTER — PATIENT MESSAGE (OUTPATIENT)
Dept: OTOLARYNGOLOGY | Facility: CLINIC | Age: 39
End: 2019-12-27

## 2019-12-27 RX ORDER — AZITHROMYCIN 250 MG/1
TABLET, FILM COATED ORAL
Qty: 6 TABLET | Refills: 0 | Status: SHIPPED | OUTPATIENT
Start: 2019-12-27 | End: 2020-05-06

## 2019-12-30 ENCOUNTER — PATIENT MESSAGE (OUTPATIENT)
Dept: ORTHOPEDICS | Facility: CLINIC | Age: 39
End: 2019-12-30

## 2020-01-02 ENCOUNTER — PATIENT MESSAGE (OUTPATIENT)
Dept: PULMONOLOGY | Facility: CLINIC | Age: 40
End: 2020-01-02

## 2020-01-08 ENCOUNTER — PATIENT MESSAGE (OUTPATIENT)
Dept: ORTHOPEDICS | Facility: CLINIC | Age: 40
End: 2020-01-08

## 2020-01-09 ENCOUNTER — OFFICE VISIT (OUTPATIENT)
Dept: PSYCHIATRY | Facility: CLINIC | Age: 40
End: 2020-01-09
Payer: COMMERCIAL

## 2020-01-09 VITALS
DIASTOLIC BLOOD PRESSURE: 98 MMHG | WEIGHT: 293 LBS | BODY MASS INDEX: 72.28 KG/M2 | SYSTOLIC BLOOD PRESSURE: 132 MMHG | HEART RATE: 88 BPM

## 2020-01-09 DIAGNOSIS — F31.32 BIPOLAR AFFECTIVE DISORDER, CURRENTLY DEPRESSED, MODERATE: Primary | ICD-10-CM

## 2020-01-09 DIAGNOSIS — F41.1 GAD (GENERALIZED ANXIETY DISORDER): ICD-10-CM

## 2020-01-09 PROCEDURE — 99999 PR PBB SHADOW E&M-EST. PATIENT-LVL II: ICD-10-PCS | Mod: PBBFAC,,, | Performed by: PSYCHIATRY & NEUROLOGY

## 2020-01-09 PROCEDURE — 3008F PR BODY MASS INDEX (BMI) DOCUMENTED: ICD-10-PCS | Mod: CPTII,S$GLB,, | Performed by: PSYCHIATRY & NEUROLOGY

## 2020-01-09 PROCEDURE — 3075F PR MOST RECENT SYSTOLIC BLOOD PRESS GE 130-139MM HG: ICD-10-PCS | Mod: CPTII,S$GLB,, | Performed by: PSYCHIATRY & NEUROLOGY

## 2020-01-09 PROCEDURE — 3080F DIAST BP >= 90 MM HG: CPT | Mod: CPTII,S$GLB,, | Performed by: PSYCHIATRY & NEUROLOGY

## 2020-01-09 PROCEDURE — 3075F SYST BP GE 130 - 139MM HG: CPT | Mod: CPTII,S$GLB,, | Performed by: PSYCHIATRY & NEUROLOGY

## 2020-01-09 PROCEDURE — 99999 PR PBB SHADOW E&M-EST. PATIENT-LVL II: CPT | Mod: PBBFAC,,, | Performed by: PSYCHIATRY & NEUROLOGY

## 2020-01-09 PROCEDURE — 99213 OFFICE O/P EST LOW 20 MIN: CPT | Mod: S$GLB,,, | Performed by: PSYCHIATRY & NEUROLOGY

## 2020-01-09 PROCEDURE — 99213 PR OFFICE/OUTPT VISIT, EST, LEVL III, 20-29 MIN: ICD-10-PCS | Mod: S$GLB,,, | Performed by: PSYCHIATRY & NEUROLOGY

## 2020-01-09 PROCEDURE — 3080F PR MOST RECENT DIASTOLIC BLOOD PRESSURE >= 90 MM HG: ICD-10-PCS | Mod: CPTII,S$GLB,, | Performed by: PSYCHIATRY & NEUROLOGY

## 2020-01-09 PROCEDURE — 3008F BODY MASS INDEX DOCD: CPT | Mod: CPTII,S$GLB,, | Performed by: PSYCHIATRY & NEUROLOGY

## 2020-01-09 RX ORDER — ESCITALOPRAM OXALATE 20 MG/1
20 TABLET ORAL DAILY
Qty: 30 TABLET | Refills: 2 | Status: SHIPPED | OUTPATIENT
Start: 2020-01-09 | End: 2020-05-07 | Stop reason: SDUPTHER

## 2020-01-09 RX ORDER — OXCARBAZEPINE 150 MG/1
TABLET, FILM COATED ORAL
Qty: 120 TABLET | Refills: 3 | Status: SHIPPED | OUTPATIENT
Start: 2020-01-09 | End: 2020-05-07 | Stop reason: SDUPTHER
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
## 2020-01-10 ENCOUNTER — PATIENT MESSAGE (OUTPATIENT)
Dept: OPHTHALMOLOGY | Facility: CLINIC | Age: 40
End: 2020-01-10

## 2020-01-10 ENCOUNTER — PATIENT MESSAGE (OUTPATIENT)
Dept: PULMONOLOGY | Facility: CLINIC | Age: 40
End: 2020-01-10

## 2020-01-22 ENCOUNTER — OFFICE VISIT (OUTPATIENT)
Dept: PODIATRY | Facility: CLINIC | Age: 40
End: 2020-01-22
Payer: COMMERCIAL

## 2020-01-22 VITALS
HEART RATE: 99 BPM | SYSTOLIC BLOOD PRESSURE: 151 MMHG | BODY MASS INDEX: 50.02 KG/M2 | DIASTOLIC BLOOD PRESSURE: 84 MMHG | WEIGHT: 293 LBS | HEIGHT: 64 IN

## 2020-01-22 DIAGNOSIS — M72.2 PLANTAR FASCIITIS: Primary | ICD-10-CM

## 2020-01-22 DIAGNOSIS — M24.571 EQUINUS CONTRACTURE OF RIGHT ANKLE: ICD-10-CM

## 2020-01-22 DIAGNOSIS — M79.671 INFLAMMATORY HEEL PAIN, RIGHT: ICD-10-CM

## 2020-01-22 PROCEDURE — 99214 OFFICE O/P EST MOD 30 MIN: CPT | Mod: 25,S$GLB,, | Performed by: PODIATRIST

## 2020-01-22 PROCEDURE — 99214 PR OFFICE/OUTPT VISIT, EST, LEVL IV, 30-39 MIN: ICD-10-PCS | Mod: 25,S$GLB,, | Performed by: PODIATRIST

## 2020-01-22 PROCEDURE — 99999 PR PBB SHADOW E&M-EST. PATIENT-LVL III: ICD-10-PCS | Mod: PBBFAC,,, | Performed by: PODIATRIST

## 2020-01-22 PROCEDURE — 3079F PR MOST RECENT DIASTOLIC BLOOD PRESSURE 80-89 MM HG: ICD-10-PCS | Mod: CPTII,S$GLB,, | Performed by: PODIATRIST

## 2020-01-22 PROCEDURE — 3077F PR MOST RECENT SYSTOLIC BLOOD PRESSURE >= 140 MM HG: ICD-10-PCS | Mod: CPTII,S$GLB,, | Performed by: PODIATRIST

## 2020-01-22 PROCEDURE — 3008F BODY MASS INDEX DOCD: CPT | Mod: CPTII,S$GLB,, | Performed by: PODIATRIST

## 2020-01-22 PROCEDURE — 3008F PR BODY MASS INDEX (BMI) DOCUMENTED: ICD-10-PCS | Mod: CPTII,S$GLB,, | Performed by: PODIATRIST

## 2020-01-22 PROCEDURE — 20550 PR INJECT TENDON SHEATH/LIGAMENT: ICD-10-PCS | Mod: RT,S$GLB,, | Performed by: PODIATRIST

## 2020-01-22 PROCEDURE — 99999 PR PBB SHADOW E&M-EST. PATIENT-LVL III: CPT | Mod: PBBFAC,,, | Performed by: PODIATRIST

## 2020-01-22 PROCEDURE — 3077F SYST BP >= 140 MM HG: CPT | Mod: CPTII,S$GLB,, | Performed by: PODIATRIST

## 2020-01-22 PROCEDURE — 3079F DIAST BP 80-89 MM HG: CPT | Mod: CPTII,S$GLB,, | Performed by: PODIATRIST

## 2020-01-22 PROCEDURE — 20550 NJX 1 TENDON SHEATH/LIGAMENT: CPT | Mod: RT,S$GLB,, | Performed by: PODIATRIST

## 2020-01-27 RX ORDER — TRIAMCINOLONE ACETONIDE 40 MG/ML
40 INJECTION, SUSPENSION INTRA-ARTICULAR; INTRAMUSCULAR
Status: COMPLETED | OUTPATIENT
Start: 2020-01-27 | End: 2020-01-27

## 2020-01-27 RX ADMIN — TRIAMCINOLONE ACETONIDE 40 MG: 40 INJECTION, SUSPENSION INTRA-ARTICULAR; INTRAMUSCULAR at 12:01

## 2020-01-27 NOTE — PROGRESS NOTES
Subjective:     Patient ID: Shaina Olson is a 40 y.o. female.    Chief Complaint: Foot Pain (pain to vottom of right heel. rates pain 7/10. wears tennis shoes with socks. non-diabetic Pt. PCP Dr. Ann, last see 12/04/19 per patient.)    Shaina is a 40 y.o. female who presents to the clinic complaining of heel pain in the right foot, especially with the first step in the morning. The pain is described as Aching and Tight. The onset of the pain was gradual and has worsened over the past several months. Shaina rates the pain as 7/10. She denies a history of trauma. Prior treatments include ibuprofen and past injections.     Patient Active Problem List   Diagnosis    Graves' disease    Hypertension    NOE (generalized anxiety disorder)    Iatrogenic cushingoid features    Vitamin D deficiency    Morbid obesity with BMI of 70 and over, adult    Dysphonia    Mild asthma    Psychophysiological insomnia    Myalgia    Arthritis of right knee    Round hole of right retina without detachment    PVC's (premature ventricular contractions)    TREVOR on CPAP    Chronic pansinusitis    Lumbosacral radiculopathy at S1    Bipolar affective disorder, currently depressed, moderate       Medication List with Changes/Refills   Current Medications    ALLERGY MIX    Pt specific maintenance vials    AMLODIPINE (NORVASC) 2.5 MG TABLET    TAKE 1 TABLET BY MOUTH EVERY DAY WITH 5MG FOR A TOTAL OF 7.5 MG PER DAY    AMLODIPINE (NORVASC) 5 MG TABLET    TAKE 1 TABLET BY MOUTH EVERY DAY    AZELASTINE (ASTELIN) 137 MCG (0.1 %) NASAL SPRAY    1 spray (137 mcg total) by Nasal route 2 (two) times daily.    AZITHROMYCIN (Z-BARRY) 250 MG TABLET    Take as directed    CHOLECALCIFEROL, VITAMIN D3, 50,000 UNIT CAPSULE    Take 1 capsule (50,000 Units total) by mouth once a week.    CLONAZEPAM (KLONOPIN) 0.5 MG TABLET    Take 1 tablet (0.5 mg total) by mouth daily as needed for Anxiety.    CYCLOBENZAPRINE (FLEXERIL) 10 MG TABLET    TAKE 1 TABLET  BY MOUTH THREE TIMES A DAY AS NEEDED    ESCITALOPRAM OXALATE (LEXAPRO) 20 MG TABLET    Take 1 tablet (20 mg total) by mouth once daily.    FLUTICASONE PROPIONATE (FLONASE) 50 MCG/ACTUATION NASAL SPRAY    2 SPRAYS (100 MCG TOTAL) BY EACH NARE ROUTE ONCE DAILY.    L NORGEST/E.ESTRADIOL-E.ESTRAD (CAMRESE) 0.15 MG-30 MCG (84)/10 MCG (7) 3MPK    Take 1 capsule by mouth once daily.    LATANOPROST 0.005 % OPHTHALMIC SOLUTION    Place 1 drop into both eyes every evening.    LEVOCETIRIZINE (XYZAL) 5 MG TABLET    Take 1 tablet (5 mg total) by mouth every evening.    METHIMAZOLE (TAPAZOLE) 10 MG TAB    TAKE 1 TABLET BY MOUTH TWICE A DAY    MONTELUKAST (SINGULAIR) 10 MG TABLET    Take 1 tablet (10 mg total) by mouth once daily.    OXCARBAZEPINE (TRILEPTAL) 150 MG TAB    Take 1 twice daily for 7 days then 2 twice daily    PANTOPRAZOLE (PROTONIX) 40 MG TABLET    Take 1 tablet (40 mg total) by mouth once daily.    PREGABALIN (LYRICA) 100 MG CAPSULE    Take 1 capsule (100 mg total) by mouth once daily.    TRAZODONE (DESYREL) 100 MG TABLET    Take 1/2 to 1 and 1/2 tablets at bedtime as needed for sleep.       Review of patient's allergies indicates:   Allergen Reactions    Demerol [meperidine] Other (See Comments)     blackout       Past Surgical History:   Procedure Laterality Date    FRACTURE SURGERY Right 2013    right wrist    NASAL SEPTUM SURGERY  2001    SINUS SURGERY  2001    SINUS SURGERY  11/22/2017    TONSILLECTOMY, ADENOIDECTOMY  2001    WRIST FRACTURE SURGERY Right 2013    Right wrist       Family History   Problem Relation Age of Onset    Hypertension Mother     Lupus Mother     Diverticulosis Mother     Cancer Sister 30        stg 1 boderline? ov cancer    Cancer Maternal Grandmother     Cancer Maternal Grandfather     Cataracts Maternal Grandfather     Diabetes Paternal Grandmother     Stroke Paternal Grandmother     Cancer Paternal Grandmother     Cancer Paternal Grandfather     Diabetes Paternal  "Aunt     Hypertension Paternal Aunt     Thyroid disease Neg Hx     Migraines Neg Hx     Asthma Neg Hx        Social History     Socioeconomic History    Marital status: Single     Spouse name: Not on file    Number of children: 0    Years of education: Not on file    Highest education level: Not on file   Occupational History    Occupation: LPN     Comment: [employer]   Social Needs    Financial resource strain: Not on file    Food insecurity:     Worry: Not on file     Inability: Not on file    Transportation needs:     Medical: Not on file     Non-medical: Not on file   Tobacco Use    Smoking status: Never Smoker    Smokeless tobacco: Never Used   Substance and Sexual Activity    Alcohol use: Yes     Alcohol/week: 0.0 standard drinks     Comment: occasionally    Drug use: No    Sexual activity: Yes     Partners: Male     Birth control/protection: OCP   Lifestyle    Physical activity:     Days per week: Not on file     Minutes per session: Not on file    Stress: Not on file   Relationships    Social connections:     Talks on phone: Not on file     Gets together: Not on file     Attends Scientologist service: Not on file     Active member of club or organization: Not on file     Attends meetings of clubs or organizations: Not on file     Relationship status: Not on file   Other Topics Concern    Not on file   Social History Narrative    Wears a seatbelt.       Vitals:    01/22/20 1619   BP: (!) 151/84   Pulse: 99   Weight: (!) 193.7 kg (427 lb)   Height: 5' 4" (1.626 m)   PainSc:   7   PainLoc: Foot       Review of Systems   Constitutional: Negative for chills and fever.   Respiratory: Negative for shortness of breath.    Cardiovascular: Negative for chest pain, palpitations, orthopnea, claudication and leg swelling.   Gastrointestinal: Negative for diarrhea, nausea and vomiting.   Musculoskeletal: Negative for joint pain.   Skin: Negative for rash.   Neurological: Negative for dizziness, tingling, " sensory change, focal weakness and weakness.   Psychiatric/Behavioral: Negative.              Objective:   PHYSICAL EXAM: Apperance: Alert and orient in no distress,well developed, and with good attention to grooming and body habits  Patient presents ambulating in tennis shoes.   Lower Extremity Exam  VASCULAR: Dorsalis pedis pulses 2/4 bilateral and Posterior Tibial pulses 2/4 bilateral. Capillary fill time <4 seconds bilateral. No edema observed bilateral. Varicosities absent bilateral. Skin temperature of the lower extremities is warm to warm, proximal to distal. Hair growth WNL bilateral.  DERMATOLOGICAL: No skin rashes, subcutaneous nodules, lesions, or ulcers observed bilateral.   NEUROLOGICAL: Light touch, sharp-dull, proprioception all present and equal bilaterally.    MUSCULOSKELETAL: Muscle strength 5/5 for all foot inverters, everters, plantarflexors, and dorsiflexors bilateral. Ankle joints bilateral shows decreased ROM. bilateral ankle ROM shows greater decrease in dorsiflexion with knee extended. Ankle joint ROM is pain free and without crepitus bilateral. Pain to palpation right plantar medial tubercle. Plantar medial aspect of right heels shows tenderness to palpation. No pain on medial-lateral compression of the calcaneus.         Assessment:   The following prescriptions/orders were written today per listed diagnosis  Plantar fasciitis - Right Foot  -     triamcinolone acetonide injection 40 mg    Inflammatory heel pain, right  -     triamcinolone acetonide injection 40 mg    Equinus contracture of right ankle          Plan:   Plantar fasciitis - Right Foot  -     triamcinolone acetonide injection 40 mg    Inflammatory heel pain, right  -     triamcinolone acetonide injection 40 mg    Equinus contracture of right ankle      I counseled the patient on her conditions, regarding findings of my examination, my impressions, and usual treatment plan.   I explained to the patient that etiology and  treatment options for heel pain including rest,  ice messages, stretching exercises, strappings/tappings, NSAID's, injections, new shoegear with orthotic inserts, and/or surgical treatment.   Patient agreed to injection therapy and inserts.   I gave written and verbal instructions on heel cord stretching and this was demonstrated for the patient. Patient expressed understanding.  Patient agreed to injection therapy today. After sterilizing the area with an alcohol prep, the affected area was injected with a solution containing 1 cc of 1% lidocaine plain, 1cc of 0.5% Marcaine plain and 1 cc of Kenalog 40%.  Injection area was then covered with band-aid. The patient tolerated the injection well and reported comfort to the area.  Patient instructed on adequate icing techniques. Patient should ice the affected area at least once per day x 10 minutes for 10 days . I advised the  patient that extra icing would also be beneficial to ensure adequate anti inflammatory effect.   The patient and I reviewed the types of shoes she should be wearing, my recommendation includes generally the best time of the day for a shoe fitting is the afternoon, shoes with a wide toe box, very good cushion, and tennis shoes with removable inner soles. The patient and I reviewed my recommendations for over-the-counter orthotic inserts.   Patient to return in 3 months.                   Cristy Michael DPM  [facility]

## 2020-01-28 DIAGNOSIS — I10 HYPERTENSION GOAL BP (BLOOD PRESSURE) < 140/90: ICD-10-CM

## 2020-01-28 RX ORDER — AMLODIPINE BESYLATE 5 MG/1
TABLET ORAL
Qty: 30 TABLET | Refills: 2 | OUTPATIENT
Start: 2020-01-28

## 2020-02-07 ENCOUNTER — PATIENT MESSAGE (OUTPATIENT)
Dept: PODIATRY | Facility: CLINIC | Age: 40
End: 2020-02-07

## 2020-02-14 ENCOUNTER — OFFICE VISIT (OUTPATIENT)
Dept: ORTHOPEDICS | Facility: CLINIC | Age: 40
End: 2020-02-14
Payer: COMMERCIAL

## 2020-02-14 VITALS
HEIGHT: 64 IN | WEIGHT: 293 LBS | BODY MASS INDEX: 50.02 KG/M2 | SYSTOLIC BLOOD PRESSURE: 128 MMHG | HEART RATE: 79 BPM | DIASTOLIC BLOOD PRESSURE: 73 MMHG

## 2020-02-14 DIAGNOSIS — M25.562 CHRONIC PAIN OF LEFT KNEE: ICD-10-CM

## 2020-02-14 DIAGNOSIS — G89.29 CHRONIC PAIN OF LEFT KNEE: ICD-10-CM

## 2020-02-14 DIAGNOSIS — M17.12 PRIMARY OSTEOARTHRITIS OF LEFT KNEE: Primary | ICD-10-CM

## 2020-02-14 PROCEDURE — 20610 LARGE JOINT ASPIRATION/INJECTION: L KNEE: ICD-10-PCS | Mod: LT,S$GLB,, | Performed by: PHYSICIAN ASSISTANT

## 2020-02-14 PROCEDURE — 99499 UNLISTED E&M SERVICE: CPT | Mod: S$GLB,,, | Performed by: PHYSICIAN ASSISTANT

## 2020-02-14 PROCEDURE — 20610 DRAIN/INJ JOINT/BURSA W/O US: CPT | Mod: LT,S$GLB,, | Performed by: PHYSICIAN ASSISTANT

## 2020-02-14 PROCEDURE — 99999 PR PBB SHADOW E&M-EST. PATIENT-LVL III: CPT | Mod: PBBFAC,,, | Performed by: PHYSICIAN ASSISTANT

## 2020-02-14 PROCEDURE — 99999 PR PBB SHADOW E&M-EST. PATIENT-LVL III: ICD-10-PCS | Mod: PBBFAC,,, | Performed by: PHYSICIAN ASSISTANT

## 2020-02-14 PROCEDURE — 99499 NO LOS: ICD-10-PCS | Mod: S$GLB,,, | Performed by: PHYSICIAN ASSISTANT

## 2020-02-14 NOTE — PROCEDURES
Large Joint Aspiration/Injection: L knee  Performed by: Cherie Qiu PA-C  Authorized by: Cherie Qiu PA-C  Date/Time: 2/14/2020 3:40 PM    Consent Done?:  Yes (Verbal)  Indications:   Timeout: Immediately prior to procedure a time out was called to verify the correct patient, procedure, equipment, support staff and site/side marked as required.  Prep:Patient was prepped and draped in the usual sterile fashion.        Anesthesia  Local anesthesia used  Anesthesia: local infiltration  Anesthetic: lidocaine 1% without epinephrine  Anesthetic total: 2mL    Details:   Needle size: 18 G    Ultrasonic Guidance for needle placement: No    Medications: 48 mg hylan g-f 20 48 mg/6 mL  Patient tolerance:  patient tolerated the procedure well with no immediate complications    Shaina Olson comes in today for Synvisc-One injection in the left knee. We have again discussed risks and benefits of the procedure.  She wishes to proceed and will notify the office with any questions or concerns.  Patient will return to the clinic in 3-6 months for follow-up evaluation.  Patient verbalizes understanding and agrees with the above plan.    Pt has Arthritis of the right knee

## 2020-02-24 RX ORDER — METHIMAZOLE 10 MG/1
10 TABLET ORAL 2 TIMES DAILY
Qty: 60 TABLET | Refills: 3 | Status: SHIPPED | OUTPATIENT
Start: 2020-02-24 | End: 2020-06-23

## 2020-02-27 ENCOUNTER — PATIENT MESSAGE (OUTPATIENT)
Dept: PODIATRY | Facility: CLINIC | Age: 40
End: 2020-02-27

## 2020-02-27 DIAGNOSIS — M72.2 PLANTAR FASCIITIS: Primary | ICD-10-CM

## 2020-02-27 DIAGNOSIS — M79.671 INFLAMMATORY HEEL PAIN, RIGHT: ICD-10-CM

## 2020-02-27 RX ORDER — DICLOFENAC SODIUM 75 MG/1
75 TABLET, DELAYED RELEASE ORAL 2 TIMES DAILY
Qty: 60 TABLET | Refills: 0 | Status: SHIPPED | OUTPATIENT
Start: 2020-02-27 | End: 2020-03-23

## 2020-03-02 NOTE — PROGRESS NOTES
Subjective:       Patient ID: Shaina Olson is a 37 y.o. female.    Chief Complaint: Follow-up    HPI Comments: 37 y old female with Migraines  And tension headaches since she was 21 y old . Seen By neuro  She was taking zonregena + Imitrex . She  Was doing fine until the past few months when she started getting  them 2 times week , last over 1 d . Excedrin migraine and aleve help some . Stress  level is  Normal . Generalized , no aura . + nauseas + photophobia .She also would like to get some labs done to find out why she has generalized muscle cramps at night  . Flexeril has help. On anothre note , she is frsutarred about her weight . She took phentermine around 2009 for 1 m with some success . She would like to resume taking it .     Review of Systems   Constitutional: Negative.    Respiratory: Negative.    Cardiovascular: Negative.    Gastrointestinal: Negative.    Musculoskeletal: Positive for myalgias.   Neurological: Positive for headaches.   Hematological: Negative.    Psychiatric/Behavioral: Negative.        Objective:      Physical Exam   Constitutional: She is oriented to person, place, and time. She appears well-developed and well-nourished. No distress.   HENT:   Head: Normocephalic and atraumatic.   Right Ear: External ear normal.   Left Ear: External ear normal.   Mouth/Throat: No oropharyngeal exudate.   Eyes: Conjunctivae and EOM are normal. Pupils are equal, round, and reactive to light. Right eye exhibits no discharge. Left eye exhibits no discharge. No scleral icterus.   Neck: Normal range of motion. Neck supple. No JVD present. No tracheal deviation present. No thyromegaly present.   Cardiovascular: Normal rate, regular rhythm and normal heart sounds.  Exam reveals no gallop and no friction rub.    No murmur heard.  Pulmonary/Chest: Effort normal and breath sounds normal. No stridor. No respiratory distress. She has no wheezes. She has no rales. She exhibits no tenderness.   Abdominal: Soft.  Bowel sounds are normal. She exhibits no distension. There is no tenderness. There is no rebound and no guarding.   Musculoskeletal: Normal range of motion.   Lymphadenopathy:     She has no cervical adenopathy.   Neurological: She is alert and oriented to person, place, and time.   Skin: Skin is warm and dry. She is not diaphoretic.   Psychiatric: She has a normal mood and affect. Her behavior is normal. Judgment and thought content normal.       Assessment:       Shaina was seen today for follow-up.    Diagnoses and all orders for this visit:    Cramps of lower extremity, unspecified laterality  -     Comprehensive metabolic panel; Future  -     TSH; Future  -     Magnesium; Future    Vitamin D deficiency  -     Vitamin D; Future    Episodic cluster headache, not intractable    Essential hypertension    Morbid obesity with BMI of 70 and over, adult    Other orders  -     Discontinue: amlodipine (NORVASC) 2.5 MG tablet; 1 tab po qd for a total of 7.5 mg qd  -     topiramate (TOPAMAX) 25 MG tablet; Take 1 tablet (25 mg total) by mouth 2 (two) times daily.  -     cyclobenzaprine (FLEXERIL) 10 MG tablet; Take 1 tablet (10 mg total) by mouth 3 (three) times daily as needed for Muscle spasms.      Plan:     Await on work up   Start Topamax. Stop BC . SE Discussed   Increase amlodipine . F.u in 10 days . Diet and ex   Start phentermine in 1 w once BP normalizes. Keep an eye on BP , PULSE, psych symptoms etc . The patient is asked to make an attempt to improve diet and exercise patterns to aid in medical management of this problem.   [Initial Evaluation] : an initial evaluation [FreeTextEntry1] : gerd, epigastric pain

## 2020-03-20 ENCOUNTER — PATIENT MESSAGE (OUTPATIENT)
Dept: OPHTHALMOLOGY | Facility: CLINIC | Age: 40
End: 2020-03-20

## 2020-03-22 DIAGNOSIS — M79.671 INFLAMMATORY HEEL PAIN, RIGHT: ICD-10-CM

## 2020-03-22 DIAGNOSIS — M72.2 PLANTAR FASCIITIS: ICD-10-CM

## 2020-03-23 RX ORDER — DICLOFENAC SODIUM 75 MG/1
TABLET, DELAYED RELEASE ORAL
Qty: 60 TABLET | Refills: 0 | Status: SHIPPED | OUTPATIENT
Start: 2020-03-23 | End: 2020-06-10 | Stop reason: SDUPTHER

## 2020-03-25 ENCOUNTER — PATIENT MESSAGE (OUTPATIENT)
Dept: ENDOCRINOLOGY | Facility: CLINIC | Age: 40
End: 2020-03-25

## 2020-03-26 ENCOUNTER — PATIENT MESSAGE (OUTPATIENT)
Dept: ENDOCRINOLOGY | Facility: CLINIC | Age: 40
End: 2020-03-26

## 2020-03-29 ENCOUNTER — PATIENT MESSAGE (OUTPATIENT)
Dept: OTOLARYNGOLOGY | Facility: CLINIC | Age: 40
End: 2020-03-29

## 2020-03-30 ENCOUNTER — PATIENT MESSAGE (OUTPATIENT)
Dept: OTOLARYNGOLOGY | Facility: CLINIC | Age: 40
End: 2020-03-30

## 2020-03-31 NOTE — TELEPHONE ENCOUNTER
Sublingual Allergy Drops  Patient: Melissa Olson   : 1980  MRN# [id number]  Ordering Physician: ATIF Wilkinson MD     RED VIAL - sublingually as directed  Allergens: Amt   Fusarium 1:40 w/v 1mL   [place] Toxaway 1:20 w/v 1mL   Red Cedar 1:20 w/v 1mL   Dock-Sorrel 1:20 w/v 1mL   Nettle Weed 1:40 w/v 1mL   Vj Grass 100,000 BAU / mL 1mL   Cockroach 1:20 w/v 1mL   Feather Mix 1:20 w/v 1mL   Mold Mix #1 1:20 w/v 1mL   Tree Mix #4 1:20 w/v 1mL   National Weed mix 1:20 w/v  1mL   Mite Mix 5,000 AU / mL each - farina and pteronyssinus 1mL      GREEN VIAL - sublingually as directed  Volume Added From RED Maintenance Vial 0.25mL   50% Glycerin - LOT#[id number]  EXP: 2022 1mL

## 2020-04-27 ENCOUNTER — TELEPHONE (OUTPATIENT)
Dept: ENDOCRINOLOGY | Facility: CLINIC | Age: 40
End: 2020-04-27

## 2020-04-27 ENCOUNTER — PATIENT MESSAGE (OUTPATIENT)
Dept: ENDOCRINOLOGY | Facility: CLINIC | Age: 40
End: 2020-04-27

## 2020-04-28 ENCOUNTER — PATIENT MESSAGE (OUTPATIENT)
Dept: ENDOCRINOLOGY | Facility: CLINIC | Age: 40
End: 2020-04-28

## 2020-04-30 ENCOUNTER — PATIENT MESSAGE (OUTPATIENT)
Dept: INTERNAL MEDICINE | Facility: CLINIC | Age: 40
End: 2020-04-30

## 2020-05-04 ENCOUNTER — PATIENT MESSAGE (OUTPATIENT)
Dept: PSYCHIATRY | Facility: CLINIC | Age: 40
End: 2020-05-04

## 2020-05-04 DIAGNOSIS — R94.6 ABNORMAL THYROID FUNCTION TEST: Primary | ICD-10-CM

## 2020-05-05 ENCOUNTER — LAB VISIT (OUTPATIENT)
Dept: LAB | Facility: HOSPITAL | Age: 40
End: 2020-05-05
Attending: INTERNAL MEDICINE
Payer: COMMERCIAL

## 2020-05-05 DIAGNOSIS — E21.3 HYPERPARATHYROIDISM: ICD-10-CM

## 2020-05-05 DIAGNOSIS — E55.9 VITAMIN D DEFICIENCY: ICD-10-CM

## 2020-05-05 DIAGNOSIS — E05.90 HYPERTHYROIDISM: ICD-10-CM

## 2020-05-05 DIAGNOSIS — R63.5 WEIGHT GAIN: ICD-10-CM

## 2020-05-05 LAB
25(OH)D3+25(OH)D2 SERPL-MCNC: 32 NG/ML (ref 30–96)
ALBUMIN SERPL BCP-MCNC: 3.5 G/DL (ref 3.5–5.2)
ALP SERPL-CCNC: 99 U/L (ref 55–135)
ALT SERPL W/O P-5'-P-CCNC: 12 U/L (ref 10–44)
ANION GAP SERPL CALC-SCNC: 10 MMOL/L (ref 8–16)
AST SERPL-CCNC: 13 U/L (ref 10–40)
BILIRUB SERPL-MCNC: 0.5 MG/DL (ref 0.1–1)
BUN SERPL-MCNC: 9 MG/DL (ref 6–20)
CALCIUM SERPL-MCNC: 8.9 MG/DL (ref 8.7–10.5)
CHLORIDE SERPL-SCNC: 107 MMOL/L (ref 95–110)
CHOLEST SERPL-MCNC: 195 MG/DL (ref 120–199)
CHOLEST/HDLC SERPL: 3.2 {RATIO} (ref 2–5)
CO2 SERPL-SCNC: 23 MMOL/L (ref 23–29)
CREAT SERPL-MCNC: 0.8 MG/DL (ref 0.5–1.4)
EST. GFR  (AFRICAN AMERICAN): >60 ML/MIN/1.73 M^2
EST. GFR  (NON AFRICAN AMERICAN): >60 ML/MIN/1.73 M^2
ESTIMATED AVG GLUCOSE: 88 MG/DL (ref 68–131)
GLUCOSE SERPL-MCNC: 82 MG/DL (ref 70–110)
HBA1C MFR BLD HPLC: 4.7 % (ref 4–5.6)
HDLC SERPL-MCNC: 61 MG/DL (ref 40–75)
HDLC SERPL: 31.3 % (ref 20–50)
INSULIN COLLECTION INTERVAL: NORMAL
INSULIN SERPL-ACNC: 17.3 UU/ML
LDLC SERPL CALC-MCNC: 119.6 MG/DL (ref 63–159)
NONHDLC SERPL-MCNC: 134 MG/DL
PHOSPHATE SERPL-MCNC: 3.4 MG/DL (ref 2.7–4.5)
POTASSIUM SERPL-SCNC: 4 MMOL/L (ref 3.5–5.1)
PROT SERPL-MCNC: 6.7 G/DL (ref 6–8.4)
PTH-INTACT SERPL-MCNC: 161 PG/ML (ref 9–77)
SODIUM SERPL-SCNC: 140 MMOL/L (ref 136–145)
T3FREE SERPL-MCNC: 2.9 PG/ML (ref 2.3–4.2)
T4 FREE SERPL-MCNC: 0.96 NG/DL (ref 0.71–1.51)
TRIGL SERPL-MCNC: 72 MG/DL (ref 30–150)
TSH SERPL DL<=0.005 MIU/L-ACNC: 1.93 UIU/ML (ref 0.4–4)

## 2020-05-05 PROCEDURE — 36415 COLL VENOUS BLD VENIPUNCTURE: CPT

## 2020-05-05 PROCEDURE — 82306 VITAMIN D 25 HYDROXY: CPT

## 2020-05-05 PROCEDURE — 83525 ASSAY OF INSULIN: CPT

## 2020-05-05 PROCEDURE — 84100 ASSAY OF PHOSPHORUS: CPT

## 2020-05-05 PROCEDURE — 83036 HEMOGLOBIN GLYCOSYLATED A1C: CPT

## 2020-05-05 PROCEDURE — 84439 ASSAY OF FREE THYROXINE: CPT

## 2020-05-05 PROCEDURE — 83970 ASSAY OF PARATHORMONE: CPT

## 2020-05-05 PROCEDURE — 80053 COMPREHEN METABOLIC PANEL: CPT

## 2020-05-05 PROCEDURE — 80061 LIPID PANEL: CPT

## 2020-05-05 PROCEDURE — 84443 ASSAY THYROID STIM HORMONE: CPT

## 2020-05-05 PROCEDURE — 84481 FREE ASSAY (FT-3): CPT

## 2020-05-06 ENCOUNTER — PATIENT MESSAGE (OUTPATIENT)
Dept: INTERNAL MEDICINE | Facility: CLINIC | Age: 40
End: 2020-05-06

## 2020-05-06 ENCOUNTER — OFFICE VISIT (OUTPATIENT)
Dept: INTERNAL MEDICINE | Facility: CLINIC | Age: 40
End: 2020-05-06
Payer: COMMERCIAL

## 2020-05-06 DIAGNOSIS — R60.0 PERIPHERAL EDEMA: Primary | ICD-10-CM

## 2020-05-06 DIAGNOSIS — I10 HYPERTENSION GOAL BP (BLOOD PRESSURE) < 140/90: ICD-10-CM

## 2020-05-06 PROCEDURE — 99212 PR OFFICE/OUTPT VISIT, EST, LEVL II, 10-19 MIN: ICD-10-PCS | Mod: 95,,, | Performed by: INTERNAL MEDICINE

## 2020-05-06 PROCEDURE — 99212 OFFICE O/P EST SF 10 MIN: CPT | Mod: 95,,, | Performed by: INTERNAL MEDICINE

## 2020-05-06 RX ORDER — FUROSEMIDE 20 MG/1
20 TABLET ORAL DAILY PRN
Qty: 30 TABLET | Refills: 1 | Status: SHIPPED | OUTPATIENT
Start: 2020-05-06 | End: 2020-10-28

## 2020-05-06 NOTE — PATIENT INSTRUCTIONS
Leg Swelling in Both Legs    Swelling of the feet, ankles, and legs is called edema. It is caused by excess fluid that has collected in the tissues. Extra fluid in the body settles in the lowest part because of gravity. This is why the legs and feet are most affected.  Some of the causes for edema include:  · Disease of the heart like congestive heart failure  · Standing or sitting for long periods of time  · Infection of the feet or legs  · Blood pooling in the veins of your legs (venous insufficiency)  · Dilated veins in your lower leg (varicose veins)  · Garters or other clothing that is tight on your legs. This will cause blood to pool in your legs because the clothing limits blood flow.  · Some medicines such as hormones like birth control pills, some blood pressure medicines like calcium channel blockers (amlodipine) and steroids, some antidepressants like MAO inhibitors and tricyclics  · Menstrual periods that cause you to retain fluids  · Many types of renal disease  · Liver failure or cirrhosis  · Pregnancy, some swelling is normal, but a sudden increase in leg swelling or weight gain can be a sign of a dangerous complication of pregnancy  · Poor nutrition  · Thyroid disease  Medical treatment will depend on what is causing the swelling in your legs. Your healthcare provider may prescribe water pills (diuretics) to get rid of the extra fluid.  Home care  Follow these guidelines when caring for yourself at home:  · Don't wear clothing like garters that is tight on your legs.  · Keep your legs up while lying or sitting.  · If infection, injury, or recent surgery is causing the swelling, stay off your legs as much as possible until symptoms get better.  · If your healthcare provider says that your leg swelling is caused by venous insufficiency or varicose veins, don't sit or  one place for long periods of time. Take breaks and walk about every few hours. Brisk walking is a good exercise. It helps  circulate the blood that has collected in your leg. Talk with your provider about using support stockings to stop daytime leg swelling.  · If your provider says that heart disease is causing your leg swelling, follow a low-salt diet to stop extra fluid from staying in your body. You may also need medicine.  Follow-up care  Follow up with your healthcare provider, or as advised.  When to seek medical advice  Call your healthcare provider right away if any of these occur:  · New shortness of breath or chest pain  · Shortness of breath or chest pain that gets worse  · Swelling in both legs or ankles that gets worse  · Swelling of the abdomen  · Redness, warmth, or swelling in one leg  · Fever of 100.4ºF (38ºC) or higher, or as directed by your healthcare provider  · Yellow color to your skin or eyes  · Rapid, unexplained weight gain  · Having to sleep upright or use an increased number of pillows  Date Last Reviewed: 3/31/2016  © 7310-0483 [employer]. [address] [place] [address], [place] [address]. All rights reserved. This information is not intended as a substitute for professional medical care. Always follow your healthcare professional's instructions.        Leg Swelling in Both Legs    Swelling of the feet, ankles, and legs is called edema. It is caused by excess fluid that has collected in the tissues. Extra fluid in the body settles in the lowest part because of gravity. This is why the legs and feet are most affected.  Some of the causes for edema include:  · Disease of the heart like congestive heart failure  · Standing or sitting for long periods of time  · Infection of the feet or legs  · Blood pooling in the veins of your legs (venous insufficiency)  · Dilated veins in your lower leg (varicose veins)  · Garters or other clothing that is tight on your legs. This will cause blood to pool in your legs because the clothing limits blood flow.  · Some medicines such as hormones like birth control pills,  some blood pressure medicines like calcium channel blockers (amlodipine) and steroids, some antidepressants like MAO inhibitors and tricyclics  · Menstrual periods that cause you to retain fluids  · Many types of renal disease  · Liver failure or cirrhosis  · Pregnancy, some swelling is normal, but a sudden increase in leg swelling or weight gain can be a sign of a dangerous complication of pregnancy  · Poor nutrition  · Thyroid disease  Medical treatment will depend on what is causing the swelling in your legs. Your healthcare provider may prescribe water pills (diuretics) to get rid of the extra fluid.  Home care  Follow these guidelines when caring for yourself at home:  · Don't wear clothing like garters that is tight on your legs.  · Keep your legs up while lying or sitting.  · If infection, injury, or recent surgery is causing the swelling, stay off your legs as much as possible until symptoms get better.  · If your healthcare provider says that your leg swelling is caused by venous insufficiency or varicose veins, don't sit or  one place for long periods of time. Take breaks and walk about every few hours. Brisk walking is a good exercise. It helps circulate the blood that has collected in your leg. Talk with your provider about using support stockings to stop daytime leg swelling.  · If your provider says that heart disease is causing your leg swelling, follow a low-salt diet to stop extra fluid from staying in your body. You may also need medicine.  Follow-up care  Follow up with your healthcare provider, or as advised.  When to seek medical advice  Call your healthcare provider right away if any of these occur:  · New shortness of breath or chest pain  · Shortness of breath or chest pain that gets worse  · Swelling in both legs or ankles that gets worse  · Swelling of the abdomen  · Redness, warmth, or swelling in one leg  · Fever of 100.4ºF (38ºC) or higher, or as directed by your healthcare  provider  · Yellow color to your skin or eyes  · Rapid, unexplained weight gain  · Having to sleep upright or use an increased number of pillows  Date Last Reviewed: 3/31/2016  © 5290-9406 [employer]. [address] [place] [address], [place] [address]. All rights reserved. This information is not intended as a substitute for professional medical care. Always follow your healthcare professional's instructions.        Low-Salt Diet  This diet removes foods that are high in salt. It also limits the amount of salt you use when cooking. It is most often used for people with high blood pressure, edema (fluid retention), and kidney, liver, or heart disease.  Table salt contains the mineral sodium. Your body needs sodium to work normally. But too much sodium can make your health problems worse. Your healthcare provider is recommending a low-salt (also called low-sodium) diet for you. Your total daily allowance of salt is 1,500 to 2,300 milligrams (mg). It is less than 1 teaspoon of table salt. This means you can have only about 500 to 700 mg of sodium at each meal. People with certain health problems should limit salt intake to the lower end of the recommended range.    When you cook, dont add much salt. If you can cook without using salt, even better. Dont add salt to your food at the table.  When shopping, read food labels. Salt is often called sodium on the label. Choose foods that are salt-free, low salt, or very low salt. Note that foods with reduced salt may not lower your salt intake enough.    Beans, potatoes, and pasta  Ok: Dry beans, split peas, lentils, potatoes, rice, macaroni, pasta, spaghetti without added salt  Avoid: Potato chips, tortilla chips, and similar products  Breads and cereals  Ok: Low-sodium breads, rolls, cereals, and cakes; low-salt crackers, matzo crackers  Avoid: Salted crackers, pretzels, popcorn, Latvian toast, pancakes, muffins  Dairy  Ok: Milk, chocolate milk, hot chocolate mix,  low-salt cheeses, and yogurt  Avoid: Processed cheese and cheese spreads; Roquefort, Camembert, and cottage cheese; buttermilk, instant breakfast drink  Desserts  Ok: Ice cream, frozen yogurt, juice bars, gelatin, cookies and pies, sugar, honey, jelly, hard candy  Avoid: Most pies, cakes and cookies prepared or processed with salt; instant pudding  Drinks  Ok: Tea, coffee, fizzy (carbonated) drinks, juices  Avoid: Flavored coffees, electrolyte replacement drinks, sports drinks  Meats  Ok: All fresh meat, fish, poultry, low-salt tuna, eggs, egg substitute  Avoid: Smoked, pickled, brine-cured, or salted meats and fish. This includes simon, chipped beef, corned beef, hot dogs, deli meats, ham, kosher meats, salt pork, sausage, canned tuna, salted codfish, smoked salmon, herring, sardines, or anchovies.  Seasonings and spices  Ok: Most seasonings are okay. Good substitutes for salt include: fresh herb blends, hot sauce, lemon, garlic, alcocer, vinegar, dry mustard, parsley, cilantro, horseradish, tomato paste, regular margarine, mayonnaise, unsalted butter, cream cheese, vegetable oil, cream, low-salt salad dressing and gravy.  Avoid: Regular ketchup, relishes, pickles, soy sauce, teriyaki sauce, Worcestershire sauce, BBQ sauce, tartar sauce, meat tenderizer, chili sauce, regular gravy, regular salad dressing, salted butter  Soups  Ok: Low-salt soups and broths made with allowed foods  Avoid: Bouillon cubes, soups with smoked or salted meats, regular soup and broth  Vegetables  Ok: Most vegetables are okay; also low-salt tomato and vegetable juices  Avoid: Sauerkraut and other brine-soaked vegetables; pickles and other pickled vegetables; tomato juice, olives  Date Last Reviewed: 8/1/2016  © 6397-9847 [employer]. [address] [place] [address], [place] [address]. All rights reserved. This information is not intended as a substitute for professional medical care. Always follow your healthcare professional's  instructions.

## 2020-05-06 NOTE — PROGRESS NOTES
Subjective:       Patient ID: Shaina Olson is a 40 y.o. female.    Chief Complaint: Leg Swelling    40 y.o. Black or  female     Patient presents with:  Leg Swelling    HPI: Presents for a video visit.   The patient location is: [place], [place]   The chief complaint leading to consultation is: leg swelling, re-establish care   Visit type: audiovisual  Total time spent with patient: 5630-3323  Each patient to whom he or she provides medical services by telemedicine is:  (1) informed of the relationship between the physician and patient and the respective role of any other health care provider with respect to management of the patient; and (2) notified that he or she may decline to receive medical services by telemedicine and may withdraw from such care at any time.    She has had a lot of swelling in her feet lately. She admits to diet and exercise non compliance. She denies a change in her medications. She is working from home now and not as active as she used to be.   She has a history of hypertension and has been compliant with amlodipine. She states she has been on this medication for a while. She does not check her b/p but it was within normal range at her last office visit earlier this year.   She denies shortness of breath, orthopnea or cough.   She does report weight gain.     Past Medical History:  Allergy  2004: Anxiety  Arthritis  Arthritis of right knee  6/2014: Graves' disease  2004: Hypertension  Vitamin D deficiency    Past Surgical History:  2013: FRACTURE SURGERY; Right      Comment:  right wrist  2001: NASAL SEPTUM SURGERY  2001: SINUS SURGERY  11/22/2017: SINUS SURGERY  2001: TONSILLECTOMY, ADENOIDECTOMY  2013: WRIST FRACTURE SURGERY; Right      Comment:  Right wrist    Review of patient's family history indicates:  Problem: Hypertension      Relation: Mother          Age of Onset: (Not Specified)  Problem: Lupus      Relation: Mother          Age of Onset: (Not  Specified)  Problem: Diverticulosis      Relation: Mother          Age of Onset: (Not Specified)  Problem: Cancer      Relation: Sister          Age of Onset: 30          Comment: stg 1 boderline? ov cancer  Problem: Cancer      Relation: Maternal Grandmother          Age of Onset: (Not Specified)  Problem: Cancer      Relation: Maternal Grandfather          Age of Onset: (Not Specified)  Problem: Cataracts      Relation: Maternal Grandfather          Age of Onset: (Not Specified)  Problem: Diabetes      Relation: Paternal Grandmother          Age of Onset: (Not Specified)  Problem: Stroke      Relation: Paternal Grandmother          Age of Onset: (Not Specified)  Problem: Cancer      Relation: Paternal Grandmother          Age of Onset: (Not Specified)  Problem: Cancer      Relation: Paternal Grandfather          Age of Onset: (Not Specified)  Problem: Diabetes      Relation: Paternal Aunt          Age of Onset: (Not Specified)  Problem: Hypertension      Relation: Paternal Aunt          Age of Onset: (Not Specified)  Problem: Thyroid disease      Relation: Neg Hx          Age of Onset: (Not Specified)  Problem: Migraines      Relation: Neg Hx          Age of Onset: (Not Specified)  Problem: Asthma      Relation: Neg Hx          Age of Onset: (Not Specified)      Current Outpatient Medications on File Prior to Visit:  Allergy Mix, Patient Specific SLIT - to be mailed to patient per Dr. Wilkinson - formulation in notes, Disp: 1 Package, Rfl: 6  amLODIPine (NORVASC) 2.5 MG tablet, TAKE 1 TABLET BY MOUTH EVERY DAY WITH 5MG FOR A TOTAL OF 7.5 MG PER DAY, Disp: 90 tablet, Rfl: 1  amLODIPine (NORVASC) 5 MG tablet, TAKE 1 TABLET BY MOUTH EVERY DAY, Disp: 90 tablet, Rfl: 1  azelastine (ASTELIN) 137 mcg (0.1 %) nasal spray, 1 spray (137 mcg total) by Nasal route 2 (two) times daily., Disp: 30 mL, Rfl: 11  cholecalciferol, vitamin D3, 50,000 unit capsule, Take 1 capsule (50,000 Units total) by mouth once a week., Disp: 12  capsule, Rfl: 1  clonazePAM (KLONOPIN) 0.5 MG tablet, Take 1 tablet (0.5 mg total) by mouth daily as needed for Anxiety., Disp: 30 tablet, Rfl: 3  cyclobenzaprine (FLEXERIL) 10 MG tablet, TAKE 1 TABLET BY MOUTH THREE TIMES A DAY AS NEEDED, Disp: 30 tablet, Rfl: 0  diclofenac (VOLTAREN) 75 MG EC tablet, TAKE 1 TABLET BY MOUTH TWICE A DAY, Disp: 60 tablet, Rfl: 0  escitalopram oxalate (LEXAPRO) 20 MG tablet, Take 1 tablet (20 mg total) by mouth once daily., Disp: 30 tablet, Rfl: 2  fluticasone propionate (FLONASE) 50 mcg/actuation nasal spray, 2 SPRAYS (100 MCG TOTAL) BY EACH NARE ROUTE ONCE DAILY., Disp: 16 mL, Rfl: 6  latanoprost 0.005 % ophthalmic solution, Place 1 drop into both eyes every evening., Disp: 2.5 mL, Rfl: 11  levocetirizine (XYZAL) 5 MG tablet, Take 1 tablet (5 mg total) by mouth every evening., Disp: 90 tablet, Rfl: 1  methIMAzole (TAPAZOLE) 10 MG Tab, Take 1 tablet (10 mg total) by mouth 2 (two) times daily., Disp: 60 tablet, Rfl: 3  montelukast (SINGULAIR) 10 mg tablet, Take 1 tablet (10 mg total) by mouth once daily., Disp: 30 tablet, Rfl: 11  OXcarbazepine (TRILEPTAL) 150 MG Tab, Take 1 twice daily for 7 days then 2 twice daily, Disp: 120 tablet, Rfl: 3  pantoprazole (PROTONIX) 40 MG tablet, Take 1 tablet (40 mg total) by mouth once daily., Disp: 30 tablet, Rfl: 0  pregabalin (LYRICA) 100 MG capsule, Take 1 capsule (100 mg total) by mouth once daily., Disp: 30 capsule, Rfl: 0  traZODone (DESYREL) 100 MG tablet, Take 1/2 to 1 and 1/2 tablets at bedtime as needed for sleep., Disp: 45 tablet, Rfl: 3    Allergies:   Review of patient's allergies indicates:   -- Demerol (meperidine) -- Other (See Comments)    --  blackout            Review of Systems   Constitutional: Positive for activity change and unexpected weight change.   HENT: Negative for hearing loss, rhinorrhea and trouble swallowing.    Eyes: Negative for discharge and visual disturbance.   Respiratory: Negative for cough, chest tightness,  shortness of breath and wheezing.    Cardiovascular: Positive for leg swelling. Negative for chest pain and palpitations.   Gastrointestinal: Negative for blood in stool, constipation, diarrhea and vomiting.   Endocrine: Negative for polydipsia and polyuria.   Genitourinary: Negative for difficulty urinating, dysuria, hematuria and menstrual problem.   Musculoskeletal: Positive for arthralgias. Negative for joint swelling and neck pain.   Neurological: Negative for weakness and headaches.   Psychiatric/Behavioral: Negative for confusion and dysphoric mood.       Objective:      Physical Exam   Constitutional: She is oriented to person, place, and time. She appears well-developed and well-nourished. No distress.   Pulmonary/Chest: Effort normal. No respiratory distress.   Musculoskeletal: She exhibits edema.   Neurological: She is alert and oriented to person, place, and time.   Psychiatric: She has a normal mood and affect. Her behavior is normal. Judgment and thought content normal.       Assessment:       1. Peripheral edema    2. Hypertension goal BP (blood pressure) < 140/90        Plan:       Shaina was seen today for leg swelling and establish care.    Diagnoses and all orders for this visit:    Peripheral edema  -     furosemide (LASIX) 20 MG tablet; Take 1 tablet (20 mg total) by mouth daily as needed.  -     Counseled regarding low sodium diet  -     Recommend leg elevation     Hypertension goal BP (blood pressure) < 140/90  -     furosemide (LASIX) 20 MG tablet; Take 1 tablet (20 mg total) by mouth daily as needed.  -     Continue amlodipine   -     Lifestyle modifications discussed  -     Recommend home b/p monitoring     F/U in 6-8 weeks.

## 2020-05-07 ENCOUNTER — OFFICE VISIT (OUTPATIENT)
Dept: PSYCHIATRY | Facility: CLINIC | Age: 40
End: 2020-05-07
Payer: COMMERCIAL

## 2020-05-07 DIAGNOSIS — F31.31 BIPOLAR AFFECTIVE DISORDER, CURRENTLY DEPRESSED, MILD: Primary | ICD-10-CM

## 2020-05-07 DIAGNOSIS — F41.9 ANXIETY: ICD-10-CM

## 2020-05-07 PROCEDURE — 99213 OFFICE O/P EST LOW 20 MIN: CPT | Mod: 95,,, | Performed by: PSYCHIATRY & NEUROLOGY

## 2020-05-07 PROCEDURE — 99213 PR OFFICE/OUTPT VISIT, EST, LEVL III, 20-29 MIN: ICD-10-PCS | Mod: 95,,, | Performed by: PSYCHIATRY & NEUROLOGY

## 2020-05-07 RX ORDER — TRAZODONE HYDROCHLORIDE 100 MG/1
TABLET ORAL
Qty: 45 TABLET | Refills: 3 | Status: SHIPPED | OUTPATIENT
Start: 2020-05-07 | End: 2021-04-12 | Stop reason: SDUPTHER

## 2020-05-07 RX ORDER — CLONAZEPAM 0.5 MG/1
0.5 TABLET ORAL DAILY PRN
Qty: 30 TABLET | Refills: 2 | Status: SHIPPED | OUTPATIENT
Start: 2020-05-07 | End: 2021-06-25 | Stop reason: SDUPTHER

## 2020-05-07 RX ORDER — OXCARBAZEPINE 150 MG/1
TABLET, FILM COATED ORAL
Qty: 120 TABLET | Refills: 3 | Status: SHIPPED | OUTPATIENT
Start: 2020-05-07 | End: 2020-10-12

## 2020-05-07 RX ORDER — ESCITALOPRAM OXALATE 20 MG/1
20 TABLET ORAL DAILY
Qty: 30 TABLET | Refills: 3 | Status: SHIPPED | OUTPATIENT
Start: 2020-05-07 | End: 2020-10-12
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
## 2020-05-18 ENCOUNTER — PATIENT MESSAGE (OUTPATIENT)
Dept: INTERNAL MEDICINE | Facility: CLINIC | Age: 40
End: 2020-05-18

## 2020-05-18 DIAGNOSIS — J01.90 ACUTE SINUSITIS, RECURRENCE NOT SPECIFIED, UNSPECIFIED LOCATION: ICD-10-CM

## 2020-05-19 RX ORDER — FLUTICASONE PROPIONATE 50 MCG
2 SPRAY, SUSPENSION (ML) NASAL DAILY
Qty: 16 ML | Refills: 6 | Status: SHIPPED | OUTPATIENT
Start: 2020-05-19 | End: 2021-06-30

## 2020-05-19 NOTE — TELEPHONE ENCOUNTER
Pt requesting refill on flonase, Dr. Garrison was not the ordering provider, however pt est care with on 05/06/2020.       LV 05/06/2020    Please advise

## 2020-05-27 RX ORDER — ASPIRIN 325 MG
50000 TABLET, DELAYED RELEASE (ENTERIC COATED) ORAL WEEKLY
Qty: 12 CAPSULE | Refills: 1 | Status: CANCELLED | OUTPATIENT
Start: 2020-05-27

## 2020-05-28 RX ORDER — ASPIRIN 325 MG
50000 TABLET, DELAYED RELEASE (ENTERIC COATED) ORAL WEEKLY
Qty: 12 CAPSULE | Refills: 1 | Status: SHIPPED | OUTPATIENT
Start: 2020-05-28 | End: 2020-11-09

## 2020-06-08 DIAGNOSIS — Z13.79 GENETIC SCREENING: ICD-10-CM

## 2020-06-09 DIAGNOSIS — M79.671 INFLAMMATORY HEEL PAIN, RIGHT: ICD-10-CM

## 2020-06-09 DIAGNOSIS — M72.2 PLANTAR FASCIITIS: ICD-10-CM

## 2020-06-09 RX ORDER — DICLOFENAC SODIUM 75 MG/1
75 TABLET, DELAYED RELEASE ORAL 2 TIMES DAILY
Qty: 60 TABLET | Refills: 0 | Status: CANCELLED | OUTPATIENT
Start: 2020-06-09

## 2020-06-10 DIAGNOSIS — M79.671 INFLAMMATORY HEEL PAIN, RIGHT: ICD-10-CM

## 2020-06-10 DIAGNOSIS — M72.2 PLANTAR FASCIITIS: ICD-10-CM

## 2020-06-10 RX ORDER — DICLOFENAC SODIUM 75 MG/1
75 TABLET, DELAYED RELEASE ORAL 2 TIMES DAILY
Qty: 60 TABLET | Refills: 0 | Status: SHIPPED | OUTPATIENT
Start: 2020-06-10 | End: 2020-11-11

## 2020-06-16 ENCOUNTER — OFFICE VISIT (OUTPATIENT)
Dept: ENDOCRINOLOGY | Facility: CLINIC | Age: 40
End: 2020-06-16
Payer: COMMERCIAL

## 2020-06-16 DIAGNOSIS — E05.90 HYPERTHYROIDISM: Primary | ICD-10-CM

## 2020-06-16 DIAGNOSIS — E05.00 GRAVES' DISEASE: ICD-10-CM

## 2020-06-16 DIAGNOSIS — E04.1 THYROID NODULE: ICD-10-CM

## 2020-06-16 PROCEDURE — 99213 OFFICE O/P EST LOW 20 MIN: CPT | Mod: 95,,, | Performed by: INTERNAL MEDICINE

## 2020-06-16 PROCEDURE — 99213 PR OFFICE/OUTPT VISIT, EST, LEVL III, 20-29 MIN: ICD-10-PCS | Mod: 95,,, | Performed by: INTERNAL MEDICINE

## 2020-06-16 NOTE — PROGRESS NOTES
Patient ID: Shaina Olson is a 40 y.o. female.  Patient is here for follow up        Chief Complaint: Hyperthyroidism      HPI    Diagnosed: diagnosed in 2014 and started on antithyroid medications immediately due to severe symptoms; she shared a picture of how she looked initially and patient had significant stare and enlarged thyroid which both have improved over time on treatment    Previous radiology tests:  Thyroid ultrasound : Yes - status post benign FNA left sided thyroid nodule. Most recent ultrasound 11/2018 shows left thyroid nodule is smaller- only 8mm, stable subcentimeter right nodule.  There was again noted to be stable nodule projecting along the medial margin superior pole left lobe demonstrating isoechoic appearance compared to adjacent thyroid.  This is not in contact or connected to the thyroid.  This measures 2.2 x 0.8 x 1.4 cm compared to 2.2 x 0.8 x 1.3 cm previously.  Appearance is again suggestive of possible ectopic thyroid tissue versus parathyroid adenoma.    NM uptake and scan:  Yes:  Because of the findings on ultrasound done May 2018 showing possible ectopic thyroid tissue she had a thyroid uptake and scan to rule out any ectopic thyroid tissue but because she is on methimazole she held it for about 5 days prior and it did not show any ectopic tissue and it only showed increased uptake at 6 and 24 hr consistent with Graves disease    She had labs and her PTH level was elevated at 171 but her calcium is normal.  She does have a history though of low vitamin D and her vitamin-D level in April 2018 was 18-she used to take high-dose vitamin-D but had been off of this for several months and had not been consistently taking over-the-counter vitamin-D so I restarted high-dose vitamin-D at a previous visit her PTH remained high  in spite of her vitamin D level increasing to an adequate level however most recently her PTH has become normal as vitamin D gradually increased further.  She  reports that her mother had hyperparathyroidism and osteoporosis     She deloped acute hyperthyroidism on labs done September 19, 2018 so I increased her methimazole to 10mg and her labs have been normal since then. She denied increased iodine intake orally or IV from CT scan or otherwise and she was compliant with her medication. Her TSI also was elevated. Her prior  TSI antibody was negative December 2017    Previous thyroid surgery: No      Thyroid symptoms:  Overall doing okay, still struggles with healthy diet and exercise    Patient has chronic hoarseness but has ALLERGIES and sinusitis and will be starting ALLERGY shots again.  Had sinus surgery last year.  Also takes protonix.  Her thyroid gland is enlarged also and she does have a vocal cord nodule but she reports that she was told the nodule has resolved and is wondering whether her thyroid which she feels overall is bigger than her initial diagnosis but is related to her hoarseness.  Her ENT  has arranged for speech therapy which she is currently undergoing She is doing her speech therapy exercises which have helped and she had to stop singing also     diagnosed with bipolar affective disorder and is on trileptal along with her Lexapro.  She is also studying for her master's program which will be complete by December     She no longer works at [employer]        Thyroid medications: methimazole which I reduced down to 5 mg 5 days a week      Takes medication appropriately: Yes  Her A1c was normal in the past.  She has joined [employer] on line.  She has not yet joined [employer].  She is contemplating gastric sleeve but still is difficult to come up with the financial co-pay that is require  On CPAP for obstructive sleep apnea but admits she has not been consistent with this    She is interested in weight loss medication- tried phentermine in past without success so I prescribed belviq last visit and this was too expensive for her    Had 2d ECHO  2016 that showed mild TR  Denies SOB or significant swelling  Patient asked previously about starting Victoza to help with weight.  She has noticed a darkening in her neck for long time but what is recent is some darkening of her skin in the right flank area-unfortunately her insurance does not cover Victoza as she is not a diabetic or saxenda      She will start getting home delivery of sensible meals which or calorie controlled and portion controlled meals    I have reviewed the past medical, family and social history    Review of Systems   Constitutional: Positive for fatigue. Negative for appetite change, fever and unexpected weight change.   HENT: Negative for sore throat and trouble swallowing.    Eyes: Negative for visual disturbance.   Respiratory: Negative for shortness of breath and wheezing.    Cardiovascular: Negative for chest pain, palpitations and leg swelling.   Gastrointestinal: Negative for diarrhea, nausea and vomiting.   Endocrine: Negative for cold intolerance, heat intolerance, polydipsia, polyphagia and polyuria.   Genitourinary: Negative for difficulty urinating, dysuria and menstrual problem.   Musculoskeletal: Negative for arthralgias and joint swelling.   Skin: Negative for rash.   Neurological: Negative for dizziness, weakness, numbness and headaches.   Psychiatric/Behavioral: Negative for confusion, dysphoric mood and sleep disturbance.       Objective:      Physical Exam  Vitals signs reviewed.   Constitutional:       General: She is not in acute distress.     Appearance: She is not diaphoretic.   HENT:      Head: Normocephalic and atraumatic.   Eyes:      General: No scleral icterus.     Conjunctiva/sclera: Conjunctivae normal.   Skin:     General: Skin is warm and dry.      Findings: No erythema or rash.      Comments: Hyperpigmentation in  neck area consistent with acanthosis    Neurological:      Mental Status: She is alert.      Sensory: No sensory deficit.      Comments:       Psychiatric:         Behavior: Behavior normal.           Lab Review:   Lab Results   Component Value Date     05/05/2020    K 4.0 05/05/2020     05/05/2020    CO2 23 05/05/2020    BUN 9 05/05/2020    CREATININE 0.8 05/05/2020    CALCIUM 8.9 05/05/2020     Lab Results   Component Value Date    TSH 1.927 05/05/2020         Assessment:     1. Hyperthyroidism     2. Graves' disease     3. Thyroid nodule      most recent labs are stable, continue methimazole       Thyroid nodules stable and asymptomatic  Plan:   Hyperthyroidism    Graves' disease    Thyroid nodule          No follow-ups on file.    Labs prior to appointment? yes     Disclaimer:  This note may have been partially prepared using voice recognition software and  it may have not been extensively proofed, as such there could be errors within the text such as sound alike errors.

## 2020-08-11 ENCOUNTER — TELEPHONE (OUTPATIENT)
Dept: OTOLARYNGOLOGY | Facility: CLINIC | Age: 40
End: 2020-08-11

## 2020-08-11 ENCOUNTER — OFFICE VISIT (OUTPATIENT)
Dept: OTOLARYNGOLOGY | Facility: CLINIC | Age: 40
End: 2020-08-11
Payer: COMMERCIAL

## 2020-08-11 DIAGNOSIS — J30.89 CHRONIC NONSEASONAL ALLERGIC RHINITIS DUE TO POLLEN: ICD-10-CM

## 2020-08-11 DIAGNOSIS — J30.81 CHRONIC ALLERGIC RHINITIS DUE TO ANIMAL HAIR AND DANDER: Primary | ICD-10-CM

## 2020-08-11 DIAGNOSIS — J30.89 ALLERGIC RHINITIS DUE TO DUST: ICD-10-CM

## 2020-08-11 DIAGNOSIS — J30.89 CHRONIC NONSEASONAL ALLERGIC RHINITIS DUE TO FUNGAL SPORES: ICD-10-CM

## 2020-08-11 DIAGNOSIS — Z98.890 HISTORY OF ENDOSCOPIC SINUS SURGERY: ICD-10-CM

## 2020-08-11 PROCEDURE — 99214 OFFICE O/P EST MOD 30 MIN: CPT | Mod: 95,,, | Performed by: ORTHOPAEDIC SURGERY

## 2020-08-11 PROCEDURE — 99214 PR OFFICE/OUTPT VISIT, EST, LEVL IV, 30-39 MIN: ICD-10-PCS | Mod: 95,,, | Performed by: ORTHOPAEDIC SURGERY

## 2020-08-11 NOTE — PROGRESS NOTES
Subjective:      Patient ID: Shaina Olson is a 40 y.o. female.    Chief Complaint: No chief complaint on file.    The patient location is: home  The chief complaint leading to consultation is: allergies and sinusitis    Visit type: audiovisual    Face to Face time with patient: 15 minutes  25 minutes of total time spent on the encounter, which includes face to face time and non-face to face time preparing to see the patient (eg, review of tests), Obtaining and/or reviewing separately obtained history, Documenting clinical information in the electronic or other health record, Independently interpreting results (not separately reported) and communicating results to the patient/family/caregiver, or Care coordination (not separately reported).         Each patient to whom he or she provides medical services by telemedicine is:  (1) informed of the relationship between the physician and patient and the respective role of any other health care provider with respect to management of the patient; and (2) notified that he or she may decline to receive medical services by telemedicine and may withdraw from such care at any time.    Notes:     Patient is a 40 year old female known to me for evaluation of allergies.  She is currently taking Xyzal and Singulair as well as Flonase.  She is taking Astelin as needed.  She was on SCIT x 15 months, and she does find that her symptoms have increased since stopping her SCIT.  Prior to starting SCIT, she was on SLIT and had noted improvement on that medication (changed to SCIT due to insurance/work schedule).  She is now unable to do SCIT as she is working for [employer] and has different hours.  She is noting increased nasal drainage, ear itching, as well as postnasal drainage.  She had some episodes of thick green drainage from her left nasal cavity.  She has a history of FESS.        Review of Systems   Constitutional: Positive for fatigue. Negative for chills, fever and unexpected  weight change.   HENT: Positive for congestion, postnasal drip, rhinorrhea, sinus pressure and sneezing. Negative for dental problem, ear discharge, ear pain, facial swelling, hearing loss, nosebleeds, sore throat (itchy throat), tinnitus, trouble swallowing and voice change.    Eyes: Negative for redness, itching and visual disturbance.   Respiratory: Positive for cough. Negative for choking, shortness of breath and wheezing.    Cardiovascular: Negative for chest pain and palpitations.   Gastrointestinal: Negative for abdominal pain.        No reflux.   Musculoskeletal: Negative for gait problem.   Skin: Negative for rash.   Allergic/Immunologic: Positive for environmental allergies.   Neurological: Positive for headaches. Negative for dizziness and light-headedness.       Objective:       Physical Exam  Constitutional:       Appearance: She is well-developed. She is not diaphoretic.   HENT:      Head: Normocephalic and atraumatic.      Right Ear: No decreased hearing noted.      Left Ear: No decreased hearing noted.      Nose: Nose normal.   Eyes:      General: Lids are normal.      Conjunctiva/sclera: Conjunctivae normal.   Pulmonary:      Effort: Pulmonary effort is normal. No respiratory distress.   Skin:     Findings: No rash.   Neurological:      Mental Status: She is alert.         Assessment:       1. Chronic allergic rhinitis due to animal hair and dander    2. Allergic rhinitis due to dust    3. Chronic nonseasonal allergic rhinitis due to fungal spores    4. Chronic nonseasonal allergic rhinitis due to pollen    5. History of endoscopic sinus surgery        Plan:     Chronic allergic rhinitis due to animal hair and dander    Allergic rhinitis due to dust    Chronic nonseasonal allergic rhinitis due to fungal spores    Chronic nonseasonal allergic rhinitis due to pollen    History of endoscopic sinus surgery    Will resume SLIT, continue use of maximal allergy therapy.  Risks and benefits of SLIT were  discussed, including risk of allergic reaction in most severe form anaphylaxis.  She did find that her symptoms were better while she was on immunotherapy.  Continue with current allergy regimen.  I would also recommend she resume with the nasoneb device, she has used in the past.  Will send prescription to [facility] for her treatment.  Call if symptoms worsen and would consider oral antibiotics.

## 2020-08-11 NOTE — TELEPHONE ENCOUNTER
Prescription and demographics faxed to [facility].  Confirmation e-mail received  Prescription scanned into the media tab.

## 2020-09-16 ENCOUNTER — PATIENT MESSAGE (OUTPATIENT)
Dept: ENDOCRINOLOGY | Facility: CLINIC | Age: 40
End: 2020-09-16

## 2020-09-17 ENCOUNTER — LAB VISIT (OUTPATIENT)
Dept: LAB | Facility: HOSPITAL | Age: 40
End: 2020-09-17
Attending: INTERNAL MEDICINE
Payer: COMMERCIAL

## 2020-09-17 DIAGNOSIS — R94.6 ABNORMAL THYROID FUNCTION TEST: ICD-10-CM

## 2020-09-17 PROCEDURE — 36415 COLL VENOUS BLD VENIPUNCTURE: CPT

## 2020-09-17 PROCEDURE — 84439 ASSAY OF FREE THYROXINE: CPT

## 2020-09-17 PROCEDURE — 84443 ASSAY THYROID STIM HORMONE: CPT

## 2020-09-18 LAB
T4 FREE SERPL-MCNC: 1.02 NG/DL (ref 0.71–1.51)
TSH SERPL DL<=0.005 MIU/L-ACNC: 1.21 UIU/ML (ref 0.4–4)

## 2020-09-21 ENCOUNTER — OFFICE VISIT (OUTPATIENT)
Dept: OBSTETRICS AND GYNECOLOGY | Facility: CLINIC | Age: 40
End: 2020-09-21
Payer: COMMERCIAL

## 2020-09-21 VITALS — SYSTOLIC BLOOD PRESSURE: 142 MMHG | DIASTOLIC BLOOD PRESSURE: 90 MMHG | WEIGHT: 293 LBS | BODY MASS INDEX: 74.17 KG/M2

## 2020-09-21 DIAGNOSIS — R30.0 DYSURIA: ICD-10-CM

## 2020-09-21 DIAGNOSIS — Z01.419 PAPANICOLAOU SMEAR, AS PART OF ROUTINE GYNECOLOGICAL EXAMINATION: ICD-10-CM

## 2020-09-21 DIAGNOSIS — Z00.00 PREVENTATIVE HEALTH CARE: Primary | ICD-10-CM

## 2020-09-21 DIAGNOSIS — Z11.3 SCREENING EXAMINATION FOR STD (SEXUALLY TRANSMITTED DISEASE): ICD-10-CM

## 2020-09-21 PROCEDURE — 99395 PR PREVENTIVE VISIT,EST,18-39: ICD-10-PCS | Mod: S$GLB,,, | Performed by: NURSE PRACTITIONER

## 2020-09-21 PROCEDURE — 99999 PR PBB SHADOW E&M-EST. PATIENT-LVL II: CPT | Mod: PBBFAC,,, | Performed by: NURSE PRACTITIONER

## 2020-09-21 PROCEDURE — 87491 CHLMYD TRACH DNA AMP PROBE: CPT

## 2020-09-21 PROCEDURE — 3080F DIAST BP >= 90 MM HG: CPT | Mod: CPTII,S$GLB,, | Performed by: NURSE PRACTITIONER

## 2020-09-21 PROCEDURE — 3077F SYST BP >= 140 MM HG: CPT | Mod: CPTII,S$GLB,, | Performed by: NURSE PRACTITIONER

## 2020-09-21 PROCEDURE — 99395 PREV VISIT EST AGE 18-39: CPT | Mod: S$GLB,,, | Performed by: NURSE PRACTITIONER

## 2020-09-21 PROCEDURE — 3080F PR MOST RECENT DIASTOLIC BLOOD PRESSURE >= 90 MM HG: ICD-10-PCS | Mod: CPTII,S$GLB,, | Performed by: NURSE PRACTITIONER

## 2020-09-21 PROCEDURE — 99999 PR PBB SHADOW E&M-EST. PATIENT-LVL II: ICD-10-PCS | Mod: PBBFAC,,, | Performed by: NURSE PRACTITIONER

## 2020-09-21 PROCEDURE — 3077F PR MOST RECENT SYSTOLIC BLOOD PRESSURE >= 140 MM HG: ICD-10-PCS | Mod: CPTII,S$GLB,, | Performed by: NURSE PRACTITIONER

## 2020-09-21 PROCEDURE — 87624 HPV HI-RISK TYP POOLED RSLT: CPT

## 2020-09-21 PROCEDURE — 88175 CYTOPATH C/V AUTO FLUID REDO: CPT

## 2020-09-21 PROCEDURE — 87086 URINE CULTURE/COLONY COUNT: CPT

## 2020-09-21 RX ORDER — DOXYCYCLINE 100 MG/1
CAPSULE ORAL
COMMUNITY
Start: 2020-08-18 | End: 2023-11-29 | Stop reason: ALTCHOICE

## 2020-09-21 RX ORDER — BUDESONIDE 0.5 MG/2ML
INHALANT ORAL
COMMUNITY
Start: 2020-08-18 | End: 2023-11-29 | Stop reason: ALTCHOICE

## 2020-09-21 NOTE — PROGRESS NOTES
CC: Well woman exam    Shaina Olson is a 40 y.o. female  presents for well woman exam.  LMP: Patient's last menstrual period was 2020..  No issues, problems, or complaints. Last pap exam was normal( wants a pap this visit). Cycles are every 26-28 days and not heavy. Patient is sexually active, wants testing. Reports mild dysuria for a week. No pelvic pain, hematuria fever, or h/o renal stones. Urine in clinic was negative.    Past Medical History:   Diagnosis Date    Allergy     Anxiety     Arthritis     Arthritis of right knee     Graves' disease 2014    Hypertension     Vitamin D deficiency      Past Surgical History:   Procedure Laterality Date    FRACTURE SURGERY Right 2013    right wrist    NASAL SEPTUM SURGERY      SINUS SURGERY      SINUS SURGERY  2017    TONSILLECTOMY, ADENOIDECTOMY  2001    WRIST FRACTURE SURGERY Right 2013    Right wrist     Social History     Socioeconomic History    Marital status: Single     Spouse name: Not on file    Number of children: 0    Years of education: Not on file    Highest education level: Not on file   Occupational History    Occupation: LPN     Comment: [employer]   Social Needs    Financial resource strain: Not hard at all    Food insecurity     Worry: Never true     Inability: Never true    Transportation needs     Medical: No     Non-medical: No   Tobacco Use    Smoking status: Never Smoker    Smokeless tobacco: Never Used   Substance and Sexual Activity    Alcohol use: Yes     Alcohol/week: 0.0 standard drinks     Frequency: 2-4 times a month     Drinks per session: 1 or 2     Binge frequency: Never     Comment: occasionally    Drug use: No    Sexual activity: Yes     Partners: Male   Lifestyle    Physical activity     Days per week: 0 days     Minutes per session: 0 min    Stress: To some extent   Relationships    Social connections     Talks on phone: More than three times a week     Gets together:  Once a week     Attends Sikhism service: Not on file     Active member of club or organization: No     Attends meetings of clubs or organizations: Never     Relationship status: Never    Other Topics Concern    Not on file   Social History Narrative    Wears a seatbelt.     Family History   Problem Relation Age of Onset    Hypertension Mother     Lupus Mother     Diverticulosis Mother     Cancer Sister 30        stg 1 boderline? ov cancer    Cancer Maternal Grandmother     Cancer Maternal Grandfather     Cataracts Maternal Grandfather     Diabetes Paternal Grandmother     Stroke Paternal Grandmother     Cancer Paternal Grandmother     Cancer Paternal Grandfather     Diabetes Paternal Aunt     Hypertension Paternal Aunt     Thyroid disease Neg Hx     Migraines Neg Hx     Asthma Neg Hx      OB History        0    Para   0    Term   0       0    AB   0    Living   0       SAB   0    TAB   0    Ectopic   0    Multiple   0    Live Births                     BP (!) 142/90   Wt (!) 196 kg (432 lb 1.6 oz)   LMP 2020   BMI 74.17 kg/m²       ROS:  GENERAL: Denies weight gain or weight loss. Feeling well overall.   SKIN: Denies rash or lesions.   HEAD: Denies head injury or headache.   NODES: Denies enlarged lymph nodes.   CHEST: Denies chest pain or shortness of breath.   CARDIOVASCULAR: Denies palpitations or left sided chest pain.   ABDOMEN: No abdominal pain, constipation, diarrhea, nausea, vomiting or rectal bleeding.   URINARY: HPI  REPRODUCTIVE: See HPI.   BREASTS: The patient performs breast self-examination and denies pain, lumps, or nipple discharge.   HEMATOLOGIC: No easy bruisability or excessive bleeding.   MUSCULOSKELETAL: Denies joint pain or swelling.   NEUROLOGIC: Denies syncope or weakness.   PSYCHIATRIC: Denies depression, anxiety or mood swings.    PHYSICAL EXAM:  APPEARANCE: Obese female, in no acute distress.  AFFECT: WNL, alert and oriented x 3  SKIN: No  acne or hirsutism  NECK: Neck symmetric without masses or thyromegaly  NODES: No inguinal, cervical, axillary, or femoral lymph node enlargement  CHEST: Good respiratory effect  ABDOMEN: Soft.  No tenderness or masses.  No hepatosplenomegaly.  No hernias.  BREASTS: Symmetrical, no skin changes or visible lesions.  No palpable masses, nipple discharge bilaterally.  PELVIC: Normal external genitalia without lesions.  Normal hair distribution.  Adequate perineal body, normal urethral meatus.  Vagina moist and well rugated without lesions or discharge.  Cervix pink, without lesions, discharge or tenderness.  No significant cystocele or rectocele.  Bimanual exam shows uterus to be normal size, regular, mobile and nontender.  Adnexa without masses or tenderness.    EXTREMITIES: No edema.    1. Preventative health care  Liquid-Based Pap Smear, Screening    HPV High Risk Genotypes, PCR   2. Papanicolaou smear, as part of routine gynecological examination  Liquid-Based Pap Smear, Screening    HPV High Risk Genotypes, PCR   3. Screening examination for STD (sexually transmitted disease)  RPR    HIV 1/2 Ag/Ab (4th Gen)    Hepatitis Panel, Acute    C. trachomatis/N. gonorrhoeae by AMP DNA    Herpes Simplex Virus (HSV) Types 1 & 2, IgG, Herpes Titer   4. Dysuria  Urine culture     PLAN:  Pap exam  STD assessment  Urine sent for cx  Discussed with patient the need to improve adherence to low card diet and exercise regimen ( BMI is 74)  Patient was counseled today on A.C.S. Pap guidelines and recommendations for yearly pelvic exams, mammograms and monthly self breast exams; to see her PCP for other health maintenance.

## 2020-09-24 LAB — BACTERIA UR CULT: NO GROWTH

## 2020-09-25 ENCOUNTER — PATIENT MESSAGE (OUTPATIENT)
Dept: OBSTETRICS AND GYNECOLOGY | Facility: CLINIC | Age: 40
End: 2020-09-25

## 2020-09-28 ENCOUNTER — OFFICE VISIT (OUTPATIENT)
Dept: INTERNAL MEDICINE | Facility: CLINIC | Age: 40
End: 2020-09-28
Payer: COMMERCIAL

## 2020-09-28 VITALS
DIASTOLIC BLOOD PRESSURE: 80 MMHG | SYSTOLIC BLOOD PRESSURE: 130 MMHG | BODY MASS INDEX: 50.02 KG/M2 | WEIGHT: 293 LBS | HEIGHT: 64 IN | HEART RATE: 97 BPM | TEMPERATURE: 100 F | OXYGEN SATURATION: 98 %

## 2020-09-28 DIAGNOSIS — I10 ESSENTIAL HYPERTENSION: Primary | ICD-10-CM

## 2020-09-28 DIAGNOSIS — M79.10 MYALGIA: ICD-10-CM

## 2020-09-28 DIAGNOSIS — E66.01 MORBID OBESITY WITH BMI OF 70 AND OVER, ADULT: ICD-10-CM

## 2020-09-28 DIAGNOSIS — R60.0 PERIPHERAL EDEMA: ICD-10-CM

## 2020-09-28 PROCEDURE — 99999 PR PBB SHADOW E&M-EST. PATIENT-LVL V: CPT | Mod: PBBFAC,,, | Performed by: INTERNAL MEDICINE

## 2020-09-28 PROCEDURE — 99214 OFFICE O/P EST MOD 30 MIN: CPT | Mod: S$GLB,,, | Performed by: INTERNAL MEDICINE

## 2020-09-28 PROCEDURE — 3008F BODY MASS INDEX DOCD: CPT | Mod: CPTII,S$GLB,, | Performed by: INTERNAL MEDICINE

## 2020-09-28 PROCEDURE — 3008F PR BODY MASS INDEX (BMI) DOCUMENTED: ICD-10-PCS | Mod: CPTII,S$GLB,, | Performed by: INTERNAL MEDICINE

## 2020-09-28 PROCEDURE — 99999 PR PBB SHADOW E&M-EST. PATIENT-LVL V: ICD-10-PCS | Mod: PBBFAC,,, | Performed by: INTERNAL MEDICINE

## 2020-09-28 PROCEDURE — 3075F SYST BP GE 130 - 139MM HG: CPT | Mod: CPTII,S$GLB,, | Performed by: INTERNAL MEDICINE

## 2020-09-28 PROCEDURE — 3075F PR MOST RECENT SYSTOLIC BLOOD PRESS GE 130-139MM HG: ICD-10-PCS | Mod: CPTII,S$GLB,, | Performed by: INTERNAL MEDICINE

## 2020-09-28 PROCEDURE — 99214 PR OFFICE/OUTPT VISIT, EST, LEVL IV, 30-39 MIN: ICD-10-PCS | Mod: S$GLB,,, | Performed by: INTERNAL MEDICINE

## 2020-09-28 PROCEDURE — 3079F PR MOST RECENT DIASTOLIC BLOOD PRESSURE 80-89 MM HG: ICD-10-PCS | Mod: CPTII,S$GLB,, | Performed by: INTERNAL MEDICINE

## 2020-09-28 PROCEDURE — 3079F DIAST BP 80-89 MM HG: CPT | Mod: CPTII,S$GLB,, | Performed by: INTERNAL MEDICINE

## 2020-09-28 RX ORDER — PREGABALIN 100 MG/1
100 CAPSULE ORAL 2 TIMES DAILY
Qty: 60 CAPSULE | Refills: 3 | Status: SHIPPED | OUTPATIENT
Start: 2020-09-28 | End: 2023-11-29 | Stop reason: ALTCHOICE

## 2020-09-28 RX ORDER — CYCLOBENZAPRINE HCL 10 MG
10 TABLET ORAL NIGHTLY
Qty: 30 TABLET | Refills: 3 | Status: SHIPPED | OUTPATIENT
Start: 2020-09-28 | End: 2023-11-29 | Stop reason: ALTCHOICE

## 2020-09-29 ENCOUNTER — PATIENT MESSAGE (OUTPATIENT)
Dept: OBSTETRICS AND GYNECOLOGY | Facility: CLINIC | Age: 40
End: 2020-09-29

## 2020-09-29 LAB
HPV HR 12 DNA SPEC QL NAA+PROBE: POSITIVE
HPV16 AG SPEC QL: NEGATIVE
HPV18 DNA SPEC QL NAA+PROBE: NEGATIVE

## 2020-09-29 NOTE — PROGRESS NOTES
Subjective:       Patient ID: Shaina Olson is a 40 y.o. female.    Chief Complaint: Follow-up    Shaina Olson  40 y.o. Black or  female    Patient presents with:  Follow-up    HPI: Here today to follow up on chronic conditions.  HTN--stable on amlodipine. She recently started furosemide for leg swelling. The swelling is still present but improved.                     LDLCALC                  119.6               05/05/2020                 CHOL                     195                 05/05/2020                 TRIG                     72                  05/05/2020                 HDL                      61                  05/05/2020                 ALT                      12                  05/05/2020                 AST                      13                  05/05/2020                 NA                       140                 05/05/2020                 K                        4.0                 05/05/2020                 CL                       107                 05/05/2020                 CREATININE               0.8                 05/05/2020                 BUN                      9                   05/05/2020                 CO2                      23                  05/05/2020                 TSH                      1.211               09/17/2020                 HGBA1C                   4.7                 05/05/2020            She continues to have a lot of muscle pain. She states cyclobenzaprine and Lyrica have worked in the past.   She is trying to lose weight.     Past Medical History:  Allergy  2004: Anxiety  Arthritis  Arthritis of right knee  6/2014: Graves' disease  2004: Hypertension  Vitamin D deficiency    Current Outpatient Medications on File Prior to Visit:  Allergy Mix, Patient Specific SLIT - to be mailed to patient per Dr. Wilkinson - formulation in notes, Disp: 1 Package, Rfl: 6  amLODIPine (NORVASC) 2.5 MG tablet, TAKE 1 TABLET BY MOUTH EVERY DAY  WITH 5MG FOR A TOTAL OF 7.5 MG PER DAY, Disp: 90 tablet, Rfl: 1  amLODIPine (NORVASC) 5 MG tablet, TAKE 1 TABLET BY MOUTH EVERY DAY, Disp: 90 tablet, Rfl: 1  azelastine (ASTELIN) 137 mcg (0.1 %) nasal spray, 1 spray (137 mcg total) by Nasal route 2 (two) times daily., Disp: 30 mL, Rfl: 11  budesonide (PULMICORT) 0.5 mg/2 mL nebulizer solution, MIX CONTENTS OF 1 RESPULE WITH STERILE DILUENT PROVIDED. POUR INTO NASONEB CUP & PERFORM NASAL TREATMENT TWICE DAILY., Disp: , Rfl:   cholecalciferol, vitamin D3, 1,250 mcg (50,000 unit) capsule, Take 1 capsule (50,000 Units total) by mouth once a week., Disp: 12 capsule, Rfl: 1  clonazePAM (KLONOPIN) 0.5 MG tablet, Take 1 tablet (0.5 mg total) by mouth daily as needed for Anxiety., Disp: 30 tablet, Rfl: 2  diclofenac (VOLTAREN) 75 MG EC tablet, Take 1 tablet (75 mg total) by mouth 2 (two) times daily., Disp: 60 tablet, Rfl: 0  doxycycline (VIBRAMYCIN) 100 MG Cap, MIX CONTENTS OF 2 CAPSULES AND STERILE DILUENT PROVIDED. POUR INTO NASONEB CUP AND PERFORM NASAL TREATMENT 2 TIMES DAILY, Disp: , Rfl:   escitalopram oxalate (LEXAPRO) 20 MG tablet, Take 1 tablet (20 mg total) by mouth once daily., Disp: 30 tablet, Rfl: 3  fluticasone propionate (FLONASE) 50 mcg/actuation nasal spray, 2 sprays (100 mcg total) by Each Nostril route once daily., Disp: 16 mL, Rfl: 6  furosemide (LASIX) 20 MG tablet, Take 1 tablet (20 mg total) by mouth daily as needed., Disp: 30 tablet, Rfl: 1  latanoprost 0.005 % ophthalmic solution, Place 1 drop into both eyes every evening., Disp: 2.5 mL, Rfl: 11  levocetirizine (XYZAL) 5 MG tablet, Take 1 tablet (5 mg total) by mouth every evening., Disp: 90 tablet, Rfl: 1  methIMAzole (TAPAZOLE) 10 MG Tab, TAKE 1 TABLET BY MOUTH TWICE A DAY, Disp: 60 tablet, Rfl: 3  montelukast (SINGULAIR) 10 mg tablet, Take 1 tablet (10 mg total) by mouth once daily., Disp: 30 tablet, Rfl: 11  OXcarbazepine (TRILEPTAL) 150 MG Tab, Take 1 twice daily for 7 days then 2 twice daily,  Disp: 120 tablet, Rfl: 3  pantoprazole (PROTONIX) 40 MG tablet, Take 1 tablet (40 mg total) by mouth once daily., Disp: 30 tablet, Rfl: 0  traZODone (DESYREL) 100 MG tablet, Take 1/2 to 1 and 1/2 tablets at bedtime as needed for sleep., Disp: 45 tablet, Rfl: 3    Current Facility-Administered Medications on File Prior to Visit:  hylan g-f 20 (SYNVISC ONE) 48 mg/6 mL injection 48 mg, 48 mg, Intra-articular, 1 time in Clinic/HOD, Cherie Qiu PA-C    Allergies:  Review of patient's allergies indicates:   -- Demerol (meperidine) -- Other (See Comments)    --  blackout          Review of Systems   Constitutional: Negative for activity change and unexpected weight change.   HENT: Negative for hearing loss, rhinorrhea and trouble swallowing.    Eyes: Negative for discharge and visual disturbance.   Respiratory: Negative for chest tightness and wheezing.    Cardiovascular: Positive for leg swelling. Negative for chest pain and palpitations.   Gastrointestinal: Negative for blood in stool, constipation, diarrhea and vomiting.   Endocrine: Negative for polydipsia and polyuria.   Genitourinary: Negative for difficulty urinating, dysuria, hematuria and menstrual problem.   Musculoskeletal: Positive for myalgias. Negative for arthralgias, joint swelling and neck pain.   Neurological: Negative for weakness and headaches.   Psychiatric/Behavioral: Negative for confusion and dysphoric mood.         Objective:      Physical Exam  Constitutional:       General: She is not in acute distress.     Appearance: She is well-developed. She is obese. She is not ill-appearing.   Eyes:      General: No scleral icterus.  Cardiovascular:      Rate and Rhythm: Normal rate.   Pulmonary:      Effort: Pulmonary effort is normal. No respiratory distress.   Musculoskeletal:      Right lower leg: Edema present.      Left lower leg: Edema present.   Neurological:      Mental Status: She is alert and oriented to person, place, and time.    Psychiatric:         Behavior: Behavior normal.         Thought Content: Thought content normal.         Judgment: Judgment normal.         Assessment:       1. Essential hypertension    2. Peripheral edema    3. Myalgia    4. Morbid obesity with BMI of 70 and over, adult        Plan:       Shaina was seen today for follow-up.    Diagnoses and all orders for this visit:    Essential hypertension  -     Continue amlodipine  -     Basic metabolic panel; Future    Peripheral edema  -     COMPRESSION STOCKINGS  -     Continue as needed furosemide   -     Basic metabolic panel; Future    Myalgia  -     cyclobenzaprine (FLEXERIL) 10 MG tablet; Take 1 tablet (10 mg total) by mouth every evening.  -     pregabalin (LYRICA) 100 MG capsule; Take 1 capsule (100 mg total) by mouth 2 (two) times daily.    Morbid obesity with BMI of 70 and over, adult  -     Lifestyle modifications discussed    Schedule labs.    F/U in 6 months and as needed.

## 2020-09-30 ENCOUNTER — PATIENT MESSAGE (OUTPATIENT)
Dept: ENDOCRINOLOGY | Facility: CLINIC | Age: 40
End: 2020-09-30

## 2020-10-01 ENCOUNTER — OFFICE VISIT (OUTPATIENT)
Dept: ENDOCRINOLOGY | Facility: CLINIC | Age: 40
End: 2020-10-01
Payer: COMMERCIAL

## 2020-10-01 DIAGNOSIS — E55.9 VITAMIN D DEFICIENCY: ICD-10-CM

## 2020-10-01 DIAGNOSIS — L83 ACANTHOSIS: ICD-10-CM

## 2020-10-01 DIAGNOSIS — E05.90 HYPERTHYROIDISM: Primary | ICD-10-CM

## 2020-10-01 DIAGNOSIS — E04.2 MULTIPLE THYROID NODULES: ICD-10-CM

## 2020-10-01 DIAGNOSIS — E21.3 HYPERPARATHYROIDISM: ICD-10-CM

## 2020-10-01 DIAGNOSIS — E66.01 MORBID OBESITY WITH BMI OF 70 AND OVER, ADULT: ICD-10-CM

## 2020-10-01 DIAGNOSIS — E05.00 GRAVES' DISEASE: ICD-10-CM

## 2020-10-01 PROCEDURE — 99214 PR OFFICE/OUTPT VISIT, EST, LEVL IV, 30-39 MIN: ICD-10-PCS | Mod: 95,,, | Performed by: INTERNAL MEDICINE

## 2020-10-01 PROCEDURE — 99214 OFFICE O/P EST MOD 30 MIN: CPT | Mod: 95,,, | Performed by: INTERNAL MEDICINE

## 2020-10-01 NOTE — PROGRESS NOTES
Patient ID: Shaina Olson is a 40 y.o. female.  Patient is here for follow up    The patient location is:  home  The chief complaint leading to consultation is:  Hyperthyroidism, Graves disease    Visit type: audiovisual    Face to Face time with patient:  20 min  Twenty-five minutes of total time spent on the encounter, which includes face to face time and non-face to face time preparing to see the patient (eg, review of tests), Obtaining and/or reviewing separately obtained history, Documenting clinical information in the electronic or other health record, Independently interpreting results (not separately reported) and communicating results to the patient/family/caregiver, or Care coordination (not separately reported).         Each patient to whom he or she provides medical services by telemedicine is:  (1) informed of the relationship between the physician and patient and the respective role of any other health care provider with respect to management of the patient; and (2) notified that he or she may decline to receive medical services by telemedicine and may withdraw from such care at any time.    Notes:     Chief Complaint: No chief complaint on file.      HPI    Diagnosed: diagnosed in 2014 and started on antithyroid medications immediately due to severe symptoms; she shared a picture of how she looked initially and patient had significant stare and enlarged thyroid which both have improved over time on treatment    Previous radiology tests:  Thyroid ultrasound : Yes - status post benign FNA left sided thyroid nodule. Most recent ultrasound 11/2018 shows left thyroid nodule is smaller- only 8mm, stable subcentimeter right nodule.  There was again noted to be stable nodule projecting along the medial margin superior pole left lobe demonstrating isoechoic appearance compared to adjacent thyroid.  This is not in contact or connected to the thyroid.  This measures 2.2 x 0.8 x 1.4 cm compared to 2.2 x 0.8  x 1.3 cm previously.  Appearance is again suggestive of possible ectopic thyroid tissue versus parathyroid adenoma.    NM uptake and scan:  Yes:  Because of the findings on ultrasound done May 2018 showing possible ectopic thyroid tissue she had a thyroid uptake and scan to rule out any ectopic thyroid tissue but because she is on methimazole she held it for about 5 days prior and it did not show any ectopic tissue and it only showed increased uptake at 6 and 24 hr consistent with Graves disease    She had labs and her PTH level was elevated at 171 but her calcium is normal.  She does have a history though of low vitamin D and her vitamin-D level in April 2018 was 18-she used to take high-dose vitamin-D but had been off of this for several months and had not been consistently taking over-the-counter vitamin-D so I restarted high-dose vitamin-D at a previous visit her PTH remained high  in spite of her vitamin D level increasing to an adequate level however most recently her PTH has become normal as vitamin D gradually increased further.  She reports that her mother had hyperparathyroidism and osteoporosis     She deloped acute hyperthyroidism on labs done September 19, 2018 so I increased her methimazole to 10mg and her labs have been normal since then. She denied increased iodine intake orally or IV from CT scan or otherwise and she was compliant with her medication. Her TSI also was elevated. Her prior  TSI antibody was negative December 2017    Previous thyroid surgery: No      Thyroid symptoms:  Overall doing okay, still struggles with healthy diet and exercise, thinks things will be better in terms of exercise when she moved out of apartment to home in [place] in the next month or so    Patient has chronic hoarseness but has ALLERGIES and sinusitis and will be starting ALLERGY shots again.  Had sinus surgery last year.  Also takes protonix.  Her thyroid gland is enlarged also and she does have a vocal cord  nodule but she reports that she was told the nodule has resolved and is wondering whether her thyroid which she feels overall is bigger than her initial diagnosis but is related to her hoarseness.  Her ENT  has arranged for speech therapy which she is currently undergoing She is doing her speech therapy exercises which have helped and she had to stop singing also     diagnosed with bipolar affective disorder and is on trileptal along with her Lexapro.  She is also studying for her master's program which will be complete by December     She no longer works at [employer]        Thyroid medications: methimazole which I reduced down to 5 mg 5 days a week    Recent TSH is normal  Takes medication appropriately: Yes  Her A1c was normal in the past.  She has joined weight watchers on line.  She has not yet joined [employer].  She is contemplating gastric sleeve but still is difficult to come up with the financial co-pay that is require  On CPAP for obstructive sleep apnea but admits she has not been consistent with this    She was interested in weight loss medication- tried phentermine in past without success so I prescribed belviq last visit and this was too expensive for her    Had 2d ECHO 2016 that showed mild TR  Denies SOB or significant swelling  Patient asked previously about starting Victoza to help with weight.  She has noticed a darkening in her neck for long time -unfortunately her insurance does not cover Victoza as she is not a diabetic or saxenda      I have reviewed the past medical, family and social history    Review of Systems   Constitutional: Negative for appetite change, fatigue, fever and unexpected weight change.   HENT: Negative for sore throat and trouble swallowing.    Eyes: Negative for visual disturbance.   Respiratory: Negative for shortness of breath and wheezing.    Cardiovascular: Negative for chest pain, palpitations and leg swelling.   Gastrointestinal: Negative for diarrhea, nausea  and vomiting.   Endocrine: Negative for cold intolerance, heat intolerance, polydipsia, polyphagia and polyuria.   Genitourinary: Negative for difficulty urinating, dysuria and menstrual problem.   Musculoskeletal: Negative for arthralgias and joint swelling.   Skin: Negative for rash.   Neurological: Negative for dizziness, weakness, numbness and headaches.   Psychiatric/Behavioral: Negative for confusion, dysphoric mood and sleep disturbance.       Objective:      Physical Exam  Constitutional:       General: She is not in acute distress.  Neurological:      Mental Status: She is alert.           Lab Review:   Lab Results   Component Value Date     05/05/2020    K 4.0 05/05/2020     05/05/2020    CO2 23 05/05/2020    BUN 9 05/05/2020    CREATININE 0.8 05/05/2020    CALCIUM 8.9 05/05/2020     Lab Results   Component Value Date    TSH 1.211 09/17/2020         Assessment:     1. Hyperthyroidism     2. Graves' disease     3. Vitamin D deficiency     4. Multiple thyroid nodules     5. Acanthosis     6. Hyperparathyroidism     7. Morbid obesity with BMI of 70 and over, adult      most thyroid recent labs are stable, continue methimazole       Thyroid nodules stable and asymptomatic    Calcium levels normal, has elevated PTH, check calcium PTH and vitamin-D levels today    Follow up with endocrine in 3-4 months  Plan:   Hyperthyroidism    Graves' disease    Vitamin D deficiency    Multiple thyroid nodules    Acanthosis    Hyperparathyroidism    Morbid obesity with BMI of 70 and over, adult          No follow-ups on file.    Labs prior to appointment? yes     Disclaimer:  This note may have been partially prepared using voice recognition software and  it may have not been extensively proofed, as such there could be errors within the text such as sound alike errors.

## 2020-10-02 ENCOUNTER — LAB VISIT (OUTPATIENT)
Dept: LAB | Facility: HOSPITAL | Age: 40
End: 2020-10-02
Payer: COMMERCIAL

## 2020-10-02 DIAGNOSIS — L83 ACANTHOSIS: ICD-10-CM

## 2020-10-02 DIAGNOSIS — E21.3 HYPERPARATHYROIDISM: ICD-10-CM

## 2020-10-02 DIAGNOSIS — R60.0 PERIPHERAL EDEMA: ICD-10-CM

## 2020-10-02 DIAGNOSIS — Z11.3 SCREENING EXAMINATION FOR STD (SEXUALLY TRANSMITTED DISEASE): ICD-10-CM

## 2020-10-02 DIAGNOSIS — I10 ESSENTIAL HYPERTENSION: ICD-10-CM

## 2020-10-02 LAB — PTH-INTACT SERPL-MCNC: 127 PG/ML (ref 9–77)

## 2020-10-02 PROCEDURE — 80074 ACUTE HEPATITIS PANEL: CPT

## 2020-10-02 PROCEDURE — 36415 COLL VENOUS BLD VENIPUNCTURE: CPT

## 2020-10-02 PROCEDURE — 86696 HERPES SIMPLEX TYPE 2 TEST: CPT

## 2020-10-02 PROCEDURE — 83036 HEMOGLOBIN GLYCOSYLATED A1C: CPT

## 2020-10-02 PROCEDURE — 80053 COMPREHEN METABOLIC PANEL: CPT

## 2020-10-02 PROCEDURE — 82306 VITAMIN D 25 HYDROXY: CPT

## 2020-10-02 PROCEDURE — 86703 HIV-1/HIV-2 1 RESULT ANTBDY: CPT

## 2020-10-02 PROCEDURE — 86592 SYPHILIS TEST NON-TREP QUAL: CPT

## 2020-10-02 PROCEDURE — 83970 ASSAY OF PARATHORMONE: CPT

## 2020-10-03 LAB
25(OH)D3+25(OH)D2 SERPL-MCNC: 37 NG/ML (ref 30–96)
ALBUMIN SERPL BCP-MCNC: 3.7 G/DL (ref 3.5–5.2)
ALP SERPL-CCNC: 110 U/L (ref 55–135)
ALT SERPL W/O P-5'-P-CCNC: 9 U/L (ref 10–44)
ANION GAP SERPL CALC-SCNC: 10 MMOL/L (ref 8–16)
ANION GAP SERPL CALC-SCNC: 10 MMOL/L (ref 8–16)
AST SERPL-CCNC: 13 U/L (ref 10–40)
BILIRUB SERPL-MCNC: 0.3 MG/DL (ref 0.1–1)
BUN SERPL-MCNC: 9 MG/DL (ref 6–20)
BUN SERPL-MCNC: 9 MG/DL (ref 6–20)
CALCIUM SERPL-MCNC: 8.9 MG/DL (ref 8.7–10.5)
CALCIUM SERPL-MCNC: 8.9 MG/DL (ref 8.7–10.5)
CHLORIDE SERPL-SCNC: 107 MMOL/L (ref 95–110)
CHLORIDE SERPL-SCNC: 107 MMOL/L (ref 95–110)
CO2 SERPL-SCNC: 23 MMOL/L (ref 23–29)
CO2 SERPL-SCNC: 23 MMOL/L (ref 23–29)
CREAT SERPL-MCNC: 0.8 MG/DL (ref 0.5–1.4)
CREAT SERPL-MCNC: 0.8 MG/DL (ref 0.5–1.4)
EST. GFR  (AFRICAN AMERICAN): >60 ML/MIN/1.73 M^2
EST. GFR  (AFRICAN AMERICAN): >60 ML/MIN/1.73 M^2
EST. GFR  (NON AFRICAN AMERICAN): >60 ML/MIN/1.73 M^2
EST. GFR  (NON AFRICAN AMERICAN): >60 ML/MIN/1.73 M^2
ESTIMATED AVG GLUCOSE: 85 MG/DL (ref 68–131)
GLUCOSE SERPL-MCNC: 97 MG/DL (ref 70–110)
GLUCOSE SERPL-MCNC: 97 MG/DL (ref 70–110)
HBA1C MFR BLD HPLC: 4.6 % (ref 4–5.6)
POTASSIUM SERPL-SCNC: 3.8 MMOL/L (ref 3.5–5.1)
POTASSIUM SERPL-SCNC: 3.8 MMOL/L (ref 3.5–5.1)
PROT SERPL-MCNC: 7.1 G/DL (ref 6–8.4)
SODIUM SERPL-SCNC: 140 MMOL/L (ref 136–145)
SODIUM SERPL-SCNC: 140 MMOL/L (ref 136–145)

## 2020-10-04 ENCOUNTER — PATIENT MESSAGE (OUTPATIENT)
Dept: ENDOCRINOLOGY | Facility: CLINIC | Age: 40
End: 2020-10-04

## 2020-10-05 LAB — RPR SER QL: NORMAL

## 2020-10-06 LAB
HAV IGM SERPL QL IA: NEGATIVE
HBV CORE IGM SERPL QL IA: NEGATIVE
HBV SURFACE AG SERPL QL IA: NEGATIVE
HCV AB SERPL QL IA: NEGATIVE
HIV 1+2 AB+HIV1 P24 AG SERPL QL IA: NEGATIVE
HSV1 IGG SERPL QL IA: NEGATIVE
HSV2 IGG SERPL QL IA: POSITIVE

## 2020-10-08 ENCOUNTER — PATIENT MESSAGE (OUTPATIENT)
Dept: INTERNAL MEDICINE | Facility: CLINIC | Age: 40
End: 2020-10-08

## 2020-10-09 LAB
FINAL PATHOLOGIC DIAGNOSIS: NORMAL
Lab: NORMAL

## 2020-10-10 RX ORDER — METHIMAZOLE 10 MG/1
10 TABLET ORAL 2 TIMES DAILY
Qty: 60 TABLET | Refills: 3 | Status: SHIPPED | OUTPATIENT
Start: 2020-10-10 | End: 2023-11-29 | Stop reason: ALTCHOICE

## 2020-10-10 RX ORDER — METHIMAZOLE 5 MG/1
5 TABLET ORAL 3 TIMES DAILY
Qty: 20 TABLET | Refills: 0 | Status: SHIPPED | OUTPATIENT
Start: 2020-10-10 | End: 2020-10-12 | Stop reason: SDUPTHER

## 2020-10-12 ENCOUNTER — TELEPHONE (OUTPATIENT)
Dept: ENDOCRINOLOGY | Facility: CLINIC | Age: 40
End: 2020-10-12

## 2020-10-12 RX ORDER — METHIMAZOLE 5 MG/1
5 TABLET ORAL 3 TIMES DAILY
Qty: 20 TABLET | Refills: 0 | Status: SHIPPED | OUTPATIENT
Start: 2020-10-12 | End: 2020-11-09

## 2020-10-12 NOTE — TELEPHONE ENCOUNTER
Calling to see correct dosing for her methimazole        ----- Message from Joce Ozuna sent at 10/12/2020  1:14 PM CDT -----  .Type:  Pharmacy Calling to Clarify an RX    Name of Caller:Rozina  Pharmacy Name:.  [id number] [address] [facility] [address]  [address] [facility] [place] [address]  Phone: [phone] Fax: [phone]      Prescription Name:Methimazole  What do they need to clarify?:directions  Best Call Back Number:[phone]  Additional Information:

## 2020-10-12 NOTE — TELEPHONE ENCOUNTER
ONLY 5 MG FIVE TIMES A WEEK PER DR. FRAZIER'S NOTE FROM 10/1. YOU MAY NEED TO PLACE THIS NOTE IN PT'S CHART.  Called into Pharmacy

## 2020-10-21 ENCOUNTER — PATIENT MESSAGE (OUTPATIENT)
Dept: INTERNAL MEDICINE | Facility: CLINIC | Age: 40
End: 2020-10-21

## 2020-10-28 ENCOUNTER — PATIENT MESSAGE (OUTPATIENT)
Dept: OTOLARYNGOLOGY | Facility: CLINIC | Age: 40
End: 2020-10-28

## 2020-10-29 RX ORDER — MONTELUKAST SODIUM 10 MG/1
10 TABLET ORAL DAILY
Qty: 30 TABLET | Refills: 11 | Status: SHIPPED | OUTPATIENT
Start: 2020-10-29 | End: 2021-10-26

## 2020-11-09 RX ORDER — ASPIRIN 325 MG
TABLET, DELAYED RELEASE (ENTERIC COATED) ORAL
Qty: 12 CAPSULE | Refills: 0 | Status: SHIPPED | OUTPATIENT
Start: 2020-11-09 | End: 2023-11-29 | Stop reason: ALTCHOICE

## 2020-11-09 RX ORDER — METHIMAZOLE 5 MG/1
TABLET ORAL
Qty: 20 TABLET | Refills: 0 | Status: SHIPPED | OUTPATIENT
Start: 2020-11-09 | End: 2020-12-10

## 2020-11-09 NOTE — TELEPHONE ENCOUNTER
Appt next available. w me or any other Endocrine within 1 month  (telemedicine visit will be fine)

## 2020-12-10 RX ORDER — METHIMAZOLE 5 MG/1
TABLET ORAL
Qty: 20 TABLET | Refills: 0 | Status: SHIPPED | OUTPATIENT
Start: 2020-12-10 | End: 2021-01-04

## 2021-01-04 ENCOUNTER — PATIENT MESSAGE (OUTPATIENT)
Dept: ENDOCRINOLOGY | Facility: CLINIC | Age: 41
End: 2021-01-04

## 2021-01-04 RX ORDER — METHIMAZOLE 5 MG/1
TABLET ORAL
Qty: 5 TABLET | Refills: 0 | Status: SHIPPED | OUTPATIENT
Start: 2021-01-04

## 2021-01-05 ENCOUNTER — PATIENT MESSAGE (OUTPATIENT)
Dept: INTERNAL MEDICINE | Facility: CLINIC | Age: 41
End: 2021-01-05

## 2021-01-27 ENCOUNTER — PATIENT MESSAGE (OUTPATIENT)
Dept: INTERNAL MEDICINE | Facility: CLINIC | Age: 41
End: 2021-01-27

## 2021-01-27 DIAGNOSIS — E05.90 HYPERTHYROIDISM: Primary | ICD-10-CM

## 2021-01-29 ENCOUNTER — PATIENT MESSAGE (OUTPATIENT)
Dept: PULMONOLOGY | Facility: CLINIC | Age: 41
End: 2021-01-29

## 2021-02-02 ENCOUNTER — TELEPHONE (OUTPATIENT)
Dept: INTERNAL MEDICINE | Facility: CLINIC | Age: 41
End: 2021-02-02

## 2021-02-03 NOTE — PROGRESS NOTES
Patient ID: Shaina Olson is a 39 y.o. female.  Patient is here for follow up        Chief Complaint: Follow-up      HPI   Diagnosed: diagnosed in 2014 and started on antithyroid medications immediately due to severe symptoms; she shared a picture of how she looked initially and patient had significant stare and enlarged thyroid which both have improved over time on treatment    Previous radiology tests:  Thyroid ultrasound : Yes - status post benign FNA left sided thyroid nodule. Most recent ultrasound 11/2018 shows left thyroid nodule is smaller- only 8mm, stable subcentimeter right nodule.  There was again noted to be stable nodule projecting along the medial margin superior pole left lobe demonstrating isoechoic appearance compared to adjacent thyroid.  This is not in contact or connected to the thyroid.  This measures 2.2 x 0.8 x 1.4 cm compared to 2.2 x 0.8 x 1.3 cm previously.  Appearance is again suggestive of possible ectopic thyroid tissue versus parathyroid adenoma.    NM uptake and scan:  Yes:  Because of the findings on ultrasound done May 2018 showing possible ectopic thyroid tissue she had a thyroid uptake and scan to rule out any ectopic thyroid tissue but because she is on methimazole she held it for about 5 days prior and it did not show any ectopic tissue and it only showed increased uptake at 6 and 24 hr consistent with Graves disease    She had labs and her PTH level was elevated at 171 but her calcium is normal.  She does have a history though of low vitamin D and her vitamin-D level in April was 18-she used to take high-dose vitamin-D but had been off of this for several months and had not been consistently taking over-the-counter vitamin-D so I restarted high-dose vitamin-D at a previous visit her PTH remained in spite of her vitamin D level increasing to an adequate level.  She reports that her mother had hyperparathyroidism and osteoporosis     She deloped acute hyperthyroidism on labs  done September 19, 2018 so I increased her methimazole to 10mg and her labs have been normal since then. She denied increased iodine intake orally or IV from CT scan or otherwise and she was compliant with her medication. Her TSI also was elevated. Her prior  TSI antibody was negative December 2017    Previous thyroid surgery: No      Thyroid symptoms:denies fatigue, weight changes, heat/cold intolerance, bowel/skin changes or CVS symptoms    Patient has chronic hoarseness but has ALLERGIES and sinusitis and will be starting ALLERGY shots again.  Had sinus surgery last year.  Also takes protonix.  Her thyroid gland is enlarged also and she does have a vocal cord nodule but she reports that she was told the nodule has resolved and is wondering whether her thyroid which she feels overall is bigger than her initial diagnosis but is related to her hoarseness.  Her ENT  has arranged for speech therapy which she is currently undergoing     Interval history:  Patient states she is recently diagnosed with bipolar affective disorder and is on trileptal along with her Lexapro.  She is also studying for her master's program which will be complete by December but she says the studying and working completely occupy her and prevents her from really focusing on her diet and exercise.  She does report that her hoarseness is must better.  She is doing her speech therapy exercises which have helped and she had to stop singing also          Thyroid medications: methimazole 10 mg daily      Takes medication appropriately: Yes  Her A1c was normal in the past.  She has joined weight watchers on line.  She has not yet joined [employer].  She is contemplating gastric sleeve but still is difficult to come up with the financial co-pay that is require  Uses CPAP for obstructive sleep apnea    She is interested in weight loss medication- tried phentermine in past without success so I prescribed belviq last visit and this was too  expensive for her    Had 2d ECHO 2016 that showed mild TR  Denies SOB or significant swelling  Patient asked previously about starting Victoza to help with weight.  She has noticed a darkening in her neck for long time but what is recent is some darkening of her skin in the right flank area-unfortunately her insurance does not cover Victoza as she is not a diabetic or saxenda        I have reviewed the past medical, family and social history    Review of Systems   Constitutional: Negative for appetite change, fatigue, fever and unexpected weight change.   HENT: Negative for sore throat and trouble swallowing.    Eyes: Negative for visual disturbance.   Respiratory: Negative for shortness of breath and wheezing.    Cardiovascular: Negative for chest pain, palpitations and leg swelling.   Gastrointestinal: Negative for diarrhea, nausea and vomiting.   Endocrine: Negative for cold intolerance, heat intolerance, polydipsia, polyphagia and polyuria.   Genitourinary: Negative for difficulty urinating, dysuria and menstrual problem.   Musculoskeletal: Negative for arthralgias and joint swelling.   Skin: Negative for rash.   Neurological: Negative for dizziness, weakness, numbness and headaches.   Psychiatric/Behavioral: Negative for confusion, dysphoric mood and sleep disturbance.       Objective:      Physical Exam   Constitutional: No distress.   HENT:   Head: Normocephalic and atraumatic.   Eyes: Conjunctivae are normal.   Musculoskeletal: She exhibits no edema.   Neurological: She is alert. No sensory deficit.        Skin: Skin is warm and dry. No rash noted. She is not diaphoretic. No erythema.   Psychiatric: She has a normal mood and affect. Her behavior is normal.   Vitals reviewed.        Lab Review:   Lab Visit on 07/15/2019   Component Date Value    TSH 07/15/2019 3.150     Free T4 07/15/2019 0.79     T3, Free 07/15/2019 2.5     WBC 07/15/2019 8.67     RBC 07/15/2019 4.40     Hemoglobin 07/15/2019 12.8      Hematocrit 07/15/2019 42.0     Mean Corpuscular Volume 07/15/2019 96     Mean Corpuscular Hemoglo* 07/15/2019 29.1     Mean Corpuscular Hemoglo* 07/15/2019 30.5*    RDW 07/15/2019 14.2     Platelets 07/15/2019 353*    MPV 07/15/2019 10.6     Immature Granulocytes 07/15/2019 0.2     Gran # (ANC) 07/15/2019 5.3     Immature Grans (Abs) 07/15/2019 0.02     Lymph # 07/15/2019 2.7     Mono # 07/15/2019 0.5     Eos # 07/15/2019 0.1     Baso # 07/15/2019 0.09     nRBC 07/15/2019 0     Gran% 07/15/2019 60.8     Lymph% 07/15/2019 30.8     Mono% 07/15/2019 5.7     Eosinophil% 07/15/2019 1.5     Basophil% 07/15/2019 1.0     Differential Method 07/15/2019 Automated     Total Protein 07/15/2019 7.0     Albumin 07/15/2019 3.7     Total Bilirubin 07/15/2019 0.5     Bilirubin, Direct 07/15/2019 0.2     AST 07/15/2019 13     ALT 07/15/2019 10     Alkaline Phosphatase 07/15/2019 103     PTH, Intact 07/15/2019 94.0*    Phosphorus 07/15/2019 2.8     Vit D, 25-Hydroxy 07/15/2019 35     Albumin 07/15/2019 3.7     Alkaline Phosphatase 07/15/2019 103    Admission on 06/27/2019, Discharged on 06/27/2019   Component Date Value    WBC 06/27/2019 13.35*    RBC 06/27/2019 4.20     Hemoglobin 06/27/2019 12.4     Hematocrit 06/27/2019 38.8     Mean Corpuscular Volume 06/27/2019 92     Mean Corpuscular Hemoglo* 06/27/2019 29.5     Mean Corpuscular Hemoglo* 06/27/2019 32.0     RDW 06/27/2019 15.0*    Platelets 06/27/2019 295     MPV 06/27/2019 9.3     Gran # (ANC) 06/27/2019 10.1*    Lymph # 06/27/2019 2.5     Mono # 06/27/2019 0.5     Eos # 06/27/2019 0.2     Baso # 06/27/2019 0.01     Gran% 06/27/2019 75.9*    Lymph% 06/27/2019 18.7     Mono% 06/27/2019 4.0     Eosinophil% 06/27/2019 1.5     Basophil% 06/27/2019 0.1     Differential Method 06/27/2019 Automated     Sodium 06/27/2019 141     Potassium 06/27/2019 3.9     Chloride 06/27/2019 109     CO2 06/27/2019 22*    Glucose 06/27/2019 95      BUN, Bld 06/27/2019 11     Creatinine 06/27/2019 0.8     Calcium 06/27/2019 9.0     Total Protein 06/27/2019 6.8     Albumin 06/27/2019 3.6     Total Bilirubin 06/27/2019 0.7     Alkaline Phosphatase 06/27/2019 102     AST 06/27/2019 13     ALT 06/27/2019 11     Anion Gap 06/27/2019 10     eGFR if African American 06/27/2019 >60     eGFR if non  Amer* 06/27/2019 >60     Specimen UA 06/27/2019 Urine, Clean Catch     Color, UA 06/27/2019 Yellow     Appearance, UA 06/27/2019 Clear     pH, UA 06/27/2019 8.0     Specific [place], UA 06/27/2019 1.015     Protein, UA 06/27/2019 Negative     Glucose, UA 06/27/2019 Negative     Ketones, UA 06/27/2019 Negative     Bilirubin (UA) 06/27/2019 Negative     Occult Blood UA 06/27/2019 Negative     Nitrite, UA 06/27/2019 Negative     Urobilinogen, UA 06/27/2019 Negative     Leukocytes, UA 06/27/2019 Negative     Lipase 06/27/2019 94*    POCT Glucose 06/27/2019 90     Preg Test, Ur 06/27/2019 Negative    Lab Visit on 02/27/2019   Component Date Value    TSH 02/27/2019 2.456     Free T4 02/27/2019 0.86     T3, Free 02/27/2019 2.8     PTH, Intact 02/27/2019 131.0*    Phosphorus 02/27/2019 2.8     Vit D, 25-Hydroxy 02/27/2019 37     Sodium 02/27/2019 141     Potassium 02/27/2019 4.0     Chloride 02/27/2019 109     CO2 02/27/2019 24     Glucose 02/27/2019 87     BUN, Bld 02/27/2019 12     Creatinine 02/27/2019 0.8     Calcium 02/27/2019 9.3     Anion Gap 02/27/2019 8     eGFR if African American 02/27/2019 >60.0     eGFR if non  Amer* 02/27/2019 >60.0    Clinical Support on 02/08/2019   Component Date Value    Interpretation 02/08/2019                      Value:  Normal spirometry. (FEV1/VC greater than or equal to LLN and FVC greater than or equal to LLN)  Normal airflow. (FEV1/VC greater than or equal to LLN)      Post FEV1 02/08/2019 2.33     Post FEV1 FVC 02/08/2019 80.14     Post FVC 02/08/2019 2.90     Post PEF  02/08/2019 6.60     FEV6 POST 02/08/2019 2.85     Post FEF 25 75 02/08/2019 2.46     Post  02/08/2019 8.41     Pre FEV1 02/08/2019 2.37     Pre FEV1 FVC 02/08/2019 79.48     Pre FVC 02/08/2019 2.98     Pre PEF 02/08/2019 5.49     FEV6 PRE 02/08/2019 2.89     Pre FEF 25 75 02/08/2019 2.34     Pre  02/08/2019 9.60     FVC Ref 02/08/2019 3.21     FVC LLN 02/08/2019 2.52     FVC Pre Ref 02/08/2019 92.8     FVC Post Ref 02/08/2019 90.4     FVC Chg 02/08/2019 -2.6     FEV1 Ref 02/08/2019 2.68     FEV1 LLN 02/08/2019 2.08     FEV1 Pre Ref 02/08/2019 88.6     FEV1 Post Ref 02/08/2019 87.0     FEV1 Chg 02/08/2019 -1.8     FEV1 FVC Ref 02/08/2019 84     FEV1 FVC LLN 02/08/2019 73     FEV1 FVC Pre Ref 02/08/2019 95.1     FEV1 FVC Post Ref 02/08/2019 95.9     FEV1 FVC Chg 02/08/2019 0.8     FEV6 Ref 02/08/2019 3.09     FEV6 LLN 02/08/2019 2.39     FEV6 Pre Ref 02/08/2019 93.6     FEV6 Post Ref 02/08/2019 92.3     FEV6 Chg 02/08/2019 -1.5     FEF 25 75 Ref 02/08/2019 2.93     FEF 25 75 LLN 02/08/2019 1.62     FEF 25 75 Pre Ref 02/08/2019 80.0     FEF 25 75 Post Ref 02/08/2019 84.0     FEF 25 75 Chg 02/08/2019 5.0     PEF Ref 02/08/2019 6.73     PEF LLN 02/08/2019 4.71     PEF Pre Ref 02/08/2019 81.6     PEF Post Ref 02/08/2019 98.1     PEF Chg 02/08/2019 20.2     [id number] Chg 02/08/2019 -12.4        Assessment:     1. Hyperthyroidism  TSH    T4, free    T3, free    Recent thyroid labs normal, continue methimazole   2. Multiple thyroid nodules      Last ultrasound was stable   3. Vitamin D deficiency  Vitamin D    At adequate level, stable, continue high-dose vitamin-D weekly   4. Hyperparathyroidism  Comprehensive metabolic panel    Vitamin D    PTH, intact    Phosphorus    Calcium remains normal, PTH elevated despite adequate vitamin-D level, continue to observe   5. Weight gain  Comprehensive metabolic panel    Hemoglobin A1c    Lipid panel    Insulin, random    We discussed  challenges with school and work.  Recommended daily calorie counts not to exceed 1200 calories, exercise when she can to make habit      Plan:   Hyperthyroidism  Comments:  Recent thyroid labs normal, continue methimazole  Orders:  -     TSH; Future; Expected date: 11/04/2019  -     T4, free; Future; Expected date: 11/04/2019  -     T3, free; Future; Expected date: 11/04/2019    Multiple thyroid nodules  Comments:  Last ultrasound was stable    Vitamin D deficiency  Comments:  At adequate level, stable, continue high-dose vitamin-D weekly  Orders:  -     Vitamin D; Future; Expected date: 11/04/2019    Hyperparathyroidism  Comments:  Calcium remains normal, PTH elevated despite adequate vitamin-D level, continue to observe  Orders:  -     Comprehensive metabolic panel; Future; Expected date: 11/04/2019  -     Vitamin D; Future; Expected date: 11/04/2019  -     PTH, intact; Future; Expected date: 11/04/2019  -     Phosphorus; Future; Expected date: 11/04/2019    Weight gain  Comments:  We discussed challenges with school and work.  Recommended daily calorie counts not to exceed 1200 calories, exercise when she can to make habit  Orders:  -     Comprehensive metabolic panel; Future; Expected date: 11/04/2019  -     Hemoglobin A1c; Future; Expected date: 11/04/2019  -     Lipid panel; Future; Expected date: 11/04/2019  -     Insulin, random; Future; Expected date: 11/04/2019          Follow up in about 4 months (around 11/22/2019).    Labs prior to appointment? yes     Disclaimer:  This note may have been partially prepared using voice recognition software and  it may have not been extensively proofed, as such there could be errors within the text such as sound alike errors.        no

## 2021-03-10 ENCOUNTER — PATIENT OUTREACH (OUTPATIENT)
Dept: ADMINISTRATIVE | Facility: HOSPITAL | Age: 41
End: 2021-03-10

## 2021-03-29 ENCOUNTER — PATIENT MESSAGE (OUTPATIENT)
Dept: INTERNAL MEDICINE | Facility: CLINIC | Age: 41
End: 2021-03-29

## 2021-03-29 DIAGNOSIS — I10 HYPERTENSION GOAL BP (BLOOD PRESSURE) < 140/90: ICD-10-CM

## 2021-03-31 ENCOUNTER — PATIENT OUTREACH (OUTPATIENT)
Dept: ADMINISTRATIVE | Facility: HOSPITAL | Age: 41
End: 2021-03-31

## 2021-04-01 ENCOUNTER — TELEPHONE (OUTPATIENT)
Dept: ADMINISTRATIVE | Facility: HOSPITAL | Age: 41
End: 2021-04-01

## 2021-04-01 ENCOUNTER — PATIENT MESSAGE (OUTPATIENT)
Dept: ADMINISTRATIVE | Facility: HOSPITAL | Age: 41
End: 2021-04-01

## 2021-04-01 VITALS — DIASTOLIC BLOOD PRESSURE: 75 MMHG | SYSTOLIC BLOOD PRESSURE: 136 MMHG

## 2021-04-01 RX ORDER — AMLODIPINE BESYLATE 5 MG/1
TABLET ORAL
Qty: 90 TABLET | Refills: 1 | Status: SHIPPED | OUTPATIENT
Start: 2021-04-01 | End: 2021-10-01

## 2021-04-01 RX ORDER — AMLODIPINE BESYLATE 2.5 MG/1
TABLET ORAL
Qty: 90 TABLET | Refills: 1 | OUTPATIENT
Start: 2021-04-01

## 2021-04-01 RX ORDER — AMLODIPINE BESYLATE 2.5 MG/1
TABLET ORAL
Qty: 90 TABLET | Refills: 1 | Status: SHIPPED | OUTPATIENT
Start: 2021-04-01 | End: 2023-11-29 | Stop reason: ALTCHOICE

## 2021-04-12 ENCOUNTER — OFFICE VISIT (OUTPATIENT)
Dept: PSYCHIATRY | Facility: CLINIC | Age: 41
End: 2021-04-12
Payer: COMMERCIAL

## 2021-04-12 DIAGNOSIS — F31.31 BIPOLAR AFFECTIVE DISORDER, CURRENTLY DEPRESSED, MILD: Primary | ICD-10-CM

## 2021-04-12 DIAGNOSIS — F41.1 GAD (GENERALIZED ANXIETY DISORDER): ICD-10-CM

## 2021-04-12 PROCEDURE — 99213 OFFICE O/P EST LOW 20 MIN: CPT | Mod: 95,,, | Performed by: PSYCHIATRY & NEUROLOGY

## 2021-04-12 PROCEDURE — 99213 PR OFFICE/OUTPT VISIT, EST, LEVL III, 20-29 MIN: ICD-10-PCS | Mod: 95,,, | Performed by: PSYCHIATRY & NEUROLOGY

## 2021-04-12 RX ORDER — ESCITALOPRAM OXALATE 20 MG/1
20 TABLET ORAL DAILY
Qty: 30 TABLET | Refills: 3 | Status: SHIPPED | OUTPATIENT
Start: 2021-04-12 | End: 2021-09-28

## 2021-04-12 RX ORDER — TRAZODONE HYDROCHLORIDE 100 MG/1
TABLET ORAL
Qty: 45 TABLET | Refills: 3 | Status: SHIPPED | OUTPATIENT
Start: 2021-04-12 | End: 2021-09-28 | Stop reason: SDUPTHER

## 2021-04-12 RX ORDER — OXCARBAZEPINE 300 MG/1
300 TABLET, FILM COATED ORAL 2 TIMES DAILY
Qty: 60 TABLET | Refills: 3 | Status: SHIPPED | OUTPATIENT
Start: 2021-04-12 | End: 2021-07-12

## 2021-05-12 ENCOUNTER — PATIENT MESSAGE (OUTPATIENT)
Dept: OTOLARYNGOLOGY | Facility: CLINIC | Age: 41
End: 2021-05-12

## 2021-05-13 ENCOUNTER — PATIENT MESSAGE (OUTPATIENT)
Dept: OTOLARYNGOLOGY | Facility: CLINIC | Age: 41
End: 2021-05-13

## 2021-05-18 ENCOUNTER — PATIENT MESSAGE (OUTPATIENT)
Dept: OTOLARYNGOLOGY | Facility: CLINIC | Age: 41
End: 2021-05-18

## 2021-06-25 RX ORDER — CLONAZEPAM 0.5 MG/1
0.5 TABLET ORAL DAILY PRN
Qty: 30 TABLET | Refills: 1 | Status: SHIPPED | OUTPATIENT
Start: 2021-06-25 | End: 2021-12-28

## 2021-06-28 ENCOUNTER — HISTORICAL (OUTPATIENT)
Dept: BARIATRICS | Facility: HOSPITAL | Age: 41
End: 2021-06-28

## 2021-06-29 DIAGNOSIS — J01.90 ACUTE SINUSITIS, RECURRENCE NOT SPECIFIED, UNSPECIFIED LOCATION: ICD-10-CM

## 2021-06-30 RX ORDER — FLUTICASONE PROPIONATE 50 MCG
2 SPRAY, SUSPENSION (ML) NASAL DAILY
Qty: 48 ML | Refills: 0 | Status: SHIPPED | OUTPATIENT
Start: 2021-06-30 | End: 2021-09-29

## 2021-07-12 ENCOUNTER — PATIENT MESSAGE (OUTPATIENT)
Dept: OTOLARYNGOLOGY | Facility: CLINIC | Age: 41
End: 2021-07-12

## 2021-07-29 ENCOUNTER — HISTORICAL (OUTPATIENT)
Dept: BARIATRICS | Facility: HOSPITAL | Age: 41
End: 2021-07-29

## 2021-08-05 ENCOUNTER — HISTORICAL (OUTPATIENT)
Dept: RADIOLOGY | Facility: HOSPITAL | Age: 41
End: 2021-08-05

## 2021-08-26 ENCOUNTER — HISTORICAL (OUTPATIENT)
Dept: BARIATRICS | Facility: HOSPITAL | Age: 41
End: 2021-08-26

## 2021-09-01 ENCOUNTER — PATIENT MESSAGE (OUTPATIENT)
Dept: PSYCHIATRY | Facility: CLINIC | Age: 41
End: 2021-09-01

## 2021-09-21 ENCOUNTER — HISTORICAL (OUTPATIENT)
Dept: BARIATRICS | Facility: HOSPITAL | Age: 41
End: 2021-09-21

## 2021-09-27 DIAGNOSIS — J01.90 ACUTE SINUSITIS, RECURRENCE NOT SPECIFIED, UNSPECIFIED LOCATION: ICD-10-CM

## 2021-09-28 ENCOUNTER — OFFICE VISIT (OUTPATIENT)
Dept: PSYCHIATRY | Facility: CLINIC | Age: 41
End: 2021-09-28
Payer: COMMERCIAL

## 2021-09-28 DIAGNOSIS — F41.1 GAD (GENERALIZED ANXIETY DISORDER): ICD-10-CM

## 2021-09-28 DIAGNOSIS — F31.31 BIPOLAR AFFECTIVE DISORDER, CURRENTLY DEPRESSED, MILD: Primary | ICD-10-CM

## 2021-09-28 PROCEDURE — 1159F MED LIST DOCD IN RCRD: CPT | Mod: CPTII,95,, | Performed by: PSYCHIATRY & NEUROLOGY

## 2021-09-28 PROCEDURE — 1160F PR REVIEW ALL MEDS BY PRESCRIBER/CLIN PHARMACIST DOCUMENTED: ICD-10-PCS | Mod: CPTII,95,, | Performed by: PSYCHIATRY & NEUROLOGY

## 2021-09-28 PROCEDURE — 1160F RVW MEDS BY RX/DR IN RCRD: CPT | Mod: CPTII,95,, | Performed by: PSYCHIATRY & NEUROLOGY

## 2021-09-28 PROCEDURE — 1159F PR MEDICATION LIST DOCUMENTED IN MEDICAL RECORD: ICD-10-PCS | Mod: CPTII,95,, | Performed by: PSYCHIATRY & NEUROLOGY

## 2021-09-28 PROCEDURE — 99213 OFFICE O/P EST LOW 20 MIN: CPT | Mod: 95,,, | Performed by: PSYCHIATRY & NEUROLOGY

## 2021-09-28 PROCEDURE — 99213 PR OFFICE/OUTPT VISIT, EST, LEVL III, 20-29 MIN: ICD-10-PCS | Mod: 95,,, | Performed by: PSYCHIATRY & NEUROLOGY

## 2021-09-28 RX ORDER — FLUVOXAMINE MALEATE 100 MG/1
TABLET, COATED ORAL
Qty: 30 TABLET | Refills: 2 | Status: SHIPPED | OUTPATIENT
Start: 2021-09-28 | End: 2021-11-16

## 2021-09-28 RX ORDER — TRAZODONE HYDROCHLORIDE 100 MG/1
TABLET ORAL
Qty: 45 TABLET | Refills: 2 | Status: SHIPPED | OUTPATIENT
Start: 2021-09-28 | End: 2022-05-31 | Stop reason: SDUPTHER

## 2021-09-28 RX ORDER — OXCARBAZEPINE 300 MG/1
300 TABLET, FILM COATED ORAL 2 TIMES DAILY
Qty: 180 TABLET | Refills: 0 | Status: SHIPPED | OUTPATIENT
Start: 2021-09-28 | End: 2022-03-04 | Stop reason: SDUPTHER

## 2021-09-29 RX ORDER — FLUTICASONE PROPIONATE 50 MCG
SPRAY, SUSPENSION (ML) NASAL
Qty: 48 ML | Refills: 0 | Status: SHIPPED | OUTPATIENT
Start: 2021-09-29

## 2021-10-01 DIAGNOSIS — I10 HYPERTENSION GOAL BP (BLOOD PRESSURE) < 140/90: ICD-10-CM

## 2021-10-01 RX ORDER — AMLODIPINE BESYLATE 5 MG/1
TABLET ORAL
Qty: 90 TABLET | Refills: 0 | Status: SHIPPED | OUTPATIENT
Start: 2021-10-01

## 2021-10-23 ENCOUNTER — PATIENT MESSAGE (OUTPATIENT)
Dept: PSYCHIATRY | Facility: CLINIC | Age: 41
End: 2021-10-23
Payer: COMMERCIAL

## 2021-10-25 ENCOUNTER — PATIENT MESSAGE (OUTPATIENT)
Dept: PSYCHIATRY | Facility: CLINIC | Age: 41
End: 2021-10-25
Payer: COMMERCIAL

## 2021-10-26 ENCOUNTER — HISTORICAL (OUTPATIENT)
Dept: BARIATRICS | Facility: HOSPITAL | Age: 41
End: 2021-10-26

## 2021-11-16 ENCOUNTER — PATIENT MESSAGE (OUTPATIENT)
Dept: PSYCHIATRY | Facility: CLINIC | Age: 41
End: 2021-11-16
Payer: COMMERCIAL

## 2021-11-16 RX ORDER — ESCITALOPRAM OXALATE 20 MG/1
20 TABLET ORAL DAILY
Qty: 30 TABLET | Refills: 2 | Status: SHIPPED | OUTPATIENT
Start: 2021-11-16 | End: 2022-03-07

## 2022-01-25 ENCOUNTER — HISTORICAL (OUTPATIENT)
Dept: BARIATRICS | Facility: HOSPITAL | Age: 42
End: 2022-01-25

## 2022-01-26 DIAGNOSIS — Z12.31 OTHER SCREENING MAMMOGRAM: ICD-10-CM

## 2022-02-23 ENCOUNTER — HISTORICAL (OUTPATIENT)
Dept: BARIATRICS | Facility: HOSPITAL | Age: 42
End: 2022-02-23

## 2022-03-07 RX ORDER — OXCARBAZEPINE 300 MG/1
300 TABLET, FILM COATED ORAL 2 TIMES DAILY
Qty: 180 TABLET | Refills: 0 | Status: SHIPPED | OUTPATIENT
Start: 2022-03-07 | End: 2022-06-13

## 2022-04-20 ENCOUNTER — HISTORICAL (OUTPATIENT)
Dept: BARIATRICS | Facility: HOSPITAL | Age: 42
End: 2022-04-20
Payer: COMMERCIAL

## 2022-05-18 ENCOUNTER — CLINICAL SUPPORT (OUTPATIENT)
Dept: BARIATRICS | Facility: HOSPITAL | Age: 42
End: 2022-05-18

## 2022-05-18 VITALS — HEIGHT: 64 IN | WEIGHT: 293 LBS | BODY MASS INDEX: 50.02 KG/M2

## 2022-05-18 NOTE — PROGRESS NOTES
Patient Education        [x]   [facility] Visit: Pre-op wt loss to below 400#    []     [facility] Visit:     Current Weight:  457#                                                                      Barriers to learning:  [x]None evident  []Acuity of illness  []Cognitive defects  []Cultural barriers  []Desire/Motivation  []Difficulty concentrating  []Emotional state  []Financial concerns  []Hearing deficit  []Language barrier  []Literacy  []Memory problems  []Vision impairment     Home caregiver present for session   []YES  [x] NO       Teaching methods:  []Demonstration  [x]Explanation  []Printed materials  []Teach back  []Virtual/web based         Verbalizes understanding Demonstrates  Needs further teaching Needs practice/ supervision Comments    Bariatric Surgery Diet  [] [] [] []    Clear liquid [] [] [] []    Full liquid [] [] [] []    Weight Reduction [x] [] [] [] Wt loss to 400#   Other Diet [] [] [] []          Additional Learner (s) Present                                                                  []Spouse   []Daughter    []  Son  []Family member   []Friend   []Grandfather   []Grandmother   []Father   []Mother   []Other       Expected Compliance:  []Good  [x]Fair  []Poor    Additional Information: Pt with 17# wt gain since last month. Pt admits to binging on sugar for a week within this month. Since then she attempted to follow [employer] wt loss program, but was not able to sustain on this program due to outside factors. Had a long discussion with pt re: eating routines and habits. Pt continues to state hesitancy with surgery and uncertainty whether she wants to proceed surgically or non-surgically. Discussed what habits need to change with or without surgery. Advised pt that she needs to be 100% certain that surgery is the avenue she wants to take before proceeding. Pt expresses clear knowledge and understanding of this. Pt has been able to implement some exercise, 5 mins on stationary bike and 5 min walks,  but not consistently.    Plan:  1. Set plan of activities at start of day, including exercise after clocking out of work in order to break cycle of watching TV and snacking in evening.  2. Continue to assess whether surgical weight loss is the right decision for her.  3. Reduce snacking behavior and set meal consistency with eating on a diligent schedule.

## 2022-06-01 RX ORDER — TRAZODONE HYDROCHLORIDE 100 MG/1
TABLET ORAL
Qty: 45 TABLET | Refills: 2 | Status: SHIPPED | OUTPATIENT
Start: 2022-06-01 | End: 2022-06-13 | Stop reason: SDUPTHER

## 2022-06-12 ENCOUNTER — PATIENT MESSAGE (OUTPATIENT)
Dept: PSYCHIATRY | Facility: CLINIC | Age: 42
End: 2022-06-12
Payer: COMMERCIAL

## 2022-06-13 ENCOUNTER — OFFICE VISIT (OUTPATIENT)
Dept: PSYCHIATRY | Facility: CLINIC | Age: 42
End: 2022-06-13
Payer: COMMERCIAL

## 2022-06-13 ENCOUNTER — PATIENT MESSAGE (OUTPATIENT)
Dept: PSYCHIATRY | Facility: CLINIC | Age: 42
End: 2022-06-13

## 2022-06-13 DIAGNOSIS — F41.1 GAD (GENERALIZED ANXIETY DISORDER): ICD-10-CM

## 2022-06-13 DIAGNOSIS — F31.31 BIPOLAR AFFECTIVE DISORDER, CURRENTLY DEPRESSED, MILD: Primary | ICD-10-CM

## 2022-06-13 PROCEDURE — 99214 OFFICE O/P EST MOD 30 MIN: CPT | Mod: 95,,, | Performed by: PSYCHIATRY & NEUROLOGY

## 2022-06-13 PROCEDURE — 90833 PR PSYCHOTHERAPY W/PATIENT W/E&M, 30 MIN (ADD ON): ICD-10-PCS | Mod: 95,,, | Performed by: PSYCHIATRY & NEUROLOGY

## 2022-06-13 PROCEDURE — 99214 PR OFFICE/OUTPT VISIT, EST, LEVL IV, 30-39 MIN: ICD-10-PCS | Mod: 95,,, | Performed by: PSYCHIATRY & NEUROLOGY

## 2022-06-13 PROCEDURE — 90833 PSYTX W PT W E/M 30 MIN: CPT | Mod: 95,,, | Performed by: PSYCHIATRY & NEUROLOGY

## 2022-06-13 RX ORDER — TRAZODONE HYDROCHLORIDE 100 MG/1
TABLET ORAL
Qty: 45 TABLET | Refills: 2 | Status: SHIPPED | OUTPATIENT
Start: 2022-06-13 | End: 2024-02-21 | Stop reason: ALTCHOICE

## 2022-06-13 RX ORDER — LURASIDONE HYDROCHLORIDE 40 MG/1
80 TABLET, FILM COATED ORAL DAILY
Qty: 60 TABLET | Refills: 2 | Status: SHIPPED | OUTPATIENT
Start: 2022-06-13 | End: 2022-06-14
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
## 2022-06-20 ENCOUNTER — PATIENT MESSAGE (OUTPATIENT)
Dept: PSYCHIATRY | Facility: CLINIC | Age: 42
End: 2022-06-20
Payer: COMMERCIAL

## 2022-06-22 ENCOUNTER — CLINICAL SUPPORT (OUTPATIENT)
Dept: BARIATRICS | Facility: HOSPITAL | Age: 42
End: 2022-06-22

## 2022-06-22 VITALS — HEIGHT: 64 IN | BODY MASS INDEX: 50.02 KG/M2 | WEIGHT: 293 LBS

## 2022-06-22 PROCEDURE — 94200034 HC BARIATRIC NUTRITIONAL SVCS PT GRADE I: Performed by: DIETITIAN, REGISTERED

## 2022-07-27 ENCOUNTER — CLINICAL SUPPORT (OUTPATIENT)
Dept: BARIATRICS | Facility: HOSPITAL | Age: 42
End: 2022-07-27

## 2022-07-27 VITALS — BODY MASS INDEX: 50.02 KG/M2 | HEIGHT: 64 IN | WEIGHT: 293 LBS

## 2022-07-27 PROCEDURE — 94200034 HC BARIATRIC NUTRITIONAL SVCS PT GRADE I: Performed by: DIETITIAN, REGISTERED

## 2022-07-27 NOTE — PROGRESS NOTES
Patient Education        [x]   MSWL Visit: Pre op wt loss to below 400#   []     NSWL Visit:     Current Weight:  456#                                                                      Barriers to learning:  [x]None evident  []Acuity of illness  []Cognitive defects  []Cultural barriers  []Desire/Motivation  []Difficulty concentrating  []Emotional state  []Financial concerns  []Hearing deficit  []Language barrier  []Literacy  []Memory problems  []Vision impairment     Home caregiver present for session   []YES  [x] NO       Teaching methods:  []Demonstration  [x]Explanation  []Printed materials  []Teach back  []Virtual/web based         Verbalizes understanding Demonstrates  Needs further teaching Needs practice/ supervision Comments    Bariatric Surgery Diet  [] [] [] []    Clear liquid [] [] [] []    Full liquid [] [] [] []    Weight Reduction [x] [] [] [] Below 400#   Other Diet [] [] [] []          Additional Learner (s) Present                                                                  []Spouse   []Daughter    []  Son  []Family member   []Friend   []Grandfather   []Grandmother   []Father   []Mother   []Other       Expected Compliance:  [x]Good  []Fair  []Poor    Additional Information: Pt's weight stable since last month. Pt continues to be under a large amount of stress related to family and work. Reports a wt gain of 16# during the month due to a larger amount of restaurant eating. Pt continues to see counselor twice per month and is now in PT for back pain. Pt recognizes that she is an all or nothing thinker and when she falls off course with her diet it leads to several days of poor food choices, specifically with restaurant eating and sugar rich food items.     Plan: Pt was encouraged to commit to 30 days of no restaurant eating and to make a list of outings that she can participate in so as to not use food pickup as an outing. Pt was encouraged to discuss her all or nothing type of thinking with  therapist in order to develop new strategies to this type of mentality.

## 2022-08-23 ENCOUNTER — CLINICAL SUPPORT (OUTPATIENT)
Dept: BARIATRICS | Facility: HOSPITAL | Age: 42
End: 2022-08-23

## 2022-08-23 VITALS — BODY MASS INDEX: 50.02 KG/M2 | HEIGHT: 64 IN | WEIGHT: 293 LBS

## 2022-08-23 PROCEDURE — 94200034 HC BARIATRIC NUTRITIONAL SVCS PT GRADE I: Performed by: DIETITIAN, REGISTERED

## 2022-08-23 NOTE — PROGRESS NOTES
Patient Education        [x]   MSWL Visit: Pre op wt loss to below 400#  []     NSWL Visit:     Current Weight:  447#                                                                      Barriers to learning:  [x]None evident  []Acuity of illness  []Cognitive defects  []Cultural barriers  []Desire/Motivation  []Difficulty concentrating  []Emotional state  []Financial concerns  []Hearing deficit  []Language barrier  []Literacy  []Memory problems  []Vision impairment     Home caregiver present for session   []YES  [x] NO       Teaching methods:  []Demonstration  [x]Explanation  [x]Printed materials  []Teach back  []Virtual/web based         Verbalizes understanding Demonstrates  Needs further teaching Needs practice/ supervision Comments    Bariatric Surgery Diet  [] [] [] []    Clear liquid [] [] [] []    Full liquid [] [] [] []    Weight Reduction [x] [] [] [] Below 400#   Other Diet [] [] [] []          Additional Learner (s) Present                                                                  []Spouse   []Daughter    []  Son  []Family member   []Friend   []Grandfather   []Grandmother   []Father   []Mother   []Other       Expected Compliance:  [x]Good  []Fair  []Poor    Additional Information: Pt has lost 9# since last visit. She has reduced sugar by eliminating soft drinks during the week, reducing sugar in coffee, reducing portions, and being more mindful of choices when dining out.      24 hr recall:  B- [employer] 1 simon egg cheese biscuit (vs 2 sandwiches in past); 1 packet of grits with 1 scrambled egg   L- Riki Power 1/2 sandwich; homemade chicken salad sandwich  S- pork chops with corn  Bevs- crystal light, Claudio Aid, soft drinks (on weekends)      Plan:  1. Educated pt on modified pre-op meal plan to begin following for a more structured approach to dieting.  2. Increase activity via walking with neighbor.

## 2022-08-24 DIAGNOSIS — I10 ESSENTIAL HYPERTENSION: ICD-10-CM

## 2022-09-28 NOTE — PROGRESS NOTES
CC: Well woman exam    Shaina Olson is a 38 y.o. female  presents for a well woman exam.  LMP: Patient's last menstrual period was 2018 (exact date)..  No issues, problems, or complaints.    Past Medical History:   Diagnosis Date    Allergy     Anxiety     Arthritis     Arthritis of right knee     Graves' disease 2014    Hypertension     Vitamin D deficiency      Past Surgical History:   Procedure Laterality Date    FRACTURE SURGERY Right 2013    right wrist    NASAL SEPTUM SURGERY      SINUS SURGERY      SINUS SURGERY  2017    SINUS SURGERY FUNCTIONAL ENDOSCOPIC N/A 2017    Performed by [facility]    TONSILLECTOMY, ADENOIDECTOMY      WRIST FRACTURE SURGERY Right 2013    Right wrist     Social History     Socioeconomic History    Marital status: Single     Spouse name: None    Number of children: 0    Years of education: None    Highest education level: None   Social Needs    Financial resource strain: None    Food insecurity - worry: None    Food insecurity - inability: None    Transportation needs - medical: None    Transportation needs - non-medical: None   Occupational History    Occupation: LPN     Comment: [employer]   Tobacco Use    Smoking status: Never Smoker    Smokeless tobacco: Never Used   Substance and Sexual Activity    Alcohol use: Yes     Alcohol/week: 0.0 oz     Comment: occasionally    Drug use: No    Sexual activity: Yes     Partners: Male     Birth control/protection: OCP   Other Topics Concern    None   Social History Narrative    Wears a seatbelt.     Family History   Problem Relation Age of Onset    Hypertension Mother     Lupus Mother     Diverticulosis Mother     Cancer Sister 30        stg 1 boderline? ov cancer    Cancer Maternal Grandmother     Cancer Maternal Grandfather     Cataracts Maternal Grandfather     Diabetes Paternal Grandmother     Stroke Paternal Grandmother      "Cancer Paternal Grandmother     Cancer Paternal Grandfather     Diabetes Paternal Aunt     Hypertension Paternal Aunt     Thyroid disease Neg Hx     Migraines Neg Hx     Asthma Neg Hx      OB History      Para Term  AB Living    0 0 0 0 0 0    SAB TAB Ectopic Multiple Live Births    0 0 0 0            /61   Ht 5' 4" (1.626 m)   Wt (!) 185.1 kg (408 lb 1.1 oz)   LMP 2018 (Exact Date)   BMI 70.05 kg/m²         ROS:  GENERAL: Denies weight gain or weight loss. Feeling well overall.   SKIN: Denies rash or lesions.   HEAD: Denies head injury or headache.   NODES: Denies enlarged lymph nodes.   CHEST: Denies chest pain or shortness of breath.   CARDIOVASCULAR: Denies palpitations or left sided chest pain.   ABDOMEN: No abdominal pain, constipation, diarrhea, nausea, vomiting or rectal bleeding.   URINARY: No frequency, dysuria, hematuria, or burning on urination.  REPRODUCTIVE: See HPI.   BREASTS: The patient performs breast self-examination and denies pain, lumps, or nipple discharge.   HEMATOLOGIC: No easy bruisability or excessive bleeding.   MUSCULOSKELETAL: Denies joint pain or swelling.   NEUROLOGIC: Denies syncope or weakness.   PSYCHIATRIC: Denies depression, anxiety or mood swings.    PHYSICAL EXAM:    APPEARANCE: Well nourished, well developed, in no acute distress.  AFFECT: WNL, alert and oriented x 3  SKIN: No acne or hirsutism  NECK: Neck symmetric without masses or thyromegaly  NODES: No inguinal, cervical, axillary, or femoral lymph node enlargement  CHEST: Good respiratory effect  ABDOMEN: Soft.  No tenderness or masses.  No hepatosplenomegaly.  No hernias.  PELVIC: Normal external genitalia without lesions.  Normal hair distribution.  Adequate perineal body, normal urethral meatus.  Vagina moist and well rugated without lesions or discharge.  Cervix pink, without lesions, discharge or tenderness.  No significant cystocele or rectocele.  Bimanual exam shows uterus to be " . normal size, regular, mobile and nontender.  Adnexa without masses or tenderness.        1. Well woman exam with routine gynecological exam     2. Screening for STDs (sexually transmitted diseases)     PLAN:  Patient was counseled today on A.C.S. Pap guidelines and recommendations for yearly pelvic exams, mammograms and monthly self breast exams; to see her PCP for other health maintenance.

## 2022-10-19 ENCOUNTER — CLINICAL SUPPORT (OUTPATIENT)
Dept: BARIATRICS | Facility: HOSPITAL | Age: 42
End: 2022-10-19

## 2022-10-19 VITALS — BODY MASS INDEX: 77.45 KG/M2 | WEIGHT: 293 LBS

## 2022-10-19 PROCEDURE — 94200034 HC BARIATRIC NUTRITIONAL SVCS PT GRADE I

## 2022-10-19 NOTE — PROGRESS NOTES
Patient Education        [x]   MSWL Visit:  pre op wt loss below 400 []     NSWL Visit:     Current Weight:  451   BMI 77                                                                           Barriers to learning:  [x]None evident  []Acuity of illness  []Cognitive defects  []Cultural barriers  []Desire/Motivation  []Difficulty concentrating  []Emotional state  []Financial concerns  []Hearing deficit  []Language barrier  []Literacy  []Memory problems  []Vision impairment     Home caregiver present for session   []YES  [x] NO       Teaching methods:  []Demonstration  [x]Explanation  [x]Printed materials  []Teach back  []Virtual/web based         Verbalizes understanding Demonstrates  Needs further teaching Needs practice/ supervision Comments    Bariatric Surgery Diet  [] [] [] []    Clear liquid [] [] [] []    Full liquid [] [] [] []    Weight Reduction [x] [] [] []    Other Diet [] [] [] []          Additional Learner (s) Present                                                                  []Spouse   []Daughter    []  Son  []Family member   []Friend   []Grandfather   []Grandmother   []Father   []Mother   []Other       Expected Compliance:  [x]Good  []Fair  []Poor    Additional Information:  Pt here for pre po wt loss, she has gained weight but is still lower then she was. Pt states that she is addicted to sugar and cannot get off it. She does well for 1 week but then she is back on it. Pt having 6tsp sugar in her coffee and then will binge cookies later. Pt will work on portions of starches and limiting sugar intake. Gave pt pre op diet and meal idea that do not contain starches.       24 hr recall:      Plan:  Pt to start pre op diet   Reduce sugar intake in coffee

## 2022-12-21 ENCOUNTER — CLINICAL SUPPORT (OUTPATIENT)
Dept: BARIATRICS | Facility: HOSPITAL | Age: 42
End: 2022-12-21

## 2022-12-21 VITALS — WEIGHT: 293 LBS | BODY MASS INDEX: 79.13 KG/M2

## 2022-12-21 PROCEDURE — 94200034 HC BARIATRIC NUTRITIONAL SVCS PT GRADE I

## 2022-12-21 NOTE — PROGRESS NOTES
Patient Education        []   MSWL Visit:  []     NSWL Visit:   Pre op weight loss   Current Weight:   461lb     Goal under 400lb                                                                       Barriers to learning:  [x]None evident  []Acuity of illness  []Cognitive defects  []Cultural barriers  []Desire/Motivation  []Difficulty concentrating  []Emotional state  []Financial concerns  []Hearing deficit  []Language barrier  []Literacy  []Memory problems  []Vision impairment     Home caregiver present for session   []YES  [x] NO       Teaching methods:  []Demonstration  [x]Explanation  []Printed materials  []Teach back  []Virtual/web based         Verbalizes understanding Demonstrates  Needs further teaching Needs practice/ supervision Comments    Bariatric Surgery Diet  [] [] [] []    Clear liquid [] [] [] []    Full liquid [] [] [] []    Weight Reduction [] [] [] []    Other Diet [] [] [] []          Additional Learner (s) Present                                                                  []Spouse   []Daughter    []  Son  []Family member   []Friend   []Grandfather   []Grandmother   []Father   []Mother   []Other       Expected Compliance:  []Good  []Fair  []Poor    Additional Information:    pt her for pre op weight loss. She has gained 10lb over the past 2 months. States that work is very stressful and at the end of the day she does not have time or energy to do anything but lay in bed after work. She does not want to do anything or make any changes, she has no motivation but she is working with her therapist on to why that is.     She works  8-430, but he dicussed that she take 1 hrs of her day to do 10-15 min exercise and meal prep.   We reviewed some easy meal prep dishes that she can make through out the week, refereed her to [email or web address]       Plan:  Exercise 10-15 min each day from 430-5pm   Meal prep from 5-530p

## 2023-02-08 ENCOUNTER — CLINICAL SUPPORT (OUTPATIENT)
Dept: BARIATRICS | Facility: HOSPITAL | Age: 43
End: 2023-02-08

## 2023-02-08 VITALS — BODY MASS INDEX: 77.24 KG/M2 | WEIGHT: 293 LBS

## 2023-02-08 PROCEDURE — 94200034 HC BARIATRIC NUTRITIONAL SVCS PT GRADE I

## 2023-02-08 NOTE — PROGRESS NOTES
Patient Education [] [facility] Visit Number:       [x]  Pre-op Wt Loss Goal (as ordered on referral):  pre op wt loss 400   [] NSWL Visit:     Current Wt: 450lb - 11lb wt loss since last visit   Current BMI: 77.24                                                                      Barriers to learning:  [x]None evident  []Acuity of illness  []Cognitive defects  []Cultural barriers  []Desire/Motivation  []Difficulty concentrating  []Emotional state  []Financial concerns  []Hearing deficit  []Language barrier  []Literacy  []Memory problems  []Vision impairment     Home caregiver present for session   []YES  [x] NO       Teaching methods:  []Demonstration  []Explanation  [x]Printed materials  []Teach back  []Virtual/web based         Verbalizes understanding Demonstrates  Needs further teaching Needs practice/ supervision Comments    Bariatric Surgery Diet  [] [] [] []    Clear liquid [] [] [] []    Full liquid [] [] [] []    Weight Reduction [x] [] [] []    Other Diet [] [] [] []          Additional Learner (s) Present                                                                  []Spouse   []Daughter    []  Son  []Family member   []Friend   []Grandfather   []Grandmother   []Father   []Mother   []Other       Expected Compliance:  [x]Good  []Fair  []Poor    Additional Information:     Pt is doing better this month she states that she is more motivated and has started to meal prep 2-3 days out of the week. Cravings for sweets has gone down and she is not eating as much at night as she typically does. Pt is working on not going into the bed after work and trying to tidy up the house.    She is still skipping lunch most days, but will look into adding protein shake for lunch as it is convenient . Pt will also star to exercise after work to increase weight loss.   Goal is 10-15lb next month to stay motivated and consistent     24 hr recall:  B: coffee  2 mini sausage biscuits   L:   wrap  D:  burrito  bowls  1/4 cup rice  rice, beans and corn,  cheese and meat    Drinking more water   But having a small 8oz coke per day and will work on decreases       Plan:     Limit starch to 1 meal per day   Meal prep  and portion out   Limit soda to every other day   Exercise 20 minute per day

## 2023-03-08 ENCOUNTER — CLINICAL SUPPORT (OUTPATIENT)
Dept: BARIATRICS | Facility: HOSPITAL | Age: 43
End: 2023-03-08

## 2023-03-08 VITALS — WEIGHT: 293 LBS | BODY MASS INDEX: 78.96 KG/M2

## 2023-03-08 PROCEDURE — 94200034 HC BARIATRIC NUTRITIONAL SVCS PT GRADE I

## 2023-03-08 NOTE — PROGRESS NOTES
Patient Education [x] MSWL Visit Number:      []  Pre-op Wt Loss   Goal (as ordered on referral):  60lb weight loss   [] NSWL Visit:     Current Wt: 460lb  Current BMI: 78.96                                                                      Barriers to learning:  []None evident  []Acuity of illness  []Cognitive defects  []Cultural barriers  []Desire/Motivation  []Difficulty concentrating  []Emotional state  []Financial concerns  []Hearing deficit  []Language barrier  []Literacy  []Memory problems  []Vision impairment     Home caregiver present for session   []YES  [] NO       Teaching methods:  []Demonstration  []Explanation  []Printed materials  []Teach back  []Virtual/web based         Verbalizes understanding Demonstrates  Needs further teaching Needs practice/ supervision Comments    Bariatric Surgery Diet  [] [] [] []    Clear liquid [] [] [] []    Full liquid [] [] [] []    Weight Reduction [] [] [] []    Other Diet [] [] [] []          Additional Learner (s) Present                                                                  []Spouse   []Daughter    []  Son  []Family member   []Friend   []Grandfather   []Grandmother   []Father   []Mother   []Other       Expected Compliance:  [x]Good  []Fair  []Poor    Additional Information:     working on measuring  out foods and protein intake  but will will work on reducing starches  She is working on getting appetite Supressant   Still working with her therapist on how to improve motivation fore exercise and staying consistent      24 hr recall:  B: 1 sausage and grits  coffee   L: Salad with chicken   D: crunchy taco     Reduce tiny coke and working on increasing water and there are days she doesn't drink enough and she can tell     Plan:   1. Increase water  to 4-6 bottles   2.eat 3 meals per day   3. Limit starches to 1/3 cup 2x per day

## 2023-04-19 ENCOUNTER — CLINICAL SUPPORT (OUTPATIENT)
Dept: BARIATRICS | Facility: HOSPITAL | Age: 43
End: 2023-04-19

## 2023-04-19 VITALS — BODY MASS INDEX: 78.5 KG/M2 | WEIGHT: 293 LBS

## 2023-04-19 PROCEDURE — 94200034 HC BARIATRIC NUTRITIONAL SVCS PT GRADE I

## 2023-04-19 NOTE — PROGRESS NOTES
Patient Education [] MSWL Visit Number:       [x]  Pre-op Wt Loss goal 400lb  Goal (as ordered on referral):   [] NSWL Visit:     Current Wt: 457.3lb   Current BMI: 78.50                                                                      Barriers to learning:  [x]None evident  []Acuity of illness  []Cognitive defects  []Cultural barriers  []Desire/Motivation  []Difficulty concentrating  []Emotional state  []Financial concerns  []Hearing deficit  []Language barrier  []Literacy  []Memory problems  []Vision impairment     Home caregiver present for session   []YES  [] NO       Teaching methods:  []Demonstration  []Explanation  []Printed materials  []Teach back  []Virtual/web based         Verbalizes understanding Demonstrates  Needs further teaching Needs practice/ supervision Comments    Bariatric Surgery Diet  [] [] [] []    Clear liquid [] [] [] []    Full liquid [] [] [] []    Weight Reduction [] [] [] []    Other Diet [] [] [] []          Additional Learner (s) Present                                                                  []Spouse   []Daughter    []  Son  []Family member   []Friend   []Grandfather   []Grandmother   []Father   []Mother   []Other       Expected Compliance:  []Good  []Fair  []Poor    Additional Information:     Pt still struggling this mo nth to lose weight, she did lose 3lb. She is still skipping meals and not being consistent with what she eats or drinks,. She has trouble staying motivated. We discussed in detail motivation and ways to stay on track as well as a plan for next month with eating and exercise.       24 hr recall:  B: coffee   reduce sugar packets 7-5   L:     Plan:    1. Eat 2 protein shakes BL   2. 1 frozen dinner   3. 2 snack from snack list   Walk to stop sign and back

## 2023-05-26 ENCOUNTER — HOSPITAL ENCOUNTER (EMERGENCY)
Facility: HOSPITAL | Age: 43
Discharge: HOME | End: 2023-05-26
Attending: STUDENT IN AN ORGANIZED HEALTH CARE EDUCATION/TRAINING PROGRAM
Payer: COMMERCIAL

## 2023-05-26 VITALS
TEMPERATURE: 98 F | WEIGHT: 293 LBS | OXYGEN SATURATION: 97 % | HEIGHT: 64 IN | SYSTOLIC BLOOD PRESSURE: 137 MMHG | HEART RATE: 94 BPM | RESPIRATION RATE: 17 BRPM | DIASTOLIC BLOOD PRESSURE: 74 MMHG | BODY MASS INDEX: 50.02 KG/M2

## 2023-05-26 DIAGNOSIS — F41.9 ANXIETY: ICD-10-CM

## 2023-05-26 DIAGNOSIS — R51.9 ACUTE NONINTRACTABLE HEADACHE, UNSPECIFIED HEADACHE TYPE: Primary | ICD-10-CM

## 2023-05-26 LAB — POCT GLUCOSE: 104 MG/DL (ref 70–110)

## 2023-05-26 PROCEDURE — 99282 EMERGENCY DEPT VISIT SF MDM: CPT

## 2023-05-26 PROCEDURE — 82962 GLUCOSE BLOOD TEST: CPT

## 2023-05-26 NOTE — ED PROVIDER NOTES
"Encounter Date: 5/26/2023    SCRIBE #1 NOTE: I, Ricky Ferreira, am scribing for, and in the presence of,  Robin Jacobson MD. I have scribed the following portions of the note - Other sections scribed: HPI, ROS, PE.     History     Chief Complaint   Patient presents with    Headache     Pt states she woke up to use the restroom, and had a h/a after getting up, h/a did not wake pt up out of sleep. She took her BP @ home and states she had a reading SBP: 130s on R. Arm and SBP: 220s in L. Arm, pt began to have an anxiety attack, d/t thinking she was "having a stroke" and decided to come to ED, pts BP in bilateral arms are normotensive in triage. Denies visual changes, n/v, recent trauma/injury, unsteady gait. Started Wegovy on Monday.      43 year old female presents to ED c/o headache onset just PTA. Pt states that she woke up with a pounding headache, suffered an anxiety attack, checked her blood pressure found it to be 130s in her right arm and 200s in her left arm, . Pt denies vision changes, fever, chills dysuria. Pt reports that she was feeling shaky from her Wegovy medication but drank some orange juice and felt better.     The history is provided by the patient. No  was used.   Review of patient's allergies indicates:   Allergen Reactions    Demerol [meperidine] Other (See Comments)     blackout     Past Medical History:   Diagnosis Date    Allergy     Anxiety 2004    Arthritis     Arthritis of right knee     Graves' disease 06/2014    Dr. Maximilian Fofana--endocrinology    Hypertension 2004    Thyroid nodule     Ultrasound 2/9/21 and 12/23/19; biopsy 3/6/23    Vitamin D deficiency     Dr. Maximilian Fofana--endocrinology     Past Surgical History:   Procedure Laterality Date    FRACTURE SURGERY Right 2013    right wrist    NASAL SEPTUM SURGERY  2001    SINUS SURGERY  2001    SINUS SURGERY  11/22/2017    TONSILLECTOMY, ADENOIDECTOMY  2001    WRIST FRACTURE SURGERY Right 2013    Right wrist "     Family History   Problem Relation Age of Onset    Hypertension Mother     Lupus Mother     Diverticulosis Mother     Cancer Sister 30        stg 1 boderline? ov cancer    Cancer Maternal Grandmother     Cancer Maternal Grandfather     Cataracts Maternal Grandfather     Diabetes Paternal Grandmother     Stroke Paternal Grandmother     Cancer Paternal Grandmother     Cancer Paternal Grandfather     Diabetes Paternal Aunt     Hypertension Paternal Aunt     Thyroid disease Neg Hx     Migraines Neg Hx     Asthma Neg Hx      Social History     Tobacco Use    Smoking status: Never    Smokeless tobacco: Never   Substance Use Topics    Alcohol use: Yes     Alcohol/week: 0.0 standard drinks     Comment: occasionally    Drug use: No     Review of Systems   Constitutional:  Negative for chills and fever.   Eyes:  Negative for visual disturbance.   Genitourinary:  Negative for dysuria.   Neurological:  Positive for headaches.   Psychiatric/Behavioral:  The patient is nervous/anxious.      Physical Exam     Initial Vitals [05/26/23 0419]   BP Pulse Resp Temp SpO2   133/79 94 17 98.3 °F (36.8 °C) 97 %      MAP       --         Physical Exam    Nursing note and vitals reviewed.  Constitutional: She appears well-developed and well-nourished. She is not diaphoretic. No distress.   Ambulates with a steady gait   HENT:   Head: Normocephalic and atraumatic.   Right Ear: External ear normal.   Left Ear: External ear normal.   Nose: Nose normal.   Eyes: Conjunctivae and EOM are normal. Pupils are equal, round, and reactive to light. Right eye exhibits no discharge. Left eye exhibits no discharge.   Cardiovascular:  Normal rate, regular rhythm, normal heart sounds and intact distal pulses.     Exam reveals no gallop and no friction rub.       No murmur heard.  Pulmonary/Chest: Breath sounds normal. No respiratory distress. She has no wheezes. She has no rhonchi. She has no rales. She exhibits no tenderness.   Abdominal: Abdomen is  soft. Bowel sounds are normal. She exhibits no distension and no mass. There is no abdominal tenderness. There is no rebound and no guarding.   Musculoskeletal:         General: No edema. Normal range of motion.     Neurological: She is alert and oriented to person, place, and time. No cranial nerve deficit or sensory deficit.   No focal neurological deficits   Skin: Skin is warm and dry. Capillary refill takes less than 2 seconds. No erythema. No pallor.       ED Course   Procedures  Labs Reviewed   POCT GLUCOSE          Imaging Results    None          Medications - No data to display           Scribe Attestation:   Scribe #1: I performed the above scribed service and the documentation accurately describes the services I performed. I attest to the accuracy of the note.    Attending Attestation:           Physician Attestation for Scribe:  Physician Attestation Statement for Scribe #1: I, Robin Jacobson MD, reviewed documentation, as scribed by Ricky Ferreira in my presence, and it is both accurate and complete.       Medical Decision Making  Patient presents with concern for hypertension history of same    Differential diagnosis includes but is not limited to  hypertensive urgency, hypertensive emergency headache, migraine    Patient is awake alert well-appearing.  She has no chest pain or shortness of breath here in the emergency department.  She was reports her headache resolved.  She reports she struck her blood pressure earlier today and this caused pain attacks was here in the emergency department further evaluation.  Here in the emergency department her blood pressure is within normal limits to both arms.  Awake alert and well-appearing.  Offered further workup her patient declined.  She states that she would piece of my now is willing to go home.  Will discharge at this time.  Strict return precautions given.  Patient voices understanding. Return precautions given.  Questions invited, questions  answered to the best my ability.  Patient discharged home condition stable.        Amount and/or Complexity of Data Reviewed  External Data Reviewed: notes.     Details: No recent emergency department visits.  Visits with PCP for diabetes, obesity.           ED Course as of 05/26/23 1139   Fri May 26, 2023   1139 Patient denies any complaints at this time, point of care glucose within normal limits. [MM]   1139 POCT Glucose: 104 [MM]      ED Course User Index  [MM] Robin Jacobson MD                 Clinical Impression:   Final diagnoses:  [R51.9] Acute nonintractable headache, unspecified headache type (Primary)  [F41.9] Anxiety        ED Disposition Condition    Discharge Stable          ED Prescriptions    None       Follow-up Information       Follow up With Specialties Details Why Contact Info    Yaz Garrison DO Internal Medicine Call   [id number] [place] [address] [phone]               Robin Jacobson MD  05/26/23 1135

## 2023-07-06 ENCOUNTER — CLINICAL SUPPORT (OUTPATIENT)
Dept: BARIATRICS | Facility: HOSPITAL | Age: 43
End: 2023-07-06

## 2023-07-06 VITALS — BODY MASS INDEX: 50.02 KG/M2 | HEIGHT: 64 IN | WEIGHT: 293 LBS

## 2023-07-06 PROCEDURE — 94200034 HC BARIATRIC NUTRITIONAL SVCS PT GRADE I: Performed by: DIETITIAN, REGISTERED

## 2023-07-06 NOTE — PROGRESS NOTES
Patient Education [] MSWL Visit Number:      [x]  Pre-op Wt Loss Goal (as ordered on referral): below 400#  [] NSWL Visit:     Current Wt: 436.1#  Current BMI:74.8 kg/m2                                                                      Barriers to learning:  [x]None evident  []Acuity of illness  []Cognitive defects  []Cultural barriers  []Desire/Motivation  []Difficulty concentrating  []Emotional state  []Financial concerns  []Hearing deficit  []Language barrier  []Literacy  []Memory problems  []Vision impairment     Home caregiver present for session   []YES  [x] NO       Teaching methods:  []Demonstration  [x]Explanation  []Printed materials  []Teach back  []Virtual/web based         Verbalizes understanding Demonstrates  Needs further teaching Needs practice/ supervision Comments    Bariatric Surgery Diet  [] [] [] []    Clear liquid [] [] [] []    Full liquid [] [] [] []    Weight Reduction [x] [] [] [] Below 400#   Other Diet [] [] [] []          Additional Learner (s) Present                                                                  []Spouse   []Daughter    []  Son  []Family member   []Friend   []Grandfather   []Grandmother   []Father   []Mother   []Other       Expected Compliance:  [x]Good  []Fair  []Poor    Additional Information:    Pt with 21# wt loss since last visit. Pt recently recovered from a bout of C. Diff. Pt reports greater satiety with meals and notes need for smaller portions. However, pt does report appetite has returned and she is trying to keep portions controlled with smaller dinnerware.     Plan:  Prioritize protein.   Limit starchy CHOs.   Continue adequate water intake.  Keep portions controlled by measuring in addition to using smaller dinnerware.

## 2023-07-26 DIAGNOSIS — E73.9 LACTOSE INTOLERANCE: ICD-10-CM

## 2023-07-26 DIAGNOSIS — R19.5: Primary | ICD-10-CM

## 2023-07-26 DIAGNOSIS — R10.9 ABDOMINAL CRAMPS: ICD-10-CM

## 2023-08-15 ENCOUNTER — HOSPITAL ENCOUNTER (OUTPATIENT)
Dept: RADIOLOGY | Facility: HOSPITAL | Age: 43
Discharge: HOME | End: 2023-08-15
Payer: COMMERCIAL

## 2023-08-15 DIAGNOSIS — R19.5: ICD-10-CM

## 2023-08-15 DIAGNOSIS — R10.9 ABDOMINAL CRAMPS: ICD-10-CM

## 2023-08-15 DIAGNOSIS — E73.9 LACTOSE INTOLERANCE: ICD-10-CM

## 2023-08-15 PROCEDURE — 76700 US EXAM ABDOM COMPLETE: CPT | Mod: TC

## 2023-08-17 ENCOUNTER — CLINICAL SUPPORT (OUTPATIENT)
Dept: BARIATRICS | Facility: HOSPITAL | Age: 43
End: 2023-08-17

## 2023-08-17 VITALS — WEIGHT: 293 LBS | BODY MASS INDEX: 72.52 KG/M2

## 2023-08-17 DIAGNOSIS — E66.9 OBESITY, UNSPECIFIED CLASSIFICATION, UNSPECIFIED OBESITY TYPE, UNSPECIFIED WHETHER SERIOUS COMORBIDITY PRESENT: Primary | ICD-10-CM

## 2023-08-17 PROCEDURE — 94200034 HC BARIATRIC NUTRITIONAL SVCS PT GRADE I

## 2023-08-17 NOTE — PROGRESS NOTES
Patient Education [] MSWL Visit Number:      [x]  Pre-op Wt Loss  400lb  Goal (as ordered on referral):   [] NSWL Visit:     Current Wt: 422lb   (  Current BMI:  72.52                                                                      Barriers to learning:  [x]None evident  []Acuity of illness  []Cognitive defects  []Cultural barriers  []Desire/Motivation  []Difficulty concentrating  []Emotional state  []Financial concerns  []Hearing deficit  []Language barrier  []Literacy  []Memory problems  []Vision impairment     Home caregiver present for session   []YES  [x] NO       Teaching methods:  []Demonstration  [x]Explanation  []Printed materials  []Teach back  []Virtual/web based         Verbalizes understanding Demonstrates  Needs further teaching Needs practice/ supervision Comments    Bariatric Surgery Diet  [] [] [] []    Clear liquid [] [] [] []    Full liquid [] [] [] []    Weight Reduction [x] [] [] []    Other Diet [] [] [] []          Additional Learner (s) Present                                                                  []Spouse   []Daughter    []  Son  []Family member   []Friend   []Grandfather   []Grandmother   []Father   []Mother   []Other       Expected Compliance:  [x]Good  []Fair  []Poor    Additional Information:    Pt with another 16# wt loss this month. After medical scare last month she states she has started to watch her portions, limited snack and weers and is cooking more at home. No fast food. She feels she has changed her habits and routine to something  healthier      24 hr recall:  B:  2 mini sausage biscuits   L:  6 in sub from [employer]   D: chicken with yam abbe with green beans       Plan:  Continue current eating habits   Increase water   Exercise with exercise bands 10-15 daily

## 2023-09-26 ENCOUNTER — CLINICAL SUPPORT (OUTPATIENT)
Dept: BARIATRICS | Facility: HOSPITAL | Age: 43
End: 2023-09-26

## 2023-09-26 VITALS — WEIGHT: 293 LBS | BODY MASS INDEX: 70.29 KG/M2

## 2023-09-26 DIAGNOSIS — E66.9 OBESITY, UNSPECIFIED CLASSIFICATION, UNSPECIFIED OBESITY TYPE, UNSPECIFIED WHETHER SERIOUS COMORBIDITY PRESENT: Primary | ICD-10-CM

## 2023-09-26 PROCEDURE — 94200034 HC BARIATRIC NUTRITIONAL SVCS PT GRADE I

## 2023-09-26 NOTE — PROGRESS NOTES
"Patient Education [x] [facility] Visit Number:      []  Pre-op Wt Loss  goal is 400lb  Goal (as ordered on referral):       [] [facility] Visit:     Height:   Ht Readings from Last 1 Encounters:   07/06/23 5' 4" (1.626 m)      Weight:   Wt Readings from Last 3 Encounters:   09/26/23 1608 (!) 185.7 kg (409 lb 8 oz)   08/17/23 1605 (!) 191.6 kg (422 lb 8 oz)   07/06/23 1549 (!) 197.8 kg (436 lb 1.6 oz)      BMI:   BMI Readings from Last 1 Encounters:   09/26/23 70.29 kg/m²                                                                          Barriers to learning:  [x]None evident  []Acuity of illness  []Cognitive defects  []Cultural barriers  []Desire/Motivation  []Difficulty concentrating  []Emotional state  []Financial concerns  []Hearing deficit  []Language barrier  []Literacy  []Memory problems  []Vision impairment     Home caregiver present for session   []YES  [] NO       Teaching methods:  []Demonstration  [x]Explanation  [x]Printed materials  []Teach back  []Virtual/web based         Verbalizes understanding Demonstrates  Needs further teaching Needs practice/ supervision Comments    Bariatric Surgery Diet  [] [] [] []    Clear liquid [] [] [] []    Full liquid [] [] [] []    Weight Reduction [x] [] [] []    Other Diet [] [] [] []          Additional Learner (s) Present                                                                  []Spouse   []Daughter    []  Son  []Family member   []Friend   []Grandfather   []Grandmother   []Father   []Mother   []Other       Expected Compliance:  [x]Good  []Fair  []Poor    Additional Information:    Pt with another 12lb wt loss this month, she is being mindful of portions and mindless eating,. She is cooking more at home.   Still struggling to eat 3 meals per day, she snacks but she feels that is is under control and mostly on fruit  bit will work on increasing protein with her fruit intake.     24 hr recall:  B: grits eggs simon   L: skip   S: fruit   D: yam abbe green beans and " chicken     Plan:  Limit starches 1 meal per day   Continue current meal plan   Exercise 3x per week 10-15min

## 2023-10-11 ENCOUNTER — OFFICE VISIT (OUTPATIENT)
Dept: SURGERY | Facility: CLINIC | Age: 43
End: 2023-10-11
Payer: COMMERCIAL

## 2023-10-11 VITALS
HEIGHT: 64 IN | BODY MASS INDEX: 50.02 KG/M2 | DIASTOLIC BLOOD PRESSURE: 80 MMHG | WEIGHT: 293 LBS | SYSTOLIC BLOOD PRESSURE: 123 MMHG | HEART RATE: 76 BPM

## 2023-10-11 DIAGNOSIS — E66.01 MORBID OBESITY: Primary | ICD-10-CM

## 2023-10-11 PROCEDURE — 3044F HG A1C LEVEL LT 7.0%: CPT | Mod: CPTII,,, | Performed by: SURGERY

## 2023-10-11 PROCEDURE — 3074F SYST BP LT 130 MM HG: CPT | Mod: CPTII,,, | Performed by: SURGERY

## 2023-10-11 PROCEDURE — 3008F BODY MASS INDEX DOCD: CPT | Mod: CPTII,,, | Performed by: SURGERY

## 2023-10-11 PROCEDURE — 3074F PR MOST RECENT SYSTOLIC BLOOD PRESSURE < 130 MM HG: ICD-10-PCS | Mod: CPTII,,, | Performed by: SURGERY

## 2023-10-11 PROCEDURE — 1160F RVW MEDS BY RX/DR IN RCRD: CPT | Mod: CPTII,,, | Performed by: SURGERY

## 2023-10-11 PROCEDURE — 99213 PR OFFICE/OUTPT VISIT, EST, LEVL III, 20-29 MIN: ICD-10-PCS | Mod: ,,, | Performed by: SURGERY

## 2023-10-11 PROCEDURE — 1159F MED LIST DOCD IN RCRD: CPT | Mod: CPTII,,, | Performed by: SURGERY

## 2023-10-11 PROCEDURE — 99213 OFFICE O/P EST LOW 20 MIN: CPT | Mod: ,,, | Performed by: SURGERY

## 2023-10-11 PROCEDURE — 3008F PR BODY MASS INDEX (BMI) DOCUMENTED: ICD-10-PCS | Mod: CPTII,,, | Performed by: SURGERY

## 2023-10-11 PROCEDURE — 3079F DIAST BP 80-89 MM HG: CPT | Mod: CPTII,,, | Performed by: SURGERY

## 2023-10-11 PROCEDURE — 3079F PR MOST RECENT DIASTOLIC BLOOD PRESSURE 80-89 MM HG: ICD-10-PCS | Mod: CPTII,,, | Performed by: SURGERY

## 2023-10-11 PROCEDURE — 1159F PR MEDICATION LIST DOCUMENTED IN MEDICAL RECORD: ICD-10-PCS | Mod: CPTII,,, | Performed by: SURGERY

## 2023-10-11 PROCEDURE — 3044F PR MOST RECENT HEMOGLOBIN A1C LEVEL <7.0%: ICD-10-PCS | Mod: CPTII,,, | Performed by: SURGERY

## 2023-10-11 PROCEDURE — 1160F PR REVIEW ALL MEDS BY PRESCRIBER/CLIN PHARMACIST DOCUMENTED: ICD-10-PCS | Mod: CPTII,,, | Performed by: SURGERY

## 2023-10-11 NOTE — PROGRESS NOTES
HISTORY & PHYSICAL  General Surgery    Patient Name: Shaina Olson  YOB: 1980    Date: 10/11/2023                   SUBJECTIVE:     Chief Complaint/Reason for Admission: No chief complaint on file.       History of Present Illness:  44yo female in process of work-up for weight loss surgery. She has been meeting with the bariatric team to achieve weight loss goal of 400lbs to be eligible to schedule surgery. Her weight has waxed and waned since starting the program. She had a friend that recently had surgery and is inspired by her success. At last appointment she stated she is more motivated and ready to commit to the program 100%. She returns today for weight check with continued weight loss. She is meeting with dieticians.     Review of Systems:  12 point ROS negative except as stated in HPI    PAST HISTORY:     Past Medical History:   Diagnosis Date    Allergy     Anxiety 2004    Arthritis     Arthritis of right knee     Asthma     Bipolar disorder, unspecified     Depression     Fibromyalgia     Graves' disease 06/2014    Dr. Maximilian Fofana--endocrinology    Hyperlipidemia     Hypertension 2004    Panic attack     Sleep apnea, unspecified     Thyroid nodule     Ultrasound 2/9/21 and 12/23/19; biopsy 3/6/23    Vitamin D deficiency     Dr. Maximilian Fofana--endocrinology     Past Surgical History:   Procedure Laterality Date    FRACTURE SURGERY Right 2013    right wrist    NASAL SEPTUM SURGERY  2001    SINUS SURGERY  2001    SINUS SURGERY  11/22/2017    TONSILLECTOMY, ADENOIDECTOMY  2001    WRIST FRACTURE SURGERY Right 2013    Right wrist     Family History   Problem Relation Age of Onset    Hypertension Mother     Lupus Mother     Diverticulosis Mother     Cancer Sister 30        stg 1 boderline? ov cancer    Cancer Maternal Grandmother     Cancer Maternal Grandfather     Cataracts Maternal Grandfather     Diabetes Paternal Grandmother     Stroke Paternal Grandmother     Cancer Paternal Grandmother      Cancer Paternal Grandfather     Diabetes Paternal Aunt     Hypertension Paternal Aunt     Thyroid disease Neg Hx     Migraines Neg Hx     Asthma Neg Hx      Social History     Socioeconomic History    Marital status: Single    Number of children: 0   Occupational History    Occupation: LPN     Comment: [employer]   Tobacco Use    Smoking status: Never    Smokeless tobacco: Never   Substance and Sexual Activity    Alcohol use: Yes     Alcohol/week: 0.0 standard drinks of alcohol     Comment: occasionally    Drug use: No    Sexual activity: Yes     Partners: Male   Social History Narrative    Wears a seatbelt.     Social Determinants of Health     Financial Resource Strain: Low Risk  (5/6/2020)    Overall Financial Resource Strain (CARDIA)     Difficulty of Paying Living Expenses: Not hard at all   Food Insecurity: No Food Insecurity (5/6/2020)    Hunger Vital Sign     Worried About Running Out of Food in the Last Year: Never true     Ran Out of Food in the Last Year: Never true   Transportation Needs: No Transportation Needs (5/6/2020)    PRAPARE - Transportation     Lack of Transportation (Medical): No     Lack of Transportation (Non-Medical): No   Physical Activity: Inactive (5/6/2020)    Exercise Vital Sign     Days of Exercise per Week: 0 days     Minutes of Exercise per Session: 0 min   Stress: Stress Concern Present (5/6/2020)    [employer] - Occupational Stress Questionnaire     Feeling of Stress : To some extent   Social Connections: Unknown (5/6/2020)    Social Connection and Isolation Panel [NHANES]     Frequency of Communication with Friends and Family: More than three times a week     Frequency of Social Gatherings with Friends and Family: Once a week     Active Member of Clubs or Organizations: No     Attends Club or Organization Meetings: Never     Marital Status: Never        MEDICATIONS & ALLERGIES:     Current Outpatient Medications on File Prior to Visit    Medication Sig    Allergy Mix Patient Specific SLIT - to be mailed to patient per Dr. Wilkinson - formulation in notes    amLODIPine (NORVASC) 5 MG tablet TAKE ONE TABLET BY MOUTH DAILY WITH 2.5 MG TABLET FOR A TOTAL OF 7.5 MG PER DAY.    cholecalciferol, vitamin D3, 1,250 mcg (50,000 unit) capsule TAKE 1 CAPSULE BY MOUTH ONE TIME PER WEEK    clonazePAM (KLONOPIN) 0.5 MG tablet TAKE 1 TABLET BY MOUTH EVERY DAY AS NEEDED FOR ANXIETY    fluticasone propionate (FLONASE) 50 mcg/actuation nasal spray SPRAY 2 SPRAYS (100 MCG TOTAL) BY EACH NOSTRIL ROUTE ONCE DAILY.    levocetirizine (XYZAL) 5 MG tablet Take 1 tablet (5 mg total) by mouth every evening.    methIMAzole (TAPAZOLE) 5 MG Tab TAKE 1 TABLET BY MOUTH 5 TIMES A WEEK    montelukast (SINGULAIR) 10 mg tablet TAKE 1 TABLET BY MOUTH EVERY DAY    pantoprazole (PROTONIX) 40 MG tablet Take 1 tablet (40 mg total) by mouth once daily.    traZODone (DESYREL) 100 MG tablet Take 1/2 to 1 and 1/2 tablets at bedtime as needed for sleep.    amLODIPine (NORVASC) 2.5 MG tablet Take one tablet daily with 5 mg tablet for a total of 7.5 mg per day. (Patient not taking: Reported on 10/11/2023)    azelastine (ASTELIN) 137 mcg (0.1 %) nasal spray 1 spray (137 mcg total) by Nasal route 2 (two) times daily. (Patient not taking: Reported on 10/11/2023)    budesonide (PULMICORT) 0.5 mg/2 mL nebulizer solution MIX CONTENTS OF 1 RESPULE WITH STERILE DILUENT PROVIDED. POUR INTO NASONEB CUP & PERFORM NASAL TREATMENT TWICE DAILY.    cyclobenzaprine (FLEXERIL) 10 MG tablet Take 1 tablet (10 mg total) by mouth every evening. (Patient not taking: Reported on 10/11/2023)    diclofenac (VOLTAREN) 75 MG EC tablet TAKE 1 TABLET BY MOUTH TWICE A DAY (Patient not taking: Reported on 10/11/2023)    doxycycline (VIBRAMYCIN) 100 MG Cap MIX CONTENTS OF 2 CAPSULES AND STERILE DILUENT PROVIDED. POUR INTO NASONEB CUP AND PERFORM NASAL TREATMENT 2 TIMES DAILY    furosemide (LASIX) 20 MG tablet TAKE 1 TABLET BY  "MOUTH EVERY DAY AS NEEDED (Patient not taking: Reported on 10/11/2023)    latanoprost 0.005 % ophthalmic solution Place 1 drop into both eyes every evening. (Patient not taking: Reported on 10/11/2023)    lurasidone (LATUDA) 40 mg Tab tablet Take 1 tablet (40 mg total) by mouth once daily. (Patient not taking: Reported on 10/11/2023)    methIMAzole (TAPAZOLE) 10 MG Tab Take 1 tablet (10 mg total) by mouth 2 (two) times daily. (Patient not taking: Reported on 10/11/2023)    pregabalin (LYRICA) 100 MG capsule Take 1 capsule (100 mg total) by mouth 2 (two) times daily. (Patient not taking: Reported on 10/11/2023)     Current Facility-Administered Medications on File Prior to Visit   Medication    hylan g-f 20 (SYNVISC ONE) 48 mg/6 mL injection 48 mg     Review of patient's allergies indicates:   Allergen Reactions    Abilify [aripiprazole]     Demerol [meperidine] Other (See Comments)     blackout       OBJECTIVE:     Vitals:    10/11/23 1508   BP: 123/80   Pulse: 76   Weight: (!) 184 kg (405 lb 9.6 oz)   Height: 5' 4" (1.626 m)     Body mass index is 69.62 kg/m².    Physical Exam:  General:  Well developed, well nourished, no acute distress  HEENT:  Normocephalic, atraumatic, PERRL, EOMI, clear sclera, ears normal, neck supple, throat clear without erythema or exudates  CVS:  RRR, S1 and S2 normal, no murmurs, rubs, gallops  Resp:  Lungs clear to auscultation, no wheezes, rales, rhonchi, cough  GI:  Abdomen soft, non-tender, non-distended, normoactive bowel sounds, no masses  :  Deferred  MSK:  No muscle atrophy, cyanosis, peripheral edema, full range of motion  Skin:  No rashes, ulcers, erythema  Neuro:  CNII-XII grossly intact  Psych:  Alert and oriented to person, place, and time    Results:  I have independently reviewed all pertinent lab and radiologic studies relevant to general/bariatric surgery.      VISIT DIAGNOSES:       ICD-10-CM ICD-9-CM   1. Morbid obesity  E66.01 278.01       ASSESSMENT/PLAN: "       Starting Weight   450  2/8/23 Weight   452  3/8/23 Weight  460      10/11/23   404      -  Pt cleared for surgery  -  Plan for surgery on Dec 19  -  Pre-op class

## 2023-10-12 ENCOUNTER — TELEPHONE (OUTPATIENT)
Dept: SURGERY | Facility: CLINIC | Age: 43
End: 2023-10-12
Payer: COMMERCIAL

## 2023-10-13 DIAGNOSIS — E66.01 MORBID OBESITY: Primary | ICD-10-CM

## 2023-11-21 ENCOUNTER — CLINICAL SUPPORT (OUTPATIENT)
Dept: BARIATRICS | Facility: HOSPITAL | Age: 43
End: 2023-11-21

## 2023-11-21 DIAGNOSIS — E66.9 OBESITY: Primary | ICD-10-CM

## 2023-11-21 NOTE — PROGRESS NOTES
Date of education:  11/21/2023  Pt education type: [x]Pre op  []Post op  Surgery date: 12/19/2023  Type of surgery: sleeve gastrectomy     Education was provided on:   [x]Importance of protein and vitamin protocol  [x]Importance of drinking  fl oz/day of non carbonated sugar free non caffeinated beverages  [x]Importance of following dietary protocol  [x]Importance of early ambulation postop   [x]Use of incentive spirometer 10 times an hour while awake  [x]Non opiod pain management post op   [x]Discontinuing use of meds containing aspirin, ibuprofen, NSAIDs, post op  [x]Signs and symptoms of immediate and long term complications post-op  [x]Prevention and signs and symptoms of blood clots   [x]Prevention and signs of infection  [x]Reviewed medication regimen  [x]Importance of adhering to behavioral changes  [x]Importance of following exercise protocol      Barriers to learning:  [x]None evident  []Acuity of illness  []Cognitive defects  []Cultural barriers  []Desire/Motivation  []Difficulty concentrating  []Emotional state  []Financial concerns  []Hearing deficit  []Language barrier  []Literacy  []Memory problems  []Vision impairment     Teaching methods:  [x]Demonstration  []Explanation  [x]Printed materials  []Teach back  []Virtual/web based    Expected Compliance:  [x]Good  []Fair  []Poor    Additional Notes:

## 2023-11-21 NOTE — PROGRESS NOTES
Date of education: 11/21/23  Pt education type: [x]Pre op  []Post op  Surgery date: 12/19/23  Type of surgery: sleeve gastrectomy     Education was provided on:   [x]Importance of protein and vitamin protocol  [x]Importance of drinking  fl oz/day of non carbonated sugar free non caffeinated beverages  [x]Importance of following dietary protocol  []Importance of early ambulation postop   []Use of incentive spirometer 10 times an hour while awake  []Non opiod pain management post op   []Discontinuing use of meds containing aspirin, ibuprofen, NSAIDs, post op  []Signs and symptoms of immediate and long term complications post-op  []Prevention and signs and symptoms of blood clots   []Prevention and signs of infection  []Reviewed medication regimen  []Importance of adhering to behavioral changes  []Importance of following exercise protocol      Barriers to learning:  [x]None evident  []Acuity of illness  []Cognitive defects  []Cultural barriers  []Desire/Motivation  []Difficulty concentrating  []Emotional state  []Financial concerns  []Hearing deficit  []Language barrier  []Literacy  []Memory problems  []Vision impairment     Teaching methods:  []Demonstration  [x]Explanation  [x]Printed materials  [x]Teach back  []Virtual/web based    Expected Compliance:  [x]Good  []Fair  []Poor    Additional Notes:

## 2023-11-21 NOTE — PROGRESS NOTES
Date of education: 11/21/23  Pt education type: [x]Pre op  []Post op  Surgery date: 12/19/23  Type of surgery: sleeve gastrectomy     Education was provided on:   []Importance of protein and vitamin protocol  []Importance of drinking  fl oz/day of non carbonated sugar free non caffeinated beverages  []Importance of following dietary protocol  []Importance of early ambulation postop   []Use of incentive spirometer 10 times an hour while awake  []Non opiod pain management post op   []Discontinuing use of meds containing aspirin, ibuprofen, NSAIDs, post op  []Signs and symptoms of immediate and long term complications post-op  []Prevention and signs and symptoms of blood clots   []Prevention and signs of infection  []Reviewed medication regimen  [x]Importance of adhering to behavioral changes  [x]Importance of following exercise protocol      Barriers to learning:  [x]None evident  []Acuity of illness  []Cognitive defects  []Cultural barriers  []Desire/Motivation  []Difficulty concentrating  []Emotional state  []Financial concerns  []Hearing deficit  []Language barrier  []Literacy  []Memory problems  []Vision impairment     Teaching methods:  [x]Demonstration  [x]Explanation  []Printed materials  []Teach back  []Virtual/web based    Expected Compliance:  [x]Good  []Fair  []Poor    Additional Notes:  A body composition was conducted and patient received a copy.    GARRETT Menjivar, CPT, CHC

## 2023-11-29 ENCOUNTER — OFFICE VISIT (OUTPATIENT)
Dept: SURGERY | Facility: CLINIC | Age: 43
End: 2023-11-29
Payer: COMMERCIAL

## 2023-11-29 ENCOUNTER — LAB VISIT (OUTPATIENT)
Dept: LAB | Facility: HOSPITAL | Age: 43
End: 2023-11-29
Attending: SURGERY
Payer: COMMERCIAL

## 2023-11-29 VITALS
BODY MASS INDEX: 50.02 KG/M2 | WEIGHT: 293 LBS | HEIGHT: 64 IN | DIASTOLIC BLOOD PRESSURE: 80 MMHG | SYSTOLIC BLOOD PRESSURE: 123 MMHG | HEART RATE: 77 BPM

## 2023-11-29 DIAGNOSIS — G47.33 OBSTRUCTIVE SLEEP APNEA: ICD-10-CM

## 2023-11-29 DIAGNOSIS — Z01.818 PREOP EXAMINATION: Primary | ICD-10-CM

## 2023-11-29 DIAGNOSIS — Z01.818 PREOP EXAMINATION: ICD-10-CM

## 2023-11-29 DIAGNOSIS — E78.5 HYPERLIPIDEMIA, UNSPECIFIED HYPERLIPIDEMIA TYPE: ICD-10-CM

## 2023-11-29 DIAGNOSIS — I10 HYPERTENSION, UNSPECIFIED TYPE: ICD-10-CM

## 2023-11-29 DIAGNOSIS — M25.569 ARTHRALGIA OF KNEE, UNSPECIFIED LATERALITY: ICD-10-CM

## 2023-11-29 LAB
ALBUMIN SERPL-MCNC: 3.9 G/DL (ref 3.5–5)
ALBUMIN/GLOB SERPL: 1.3 RATIO (ref 1.1–2)
ALP SERPL-CCNC: 95 UNIT/L (ref 40–150)
ALT SERPL-CCNC: 11 UNIT/L (ref 0–55)
APTT PPP: 28.6 SECONDS (ref 23.2–33.7)
AST SERPL-CCNC: 15 UNIT/L (ref 5–34)
BASOPHILS # BLD AUTO: 0.07 X10(3)/MCL
BASOPHILS NFR BLD AUTO: 0.6 %
BILIRUB SERPL-MCNC: 0.5 MG/DL
BUN SERPL-MCNC: 13.2 MG/DL (ref 7–18.7)
CALCIUM SERPL-MCNC: 9.6 MG/DL (ref 8.4–10.2)
CHLORIDE SERPL-SCNC: 105 MMOL/L (ref 98–107)
CO2 SERPL-SCNC: 27 MMOL/L (ref 22–29)
CREAT SERPL-MCNC: 0.81 MG/DL (ref 0.55–1.02)
EOSINOPHIL # BLD AUTO: 0.19 X10(3)/MCL (ref 0–0.9)
EOSINOPHIL NFR BLD AUTO: 1.5 %
ERYTHROCYTE [DISTWIDTH] IN BLOOD BY AUTOMATED COUNT: 14.1 % (ref 11.5–17)
GFR SERPLBLD CREATININE-BSD FMLA CKD-EPI: >60 MLS/MIN/1.73/M2
GLOBULIN SER-MCNC: 3.1 GM/DL (ref 2.4–3.5)
GLUCOSE SERPL-MCNC: 83 MG/DL (ref 74–100)
HCT VFR BLD AUTO: 42.2 % (ref 37–47)
HGB BLD-MCNC: 12.7 G/DL (ref 12–16)
IMM GRANULOCYTES # BLD AUTO: 0.04 X10(3)/MCL (ref 0–0.04)
IMM GRANULOCYTES NFR BLD AUTO: 0.3 %
LYMPHOCYTES # BLD AUTO: 3.37 X10(3)/MCL (ref 0.6–4.6)
LYMPHOCYTES NFR BLD AUTO: 26.5 %
MCH RBC QN AUTO: 29.2 PG (ref 27–31)
MCHC RBC AUTO-ENTMCNC: 30.1 G/DL (ref 33–36)
MCV RBC AUTO: 97 FL (ref 80–94)
MONOCYTES # BLD AUTO: 0.62 X10(3)/MCL (ref 0.1–1.3)
MONOCYTES NFR BLD AUTO: 4.9 %
NEUTROPHILS # BLD AUTO: 8.43 X10(3)/MCL (ref 2.1–9.2)
NEUTROPHILS NFR BLD AUTO: 66.2 %
NRBC BLD AUTO-RTO: 0 %
PLATELET # BLD AUTO: 330 X10(3)/MCL (ref 130–400)
PMV BLD AUTO: 9.5 FL (ref 7.4–10.4)
POTASSIUM SERPL-SCNC: 4.4 MMOL/L (ref 3.5–5.1)
PROT SERPL-MCNC: 7 GM/DL (ref 6.4–8.3)
RBC # BLD AUTO: 4.35 X10(6)/MCL (ref 4.2–5.4)
SODIUM SERPL-SCNC: 142 MMOL/L (ref 136–145)
WBC # SPEC AUTO: 12.72 X10(3)/MCL (ref 4.5–11.5)

## 2023-11-29 PROCEDURE — 3008F BODY MASS INDEX DOCD: CPT | Mod: CPTII,,, | Performed by: SURGERY

## 2023-11-29 PROCEDURE — 1160F PR REVIEW ALL MEDS BY PRESCRIBER/CLIN PHARMACIST DOCUMENTED: ICD-10-PCS | Mod: CPTII,,, | Performed by: SURGERY

## 2023-11-29 PROCEDURE — 99214 PR OFFICE/OUTPT VISIT, EST, LEVL IV, 30-39 MIN: ICD-10-PCS | Mod: ,,, | Performed by: SURGERY

## 2023-11-29 PROCEDURE — 3074F SYST BP LT 130 MM HG: CPT | Mod: CPTII,,, | Performed by: SURGERY

## 2023-11-29 PROCEDURE — 1159F MED LIST DOCD IN RCRD: CPT | Mod: CPTII,,, | Performed by: SURGERY

## 2023-11-29 PROCEDURE — 3079F DIAST BP 80-89 MM HG: CPT | Mod: CPTII,,, | Performed by: SURGERY

## 2023-11-29 PROCEDURE — 3079F PR MOST RECENT DIASTOLIC BLOOD PRESSURE 80-89 MM HG: ICD-10-PCS | Mod: CPTII,,, | Performed by: SURGERY

## 2023-11-29 PROCEDURE — 3008F PR BODY MASS INDEX (BMI) DOCUMENTED: ICD-10-PCS | Mod: CPTII,,, | Performed by: SURGERY

## 2023-11-29 PROCEDURE — 99214 OFFICE O/P EST MOD 30 MIN: CPT | Mod: ,,, | Performed by: SURGERY

## 2023-11-29 PROCEDURE — 1159F PR MEDICATION LIST DOCUMENTED IN MEDICAL RECORD: ICD-10-PCS | Mod: CPTII,,, | Performed by: SURGERY

## 2023-11-29 PROCEDURE — 1160F RVW MEDS BY RX/DR IN RCRD: CPT | Mod: CPTII,,, | Performed by: SURGERY

## 2023-11-29 PROCEDURE — 3074F PR MOST RECENT SYSTOLIC BLOOD PRESSURE < 130 MM HG: ICD-10-PCS | Mod: CPTII,,, | Performed by: SURGERY

## 2023-11-29 PROCEDURE — 3044F PR MOST RECENT HEMOGLOBIN A1C LEVEL <7.0%: ICD-10-PCS | Mod: CPTII,,, | Performed by: SURGERY

## 2023-11-29 PROCEDURE — 3044F HG A1C LEVEL LT 7.0%: CPT | Mod: CPTII,,, | Performed by: SURGERY

## 2023-11-29 PROCEDURE — 36415 COLL VENOUS BLD VENIPUNCTURE: CPT

## 2023-11-29 RX ORDER — EPINEPHRINE 0.3 MG/.3ML
INJECTION SUBCUTANEOUS
COMMUNITY
Start: 2023-02-15

## 2023-11-29 RX ORDER — ESCITALOPRAM OXALATE 20 MG
20 TABLET ORAL DAILY
COMMUNITY

## 2023-11-29 RX ORDER — ONDANSETRON 8 MG/1
TABLET, ORALLY DISINTEGRATING ORAL
Status: ON HOLD | COMMUNITY
Start: 2023-07-10 | End: 2023-12-20 | Stop reason: HOSPADM

## 2023-11-29 RX ORDER — ERGOCALCIFEROL 1.25 MG/1
50000 CAPSULE ORAL
COMMUNITY

## 2023-11-29 RX ORDER — SILVER SULFADIAZINE 10 G/1000G
CREAM TOPICAL
COMMUNITY
Start: 2023-11-20 | End: 2023-11-30

## 2023-11-29 RX ORDER — LAMOTRIGINE 100 MG/1
100 TABLET ORAL DAILY
COMMUNITY
Start: 2023-02-09

## 2023-11-29 RX ORDER — HYDROCORTISONE 25 MG/G
CREAM TOPICAL
COMMUNITY
Start: 2023-02-08

## 2023-11-29 RX ORDER — VALACYCLOVIR HYDROCHLORIDE 500 MG/1
TABLET, FILM COATED ORAL
COMMUNITY
Start: 2023-11-02

## 2023-11-29 RX ORDER — HYDROCHLOROTHIAZIDE 25 MG/1
1 TABLET ORAL
Status: ON HOLD | COMMUNITY
End: 2023-12-20 | Stop reason: HOSPADM

## 2023-12-02 NOTE — H&P (VIEW-ONLY)
HISTORY & PHYSICAL  Bariatric Preoperative Note  General Surgery    Patient Name: Shaina Olson  YOB: 1980    Date: 12/02/2023                   SUBJECTIVE:     Chief Complaint/Reason for Admission:   Chief Complaint   Patient presents with    Pre-op Exam     VSG 12/19/23        History of Present Illness:  43 y.o. yo here for pre-op for Laparoscopic Vertical Sleeve Gastrectomy.  Pt denies any changes to She history since She last examination.  All questions were answered in regards to surgery.  I once again reviewed all the risks / benefits of surgery with the patient in regards to the Vertical Sleeve Gastrectomy, She voiced She understanding and wishes to continue.     Review of Systems:  12 point ROS negative except as stated in HPI    PAST HISTORY:     Past Medical History:   Diagnosis Date    Allergy     Anxiety 2004    Arthritis     Arthritis of right knee     Asthma     Bipolar disorder, unspecified     Depression     Fibromyalgia     GERD (gastroesophageal reflux disease)     Graves' disease 06/2014    Dr. Maximilian Fofana--endocrinology    Hyperlipidemia     Hypertension 2004    Panic attack     Sleep apnea, unspecified     Thyroid nodule     Ultrasound 2/9/21 and 12/23/19; biopsy 3/6/23    Vitamin D deficiency     Dr. Maximilian Fofana--endocrinology     Past Surgical History:   Procedure Laterality Date    FRACTURE SURGERY Right 2013    right wrist    NASAL SEPTUM SURGERY  2001    SINUS SURGERY  2001    SINUS SURGERY  11/22/2017    TONSILLECTOMY, ADENOIDECTOMY  2001    WRIST FRACTURE SURGERY Right 2013    Right wrist     Family History   Problem Relation Age of Onset    Hypertension Mother     Lupus Mother     Diverticulosis Mother     Cancer Sister 30        Ovarian (granulosa cell tumor)    Hypertension Sister     Cancer Maternal Grandmother         Leukemia    Cancer Maternal Grandfather     Cataracts Maternal Grandfather     Diabetes Paternal Grandmother     Stroke Paternal Grandmother      Cancer Paternal Grandmother         Breast    Cancer Paternal Grandfather         Lung cancer    Diabetes Paternal Aunt     Hypertension Paternal Aunt     Diabetes Maternal Aunt     Hypertension Maternal Aunt     Thyroid disease Neg Hx     Migraines Neg Hx     Asthma Neg Hx      Social History     Socioeconomic History    Marital status: Single    Number of children: 0   Occupational History    Occupation: LPN     Comment: [employer]   Tobacco Use    Smoking status: Never    Smokeless tobacco: Never   Substance and Sexual Activity    Alcohol use: Yes     Alcohol/week: 1.0 standard drink of alcohol     Types: 1 Glasses of wine per week     Comment: occasionally    Drug use: Never    Sexual activity: Not Currently     Partners: Male     Birth control/protection: None   Social History Narrative    Wears a seatbelt.     Social Determinants of Health     Financial Resource Strain: Low Risk  (5/6/2020)    Overall Financial Resource Strain (CARDIA)     Difficulty of Paying Living Expenses: Not hard at all   Food Insecurity: No Food Insecurity (5/6/2020)    Hunger Vital Sign     Worried About Running Out of Food in the Last Year: Never true     Ran Out of Food in the Last Year: Never true   Transportation Needs: No Transportation Needs (5/6/2020)    PRAPARE - Transportation     Lack of Transportation (Medical): No     Lack of Transportation (Non-Medical): No   Physical Activity: Inactive (5/6/2020)    Exercise Vital Sign     Days of Exercise per Week: 0 days     Minutes of Exercise per Session: 0 min   Stress: Stress Concern Present (5/6/2020)    [employer] - Occupational Stress Questionnaire     Feeling of Stress : To some extent   Social Connections: Unknown (5/6/2020)    Social Connection and Isolation Panel [NHANES]     Frequency of Communication with Friends and Family: More than three times a week     Frequency of Social Gatherings with Friends and Family: Once a week     Active Member of  Clubs or Organizations: No     Attends Club or Organization Meetings: Never     Marital Status: Never        MEDICATIONS & ALLERGIES:     Current Outpatient Medications on File Prior to Visit   Medication Sig    Allergy Mix Patient Specific SLIT - to be mailed to patient per Dr. Wilkinson - formulation in notes    amLODIPine (NORVASC) 5 MG tablet TAKE ONE TABLET BY MOUTH DAILY WITH 2.5 MG TABLET FOR A TOTAL OF 7.5 MG PER DAY.    azelastine (ASTELIN) 137 mcg (0.1 %) nasal spray 1 spray (137 mcg total) by Nasal route 2 (two) times daily.    clonazePAM (KLONOPIN) 0.5 MG tablet TAKE 1 TABLET BY MOUTH EVERY DAY AS NEEDED FOR ANXIETY    EPINEPHrine (EPIPEN) 0.3 mg/0.3 mL AtIn INJECT 1 PEN IN THE MUSCLE ONE TIME AS DIRECTED ONSET OF ANAPHYLAXIS. IF NO RELIEF WITHIN 3-5MIN INJECT SECOND DOSE IN OPPOSIT THIGH    ergocalciferol (ERGOCALCIFEROL) 50,000 unit Cap Take 50,000 Units by mouth.    fluticasone propionate (FLONASE) 50 mcg/actuation nasal spray SPRAY 2 SPRAYS (100 MCG TOTAL) BY EACH NOSTRIL ROUTE ONCE DAILY.    hydroCHLOROthiazide (HYDRODIURIL) 25 MG tablet Take 1 tablet by mouth every morning.    hydrocortisone 2.5 % cream     lamoTRIgine (LAMICTAL) 100 MG tablet 30    levocetirizine (XYZAL) 5 MG tablet Take 1 tablet (5 mg total) by mouth every evening.    LEXAPRO 20 mg tablet tablet    methIMAzole (TAPAZOLE) 5 MG Tab TAKE 1 TABLET BY MOUTH 5 TIMES A WEEK    montelukast (SINGULAIR) 10 mg tablet TAKE 1 TABLET BY MOUTH EVERY DAY    ondansetron (ZOFRAN-ODT) 8 MG TbDL TAKE 1 TABLET BY MOUTH EVERY 8 HOURS AS NEEDED FOR NAUSEA FOR UP TO 7 DAYS.    pantoprazole (PROTONIX) 40 MG tablet Take 1 tablet (40 mg total) by mouth once daily.    traZODone (DESYREL) 100 MG tablet Take 1/2 to 1 and 1/2 tablets at bedtime as needed for sleep.    valACYclovir (VALTREX) 500 MG tablet TAKE 1 TABLET BY MOUTH EVERY MORNING AND TAKE 1 TABLET BEFORE bedtime FOR THREE DAYS     Current Facility-Administered Medications on File Prior to  "Visit   Medication    hylan g-f 20 (SYNVISC ONE) 48 mg/6 mL injection 48 mg     Review of patient's allergies indicates:   Allergen Reactions    Abilify [aripiprazole]     Demerol [meperidine] Other (See Comments)     blackout    Lurasidone      Excessive somnolence       OBJECTIVE:   Visit Vitals  /80 (BP Location: Right arm, Patient Position: Sitting)   Pulse 77   Ht 5' 4" (1.626 m)   Wt (!) 186.7 kg (411 lb 9.6 oz)   BMI 70.65 kg/m²       Physical Exam:  General:  Well developed, well nourished, no acute distress  HEENT:  Normocephalic, atraumatic, PERRL, EOMI, clear sclera, ears normal, neck supple, throat clear without erythema or exudates  CVS:  RRR, S1 and S2 normal, no murmurs, rubs, gallops  Resp:  Lungs clear to auscultation, no wheezes, rales, rhonchi, cough  GI:  Abdomen soft, non-tender, non-distended, normoactive bowel sounds, no masses  :  Deferred  MSK:  No muscle atrophy, cyanosis, peripheral edema, full range of motion  Skin:  No rashes, ulcers, erythema  Neuro:  CNII-XII grossly intact  Psych:  Alert and oriented to person, place, and time    Results:  I have independently reviewed all pertinent lab and radiologic studies relevant to general/bariatric surgery.      VISIT DIAGNOSES:       ICD-10-CM ICD-9-CM   1. Preop examination  Z01.818 V72.84   2. Hypertension, unspecified type  I10 401.9   3. Hyperlipidemia, unspecified hyperlipidemia type  E78.5 272.4   4. Obstructive sleep apnea  G47.33 327.23   5. Arthralgia of knee, unspecified laterality  M25.569 719.46       ASSESSMENT/PLAN:     The patient is a morbidly obese female with a Body mass index is 70.65 kg/m². with significant weight related co-morbidity including:  Obstructive Sleep Apnea, Severe Weight Bearing Joint Pain, Hypertension and Hyperlipidemia.  She has been unable to lose her weight and improve her co-morbid conditions with medical management including diet and exercise.      RECOMMENDATION:     Shaina Olson has " voluntarily decided to undergo laparoscopic vertical sleeve gastrectomy under the care of Declan Martinez M.D. The patient has stated that the performance of this procedure requres major intra-abdominal surgery and therefore carries certain risks. Like all major surgery, there is a chance of death during or immediately following the procedure. The risk varies depending on the age, weight and the medical background of each individual patient.    Additionally, there can be complications as a result of the procedure. General anesthesia is a required and a respirator is necessary during the operation. The risk is greater in patients with a history of smoking, that weigh more than 400 pounds, have a BMI >55 or can not walk up a flight of stairs.    Performance of the surgery requires a long division of the stomach with a special stapler. If leakage occurs, additional surgery or drainage procedures may need to be performed. In addition, since the stomach lies in close proximity to the spleen, the possibility of splenic injury requiring splenectomy may occur.    In addition, complications may occur in wound healing. These may include wound infections, fascial disruptions and hernias which may require future surgical repair. Wound drainage is common in obese patients. In addition to these outlined complications, there can be additional problems which can occur in all major operations. These include infection, unexpected myocardial infarctions and the formation of blood clots leading to pulmonary embolism.    The patient voiced there understanding of the above and that the procedure may be converted to an open procedure if the surgeon feels the surgery can not safely continue laparoscopically.    The patient also voiced there understanding that patients who undergo this surgery generally lose a significant amount of weight and keep it off, but no operation can guarantee permanent weight reduction.

## 2023-12-02 NOTE — PROGRESS NOTES
HISTORY & PHYSICAL  Bariatric Preoperative Note  General Surgery    Patient Name: Shaina Olson  YOB: 1980    Date: 12/02/2023                   SUBJECTIVE:     Chief Complaint/Reason for Admission:   Chief Complaint   Patient presents with    Pre-op Exam     VSG 12/19/23        History of Present Illness:  43 y.o. yo here for pre-op for Laparoscopic Vertical Sleeve Gastrectomy.  Pt denies any changes to She history since She last examination.  All questions were answered in regards to surgery.  I once again reviewed all the risks / benefits of surgery with the patient in regards to the Vertical Sleeve Gastrectomy, She voiced She understanding and wishes to continue.     Review of Systems:  12 point ROS negative except as stated in HPI    PAST HISTORY:     Past Medical History:   Diagnosis Date    Allergy     Anxiety 2004    Arthritis     Arthritis of right knee     Asthma     Bipolar disorder, unspecified     Depression     Fibromyalgia     GERD (gastroesophageal reflux disease)     Graves' disease 06/2014    Dr. Maximilian Fofana--endocrinology    Hyperlipidemia     Hypertension 2004    Panic attack     Sleep apnea, unspecified     Thyroid nodule     Ultrasound 2/9/21 and 12/23/19; biopsy 3/6/23    Vitamin D deficiency     Dr. Maximilian Fofana--endocrinology     Past Surgical History:   Procedure Laterality Date    FRACTURE SURGERY Right 2013    right wrist    NASAL SEPTUM SURGERY  2001    SINUS SURGERY  2001    SINUS SURGERY  11/22/2017    TONSILLECTOMY, ADENOIDECTOMY  2001    WRIST FRACTURE SURGERY Right 2013    Right wrist     Family History   Problem Relation Age of Onset    Hypertension Mother     Lupus Mother     Diverticulosis Mother     Cancer Sister 30        Ovarian (granulosa cell tumor)    Hypertension Sister     Cancer Maternal Grandmother         Leukemia    Cancer Maternal Grandfather     Cataracts Maternal Grandfather     Diabetes Paternal Grandmother     Stroke Paternal Grandmother      Cancer Paternal Grandmother         Breast    Cancer Paternal Grandfather         Lung cancer    Diabetes Paternal Aunt     Hypertension Paternal Aunt     Diabetes Maternal Aunt     Hypertension Maternal Aunt     Thyroid disease Neg Hx     Migraines Neg Hx     Asthma Neg Hx      Social History     Socioeconomic History    Marital status: Single    Number of children: 0   Occupational History    Occupation: LPN     Comment: [employer]   Tobacco Use    Smoking status: Never    Smokeless tobacco: Never   Substance and Sexual Activity    Alcohol use: Yes     Alcohol/week: 1.0 standard drink of alcohol     Types: 1 Glasses of wine per week     Comment: occasionally    Drug use: Never    Sexual activity: Not Currently     Partners: Male     Birth control/protection: None   Social History Narrative    Wears a seatbelt.     Social Determinants of Health     Financial Resource Strain: Low Risk  (5/6/2020)    Overall Financial Resource Strain (CARDIA)     Difficulty of Paying Living Expenses: Not hard at all   Food Insecurity: No Food Insecurity (5/6/2020)    Hunger Vital Sign     Worried About Running Out of Food in the Last Year: Never true     Ran Out of Food in the Last Year: Never true   Transportation Needs: No Transportation Needs (5/6/2020)    PRAPARE - Transportation     Lack of Transportation (Medical): No     Lack of Transportation (Non-Medical): No   Physical Activity: Inactive (5/6/2020)    Exercise Vital Sign     Days of Exercise per Week: 0 days     Minutes of Exercise per Session: 0 min   Stress: Stress Concern Present (5/6/2020)    [employer] [place] [employer] - Occupational Stress Questionnaire     Feeling of Stress : To some extent   Social Connections: Unknown (5/6/2020)    Social Connection and Isolation Panel [NHANES]     Frequency of Communication with Friends and Family: More than three times a week     Frequency of Social Gatherings with Friends and Family: Once a week     Active Member of  Clubs or Organizations: No     Attends Club or Organization Meetings: Never     Marital Status: Never        MEDICATIONS & ALLERGIES:     Current Outpatient Medications on File Prior to Visit   Medication Sig    Allergy Mix Patient Specific SLIT - to be mailed to patient per Dr. Wilkinson - formulation in notes    amLODIPine (NORVASC) 5 MG tablet TAKE ONE TABLET BY MOUTH DAILY WITH 2.5 MG TABLET FOR A TOTAL OF 7.5 MG PER DAY.    azelastine (ASTELIN) 137 mcg (0.1 %) nasal spray 1 spray (137 mcg total) by Nasal route 2 (two) times daily.    clonazePAM (KLONOPIN) 0.5 MG tablet TAKE 1 TABLET BY MOUTH EVERY DAY AS NEEDED FOR ANXIETY    EPINEPHrine (EPIPEN) 0.3 mg/0.3 mL AtIn INJECT 1 PEN IN THE MUSCLE ONE TIME AS DIRECTED ONSET OF ANAPHYLAXIS. IF NO RELIEF WITHIN 3-5MIN INJECT SECOND DOSE IN OPPOSIT THIGH    ergocalciferol (ERGOCALCIFEROL) 50,000 unit Cap Take 50,000 Units by mouth.    fluticasone propionate (FLONASE) 50 mcg/actuation nasal spray SPRAY 2 SPRAYS (100 MCG TOTAL) BY EACH NOSTRIL ROUTE ONCE DAILY.    hydroCHLOROthiazide (HYDRODIURIL) 25 MG tablet Take 1 tablet by mouth every morning.    hydrocortisone 2.5 % cream     lamoTRIgine (LAMICTAL) 100 MG tablet 30    levocetirizine (XYZAL) 5 MG tablet Take 1 tablet (5 mg total) by mouth every evening.    LEXAPRO 20 mg tablet tablet    methIMAzole (TAPAZOLE) 5 MG Tab TAKE 1 TABLET BY MOUTH 5 TIMES A WEEK    montelukast (SINGULAIR) 10 mg tablet TAKE 1 TABLET BY MOUTH EVERY DAY    ondansetron (ZOFRAN-ODT) 8 MG TbDL TAKE 1 TABLET BY MOUTH EVERY 8 HOURS AS NEEDED FOR NAUSEA FOR UP TO 7 DAYS.    pantoprazole (PROTONIX) 40 MG tablet Take 1 tablet (40 mg total) by mouth once daily.    traZODone (DESYREL) 100 MG tablet Take 1/2 to 1 and 1/2 tablets at bedtime as needed for sleep.    valACYclovir (VALTREX) 500 MG tablet TAKE 1 TABLET BY MOUTH EVERY MORNING AND TAKE 1 TABLET BEFORE bedtime FOR THREE DAYS     Current Facility-Administered Medications on File Prior to  "Visit   Medication    hylan g-f 20 (SYNVISC ONE) 48 mg/6 mL injection 48 mg     Review of patient's allergies indicates:   Allergen Reactions    Abilify [aripiprazole]     Demerol [meperidine] Other (See Comments)     blackout    Lurasidone      Excessive somnolence       OBJECTIVE:   Visit Vitals  /80 (BP Location: Right arm, Patient Position: Sitting)   Pulse 77   Ht 5' 4" (1.626 m)   Wt (!) 186.7 kg (411 lb 9.6 oz)   BMI 70.65 kg/m²       Physical Exam:  General:  Well developed, well nourished, no acute distress  HEENT:  Normocephalic, atraumatic, PERRL, EOMI, clear sclera, ears normal, neck supple, throat clear without erythema or exudates  CVS:  RRR, S1 and S2 normal, no murmurs, rubs, gallops  Resp:  Lungs clear to auscultation, no wheezes, rales, rhonchi, cough  GI:  Abdomen soft, non-tender, non-distended, normoactive bowel sounds, no masses  :  Deferred  MSK:  No muscle atrophy, cyanosis, peripheral edema, full range of motion  Skin:  No rashes, ulcers, erythema  Neuro:  CNII-XII grossly intact  Psych:  Alert and oriented to person, place, and time    Results:  I have independently reviewed all pertinent lab and radiologic studies relevant to general/bariatric surgery.      VISIT DIAGNOSES:       ICD-10-CM ICD-9-CM   1. Preop examination  Z01.818 V72.84   2. Hypertension, unspecified type  I10 401.9   3. Hyperlipidemia, unspecified hyperlipidemia type  E78.5 272.4   4. Obstructive sleep apnea  G47.33 327.23   5. Arthralgia of knee, unspecified laterality  M25.569 719.46       ASSESSMENT/PLAN:     The patient is a morbidly obese female with a Body mass index is 70.65 kg/m². with significant weight related co-morbidity including:  Obstructive Sleep Apnea, Severe Weight Bearing Joint Pain, Hypertension and Hyperlipidemia.  She has been unable to lose her weight and improve her co-morbid conditions with medical management including diet and exercise.      RECOMMENDATION:     Shaina Olson has " voluntarily decided to undergo laparoscopic vertical sleeve gastrectomy under the care of Declan Martinez M.D. The patient has stated that the performance of this procedure requres major intra-abdominal surgery and therefore carries certain risks. Like all major surgery, there is a chance of death during or immediately following the procedure. The risk varies depending on the age, weight and the medical background of each individual patient.    Additionally, there can be complications as a result of the procedure. General anesthesia is a required and a respirator is necessary during the operation. The risk is greater in patients with a history of smoking, that weigh more than 400 pounds, have a BMI >55 or can not walk up a flight of stairs.    Performance of the surgery requires a long division of the stomach with a special stapler. If leakage occurs, additional surgery or drainage procedures may need to be performed. In addition, since the stomach lies in close proximity to the spleen, the possibility of splenic injury requiring splenectomy may occur.    In addition, complications may occur in wound healing. These may include wound infections, fascial disruptions and hernias which may require future surgical repair. Wound drainage is common in obese patients. In addition to these outlined complications, there can be additional problems which can occur in all major operations. These include infection, unexpected myocardial infarctions and the formation of blood clots leading to pulmonary embolism.    The patient voiced there understanding of the above and that the procedure may be converted to an open procedure if the surgeon feels the surgery can not safely continue laparoscopically.    The patient also voiced there understanding that patients who undergo this surgery generally lose a significant amount of weight and keep it off, but no operation can guarantee permanent weight reduction.

## 2023-12-06 ENCOUNTER — CLINICAL SUPPORT (OUTPATIENT)
Dept: BARIATRICS | Facility: HOSPITAL | Age: 43
End: 2023-12-06

## 2023-12-06 VITALS — BODY MASS INDEX: 69.52 KG/M2 | WEIGHT: 293 LBS

## 2023-12-06 DIAGNOSIS — E66.9 OBESITY: Primary | ICD-10-CM

## 2023-12-06 NOTE — PROGRESS NOTES
Patient attended preop education visit with team. Patient missed 1 on questions 13-18. Corrections were discussed. No issues noted. Patient's current weight is 405 lbs. She has lost 5 lbs. On the preop diet.     GARRETT Menjivar, CPT, CHC

## 2023-12-06 NOTE — PROGRESS NOTES
"Pt attended pre-op visit with bariatric team and completed questionnaire. Pre- and immediate post-op guidelines were reviewed. Pt confirmed knowledge and expressed understanding.     Height:   Ht Readings from Last 1 Encounters:   11/29/23 5' 4" (1.626 m)      Weight:   Wt Readings from Last 3 Encounters:   12/06/23 1551 (!) 183.7 kg (405 lb)   11/29/23 1543 (!) 186.7 kg (411 lb 9.6 oz)   10/11/23 1508 (!) 184 kg (405 lb 9.6 oz)      BMI:   BMI Readings from Last 1 Encounters:   12/06/23 69.52 kg/m²        Weight loss since pre-op class:  -5#    "

## 2023-12-11 ENCOUNTER — TELEPHONE (OUTPATIENT)
Dept: SURGERY | Facility: CLINIC | Age: 43
End: 2023-12-11
Payer: COMMERCIAL

## 2023-12-12 ENCOUNTER — CLINICAL SUPPORT (OUTPATIENT)
Dept: BARIATRICS | Facility: HOSPITAL | Age: 43
End: 2023-12-12
Payer: COMMERCIAL

## 2023-12-12 DIAGNOSIS — E66.9 OBESITY, UNSPECIFIED CLASSIFICATION, UNSPECIFIED OBESITY TYPE, UNSPECIFIED WHETHER SERIOUS COMORBIDITY PRESENT: Primary | ICD-10-CM

## 2023-12-12 DIAGNOSIS — E66.01 MORBID OBESITY: Primary | ICD-10-CM

## 2023-12-18 ENCOUNTER — ANESTHESIA EVENT (OUTPATIENT)
Dept: SURGERY | Facility: HOSPITAL | Age: 43
DRG: 621 | End: 2023-12-18
Payer: COMMERCIAL

## 2023-12-18 DIAGNOSIS — E66.01 MORBID OBESITY: Primary | ICD-10-CM

## 2023-12-18 NOTE — PRE-PROCEDURE INSTRUCTIONS
"[facility]: Outpatient Surgery  Preprocedure Instructions    Expectations: "Because of inconsistent procedure completion times, an unexpected wait may occur. The physicians would like you to be here to prepare in the event they run ahead of time. We will make you as comfortable as possible and keep you informed. We apologize in advance if this happens."     Your arrival time for your surgery or procedure is _6:30 am_____.  We ask patients to arrive about 2 hours before surgery to allow for enough time to review your health history & medications, start your IV, complete any outstanding labwork or tests, and meet your Anesthesiologist.    We are located at [facility], [address] [place] [address].    Enter through the [facility] entrance next to the Emergency Room, and come to the 6th floor to the Outpatient Surgery Department.    If you are in need of a wheelchair the morning of surgery please call [phone] about 15 minutes before you arrive. Parking is available in our parking garage located off [facility], between the hospital and [facility].      Visitory Policy:  You are allowed 2 adult visitors to be with you in the hospital. Please, no switching visitors in pre-op area. All hospital visitors should be in good current health.  No small children.  We will update you and your family hourly on the progression of surgery and any unexpected delays.      What to Bring:  Please have your ID, insurance cards, and all home medication bottles with you at check in.  Bring your CPAP machine if one is used at home.     Fasting:  Nothing to eat or drink after midnight the night before your procedure. This includes no ice, gum, hard candies, and/or tobacco products.    Medications:  Follow your doctor's instructions for taking any medications on the morning of your procedure.  If no instructions for taking medications were given, do not take any medications but bring your " medications in their bottles to your procedure check in.     Follow your doctor's preoperative instructions regarding skin prep, bowel prep, bathing, or showering prior to your procedure.  If any special soaps were provided to you, please use according to your doctor's instructions. If no instructions were given from your doctor, take a good bath or shower with antibacterial soap the night before and the morning of your procedure.  On the morning of procedure, wear loose, comfortable clothing.  No lotions, makeup, perfumes, colognes, deodorant, or jewelry to your procedure.  Removable items (glasses, contact lenses, dentures, retainers, hearing aids) need to be removed for your procedure.  Bring your storage containers for these items if you wear them.     Artificial nails, body jewelry, eyelash extensions, and/or hair extensions with metal clips are not allowed during your surgery.  If you currently wear any of these items, please arrange for them to be removed prior to your arrival to the hospital.     Outpatient or Same Day Surgeries:  Any patients receiving sedation/anesthesia are advised not to drive for 24 hours after their procedure.  We do not allow patients to drive themselves home after discharge.  If you are going home after your procedure, please have someone available to drive you home from the hospital.     You may call the Outpatient Surgery Department at [phone] with any questions or concerns.  We are looking forward to meeting you and taking great care of you for your procedure.  Thank you for choosing [facility] for your surgical needs.

## 2023-12-19 ENCOUNTER — ANESTHESIA (OUTPATIENT)
Dept: SURGERY | Facility: HOSPITAL | Age: 43
DRG: 621 | End: 2023-12-19
Payer: COMMERCIAL

## 2023-12-19 ENCOUNTER — HOSPITAL ENCOUNTER (INPATIENT)
Facility: HOSPITAL | Age: 43
LOS: 1 days | Discharge: HOME | DRG: 621 | End: 2023-12-20
Attending: SURGERY | Admitting: SURGERY
Payer: COMMERCIAL

## 2023-12-19 DIAGNOSIS — E66.01 MORBID OBESITY: ICD-10-CM

## 2023-12-19 LAB
ABORH RETYPE: NORMAL
B-HCG UR QL: NEGATIVE
CTP QC/QA: YES
GROUP & RH: NORMAL
INDIRECT COOMBS GEL: NORMAL
POCT GLUCOSE: 101 MG/DL (ref 70–110)
POCT GLUCOSE: 84 MG/DL (ref 70–110)
SPECIMEN OUTDATE: NORMAL

## 2023-12-19 PROCEDURE — 63600175 PHARM REV CODE 636 W HCPCS

## 2023-12-19 PROCEDURE — 25000003 PHARM REV CODE 250

## 2023-12-19 PROCEDURE — D9220A PRA ANESTHESIA: Mod: CRNA,,,

## 2023-12-19 PROCEDURE — 43775 LAP SLEEVE GASTRECTOMY: CPT | Mod: 80,,, | Performed by: SURGERY

## 2023-12-19 PROCEDURE — 71000039 HC RECOVERY, EACH ADD'L HOUR: Performed by: SURGERY

## 2023-12-19 PROCEDURE — 36000710: Performed by: SURGERY

## 2023-12-19 PROCEDURE — 43775 LAP SLEEVE GASTRECTOMY: CPT | Mod: ,,, | Performed by: SURGERY

## 2023-12-19 PROCEDURE — D9220A PRA ANESTHESIA: ICD-10-PCS | Mod: ANES,,, | Performed by: STUDENT IN AN ORGANIZED HEALTH CARE EDUCATION/TRAINING PROGRAM

## 2023-12-19 PROCEDURE — 81025 URINE PREGNANCY TEST: CPT | Performed by: SURGERY

## 2023-12-19 PROCEDURE — 43775 PR LAP, GAST RESTRICT PROC, LONGITUDINAL GASTRECTOMY: ICD-10-PCS | Mod: 80,,, | Performed by: SURGERY

## 2023-12-19 PROCEDURE — D9220A PRA ANESTHESIA: Mod: ANES,,, | Performed by: STUDENT IN AN ORGANIZED HEALTH CARE EDUCATION/TRAINING PROGRAM

## 2023-12-19 PROCEDURE — 63600175 PHARM REV CODE 636 W HCPCS: Performed by: STUDENT IN AN ORGANIZED HEALTH CARE EDUCATION/TRAINING PROGRAM

## 2023-12-19 PROCEDURE — 36000711: Performed by: SURGERY

## 2023-12-19 PROCEDURE — C9290 INJ, BUPIVACAINE LIPOSOME: HCPCS | Performed by: SURGERY

## 2023-12-19 PROCEDURE — 25000003 PHARM REV CODE 250: Performed by: SURGERY

## 2023-12-19 PROCEDURE — 86901 BLOOD TYPING SEROLOGIC RH(D): CPT | Performed by: SURGERY

## 2023-12-19 PROCEDURE — 37000008 HC ANESTHESIA 1ST 15 MINUTES: Performed by: SURGERY

## 2023-12-19 PROCEDURE — 71000015 HC POSTOP RECOV 1ST HR: Performed by: SURGERY

## 2023-12-19 PROCEDURE — 11000001 HC ACUTE MED/SURG PRIVATE ROOM

## 2023-12-19 PROCEDURE — 37000009 HC ANESTHESIA EA ADD 15 MINS: Performed by: SURGERY

## 2023-12-19 PROCEDURE — 63600175 PHARM REV CODE 636 W HCPCS: Performed by: SURGERY

## 2023-12-19 PROCEDURE — 25000003 PHARM REV CODE 250: Performed by: STUDENT IN AN ORGANIZED HEALTH CARE EDUCATION/TRAINING PROGRAM

## 2023-12-19 PROCEDURE — 27201423 OPTIME MED/SURG SUP & DEVICES STERILE SUPPLY: Performed by: SURGERY

## 2023-12-19 PROCEDURE — 71000033 HC RECOVERY, INTIAL HOUR: Performed by: SURGERY

## 2023-12-19 PROCEDURE — D9220A PRA ANESTHESIA: ICD-10-PCS | Mod: CRNA,,,

## 2023-12-19 PROCEDURE — 36620 INSERTION CATHETER ARTERY: CPT | Mod: 59,,, | Performed by: STUDENT IN AN ORGANIZED HEALTH CARE EDUCATION/TRAINING PROGRAM

## 2023-12-19 PROCEDURE — 36620 ARTERIAL: ICD-10-PCS | Mod: 59,,, | Performed by: STUDENT IN AN ORGANIZED HEALTH CARE EDUCATION/TRAINING PROGRAM

## 2023-12-19 PROCEDURE — 43775 PR LAP, GAST RESTRICT PROC, LONGITUDINAL GASTRECTOMY: ICD-10-PCS | Mod: ,,, | Performed by: SURGERY

## 2023-12-19 RX ORDER — CELECOXIB 200 MG/1
200 CAPSULE ORAL
Status: COMPLETED | OUTPATIENT
Start: 2023-12-19 | End: 2023-12-19

## 2023-12-19 RX ORDER — TRAMADOL HYDROCHLORIDE 50 MG/1
100 TABLET ORAL EVERY 6 HOURS PRN
Status: DISCONTINUED | OUTPATIENT
Start: 2023-12-20 | End: 2023-12-20 | Stop reason: HOSPADM

## 2023-12-19 RX ORDER — ACETAMINOPHEN 500 MG
1000 TABLET ORAL
Status: COMPLETED | OUTPATIENT
Start: 2023-12-19 | End: 2023-12-19

## 2023-12-19 RX ORDER — ONDANSETRON 4 MG/1
4 TABLET, ORALLY DISINTEGRATING ORAL
Status: COMPLETED | OUTPATIENT
Start: 2023-12-19 | End: 2023-12-19

## 2023-12-19 RX ORDER — CEFAZOLIN SODIUM 2 G/50ML
2 SOLUTION INTRAVENOUS
Status: DISCONTINUED | OUTPATIENT
Start: 2023-12-19 | End: 2023-12-19 | Stop reason: HOSPADM

## 2023-12-19 RX ORDER — LABETALOL HYDROCHLORIDE 5 MG/ML
10 INJECTION, SOLUTION INTRAVENOUS
Status: DISCONTINUED | OUTPATIENT
Start: 2023-12-19 | End: 2023-12-20 | Stop reason: HOSPADM

## 2023-12-19 RX ORDER — SODIUM CHLORIDE, SODIUM LACTATE, POTASSIUM CHLORIDE, CALCIUM CHLORIDE 600; 310; 30; 20 MG/100ML; MG/100ML; MG/100ML; MG/100ML
INJECTION, SOLUTION INTRAVENOUS CONTINUOUS
Status: DISCONTINUED | OUTPATIENT
Start: 2023-12-19 | End: 2023-12-20 | Stop reason: HOSPADM

## 2023-12-19 RX ORDER — HYOSCYAMINE SULFATE 0.12 MG/1
0.12 TABLET SUBLINGUAL EVERY 4 HOURS PRN
Status: DISCONTINUED | OUTPATIENT
Start: 2023-12-19 | End: 2023-12-20 | Stop reason: HOSPADM

## 2023-12-19 RX ORDER — PROPOFOL 10 MG/ML
VIAL (ML) INTRAVENOUS
Status: DISCONTINUED | OUTPATIENT
Start: 2023-12-19 | End: 2023-12-19

## 2023-12-19 RX ORDER — SUCCINYLCHOLINE CHLORIDE 20 MG/ML
INJECTION INTRAMUSCULAR; INTRAVENOUS
Status: DISCONTINUED | OUTPATIENT
Start: 2023-12-19 | End: 2023-12-19

## 2023-12-19 RX ORDER — ONDANSETRON 2 MG/ML
4 INJECTION INTRAMUSCULAR; INTRAVENOUS EVERY 4 HOURS PRN
Status: DISCONTINUED | OUTPATIENT
Start: 2023-12-19 | End: 2023-12-20 | Stop reason: HOSPADM

## 2023-12-19 RX ORDER — ONDANSETRON 2 MG/ML
INJECTION INTRAMUSCULAR; INTRAVENOUS
Status: DISCONTINUED | OUTPATIENT
Start: 2023-12-19 | End: 2023-12-19

## 2023-12-19 RX ORDER — SCOLOPAMINE TRANSDERMAL SYSTEM 1 MG/1
1 PATCH, EXTENDED RELEASE TRANSDERMAL
Status: DISCONTINUED | OUTPATIENT
Start: 2023-12-19 | End: 2023-12-20 | Stop reason: HOSPADM

## 2023-12-19 RX ORDER — MORPHINE SULFATE 4 MG/ML
2 INJECTION, SOLUTION INTRAMUSCULAR; INTRAVENOUS
Status: DISCONTINUED | OUTPATIENT
Start: 2023-12-19 | End: 2023-12-20 | Stop reason: HOSPADM

## 2023-12-19 RX ORDER — BUPIVACAINE HYDROCHLORIDE 5 MG/ML
INJECTION, SOLUTION EPIDURAL; INTRACAUDAL
Status: DISCONTINUED | OUTPATIENT
Start: 2023-12-19 | End: 2023-12-19 | Stop reason: HOSPADM

## 2023-12-19 RX ORDER — ONDANSETRON 4 MG/1
4 TABLET, ORALLY DISINTEGRATING ORAL EVERY 4 HOURS PRN
Status: DISCONTINUED | OUTPATIENT
Start: 2023-12-19 | End: 2023-12-20 | Stop reason: HOSPADM

## 2023-12-19 RX ORDER — CELECOXIB 200 MG/1
200 CAPSULE ORAL ONCE
Status: DISCONTINUED | OUTPATIENT
Start: 2023-12-19 | End: 2023-12-19

## 2023-12-19 RX ORDER — CEFAZOLIN SODIUM 1 G/3ML
INJECTION, POWDER, FOR SOLUTION INTRAMUSCULAR; INTRAVENOUS
Status: DISCONTINUED | OUTPATIENT
Start: 2023-12-19 | End: 2023-12-19

## 2023-12-19 RX ORDER — METHOCARBAMOL 100 MG/ML
1000 INJECTION, SOLUTION INTRAMUSCULAR; INTRAVENOUS ONCE
Status: COMPLETED | OUTPATIENT
Start: 2023-12-19 | End: 2023-12-19

## 2023-12-19 RX ORDER — FENTANYL CITRATE 50 UG/ML
INJECTION, SOLUTION INTRAMUSCULAR; INTRAVENOUS
Status: DISCONTINUED | OUTPATIENT
Start: 2023-12-19 | End: 2023-12-19

## 2023-12-19 RX ORDER — CLONIDINE 0.2 MG/24H
1 PATCH, EXTENDED RELEASE TRANSDERMAL ONCE AS NEEDED
Status: DISCONTINUED | OUTPATIENT
Start: 2023-12-19 | End: 2023-12-20 | Stop reason: HOSPADM

## 2023-12-19 RX ORDER — SODIUM CHLORIDE 0.9 % (FLUSH) 0.9 %
10 SYRINGE (ML) INJECTION
Status: DISCONTINUED | OUTPATIENT
Start: 2023-12-19 | End: 2023-12-19 | Stop reason: HOSPADM

## 2023-12-19 RX ORDER — ENOXAPARIN SODIUM 100 MG/ML
40 INJECTION SUBCUTANEOUS DAILY
Status: DISCONTINUED | OUTPATIENT
Start: 2023-12-20 | End: 2023-12-20 | Stop reason: HOSPADM

## 2023-12-19 RX ORDER — MUPIROCIN 20 MG/G
OINTMENT TOPICAL 2 TIMES DAILY
Status: DISCONTINUED | OUTPATIENT
Start: 2023-12-19 | End: 2023-12-20 | Stop reason: HOSPADM

## 2023-12-19 RX ORDER — ENOXAPARIN SODIUM 100 MG/ML
40 INJECTION SUBCUTANEOUS
Status: COMPLETED | OUTPATIENT
Start: 2023-12-19 | End: 2023-12-19

## 2023-12-19 RX ORDER — PROCHLORPERAZINE EDISYLATE 5 MG/ML
5 INJECTION INTRAMUSCULAR; INTRAVENOUS EVERY 4 HOURS PRN
Status: DISCONTINUED | OUTPATIENT
Start: 2023-12-19 | End: 2023-12-20 | Stop reason: HOSPADM

## 2023-12-19 RX ORDER — MIDAZOLAM HYDROCHLORIDE 1 MG/ML
INJECTION INTRAMUSCULAR; INTRAVENOUS
Status: DISCONTINUED | OUTPATIENT
Start: 2023-12-19 | End: 2023-12-19

## 2023-12-19 RX ORDER — ACETAMINOPHEN 10 MG/ML
1000 INJECTION, SOLUTION INTRAVENOUS EVERY 8 HOURS
Status: DISPENSED | OUTPATIENT
Start: 2023-12-19 | End: 2023-12-20

## 2023-12-19 RX ORDER — ROCURONIUM BROMIDE 10 MG/ML
INJECTION, SOLUTION INTRAVENOUS
Status: DISCONTINUED | OUTPATIENT
Start: 2023-12-19 | End: 2023-12-19

## 2023-12-19 RX ORDER — KETOROLAC TROMETHAMINE 30 MG/ML
30 INJECTION, SOLUTION INTRAMUSCULAR; INTRAVENOUS EVERY 6 HOURS
Status: COMPLETED | OUTPATIENT
Start: 2023-12-19 | End: 2023-12-20

## 2023-12-19 RX ORDER — DEXAMETHASONE SODIUM PHOSPHATE 4 MG/ML
INJECTION, SOLUTION INTRA-ARTICULAR; INTRALESIONAL; INTRAMUSCULAR; INTRAVENOUS; SOFT TISSUE
Status: DISCONTINUED | OUTPATIENT
Start: 2023-12-19 | End: 2023-12-19

## 2023-12-19 RX ORDER — GABAPENTIN 300 MG/1
600 CAPSULE ORAL
Status: COMPLETED | OUTPATIENT
Start: 2023-12-19 | End: 2023-12-19

## 2023-12-19 RX ORDER — LABETALOL HYDROCHLORIDE 5 MG/ML
INJECTION, SOLUTION INTRAVENOUS
Status: DISPENSED
Start: 2023-12-19 | End: 2023-12-20

## 2023-12-19 RX ORDER — CEFAZOLIN SODIUM 2 G/50ML
2 SOLUTION INTRAVENOUS
Status: COMPLETED | OUTPATIENT
Start: 2023-12-19 | End: 2023-12-20

## 2023-12-19 RX ORDER — HYDROMORPHONE HYDROCHLORIDE 2 MG/ML
0.4 INJECTION, SOLUTION INTRAMUSCULAR; INTRAVENOUS; SUBCUTANEOUS EVERY 5 MIN PRN
Status: DISCONTINUED | OUTPATIENT
Start: 2023-12-19 | End: 2023-12-19 | Stop reason: HOSPADM

## 2023-12-19 RX ORDER — LIDOCAINE HYDROCHLORIDE 20 MG/ML
INJECTION INTRAVENOUS
Status: DISCONTINUED | OUTPATIENT
Start: 2023-12-19 | End: 2023-12-19

## 2023-12-19 RX ORDER — EPHEDRINE SULFATE 50 MG/ML
INJECTION, SOLUTION INTRAVENOUS
Status: DISCONTINUED | OUTPATIENT
Start: 2023-12-19 | End: 2023-12-19

## 2023-12-19 RX ORDER — HYDRALAZINE HYDROCHLORIDE 20 MG/ML
10 INJECTION INTRAMUSCULAR; INTRAVENOUS
Status: DISCONTINUED | OUTPATIENT
Start: 2023-12-19 | End: 2023-12-20 | Stop reason: HOSPADM

## 2023-12-19 RX ORDER — ACETAMINOPHEN 500 MG
1000 TABLET ORAL EVERY 8 HOURS
Status: DISCONTINUED | OUTPATIENT
Start: 2023-12-20 | End: 2023-12-20 | Stop reason: HOSPADM

## 2023-12-19 RX ADMIN — METHOCARBAMOL 1000 MG: 100 INJECTION, SOLUTION INTRAMUSCULAR; INTRAVENOUS at 12:12

## 2023-12-19 RX ADMIN — MORPHINE SULFATE 2 MG: 4 INJECTION, SOLUTION INTRAMUSCULAR; INTRAVENOUS at 06:12

## 2023-12-19 RX ADMIN — ONDANSETRON 4 MG: 4 TABLET, ORALLY DISINTEGRATING ORAL at 07:12

## 2023-12-19 RX ADMIN — FENTANYL CITRATE 50 MCG: 50 INJECTION, SOLUTION INTRAMUSCULAR; INTRAVENOUS at 10:12

## 2023-12-19 RX ADMIN — ONDANSETRON 4 MG: 2 INJECTION INTRAMUSCULAR; INTRAVENOUS at 11:12

## 2023-12-19 RX ADMIN — ACETAMINOPHEN 1000 MG: 10 INJECTION, SOLUTION INTRAVENOUS at 02:12

## 2023-12-19 RX ADMIN — ENOXAPARIN SODIUM 40 MG: 40 INJECTION SUBCUTANEOUS at 07:12

## 2023-12-19 RX ADMIN — SCOPOLAMINE 1 PATCH: 1 PATCH TRANSDERMAL at 08:12

## 2023-12-19 RX ADMIN — LIDOCAINE HYDROCHLORIDE 80 MG: 20 INJECTION INTRAVENOUS at 10:12

## 2023-12-19 RX ADMIN — PROPOFOL 150 MG: 10 INJECTION, EMULSION INTRAVENOUS at 10:12

## 2023-12-19 RX ADMIN — ACETAMINOPHEN 1000 MG: 500 TABLET ORAL at 07:12

## 2023-12-19 RX ADMIN — HYDROMORPHONE HYDROCHLORIDE 0.4 MG: 2 INJECTION, SOLUTION INTRAMUSCULAR; INTRAVENOUS; SUBCUTANEOUS at 11:12

## 2023-12-19 RX ADMIN — GABAPENTIN 600 MG: 300 CAPSULE ORAL at 07:12

## 2023-12-19 RX ADMIN — KETOROLAC TROMETHAMINE 30 MG: 30 INJECTION, SOLUTION INTRAMUSCULAR; INTRAVENOUS at 12:12

## 2023-12-19 RX ADMIN — KETOROLAC TROMETHAMINE 30 MG: 30 INJECTION, SOLUTION INTRAMUSCULAR; INTRAVENOUS at 11:12

## 2023-12-19 RX ADMIN — KETOROLAC TROMETHAMINE 30 MG: 30 INJECTION, SOLUTION INTRAMUSCULAR; INTRAVENOUS at 05:12

## 2023-12-19 RX ADMIN — SODIUM CHLORIDE, POTASSIUM CHLORIDE, SODIUM LACTATE AND CALCIUM CHLORIDE: 600; 310; 30; 20 INJECTION, SOLUTION INTRAVENOUS at 02:12

## 2023-12-19 RX ADMIN — MIDAZOLAM HYDROCHLORIDE 2 MG: 1 INJECTION, SOLUTION INTRAMUSCULAR; INTRAVENOUS at 10:12

## 2023-12-19 RX ADMIN — SODIUM CHLORIDE, SODIUM GLUCONATE, SODIUM ACETATE, POTASSIUM CHLORIDE AND MAGNESIUM CHLORIDE: 526; 502; 368; 37; 30 INJECTION, SOLUTION INTRAVENOUS at 10:12

## 2023-12-19 RX ADMIN — ACETAMINOPHEN 1000 MG: 10 INJECTION, SOLUTION INTRAVENOUS at 10:12

## 2023-12-19 RX ADMIN — MORPHINE SULFATE 2 MG: 4 INJECTION, SOLUTION INTRAMUSCULAR; INTRAVENOUS at 10:12

## 2023-12-19 RX ADMIN — DEXAMETHASONE SODIUM PHOSPHATE 4 MG: 4 INJECTION, SOLUTION INTRA-ARTICULAR; INTRALESIONAL; INTRAMUSCULAR; INTRAVENOUS; SOFT TISSUE at 10:12

## 2023-12-19 RX ADMIN — ONDANSETRON 4 MG: 2 INJECTION INTRAMUSCULAR; INTRAVENOUS at 03:12

## 2023-12-19 RX ADMIN — HYDROMORPHONE HYDROCHLORIDE 0.4 MG: 2 INJECTION, SOLUTION INTRAMUSCULAR; INTRAVENOUS; SUBCUTANEOUS at 12:12

## 2023-12-19 RX ADMIN — CEFAZOLIN 2 G: 330 INJECTION, POWDER, FOR SOLUTION INTRAMUSCULAR; INTRAVENOUS at 10:12

## 2023-12-19 RX ADMIN — ROCURONIUM BROMIDE 20 MG: 10 SOLUTION INTRAVENOUS at 10:12

## 2023-12-19 RX ADMIN — ROCURONIUM BROMIDE 45 MG: 10 SOLUTION INTRAVENOUS at 10:12

## 2023-12-19 RX ADMIN — EPHEDRINE SULFATE 10 MG: 50 INJECTION INTRAVENOUS at 10:12

## 2023-12-19 RX ADMIN — CEFAZOLIN SODIUM 2 G: 2 SOLUTION INTRAVENOUS at 06:12

## 2023-12-19 RX ADMIN — CELECOXIB 200 MG: 200 CAPSULE ORAL at 07:12

## 2023-12-19 RX ADMIN — SUGAMMADEX 200 MG: 100 INJECTION, SOLUTION INTRAVENOUS at 11:12

## 2023-12-19 RX ADMIN — SUCCINYLCHOLINE CHLORIDE 160 MG: 20 INJECTION, SOLUTION INTRAMUSCULAR; INTRAVENOUS at 10:12

## 2023-12-19 RX ADMIN — ROCURONIUM BROMIDE 5 MG: 10 SOLUTION INTRAVENOUS at 10:12

## 2023-12-19 NOTE — ANESTHESIA PROCEDURE NOTES
Arterial    Diagnosis: morbid obesity    Patient location during procedure: done in OR  Procedure start time: 12/19/2023 10:15 AM  Timeout: 12/19/2023 10:15 AM  Procedure end time: 12/19/2023 10:17 AM    Staffing  Authorizing Provider: Gaetano Sterling MD  Performing Provider: Gaetano Sterling MD    Staffing  Performed by: Gaetano Sterling MD  Authorized by: Gaetano Sterling MD    Anesthesiologist was present at the time of the procedure.  Arterial  Skin Prep: chlorhexidine gluconate  Local Infiltration: none (GETA)  Orientation: left  Location: radial    Catheter Size: 20 G  Catheter placement by Ultrasound guidance. Heme positive aspiration all ports.   Vessel Caliber: small, patent, compressibility normal  Vascular Doppler:  not done  Needle advanced into vessel with real time Ultrasound guidance.  Guidewire confirmed in vessel.Insertion Attempts: 1  Assessment  Dressing: secured with tape and tegaderm  Patient: Tolerated well

## 2023-12-19 NOTE — ANESTHESIA PROCEDURE NOTES
Intubation    Date/Time: 12/19/2023 10:09 AM    Performed by: Ludmila Fuchs CRNA  Authorized by: Gaetano Sterling MD    Intubation:     Induction:  Intravenous    Intubated:  Postinduction    Mask Ventilation:  Easy mask    Attempts:  1    Attempted By:  CRNA    Method of Intubation:  Direct    Blade:  Correa 3    Laryngeal View Grade: Grade I - full view of cords      Difficult Airway Encountered?: No      Complications:  None    Airway Device:  Oral endotracheal tube    Airway Device Size:  7.0    Style/Cuff Inflation:  Cuffed (inflated to minimal occlusive pressure)    Tube secured:  23    Secured at:  The lips    Placement Verified By:  Capnometry    Complicating Factors:  None    Findings Post-Intubation:  BS equal bilateral and atraumatic/condition of teeth unchanged

## 2023-12-19 NOTE — TRANSFER OF CARE
"Anesthesia Transfer of Care Note    Patient: Shaina Olson    Procedure(s) Performed: Procedure(s) (LRB):  GASTRECTOMY, SLEEVE, LAPAROSCOPIC (N/A)    Patient location: PACU    Anesthesia Type: general    Transport from OR: Transported from OR on room air with adequate spontaneous ventilation    Post pain: adequate analgesia    Post assessment: no apparent anesthetic complications and tolerated procedure well    Post vital signs: stable    Level of consciousness: awake and alert    Nausea/Vomiting: no nausea/vomiting    Complications: none    Transfer of care protocol was followed      Last vitals: Visit Vitals  BP (!) 155/88   Pulse 88   Temp 36.7 °C (98.1 °F) (Oral)   Resp 16   Ht 5' 4" (1.626 m)   Wt (!) 178.3 kg (393 lb 1.3 oz)   LMP 11/18/2023   SpO2 100%   Breastfeeding No   BMI 67.47 kg/m²     "

## 2023-12-19 NOTE — OP NOTE
Patient:  Shaina Olson        :  1980            DATE OF SURGERY:     2023          SURGEON:  Declan Martinez MD         ASSISTANT:  Nathanael Cedeno (First Assistant)           PREOPERATIVE DIAGNOSIS:  Morbid obesity.           POSTOPERATIVE DIAGNOSIS:  Same.           OPERATIONS:  Laparoscopic vertical sleeve gastrectomy with exparel tap block .            Anesthesia:  General endotracheal anesthesia.      Estimated blood loss:  Less than 50 cc.      Blood administered:  None.      Lap and instrument counts correct x 2 at the end of the case.     Specimen: Lateral Remnant Stomach           INDICATIONS/SIGNIFICANT HISTORY:  The patient is a 43 y.o. year old female who was unable to achieve sustainable weight loss and improve co-morbid conditions with self dieting and exercise.  The patient attended a weight loss seminar and was interested in laparoscopic vertical sleeve gastrectomy.  Once the patient was cleared medically and attended pre-operative classes, the risks and benefits of surgery were discussed with the patient.  The patient voiced understanding of risks and benefits and elected to proceed with surgery.           PROCEDURE IN DETAIL:  Once informed consents were obtained, the patient was taken to the operating room and placed supine on the operating table.  After general endotracheal anesthesia was induced, the abdomen was prepped and draped in a standard surgical fashion.  A left paramedian incision was made with a scalpel, upon which the 11 mm Visiport trocar was used to enter the abdominal cavity.  A pneumoperitoneum was then created with insufflation.  After adequate insufflation was obtained, two additional trocar ports were placed in the right upper quadrant (12mm and 15mm), followed by two additional 5mm working trocar ports placed in the left upper quadrant.  A Nisa liver retractor was placed subxiphoid for retraction of the liver superiorly.  Attention was then redirected to  the gastric hiatus.  No hiatal hernia was appreciated.  The short gastric vessels were then taken down starting 6 cm proximal to the pylorus, up to the left josh of the diaphragm.  Once this was performed, a 34 [place] blunt-tipped bougie was then placed in the distal portion of the stomach.  Using this as a guide, the stomach was stapled up to the left josh of the diaphragm using 3 green loads then blue loads.  Particular note was made not to narrow the angular incisura.  A small portion of the fundus was left purposefully near the GE junction during the stapling procedure.  The staple line was then visualized and appeared secure and intact with no active bleeding noted.  The remnant of the stomach was removed through the right paramedian trocar port and passed off the table as specimen.  Vistaseal was then placed along the staple line for added hemostasis.  The liver retractor was then removed.  The extraction port site abdominal fascia was closed with 0 Vicryl suture in interrupted fashion using a suture passer.  A tap block was then performed under direct visualization to bilateral sides.  The pneumoperitoneum was then evacuated and all ports were removed with no active bleeding noted.  The skin was then closed with a 4-0 Vicryl suture in interrupted fashion.  The skin was cleansed and dried, and a dry sterile dressing was placed on the wound.  Lap and instrument counts were correct x 2 at the end of the case. Nathanael Cedeno MD was present during the entirety of the case and was critical in retraction for proper dissection.  The patient tolerated the procedure well and was transported to the recovery room in good condition.

## 2023-12-19 NOTE — NURSING
Nurses Note -- 4 Eyes      12/19/2023   3:17 PM      Skin assessed during: Admit      [x] No Altered Skin Integrity Present    [x]Prevention Measures Documented      [] Yes- Altered Skin Integrity Present or Discovered   [] LDA Added if Not in Epic (Describe Wound)   [] New Altered Skin Integrity was Present on Admit and Documented in LDA   [] Wound Image Taken    Wound Care Consulted? No    Attending Nurse:  Anthony Fields RN     Second RN/Staff Member:  Debbie Valdes RN

## 2023-12-19 NOTE — ANESTHESIA PREPROCEDURE EVALUATION
12/19/2023  Shaina Olson is a 43 y.o., female.    PONV (postoperative nausea and vomiting)    Other Medical History   Hypertension Anxiety   Allergy Vitamin D deficiency   Graves' disease Arthritis of right knee   Arthritis Thyroid nodule   Sleep apnea, unspecified Panic attack   Hyperlipidemia Fibromyalgia   Depression Bipolar disorder, unspecified   Asthma GERD (gastroesophageal reflux disease)   Morbid obesity Palpitations     Surgical History  TONSILLECTOMY, ADENOIDECTOMY NASAL SEPTUM SURGERY   FRACTURE SURGERY SINUS SURGERY   SINUS SURGERY ESOPHAGOGASTRODUODENOSCOPY     Na 141, K 4.1, Cr 0.73  13 / 42 / 334k    Pre-op Assessment    I have reviewed the Patient Summary Reports.     I have reviewed the Nursing Notes.    I have reviewed the Medications.     Review of Systems  Anesthesia Hx:  No problems with previous Anesthesia             Denies Family Hx of Anesthesia complications.    Denies Personal Hx of Anesthesia complications.                    Cardiovascular:     Hypertension              ECG has been reviewed.                          Pulmonary:    Asthma    Sleep Apnea, CPAP                Hepatic/GI:     GERD             Neurological:    Neuromuscular Disease, (FM)                                   Endocrine:    Hyperthyroidism       Morbid Obesity / BMI > 40  Psych:  Psychiatric History anxiety depression                Physical Exam  General: Well nourished, Cooperative and Alert    Airway:  Mallampati: II   Mouth Opening: Small, but > 3cm  TM Distance: 4 - 6 cm    Dental:  Intact        Anesthesia Plan  Type of Anesthesia, risks & benefits discussed:    Anesthesia Type: Gen ETT  Intra-op Monitoring Plan: Standard ASA Monitors  Post Op Pain Control Plan: multimodal analgesia  Induction:  IV  Informed Consent: Informed consent signed with the Patient and all parties understand the risks  and agree with anesthesia plan.  All questions answered.   ASA Score: 3  Day of Surgery Review of History & Physical: H&P Update referred to the surgeon/provider.    Ready For Surgery From Anesthesia Perspective.     .       Class III - visualization of the soft palate and the base of the uvula

## 2023-12-20 VITALS
BODY MASS INDEX: 50.02 KG/M2 | OXYGEN SATURATION: 98 % | DIASTOLIC BLOOD PRESSURE: 79 MMHG | HEART RATE: 66 BPM | TEMPERATURE: 98 F | RESPIRATION RATE: 18 BRPM | WEIGHT: 293 LBS | SYSTOLIC BLOOD PRESSURE: 138 MMHG | HEIGHT: 64 IN

## 2023-12-20 LAB
ANION GAP SERPL CALC-SCNC: 6 MEQ/L
BASOPHILS # BLD AUTO: 0.05 X10(3)/MCL
BASOPHILS NFR BLD AUTO: 0.4 %
BUN SERPL-MCNC: 7.9 MG/DL (ref 7–18.7)
CALCIUM SERPL-MCNC: 8.7 MG/DL (ref 8.4–10.2)
CHLORIDE SERPL-SCNC: 108 MMOL/L (ref 98–107)
CO2 SERPL-SCNC: 24 MMOL/L (ref 22–29)
CREAT SERPL-MCNC: 0.71 MG/DL (ref 0.55–1.02)
CREAT/UREA NIT SERPL: 11
EOSINOPHIL # BLD AUTO: 0.01 X10(3)/MCL (ref 0–0.9)
EOSINOPHIL NFR BLD AUTO: 0.1 %
ERYTHROCYTE [DISTWIDTH] IN BLOOD BY AUTOMATED COUNT: 14.1 % (ref 11.5–17)
GFR SERPLBLD CREATININE-BSD FMLA CKD-EPI: >60 MLS/MIN/1.73/M2
GLUCOSE SERPL-MCNC: 86 MG/DL (ref 74–100)
HCT VFR BLD AUTO: 39.8 % (ref 37–47)
HGB BLD-MCNC: 12.5 G/DL (ref 12–16)
IMM GRANULOCYTES # BLD AUTO: 0.03 X10(3)/MCL (ref 0–0.04)
IMM GRANULOCYTES NFR BLD AUTO: 0.3 %
LYMPHOCYTES # BLD AUTO: 2.08 X10(3)/MCL (ref 0.6–4.6)
LYMPHOCYTES NFR BLD AUTO: 18.2 %
MCH RBC QN AUTO: 29.9 PG (ref 27–31)
MCHC RBC AUTO-ENTMCNC: 31.4 G/DL (ref 33–36)
MCV RBC AUTO: 95.2 FL (ref 80–94)
MONOCYTES # BLD AUTO: 0.61 X10(3)/MCL (ref 0.1–1.3)
MONOCYTES NFR BLD AUTO: 5.4 %
NEUTROPHILS # BLD AUTO: 8.62 X10(3)/MCL (ref 2.1–9.2)
NEUTROPHILS NFR BLD AUTO: 75.6 %
NRBC BLD AUTO-RTO: 0 %
PLATELET # BLD AUTO: 271 X10(3)/MCL (ref 130–400)
PMV BLD AUTO: 10.6 FL (ref 7.4–10.4)
POTASSIUM SERPL-SCNC: 4 MMOL/L (ref 3.5–5.1)
RBC # BLD AUTO: 4.18 X10(6)/MCL (ref 4.2–5.4)
SODIUM SERPL-SCNC: 138 MMOL/L (ref 136–145)
WBC # SPEC AUTO: 11.4 X10(3)/MCL (ref 4.5–11.5)

## 2023-12-20 PROCEDURE — C9113 INJ PANTOPRAZOLE SODIUM, VIA: HCPCS | Performed by: SURGERY

## 2023-12-20 PROCEDURE — 63600175 PHARM REV CODE 636 W HCPCS: Performed by: SURGERY

## 2023-12-20 PROCEDURE — 63600175 PHARM REV CODE 636 W HCPCS

## 2023-12-20 PROCEDURE — 25000003 PHARM REV CODE 250

## 2023-12-20 PROCEDURE — 85025 COMPLETE CBC W/AUTO DIFF WBC: CPT

## 2023-12-20 PROCEDURE — 25000003 PHARM REV CODE 250: Performed by: SURGERY

## 2023-12-20 PROCEDURE — 80048 BASIC METABOLIC PNL TOTAL CA: CPT

## 2023-12-20 RX ORDER — PANTOPRAZOLE SODIUM 40 MG/10ML
40 INJECTION, POWDER, LYOPHILIZED, FOR SOLUTION INTRAVENOUS DAILY
Status: DISCONTINUED | OUTPATIENT
Start: 2023-12-20 | End: 2023-12-20 | Stop reason: HOSPADM

## 2023-12-20 RX ORDER — ONDANSETRON 4 MG/1
4 TABLET, ORALLY DISINTEGRATING ORAL EVERY 6 HOURS PRN
Qty: 10 TABLET | Refills: 0 | Status: SHIPPED | OUTPATIENT
Start: 2023-12-20 | End: 2024-02-21 | Stop reason: ALTCHOICE

## 2023-12-20 RX ORDER — TRAMADOL HYDROCHLORIDE 50 MG/1
50 TABLET ORAL EVERY 6 HOURS PRN
Qty: 20 TABLET | Refills: 0 | Status: SHIPPED | OUTPATIENT
Start: 2023-12-20 | End: 2024-01-03

## 2023-12-20 RX ADMIN — KETOROLAC TROMETHAMINE 30 MG: 30 INJECTION, SOLUTION INTRAMUSCULAR; INTRAVENOUS at 06:12

## 2023-12-20 RX ADMIN — CEFAZOLIN SODIUM 2 G: 2 SOLUTION INTRAVENOUS at 02:12

## 2023-12-20 RX ADMIN — SODIUM CHLORIDE, POTASSIUM CHLORIDE, SODIUM LACTATE AND CALCIUM CHLORIDE: 600; 310; 30; 20 INJECTION, SOLUTION INTRAVENOUS at 10:12

## 2023-12-20 RX ADMIN — ACETAMINOPHEN 1000 MG: 500 TABLET ORAL at 08:12

## 2023-12-20 RX ADMIN — ENOXAPARIN SODIUM 40 MG: 40 INJECTION SUBCUTANEOUS at 08:12

## 2023-12-20 RX ADMIN — MUPIROCIN: 20 OINTMENT TOPICAL at 08:12

## 2023-12-20 RX ADMIN — PANTOPRAZOLE SODIUM 40 MG: 40 INJECTION, POWDER, LYOPHILIZED, FOR SOLUTION INTRAVENOUS at 08:12

## 2023-12-20 RX ADMIN — TRAMADOL HYDROCHLORIDE 100 MG: 50 TABLET, COATED ORAL at 12:12

## 2023-12-20 NOTE — NURSING
Pt D/C in stable condition. Ivs taken out. All follow up appointments, home medications, and discharge instructions discussed. All questions answered.

## 2023-12-20 NOTE — DISCHARGE SUMMARY
Patient: Shaina Olson    Date: 12/20/2023    Admit Diagnosis: Morbid Obesity    Discharge Diagnosis: Morbid Obesity    Operation: Laparoscopic Vertical Sleeve Gastrectomy     Hospital Course: 43 y.o. old female POD1 s/p LVSG is currently tolerating PO without any nausea / emesis. Doing well, planned for discharge.     Disposition: Home    Meds: The patient is being discharged with Zofran, Tylenol, and Ultram     Wounds: May shower starting tomorrow, no tub bath / soaking x 2 weeks    F/U - 2 weeks with Dr. Martinez    Diet: Post Bariatric

## 2023-12-20 NOTE — CONSULTS
"  [facility] - 8th Floor Med Surg  Adult Nutrition  Consult Note    SUMMARY     Recommendations    Recommendation/Intervention: Pt to continue nutritional intervention and monitoring in bariatric clinic per protocol  Goals: Understanding of dietary guidelines and tolerance to diet.  Nutrition Goal Status: new  Communication of RD Recs: discussed on rounds    Assessment and Plan    No new Assessment & Plan notes have been filed under this hospital service since the last note was generated.  Service: Nutrition       Malnutrition Assessment                                       Reason for Assessment    Reason For Assessment: consult  Diagnosis: other (see comments) (s/p VSG)  Relevant Medical History: morbid obesity, TREVOR, HTN, vit D deficiency  Interdisciplinary Rounds: attended  General Information Comments: Pt was able to recite nutritional guidelines for home discharge when rounding.  Nutrition Discharge Planning: Pt to remain on bariatric clear liquids through discharge and progress home diet per protocol.    Nutrition Risk Screen    Nutrition Risk Screen: no indicators present    Nutrition/Diet History    Spiritual, Cultural Beliefs, Sabianism Practices, Values that Affect Care: no  Factors Affecting Nutritional Intake: clear liquid diet, decreased appetite, early satiety (as expected)    Anthropometrics    Temp: 98.1 °F (36.7 °C)  Height Method: Stated  Height: 5' 4" (162.6 cm)  Height (inches): 64 in  Weight Method: Standard Scale  Weight: (!) 178.3 kg (393 lb)  Weight (lb): (!) 393 lb  Ideal Body Weight (IBW), Female: 120 lb  % Ideal Body Weight, Female (lb): 327.5 %  BMI (Calculated): 67.4  BMI Grade: greater than 40 - morbid obesity  Weight Loss: intentional  Usual Body Weight (UBW), kg: (!) 180.5 kg  % Usual Body Weight: 98.97  % Weight Change From Usual Weight: -1.24 %       Lab/Procedures/Meds         Physical Findings/Assessment         Estimated/Assessed Needs    Weight Used For Calorie " Calculations: 68 kg (149 lb 14.6 oz)  Energy Calorie Requirements (kcal): 1977-0106 kcals/d  Energy Need Method: Kcal/kg  Protein Requirements: 68-74g/d  Weight Used For Protein Calculations: 68 kg (149 lb 14.6 oz)  Fluid Requirements (mL): 3566 ml/d  Estimated Fluid Requirement Method: other (see comments) (20 ml/kg actual BW/d)  RDA Method (mL): 1360         Nutrition Prescription Ordered    Current Diet Order: Bariatric clear liquids  Nutrition Order Comments: Pt to remain on bariatric clears through discharge.    Evaluation of Received Nutrient/Fluid Intake    Energy Calories Required: not meeting needs (as expected)  Protein Required: not meeting needs (as expected)  Fluid Required: meeting needs  Tolerance: tolerating  % Intake of Estimated Energy Needs: 0 - 25 %  % Meal Intake: 0 - 25 %    Nutrition Risk    Level of Risk/Frequency of Follow-up: low       Monitor and Evaluation    Food and Nutrient Intake: energy intake, food and beverage intake  Food and Nutrient Adminstration: diet order  Knowledge/Beliefs/Attitudes: food and nutrition knowledge/skill  Anthropometric Measurements: weight, weight change, body mass index       Nutrition Follow-Up    RD Follow-up?: Yes (2w bariatric follow up)

## 2023-12-20 NOTE — PROGRESS NOTES
Date of education: 12/20/23  Pt education type: []Pre op  [x]Post op  Surgery date: 12/19/23  Type of surgery: sleeve gastrectomy     Education was provided on:   [x]Importance of protein and vitamin protocol  [x]Importance of drinking  fl oz/day of non carbonated sugar free non caffeinated beverages  [x]Importance of following dietary protocol  [x]Importance of ambulation postop   [x]Use of incentive spirometer 10 times an hour while awake  [x]Non opiod pain management post op   [x]Discontinuing use of meds containing aspirin, ibuprofen, NSAIDs, post op  [x]Signs and symptoms of immediate and long term complications post-op  [x]Prevention and signs and symptoms of blood clots   [x]Prevention and signs of infection  [x]Reviewed medication regimen  [x]Importance of adhering to behavioral changes  [x]Importance of following exercise protocol      Barriers to learning:  [x]None evident  []Acuity of illness  []Cognitive defects  []Cultural barriers  []Desire/Motivation  []Difficulty concentrating  []Emotional state  []Financial concerns  []Hearing deficit  []Language barrier  []Literacy  []Memory problems  []Vision impairment     Teaching methods:  []Demonstration  [x]Explanation  []Printed materials  [x]Teach back  []Virtual/web based    Expected Compliance:  [x]Good  []Fair  []Poor    Additional Notes:  Reviewed above protocols with patient. She was able to recite the post-op protocols with minimal assistance. Reiterated the importance of following the post-op dietary and behavioral guidelines to prevent complications.

## 2023-12-20 NOTE — PROGRESS NOTES
POD 1 s/p LVSG    Pt doing well, denies any nausea / emesis    VSS, AF    PE    Abd - soft, incision c/d/I, +BS      Labs - reviewed / stable      POD 1    -  doing well  -  Ambulate  -  DVT prophylaxis  -  Dietary teaching - Advance diet  -  Possible discharge to home later today

## 2023-12-21 LAB — PSYCHE PATHOLOGY RESULT: NORMAL

## 2023-12-26 NOTE — ANESTHESIA POSTPROCEDURE EVALUATION
Anesthesia Post Evaluation    Patient: Shaina Olson    Procedure(s) Performed: Procedure(s) (LRB):  GASTRECTOMY, SLEEVE, LAPAROSCOPIC (N/A)    Final Anesthesia Type: general      Patient location during evaluation: PACU  Patient participation: Yes- Able to Participate  Level of consciousness: awake and alert  Post-procedure vital signs: reviewed and stable  Pain management: adequate  Airway patency: patent    PONV status at discharge: No PONV  Anesthetic complications: no      Respiratory status: unassisted  Hydration status: euvolemic  Follow-up not needed.              Vitals Value Taken Time   /83 12/19/23 1401   Temp nl 12/26/23 0753   Pulse 94 12/19/23 1408   Resp 19 12/19/23 1407   SpO2 95 % 12/19/23 1408   Vitals shown include unvalidated device data.      Event Time   Out of Recovery 13:20:00         Pain/Aline Score: No data recorded

## 2023-12-27 ENCOUNTER — TELEPHONE (OUTPATIENT)
Dept: SURGERY | Facility: CLINIC | Age: 43
End: 2023-12-27
Payer: COMMERCIAL

## 2023-12-27 ENCOUNTER — TELEPHONE (OUTPATIENT)
Dept: BARIATRICS | Facility: HOSPITAL | Age: 43
End: 2023-12-27
Payer: COMMERCIAL

## 2023-12-27 DIAGNOSIS — Z98.84 HISTORY OF BARIATRIC SURGERY: Primary | ICD-10-CM

## 2023-12-27 NOTE — TELEPHONE ENCOUNTER
Surgeon:  Dr. Declan Martinez   Procedure:  Lap Sleeve Gastrectomy   Procedure Date:  12/19/23  Patient Concerns:  constipation and a rash   Ok to take steri strips off also benadryl cream not on incision but around, also take miralax and colace for constipation   Pt voiced understanding

## 2023-12-27 NOTE — TELEPHONE ENCOUNTER
Date call was completed: 12/27/23  Date of Surgery: 12/19/23  Surgery type: sleeve gastrectomy    Are you drinking the recommended amount of water (73-100oz) a day? []  Yes        [x]  No  If not, how may ounces of water are you drinking? 40-50 fl oz/d    Are you drinking the recommended amount of protein a day?(recommendations base on individual goal) [x]  Yes        []  No  If not, how many grams of protein are you drinking?    Are you taking the recommended supplements that were discussed in pre-op class?  [x]  Yes   [] No  Multivitamin [x]  Yes        []  No  Calcium Supplement [x]  Yes        []  No  Iron [x]  Yes        []  No  Miralax [x]  Yes        []  No    Are you walking at least 20 minutes a day for exercise? [x]  Yes   [] No    Have you resumed your home medications that you were instructed to resume? [x]  Yes   [] No    Do you have a follow-up appt scheduled with your family doctor or doctor treating you for you co-morb conditions within the week? [x]  Yes   [] No    Are you taking the Protonix (at night) that was prescribed to you in the hospital? [x]  Yes   [] No    Are you showering daily with an antibacterial soap?  [x]  Yes   [] No    Have you had a bowel movement since surgery? [x]  Yes   [] No

## 2024-01-02 ENCOUNTER — CLINICAL SUPPORT (OUTPATIENT)
Dept: BARIATRICS | Facility: HOSPITAL | Age: 44
End: 2024-01-02

## 2024-01-02 DIAGNOSIS — Z98.84 HISTORY OF BARIATRIC SURGERY: Primary | ICD-10-CM

## 2024-01-02 NOTE — PROGRESS NOTES
"POST OP BARIATRIC NUTRITION FOLLOW UP    Type of surgery: sleeve gastrectomy  Post-op visit:   [x] 2 weeks  [] 4 weeks:  [] 2 months:  [] 4 months:  [] 6 months:  [] 9 months:  [] 1 year:   [] Other:     Height:   Ht Readings from Last 1 Encounters:   12/20/23 5' 4" (1.626 m)      Weight:   Wt Readings from Last 3 Encounters:   12/20/23 0846 (!) 178.3 kg (393 lb)   12/19/23 0741 (!) 178.3 kg (393 lb 1.3 oz)   12/18/23 1008 (!) 180.5 kg (398 lb)   12/06/23 1551 (!) 183.7 kg (405 lb)   11/29/23 1543 (!) 186.7 kg (411 lb 9.6 oz)      BMI:   BMI Readings from Last 1 Encounters:   12/20/23 67.46 kg/m²            Post op complications:   [] Yes [x] No  If yes: [] Nausea   [] Vomiting   [] Constipation    [] Diarrhea    [] Other:     Dietary tolerance:  [x] Yes [] No [] Comment:     Adequate fluid intake:  [x] Yes [] No Approx. daily fluid intake:   Adequate protein intake:  [x] Yes [] No  Approx. daily protein intake:    Vitamins/Minerals:   [x] MVI:    [] Biotin:  [x] Calcium:    [] Hair/Nails:  [] B 50 complex:   [] Bariatric Specific MVI:  [] B12:    [] Bariatric Specific Calcium:  [x] Iron:    [] Other:   [] Non-compliance:        Labs:   not ordered for this visit  Comment:    Expected compliance:  [x]Good  []Fair  []Poor      Progress:   [x]Patient progressing well at this time with no complaints   [] Patient expressed complaint at this time: See additional notes       Bariatric Diet Education:   Verbalizes understanding Demonstrates  Needs further teaching Needs practice/ supervision Comments    Bariatric Clear [] [] [] []    Bariatric Full [] [] [] []    Bariatric Puree [x] [] [] []    Bariatric Soft [x] [] [] []    Bariatric Regular [] [] [] []    Bariatric Reintroduction of Starchy CHO [] [] [] []    Bariatric: Mindful Eating [] [] [] []    Importance of Protein and Vitamin Protocol [] [] [] []    Other:            Additional Notes:        "

## 2024-01-03 ENCOUNTER — OFFICE VISIT (OUTPATIENT)
Dept: SURGERY | Facility: CLINIC | Age: 44
End: 2024-01-03
Payer: COMMERCIAL

## 2024-01-03 VITALS
HEIGHT: 64 IN | HEART RATE: 78 BPM | DIASTOLIC BLOOD PRESSURE: 72 MMHG | BODY MASS INDEX: 50.02 KG/M2 | SYSTOLIC BLOOD PRESSURE: 109 MMHG | WEIGHT: 293 LBS

## 2024-01-03 DIAGNOSIS — Z13.0 SCREENING FOR IRON DEFICIENCY ANEMIA: Primary | ICD-10-CM

## 2024-01-03 DIAGNOSIS — E66.01 MORBID OBESITY: ICD-10-CM

## 2024-01-03 DIAGNOSIS — Z91.89 ELECTROLYTE IMBALANCE RISK: ICD-10-CM

## 2024-01-03 DIAGNOSIS — Z13.21 ENCOUNTER FOR VITAMIN DEFICIENCY SCREENING: ICD-10-CM

## 2024-01-03 PROCEDURE — 99024 POSTOP FOLLOW-UP VISIT: CPT | Mod: ,,, | Performed by: SURGERY

## 2024-01-03 NOTE — PROGRESS NOTES
"Progress Note  General Surgery    Patient Name: Shaina Olson  YOB: 1980    Date: 01/03/2024                     PRESENTING HISTORY:     Chief Complaint/Reason for Admission: "post op appointment"    History of Present Illness:  Ms. Shaina Olson is a 43 y.o. female for 2 week post op VSG. Pt without any complaints.  Tolerating liquid diet without any nausea.  Denies any fever / chills. Walking, taking vitamins as directed.        Review of Systems:  12 point ROS negative except as stated in HPI    PAST HISTORY:     Past Medical History:   Diagnosis Date    Allergy     Anxiety 2004    Arthritis     Arthritis of right knee     Asthma     Bipolar disorder, unspecified     Dependence on continuous positive airway pressure ventilation     Depression     Fibromyalgia     GERD (gastroesophageal reflux disease)     Graves' disease 06/2014    Dr. Maximilian Fofana--endocrinology    Hyperlipidemia     Hypertension 2004    Morbid obesity     Palpitations     Panic attack     PONV (postoperative nausea and vomiting)     Sleep apnea, unspecified     cipap    Thyroid nodule     Ultrasound 2/9/21 and 12/23/19; biopsy 3/6/23    Vitamin D deficiency     Dr. Maximilian Fofana--endocrinology       Past Surgical History:   Procedure Laterality Date    ESOPHAGOGASTRODUODENOSCOPY  2021    FRACTURE SURGERY Right 2013    right wrist (titanium)    LAPAROSCOPIC SLEEVE GASTRECTOMY N/A 12/19/2023    Procedure: GASTRECTOMY, SLEEVE, LAPAROSCOPIC;  Surgeon: Declan Martinez Jr., MD;  Location: [facility];  Service: General;  Laterality: N/A;  WITH AUTIN    NASAL SEPTUM SURGERY  2001    SINUS SURGERY  2001    SINUS SURGERY  11/22/2018    TONSILLECTOMY, ADENOIDECTOMY  2001       Family History   Problem Relation Age of Onset    Hypertension Mother     Lupus Mother     Diverticulosis Mother     Cancer Sister 30        Ovarian (granulosa cell tumor)    Hypertension Sister     Cancer Maternal Grandmother         Leukemia    Cancer Maternal " Grandfather     Cataracts Maternal Grandfather     Diabetes Paternal Grandmother     Stroke Paternal Grandmother     Cancer Paternal Grandmother         Breast    Cancer Paternal Grandfather         Lung cancer    Diabetes Paternal Aunt     Hypertension Paternal Aunt     Diabetes Maternal Aunt     Hypertension Maternal Aunt     Thyroid disease Neg Hx     Migraines Neg Hx     Asthma Neg Hx        Social History     Socioeconomic History    Marital status: Single    Number of children: 0   Occupational History    Occupation: LPN     Comment: [employer]   Tobacco Use    Smoking status: Never    Smokeless tobacco: Never   Substance and Sexual Activity    Alcohol use: Not Currently     Alcohol/week: 1.0 standard drink of alcohol     Types: 1 Glasses of wine per week     Comment: occasionally    Drug use: Never    Sexual activity: Not Currently     Partners: Male     Birth control/protection: None   Social History Narrative    Wears a seatbelt.     Social Determinants of Health     Financial Resource Strain: Low Risk  (12/19/2023)    Overall Financial Resource Strain (CARDIA)     Difficulty of Paying Living Expenses: Not hard at all   Food Insecurity: No Food Insecurity (12/19/2023)    Hunger Vital Sign     Worried About Running Out of Food in the Last Year: Never true     Ran Out of Food in the Last Year: Never true   Transportation Needs: No Transportation Needs (12/19/2023)    PRAPARE - Transportation     Lack of Transportation (Medical): No     Lack of Transportation (Non-Medical): No   Physical Activity: Unknown (12/19/2023)    Exercise Vital Sign     Days of Exercise per Week: 0 days   Stress: Stress Concern Present (12/19/2023)    [place] [employer] - Occupational Stress Questionnaire     Feeling of Stress : Rather much   Social Connections: Unknown (12/19/2023)    Social Connection and Isolation Panel [NHANES]     Frequency of Communication with Friends and Family: More than three times a week      Frequency of Social Gatherings with Friends and Family: Once a week     Active Member of Clubs or Organizations: Yes     Attends Club or Organization Meetings: More than 4 times per year     Marital Status: Never    Housing Stability: Unknown (12/19/2023)    Housing Stability Vital Sign     Unable to Pay for Housing in the Last Year: No     Unstable Housing in the Last Year: No       MEDICATIONS & ALLERGIES:     Current Outpatient Medications on File Prior to Visit   Medication Sig    Allergy Mix Patient Specific SLIT - to be mailed to patient per Dr. Wilkinson - formulation in notes    amLODIPine (NORVASC) 5 MG tablet TAKE ONE TABLET BY MOUTH DAILY WITH 2.5 MG TABLET FOR A TOTAL OF 7.5 MG PER DAY. (Patient taking differently: Take 5 mg by mouth once daily.)    azelastine (ASTELIN) 137 mcg (0.1 %) nasal spray 1 spray (137 mcg total) by Nasal route 2 (two) times daily. (Patient taking differently: 1 spray by Nasal route as needed.)    calcium carbonate/vitamin D3 (VITAMIN D-3 ORAL) Take by mouth.    clonazePAM (KLONOPIN) 0.5 MG tablet TAKE 1 TABLET BY MOUTH EVERY DAY AS NEEDED FOR ANXIETY    EPINEPHrine (EPIPEN) 0.3 mg/0.3 mL AtIn INJECT 1 PEN IN THE MUSCLE ONE TIME AS DIRECTED ONSET OF ANAPHYLAXIS. IF NO RELIEF WITHIN 3-5MIN INJECT SECOND DOSE IN OPPOSIT THIGH    ergocalciferol (ERGOCALCIFEROL) 50,000 unit Cap Take 50,000 Units by mouth. Twice per wek, vitamin d    fluticasone propionate (FLONASE) 50 mcg/actuation nasal spray SPRAY 2 SPRAYS (100 MCG TOTAL) BY EACH NOSTRIL ROUTE ONCE DAILY. (Patient taking differently: Only take if needed preop and postop)    hydrocortisone 2.5 % cream     lamoTRIgine (LAMICTAL) 100 MG tablet Take 100 mg by mouth once daily.    levocetirizine (XYZAL) 5 MG tablet Take 1 tablet (5 mg total) by mouth every evening.    LEXAPRO 20 mg tablet Take 20 mg by mouth once daily.    methIMAzole (TAPAZOLE) 5 MG Tab TAKE 1 TABLET BY MOUTH 5 TIMES A WEEK (Patient taking differently: M-W-F  "Only)    montelukast (SINGULAIR) 10 mg tablet TAKE 1 TABLET BY MOUTH EVERY DAY    ondansetron (ZOFRAN-ODT) 4 MG TbDL Take 1 tablet (4 mg total) by mouth every 6 (six) hours as needed (nausea).    pantoprazole (PROTONIX) 40 MG tablet Take 1 tablet (40 mg total) by mouth once daily.    traZODone (DESYREL) 100 MG tablet Take 1/2 to 1 and 1/2 tablets at bedtime as needed for sleep.    UNABLE TO FIND Bariatric advantage multi and iron    valACYclovir (VALTREX) 500 MG tablet as needed.    [DISCONTINUED] traMADoL (ULTRAM) 50 mg tablet Take 1 tablet (50 mg total) by mouth every 6 (six) hours as needed for Pain.     No current facility-administered medications on file prior to visit.       Review of patient's allergies indicates:   Allergen Reactions    Abilify [aripiprazole]     Demerol [meperidine] Other (See Comments)     blackout    Lurasidone      Excessive somnolence       OBJECTIVE:     Vital Signs:  Visit Vitals  /72   Pulse 78   Ht 5' 4" (1.626 m)   Wt (!) 170.4 kg (375 lb 9.6 oz)   LMP 11/18/2023   BMI 64.47 kg/m²       Physical Exam:  General:  Well developed, well nourished, no acute distress  HEENT:  Normocephalic, atraumatic, PERRL, EOMI, clear sclera, ears normal, neck supple, throat clear without erythema or exudates  CVS:  RRR, S1 and S2 normal, no murmurs, rubs, gallops  Resp:  Lungs clear to auscultation, no wheezes, rales, rhonchi, cough  GI:  Abdomen soft, non-tender, non-distended, normoactive bowel sounds, no masses  :  Deferred  MSK:  No muscle atrophy, cyanosis, peripheral edema, full range of motion  Skin:  No rashes, ulcers, erythema  Neuro:  CNII-XII grossly intact  Psych:  Alert and oriented to person, place, and time        ASSESSMENT & PLAN:   Ms. Shaina Olson is a 43 y.o. female 2 week s/p Laparoscopic Vertical Sleeve Gastrectomy    VISIT DIAGNOSES:       ICD-10-CM ICD-9-CM   1. Screening for iron deficiency anemia  Z13.0 V78.0   2. Electrolyte imbalance risk  Z91.89 V49.89   3. " Encounter for vitamin deficiency screening  Z13.21 V77.99   4. Morbid obesity  E66.01 278.01       Plan:  Starting Weight  450  2 Week Weight  375      -  Pt doing well  -  Discussed exercise goals at this time  -  F/U with Bariatric team   -  advance to Pureed food    RTC 6 wks with labs    Declan Martinez Jr, MD  General / Bariatric Surgery

## 2024-01-10 ENCOUNTER — TELEPHONE (OUTPATIENT)
Dept: BARIATRICS | Facility: HOSPITAL | Age: 44
End: 2024-01-10
Payer: COMMERCIAL

## 2024-01-10 ENCOUNTER — PATIENT MESSAGE (OUTPATIENT)
Dept: SURGERY | Facility: CLINIC | Age: 44
End: 2024-01-10
Payer: COMMERCIAL

## 2024-01-10 NOTE — TELEPHONE ENCOUNTER
Patient sent a message stating that she felt like she was getting dehydrated. Reviewed the rules of drinking with the patient. She set a timer on her phone for every 5 minutes and took a sip. She phoned back a few hours later to state she felt better and would continue this practice until bed time. She will phone us in the morning to update us on how she is feeling.

## 2024-02-15 ENCOUNTER — LAB VISIT (OUTPATIENT)
Dept: LAB | Facility: HOSPITAL | Age: 44
End: 2024-02-15
Attending: SURGERY
Payer: COMMERCIAL

## 2024-02-15 DIAGNOSIS — Z91.89 ELECTROLYTE IMBALANCE RISK: ICD-10-CM

## 2024-02-15 DIAGNOSIS — Z13.21 ENCOUNTER FOR VITAMIN DEFICIENCY SCREENING: ICD-10-CM

## 2024-02-15 DIAGNOSIS — Z13.0 SCREENING FOR IRON DEFICIENCY ANEMIA: ICD-10-CM

## 2024-02-15 LAB
ALBUMIN SERPL-MCNC: 3.7 G/DL (ref 3.5–5)
ALBUMIN/GLOB SERPL: 1.4 RATIO (ref 1.1–2)
ALP SERPL-CCNC: 101 UNIT/L (ref 40–150)
ALT SERPL-CCNC: 18 UNIT/L (ref 0–55)
AST SERPL-CCNC: 19 UNIT/L (ref 5–34)
BASOPHILS # BLD AUTO: 0.08 X10(3)/MCL
BASOPHILS NFR BLD AUTO: 0.9 %
BILIRUB SERPL-MCNC: 0.7 MG/DL
BUN SERPL-MCNC: 7.5 MG/DL (ref 7–18.7)
CALCIUM SERPL-MCNC: 8.9 MG/DL (ref 8.4–10.2)
CHLORIDE SERPL-SCNC: 108 MMOL/L (ref 98–107)
CO2 SERPL-SCNC: 26 MMOL/L (ref 22–29)
CREAT SERPL-MCNC: 0.76 MG/DL (ref 0.55–1.02)
EOSINOPHIL # BLD AUTO: 0.29 X10(3)/MCL (ref 0–0.9)
EOSINOPHIL NFR BLD AUTO: 3.1 %
ERYTHROCYTE [DISTWIDTH] IN BLOOD BY AUTOMATED COUNT: 15.6 % (ref 11.5–17)
GFR SERPLBLD CREATININE-BSD FMLA CKD-EPI: >60 MLS/MIN/1.73/M2
GLOBULIN SER-MCNC: 2.7 GM/DL (ref 2.4–3.5)
GLUCOSE SERPL-MCNC: 76 MG/DL (ref 74–100)
HCT VFR BLD AUTO: 42.4 % (ref 37–47)
HGB BLD-MCNC: 12.9 G/DL (ref 12–16)
IMM GRANULOCYTES # BLD AUTO: 0.03 X10(3)/MCL (ref 0–0.04)
IMM GRANULOCYTES NFR BLD AUTO: 0.3 %
IRON SATN MFR SERPL: 32 % (ref 20–50)
IRON SERPL-MCNC: 69 UG/DL (ref 50–170)
LYMPHOCYTES # BLD AUTO: 2.37 X10(3)/MCL (ref 0.6–4.6)
LYMPHOCYTES NFR BLD AUTO: 25.7 %
MCH RBC QN AUTO: 30.4 PG (ref 27–31)
MCHC RBC AUTO-ENTMCNC: 30.4 G/DL (ref 33–36)
MCV RBC AUTO: 99.8 FL (ref 80–94)
MONOCYTES # BLD AUTO: 0.65 X10(3)/MCL (ref 0.1–1.3)
MONOCYTES NFR BLD AUTO: 7.1 %
NEUTROPHILS # BLD AUTO: 5.79 X10(3)/MCL (ref 2.1–9.2)
NEUTROPHILS NFR BLD AUTO: 62.9 %
NRBC BLD AUTO-RTO: 0 %
PLATELET # BLD AUTO: 274 X10(3)/MCL (ref 130–400)
PMV BLD AUTO: 11.3 FL (ref 7.4–10.4)
POTASSIUM SERPL-SCNC: 3.6 MMOL/L (ref 3.5–5.1)
PROT SERPL-MCNC: 6.4 GM/DL (ref 6.4–8.3)
RBC # BLD AUTO: 4.25 X10(6)/MCL (ref 4.2–5.4)
SODIUM SERPL-SCNC: 144 MMOL/L (ref 136–145)
TIBC SERPL-MCNC: 148 UG/DL (ref 70–310)
TIBC SERPL-MCNC: 217 UG/DL (ref 250–450)
TRANSFERRIN SERPL-MCNC: 193 MG/DL (ref 180–382)
VIT B12 SERPL-MCNC: 539 PG/ML (ref 213–816)
WBC # SPEC AUTO: 9.21 X10(3)/MCL (ref 4.5–11.5)

## 2024-02-15 PROCEDURE — 36415 COLL VENOUS BLD VENIPUNCTURE: CPT

## 2024-02-18 LAB — VIT B1 BLD-SCNC: 151 NMOL/L (ref 70–180)

## 2024-02-21 ENCOUNTER — CLINICAL SUPPORT (OUTPATIENT)
Dept: BARIATRICS | Facility: HOSPITAL | Age: 44
End: 2024-02-21

## 2024-02-21 ENCOUNTER — OFFICE VISIT (OUTPATIENT)
Dept: SURGERY | Facility: CLINIC | Age: 44
End: 2024-02-21
Payer: COMMERCIAL

## 2024-02-21 VITALS
BODY MASS INDEX: 50.02 KG/M2 | DIASTOLIC BLOOD PRESSURE: 73 MMHG | HEIGHT: 64 IN | WEIGHT: 293 LBS | HEART RATE: 70 BPM | SYSTOLIC BLOOD PRESSURE: 130 MMHG

## 2024-02-21 VITALS — HEIGHT: 64 IN | WEIGHT: 293 LBS | BODY MASS INDEX: 50.02 KG/M2

## 2024-02-21 DIAGNOSIS — Z13.21 ENCOUNTER FOR VITAMIN DEFICIENCY SCREENING: ICD-10-CM

## 2024-02-21 DIAGNOSIS — Z13.0 SCREENING FOR IRON DEFICIENCY ANEMIA: Primary | ICD-10-CM

## 2024-02-21 DIAGNOSIS — E66.9 OBESITY: Primary | ICD-10-CM

## 2024-02-21 DIAGNOSIS — Z91.89 ELECTROLYTE IMBALANCE RISK: ICD-10-CM

## 2024-02-21 DIAGNOSIS — R11.0 NAUSEA: ICD-10-CM

## 2024-02-21 DIAGNOSIS — Z98.84 HISTORY OF BARIATRIC SURGERY: Primary | ICD-10-CM

## 2024-02-21 PROCEDURE — 3078F DIAST BP <80 MM HG: CPT | Mod: CPTII,,,

## 2024-02-21 PROCEDURE — 1160F RVW MEDS BY RX/DR IN RCRD: CPT | Mod: CPTII,,,

## 2024-02-21 PROCEDURE — 3075F SYST BP GE 130 - 139MM HG: CPT | Mod: CPTII,,,

## 2024-02-21 PROCEDURE — 99024 POSTOP FOLLOW-UP VISIT: CPT | Mod: ,,,

## 2024-02-21 PROCEDURE — 1159F MED LIST DOCD IN RCRD: CPT | Mod: CPTII,,,

## 2024-02-21 RX ORDER — METHOCARBAMOL 500 MG/1
TABLET, FILM COATED ORAL
COMMUNITY
Start: 2024-01-22 | End: 2024-06-19

## 2024-02-21 RX ORDER — ONDANSETRON 4 MG/1
4 TABLET, ORALLY DISINTEGRATING ORAL EVERY 6 HOURS PRN
Qty: 10 TABLET | Refills: 0 | Status: SHIPPED | OUTPATIENT
Start: 2024-02-21 | End: 2024-02-21

## 2024-02-21 RX ORDER — ONDANSETRON 8 MG/1
TABLET, ORALLY DISINTEGRATING ORAL
COMMUNITY
Start: 2024-01-23 | End: 2024-02-21

## 2024-02-21 RX ORDER — PROMETHAZINE HYDROCHLORIDE AND DEXTROMETHORPHAN HYDROBROMIDE 6.25; 15 MG/5ML; MG/5ML
SYRUP ORAL
COMMUNITY
Start: 2024-02-12 | End: 2024-06-19

## 2024-02-21 RX ORDER — ONDANSETRON 4 MG/1
4 TABLET, ORALLY DISINTEGRATING ORAL EVERY 6 HOURS PRN
Qty: 10 TABLET | Refills: 0 | Status: SHIPPED | OUTPATIENT
Start: 2024-02-21 | End: 2024-06-19

## 2024-02-21 RX ORDER — AMOXICILLIN AND CLAVULANATE POTASSIUM 875; 125 MG/1; MG/1
1 TABLET, FILM COATED ORAL
COMMUNITY
Start: 2024-02-16 | End: 2024-02-26

## 2024-02-21 RX ORDER — TRAZODONE HYDROCHLORIDE 50 MG/1
TABLET ORAL
COMMUNITY
Start: 2024-01-17

## 2024-02-21 NOTE — PROGRESS NOTES
"POST OP BARIATRIC NUTRITION FOLLOW UP    Type of surgery: sleeve gastrectomy  Post-op visit:   [] 2 weeks  [] 4 weeks:  [x] 2 months:  [] 4 months:  [] 6 months:  [] 9 months:  [] 1 year:   [] Other:     Height:   Ht Readings from Last 1 Encounters:   02/21/24 5' 4" (1.626 m)      Weight:   Wt Readings from Last 3 Encounters:   02/21/24 0816 (!) 156.5 kg (345 lb 1.6 oz)   01/03/24 0940 (!) 170.4 kg (375 lb 9.6 oz)   12/20/23 0846 (!) 178.3 kg (393 lb)   12/19/23 0741 (!) 178.3 kg (393 lb 1.3 oz)   12/18/23 1008 (!) 180.5 kg (398 lb)      BMI:   BMI Readings from Last 1 Encounters:   02/21/24 59.24 kg/m²            Post op complications:   [x] Yes [] No  If yes: [x] Nausea   [] Vomiting   [] Constipation    [] Diarrhea    [] Other:     Dietary tolerance:  [x] Yes [] No [] Comment:     Adequate fluid intake:  [x] Yes [] No Approx. daily fluid intake:   Adequate protein intake:  [x] Yes [] No  Approx. daily protein intake:    Vitamins/Minerals:   [x] MVI:    [] Biotin:  [x] Calcium:    [] Hair/Nails:  [] B 50 complex:   [] Bariatric Specific MVI:  [] B12:    [] Bariatric Specific Calcium:  [x] Iron:    [] Other:   [] Non-compliance:        Labs:   all WNL  Comment:    Expected compliance:  [x]Good  []Fair  []Poor      Progress:   [x]Patient progressing well at this time with no complaints   [] Patient expressed complaint at this time: See additional notes       Bariatric Diet Education:   Verbalizes understanding Demonstrates  Needs further teaching Needs practice/ supervision Comments    Bariatric Clear [] [] [] []    Bariatric Full [] [] [] []    Bariatric Puree [] [] [] []    Bariatric Soft [] [] [] []    Bariatric Regular [x] [] [] []    Bariatric Reintroduction of Starchy CHO [] [] [] []    Bariatric: Mindful Eating [] [] [] []    Importance of Protein and Vitamin Protocol [] [] [] []    Other:            Additional Notes:        "

## 2024-02-21 NOTE — PROGRESS NOTES
"Progress Note  General Surgery    Patient Name: Shaina Olson  YOB: 1980    Date: 02/21/2024                   SUBJECTIVE:     Chief Complaint/Reason for Admission:   Chief Complaint   Patient presents with    Post-op Evaluation     [facility] [place] 2 month fu        History of Present Illness:  Ms. Shaina Olson is a 44 y.o. female 2 mo s/p LVSG.  Pt without any complaints. Exercising - walking ~5k steps daily.  Good satiety.  Taking vitamins as directed. Still struggling with some occasional nausea in the AM.     Review of Systems:  12 point ROS negative except as stated in HPI    The patient's problem list, medication list, history and allergies were reviewed and updated as appropriate.    OBJECTIVE:   Visit Vitals  /73 (BP Location: Right arm, Patient Position: Sitting)   Pulse 70   Ht 5' 4" (1.626 m)   Wt (!) 157 kg (346 lb 3.2 oz)   BMI 59.43 kg/m²          BMI Readings from Last 3 Encounters:   02/21/24 59.43 kg/m²   02/21/24 59.24 kg/m²   01/03/24 64.47 kg/m²        Physical Exam:  General:  Well developed, well nourished, no acute distress  HEENT:  Normocephalic, atraumatic, PERRL, EOMI, clear sclera, ears normal, neck supple, throat clear without erythema or exudates  CVS:  RRR, S1 and S2 normal, no murmurs, rubs, gallops  Resp:  Lungs clear to auscultation, no wheezes, rales, rhonchi, cough  GI:  Abdomen soft, non-tender, non-distended, normoactive bowel sounds, no masses  :  Deferred  MSK:  No muscle atrophy, cyanosis, peripheral edema, full range of motion  Skin:  No rashes, ulcers, erythema  Neuro:  CNII-XII grossly intact  Psych:  Alert and oriented to person, place, and time      Review / Management:     LABS:  Reviewed CBC, CMP, Fe Panel, B Vitamin Panel & discussed with pt.    VISIT DIAGNOSES:       ICD-10-CM ICD-9-CM   1. Screening for iron deficiency anemia  Z13.0 V78.0   2. Electrolyte imbalance risk  Z91.89 V49.89   3. Encounter for vitamin deficiency screening  " Z13.21 V77.99        ASSESSMENT/PLAN:     Starting Weight 450   Preop Weight 411   2 Week Weight 375   2 Month Weight 346   6 Month Weight    1 Year Weight      -  Pt doing well  -  Discussed exercise goals at this time  -  F/U with Bariatric team   -  Encouraged support groups  -  Zofran for nausea - discussed evening snack schedule    RTC  4 mo with labs

## 2024-05-28 ENCOUNTER — PATIENT MESSAGE (OUTPATIENT)
Dept: SURGERY | Facility: CLINIC | Age: 44
End: 2024-05-28
Payer: COMMERCIAL

## 2024-06-10 ENCOUNTER — LAB VISIT (OUTPATIENT)
Dept: LAB | Facility: HOSPITAL | Age: 44
End: 2024-06-10
Attending: SURGERY
Payer: COMMERCIAL

## 2024-06-10 DIAGNOSIS — Z91.89 ELECTROLYTE IMBALANCE RISK: ICD-10-CM

## 2024-06-10 DIAGNOSIS — Z13.21 ENCOUNTER FOR VITAMIN DEFICIENCY SCREENING: ICD-10-CM

## 2024-06-10 DIAGNOSIS — Z13.0 SCREENING FOR IRON DEFICIENCY ANEMIA: ICD-10-CM

## 2024-06-10 LAB
ALBUMIN SERPL-MCNC: 3.5 G/DL (ref 3.5–5)
ALBUMIN/GLOB SERPL: 1.3 RATIO (ref 1.1–2)
ALP SERPL-CCNC: 87 UNIT/L (ref 40–150)
ALT SERPL-CCNC: 10 UNIT/L (ref 0–55)
ANION GAP SERPL CALC-SCNC: 6 MEQ/L
AST SERPL-CCNC: 11 UNIT/L (ref 5–34)
BASOPHILS # BLD AUTO: 0.06 X10(3)/MCL
BASOPHILS NFR BLD AUTO: 0.7 %
BILIRUB SERPL-MCNC: 0.5 MG/DL
BUN SERPL-MCNC: 11.5 MG/DL (ref 7–18.7)
CALCIUM SERPL-MCNC: 9.1 MG/DL (ref 8.4–10.2)
CHLORIDE SERPL-SCNC: 112 MMOL/L (ref 98–107)
CO2 SERPL-SCNC: 26 MMOL/L (ref 22–29)
CREAT SERPL-MCNC: 0.72 MG/DL (ref 0.55–1.02)
CREAT/UREA NIT SERPL: 16
EOSINOPHIL # BLD AUTO: 0.12 X10(3)/MCL (ref 0–0.9)
EOSINOPHIL NFR BLD AUTO: 1.4 %
ERYTHROCYTE [DISTWIDTH] IN BLOOD BY AUTOMATED COUNT: 13.9 % (ref 11.5–17)
GFR SERPLBLD CREATININE-BSD FMLA CKD-EPI: >60 ML/MIN/1.73/M2
GLOBULIN SER-MCNC: 2.7 GM/DL (ref 2.4–3.5)
GLUCOSE SERPL-MCNC: 56 MG/DL (ref 74–100)
HCT VFR BLD AUTO: 41.1 % (ref 37–47)
HGB BLD-MCNC: 12.7 G/DL (ref 12–16)
IMM GRANULOCYTES # BLD AUTO: 0.01 X10(3)/MCL (ref 0–0.04)
IMM GRANULOCYTES NFR BLD AUTO: 0.1 %
IRON SATN MFR SERPL: 22 % (ref 20–50)
IRON SERPL-MCNC: 52 UG/DL (ref 50–170)
LYMPHOCYTES # BLD AUTO: 2.46 X10(3)/MCL (ref 0.6–4.6)
LYMPHOCYTES NFR BLD AUTO: 27.8 %
MCH RBC QN AUTO: 30.9 PG (ref 27–31)
MCHC RBC AUTO-ENTMCNC: 30.9 G/DL (ref 33–36)
MCV RBC AUTO: 100 FL (ref 80–94)
MONOCYTES # BLD AUTO: 0.55 X10(3)/MCL (ref 0.1–1.3)
MONOCYTES NFR BLD AUTO: 6.2 %
NEUTROPHILS # BLD AUTO: 5.66 X10(3)/MCL (ref 2.1–9.2)
NEUTROPHILS NFR BLD AUTO: 63.8 %
NRBC BLD AUTO-RTO: 0 %
PLATELET # BLD AUTO: 294 X10(3)/MCL (ref 130–400)
PMV BLD AUTO: 10.3 FL (ref 7.4–10.4)
POTASSIUM SERPL-SCNC: 4 MMOL/L (ref 3.5–5.1)
PROT SERPL-MCNC: 6.2 GM/DL (ref 6.4–8.3)
RBC # BLD AUTO: 4.11 X10(6)/MCL (ref 4.2–5.4)
SODIUM SERPL-SCNC: 144 MMOL/L (ref 136–145)
TIBC SERPL-MCNC: 188 UG/DL (ref 70–310)
TIBC SERPL-MCNC: 240 UG/DL (ref 250–450)
TRANSFERRIN SERPL-MCNC: 221 MG/DL (ref 180–382)
VIT B12 SERPL-MCNC: 167 PG/ML (ref 213–816)
WBC # SPEC AUTO: 8.86 X10(3)/MCL (ref 4.5–11.5)

## 2024-06-10 PROCEDURE — 36415 COLL VENOUS BLD VENIPUNCTURE: CPT

## 2024-06-13 LAB — VIT B1 BLD-SCNC: 108 NMOL/L (ref 70–180)

## 2024-06-19 ENCOUNTER — CLINICAL SUPPORT (OUTPATIENT)
Dept: BARIATRICS | Facility: HOSPITAL | Age: 44
End: 2024-06-19

## 2024-06-19 ENCOUNTER — PATIENT MESSAGE (OUTPATIENT)
Dept: SURGERY | Facility: CLINIC | Age: 44
End: 2024-06-19

## 2024-06-19 ENCOUNTER — OFFICE VISIT (OUTPATIENT)
Dept: SURGERY | Facility: CLINIC | Age: 44
End: 2024-06-19
Payer: COMMERCIAL

## 2024-06-19 VITALS
SYSTOLIC BLOOD PRESSURE: 118 MMHG | WEIGHT: 293 LBS | DIASTOLIC BLOOD PRESSURE: 77 MMHG | BODY MASS INDEX: 50.02 KG/M2 | HEIGHT: 64 IN | HEART RATE: 67 BPM

## 2024-06-19 DIAGNOSIS — R10.11 RIGHT UPPER QUADRANT PAIN: ICD-10-CM

## 2024-06-19 DIAGNOSIS — Z13.21 ENCOUNTER FOR VITAMIN DEFICIENCY SCREENING: ICD-10-CM

## 2024-06-19 DIAGNOSIS — Z98.84 HISTORY OF BARIATRIC SURGERY: Primary | ICD-10-CM

## 2024-06-19 DIAGNOSIS — Z13.0 SCREENING FOR IRON DEFICIENCY ANEMIA: Primary | ICD-10-CM

## 2024-06-19 DIAGNOSIS — Z91.89 ELECTROLYTE IMBALANCE RISK: ICD-10-CM

## 2024-06-19 DIAGNOSIS — E66.9 OBESITY: Primary | ICD-10-CM

## 2024-06-19 PROCEDURE — 1159F MED LIST DOCD IN RCRD: CPT | Mod: CPTII,,,

## 2024-06-19 PROCEDURE — 3074F SYST BP LT 130 MM HG: CPT | Mod: CPTII,,,

## 2024-06-19 PROCEDURE — 3008F BODY MASS INDEX DOCD: CPT | Mod: CPTII,,,

## 2024-06-19 PROCEDURE — 3078F DIAST BP <80 MM HG: CPT | Mod: CPTII,,,

## 2024-06-19 PROCEDURE — 99214 OFFICE O/P EST MOD 30 MIN: CPT | Mod: ,,,

## 2024-06-19 RX ORDER — SUMATRIPTAN SUCCINATE 100 MG/1
100 TABLET ORAL 2 TIMES DAILY PRN
COMMUNITY
Start: 2024-06-12 | End: 2024-06-19

## 2024-06-19 RX ORDER — NORETHINDRONE ACETATE AND ETHINYL ESTRADIOL 1MG-20(21)
KIT ORAL
COMMUNITY

## 2024-06-19 NOTE — PROGRESS NOTES
"POST OP BARIATRIC NUTRITION FOLLOW UP    Type of surgery: sleeve gastrectomy  Post-op visit:   [] 2 weeks  [] 4 weeks:  [] 2 months:  [] 4 months:  [x] 6 months:  [] 9 months:  [] 1 year:   [] Other:     Height:   Ht Readings from Last 1 Encounters:   06/19/24 5' 4" (1.626 m)      Weight:   Wt Readings from Last 3 Encounters:   06/19/24 0855 135.4 kg (298 lb 6.4 oz)   02/21/24 0920 (!) 157 kg (346 lb 3.2 oz)   02/21/24 0816 (!) 156.5 kg (345 lb 1.6 oz)      BMI:   BMI Readings from Last 1 Encounters:   06/19/24 51.22 kg/m²        Post op complications:   [x] Yes [] No  If yes: [x] Nausea   [] Vomiting   [] Constipation    [] Diarrhea    [] Other: nausea 1-2x week (may be due to inadequate water intake)    Dietary tolerance:  [x] Yes [] No [] Comment:     Adequate fluid intake:  [] Yes [x] No Approx. daily fluid intake: 50 oz  Adequate protein intake:  [x] Yes [] No  Approx. daily protein intake: 50-80 g    Vitamins/Minerals:   [] MVI:    [] Biotin:  [] Calcium:    [] Hair/Nails:  [] B 50 complex:   [] Bariatric Specific MVI:  [] B12:    [] Bariatric Specific Calcium:  [] Iron:    [] Other:   [x] Non-compliance: MVI, iron, b12, b50, Ca    Labs:   B12 (L)  Comment: has not been compliant with vitamins (MVI, Ca, iron, B12, and B50 complex)    Expected compliance:  []Good  [x]Fair  []Poor    Progress:   [x]Patient progressing well at this time with no complaints   [] Patient expressed complaint at this time: See additional notes       Bariatric Diet Education:   Verbalizes understanding Demonstrates  Needs further teaching Needs practice/ supervision Comments    Bariatric Clear [] [] [] []    Bariatric Full [] [] [] []    Bariatric Puree [] [] [] []    Bariatric Soft [] [] [] []    Bariatric Regular [] [] [] []    Bariatric Reintroduction of Starchy CHO [x] [] [] []    Bariatric: Mindful Eating [x] [] [] []    Importance of Protein and Vitamin Protocol [x] [] [] []    Other:            Additional Notes:   Pt has lost " over 50# in the past few months. She is intaking enough protein but still struggling with water intake (believe this may cause her occasional nausea- along with drinking too much, too quickly). We discussed importance of being compliant with vitamin regimen- suggested leaving vitamins on desk at work so she can take them easier. Also rec'd adding B12 supplement until levels normalized. Discussed added starches back into diet slowly and limiting to one serving daily. Pt understood.

## 2024-06-19 NOTE — PROGRESS NOTES
"Progress Note  General Surgery    Patient Name: Shaina Olson  YOB: 1980    Date: 06/19/2024                   SUBJECTIVE:     Chief Complaint/Reason for Admission:   Chief Complaint   Patient presents with    Follow-up     VSG 12/19/23-6 month fu         History of Present Illness:  Ms. Shaina Olson is a 44 y.o. female 6 mo s/p LVSG.  Pt without any complaints. Minimal exercising - 1 day/wk.  Good satiety.  Was not taking vitamins as directed but has recently started back. Reports a couple episodes of post-prandial substernal pain radiating to her back, relived within 1-2 hours.    Review of Systems:  12 point ROS negative except as stated in HPI    The patient's problem list, medication list, history and allergies were reviewed and updated as appropriate.    OBJECTIVE:   Visit Vitals  /77   Pulse 67   Ht 5' 4" (1.626 m)   Wt 135.4 kg (298 lb 6.4 oz)   BMI 51.22 kg/m²          BMI Readings from Last 3 Encounters:   06/19/24 51.22 kg/m²   02/21/24 59.43 kg/m²   02/21/24 59.24 kg/m²        Physical Exam:  General:  Well developed, well nourished, no acute distress  HEENT:  Normocephalic, atraumatic, PERRL, EOMI, clear sclera, ears normal, neck supple, throat clear without erythema or exudates  CVS:  RRR, S1 and S2 normal, no murmurs, rubs, gallops  Resp:  Lungs clear to auscultation, no wheezes, rales, rhonchi, cough  GI:  Abdomen soft, non-tender, non-distended, normoactive bowel sounds, no masses  :  Deferred  MSK:  No muscle atrophy, cyanosis, peripheral edema, full range of motion  Skin:  No rashes, ulcers, erythema  Neuro:  CNII-XII grossly intact  Psych:  Alert and oriented to person, place, and time      Review / Management:     LABS:  Reviewed CBC, CMP, Fe Panel, B Vitamin Panel & discussed with pt.    VISIT DIAGNOSES:       ICD-10-CM ICD-9-CM   1. Screening for iron deficiency anemia  Z13.0 V78.0   2. Encounter for vitamin deficiency screening  Z13.21 V77.99   3. Electrolyte " imbalance risk  Z91.89 V49.89        ASSESSMENT/PLAN:      Starting Weight 450   Preop Weight 411   2 Week Weight 375   2 Month Weight 346   6 Month Weight 298    1 Year Weight          -  Pt doing well  -  Discussed exercise goals at this time  -  F/U with Bariatric team   -  Encouraged support groups  -  RUQ US, HIDA if US negative  -  B12 supplement    RTC  6 mo with labs - or sooner pending gallbladder workup

## 2024-06-19 NOTE — PROGRESS NOTES
A 6 month F/U was conducted with Shaina. Current weight is 298.2 lbs. A body composition was conducted and patient was educated on results. Patient is not exercising consistently. She was educated on the importance of exercising. She understood. She is having some cravings, but mentions that she just wants to taste it. She knows that sweets is a trigger for her and is not ready to start eating it. No issues or concerns at this time.    PLAN:  - Patient will do cardio and strength train 3x/week. Recommended [place] pily.      It is my professional opinion that patient is in the action phase of behavior change.      Jillian Boucher, GARRETT, CPT, CHC

## 2024-07-01 ENCOUNTER — TELEPHONE (OUTPATIENT)
Dept: SURGERY | Facility: CLINIC | Age: 44
End: 2024-07-01
Payer: COMMERCIAL

## 2024-07-09 ENCOUNTER — TELEPHONE (OUTPATIENT)
Dept: SURGERY | Facility: CLINIC | Age: 44
End: 2024-07-09
Payer: COMMERCIAL

## 2024-07-09 DIAGNOSIS — R10.9 ABDOMINAL PAIN, UNSPECIFIED ABDOMINAL LOCATION: Primary | ICD-10-CM

## 2024-07-09 NOTE — TELEPHONE ENCOUNTER
US normal- call pt to let her know  HIDA scan scheduled for 7/26/24 @ 8 AM at [facility]  Called pt with date/time- she voiced understanding

## 2024-07-26 ENCOUNTER — HOSPITAL ENCOUNTER (OUTPATIENT)
Dept: RADIOLOGY | Facility: HOSPITAL | Age: 44
Discharge: HOME | End: 2024-07-26
Attending: SURGERY
Payer: COMMERCIAL

## 2024-07-26 DIAGNOSIS — R10.9 ABDOMINAL PAIN, UNSPECIFIED ABDOMINAL LOCATION: ICD-10-CM

## 2024-07-26 PROCEDURE — A9537 TC99M MEBROFENIN: HCPCS | Performed by: SURGERY

## 2024-07-26 PROCEDURE — 63600175 PHARM REV CODE 636 W HCPCS: Performed by: SURGERY

## 2024-07-26 PROCEDURE — 25000003 PHARM REV CODE 250: Performed by: SURGERY

## 2024-07-26 PROCEDURE — 78226 HEPATOBILIARY SYSTEM IMAGING: CPT | Mod: TC

## 2024-07-26 RX ORDER — KIT FOR THE PREPARATION OF TECHNETIUM TC 99M MEBROFENIN 45 MG/10ML
8.2 INJECTION, POWDER, LYOPHILIZED, FOR SOLUTION INTRAVENOUS
Status: COMPLETED | OUTPATIENT
Start: 2024-07-26 | End: 2024-07-26

## 2024-07-26 RX ORDER — SINCALIDE 5 UG/5ML
0.02 INJECTION, POWDER, LYOPHILIZED, FOR SOLUTION INTRAVENOUS ONCE
Status: DISCONTINUED | OUTPATIENT
Start: 2024-07-26 | End: 2024-07-26

## 2024-07-26 RX ADMIN — SINCALIDE: 5 INJECTION, POWDER, LYOPHILIZED, FOR SOLUTION INTRAVENOUS at 09:07

## 2024-07-26 RX ADMIN — MEBROFENIN 8.2 MILLICURIE: 45 INJECTION, POWDER, LYOPHILIZED, FOR SOLUTION INTRAVENOUS at 08:07

## 2024-09-18 ENCOUNTER — CLINICAL SUPPORT (OUTPATIENT)
Dept: BARIATRICS | Facility: HOSPITAL | Age: 44
End: 2024-09-18

## 2024-09-18 DIAGNOSIS — Z98.84 HISTORY OF BARIATRIC SURGERY: Primary | ICD-10-CM

## 2024-09-18 NOTE — PROGRESS NOTES
Pt attended 9m group follow up class. Pt reports no complaints at this time. Pt advised to call with questions/concerns if needed prior to 1 year individual follow up.      Reviewed below with client:Patient aware of date, time and place of Covid test, and informed that once they get their COVID test :  Do NOT travel out of home area  Self -quarantine is recommended to minimizes your risk of exposure before your procedure, if you are unable to self-quarantine, please continue to wear a mask, practice social distancing and perform good hand hygiene.  Immediately report any new symptoms or suspected/known exposures to COVID-19 cases to your surgeon’s office  fever of 100.4 or more  new cough, or cough that gets worse  difficulty breathing  sore throat  stomach problems: nausea, vomiting or diarrhea  loss of taste or smell  chills and/or repeated shaking with chills  muscle pain  headache  Maintain physical distancing (at least 6 feet) at all times both at home and away from home  Wash hands frequently and thoroughly with soap and water (lather at least 20 seconds) or disinfect with alcohol-based hand  before eating. This should also be done by the person who will be bringing you to and from the procedure.  Only one visitor allowed into building. They must remain in designated area assigned by the nursing staff and can not eat in the facility. The visitor may have a drink if it is covered with a lid or cap. Please do not drink while care team members are in the room.  It will be an expectation for the patient/support person to wear a mask at all times during their stay.    Patient is aware they will not get notified of a negative result

## 2024-10-16 ENCOUNTER — PATIENT MESSAGE (OUTPATIENT)
Dept: SURGERY | Facility: CLINIC | Age: 44
End: 2024-10-16
Payer: COMMERCIAL

## 2024-10-16 DIAGNOSIS — K21.9 GASTROESOPHAGEAL REFLUX DISEASE, UNSPECIFIED WHETHER ESOPHAGITIS PRESENT: Primary | ICD-10-CM

## 2024-10-16 RX ORDER — PANTOPRAZOLE SODIUM 40 MG/1
40 TABLET, DELAYED RELEASE ORAL 2 TIMES DAILY
Qty: 60 TABLET | Refills: 0 | Status: SHIPPED | OUTPATIENT
Start: 2024-10-16 | End: 2024-11-15

## 2024-12-04 ENCOUNTER — PATIENT MESSAGE (OUTPATIENT)
Dept: SURGERY | Facility: CLINIC | Age: 44
End: 2024-12-04
Payer: COMMERCIAL

## 2024-12-12 ENCOUNTER — LAB VISIT (OUTPATIENT)
Dept: LAB | Facility: HOSPITAL | Age: 44
End: 2024-12-12
Attending: SURGERY
Payer: COMMERCIAL

## 2024-12-12 DIAGNOSIS — Z13.0 SCREENING FOR IRON DEFICIENCY ANEMIA: ICD-10-CM

## 2024-12-12 DIAGNOSIS — Z91.89 ELECTROLYTE IMBALANCE RISK: ICD-10-CM

## 2024-12-12 DIAGNOSIS — Z13.21 ENCOUNTER FOR VITAMIN DEFICIENCY SCREENING: ICD-10-CM

## 2024-12-12 LAB
ALBUMIN SERPL-MCNC: 3.5 G/DL (ref 3.5–5)
ALBUMIN/GLOB SERPL: 1.3 RATIO (ref 1.1–2)
ALP SERPL-CCNC: 76 UNIT/L (ref 40–150)
ALT SERPL-CCNC: 11 UNIT/L (ref 0–55)
ANION GAP SERPL CALC-SCNC: 4 MEQ/L
AST SERPL-CCNC: 13 UNIT/L (ref 5–34)
BASOPHILS # BLD AUTO: 0.06 X10(3)/MCL
BASOPHILS NFR BLD AUTO: 0.7 %
BILIRUB SERPL-MCNC: 1.1 MG/DL
BUN SERPL-MCNC: 8.8 MG/DL (ref 7–18.7)
CALCIUM SERPL-MCNC: 8.5 MG/DL (ref 8.4–10.2)
CHLORIDE SERPL-SCNC: 112 MMOL/L (ref 98–107)
CO2 SERPL-SCNC: 27 MMOL/L (ref 22–29)
CREAT SERPL-MCNC: 0.82 MG/DL (ref 0.55–1.02)
CREAT/UREA NIT SERPL: 11
EOSINOPHIL # BLD AUTO: 0.15 X10(3)/MCL (ref 0–0.9)
EOSINOPHIL NFR BLD AUTO: 1.9 %
ERYTHROCYTE [DISTWIDTH] IN BLOOD BY AUTOMATED COUNT: 13.3 % (ref 11.5–17)
GFR SERPLBLD CREATININE-BSD FMLA CKD-EPI: >60 ML/MIN/1.73/M2
GLOBULIN SER-MCNC: 2.6 GM/DL (ref 2.4–3.5)
GLUCOSE SERPL-MCNC: 83 MG/DL (ref 74–100)
HCT VFR BLD AUTO: 41.2 % (ref 37–47)
HGB BLD-MCNC: 13 G/DL (ref 12–16)
IMM GRANULOCYTES # BLD AUTO: 0.01 X10(3)/MCL (ref 0–0.04)
IMM GRANULOCYTES NFR BLD AUTO: 0.1 %
IRON SATN MFR SERPL: 50 % (ref 20–50)
IRON SERPL-MCNC: 139 UG/DL (ref 50–170)
LYMPHOCYTES # BLD AUTO: 2.59 X10(3)/MCL (ref 0.6–4.6)
LYMPHOCYTES NFR BLD AUTO: 32.2 %
MCH RBC QN AUTO: 31.6 PG (ref 27–31)
MCHC RBC AUTO-ENTMCNC: 31.6 G/DL (ref 33–36)
MCV RBC AUTO: 100.2 FL (ref 80–94)
MONOCYTES # BLD AUTO: 0.54 X10(3)/MCL (ref 0.1–1.3)
MONOCYTES NFR BLD AUTO: 6.7 %
NEUTROPHILS # BLD AUTO: 4.7 X10(3)/MCL (ref 2.1–9.2)
NEUTROPHILS NFR BLD AUTO: 58.4 %
NRBC BLD AUTO-RTO: 0 %
PLATELET # BLD AUTO: 269 X10(3)/MCL (ref 130–400)
PMV BLD AUTO: 9.8 FL (ref 7.4–10.4)
POTASSIUM SERPL-SCNC: 4.1 MMOL/L (ref 3.5–5.1)
PROT SERPL-MCNC: 6.1 GM/DL (ref 6.4–8.3)
RBC # BLD AUTO: 4.11 X10(6)/MCL (ref 4.2–5.4)
SODIUM SERPL-SCNC: 143 MMOL/L (ref 136–145)
TIBC SERPL-MCNC: 140 UG/DL (ref 70–310)
TIBC SERPL-MCNC: 279 UG/DL (ref 250–450)
TRANSFERRIN SERPL-MCNC: 244 MG/DL (ref 180–382)
VIT B12 SERPL-MCNC: 240 PG/ML (ref 213–816)
WBC # BLD AUTO: 8.05 X10(3)/MCL (ref 4.5–11.5)

## 2024-12-12 PROCEDURE — 36415 COLL VENOUS BLD VENIPUNCTURE: CPT

## 2024-12-14 LAB — VIT B1 BLD-SCNC: 144 NMOL/L (ref 70–180)

## 2024-12-18 ENCOUNTER — CLINICAL SUPPORT (OUTPATIENT)
Dept: BARIATRICS | Facility: HOSPITAL | Age: 44
End: 2024-12-18

## 2024-12-18 ENCOUNTER — OFFICE VISIT (OUTPATIENT)
Dept: SURGERY | Facility: CLINIC | Age: 44
End: 2024-12-18
Payer: COMMERCIAL

## 2024-12-18 VITALS
DIASTOLIC BLOOD PRESSURE: 82 MMHG | BODY MASS INDEX: 50.02 KG/M2 | HEART RATE: 87 BPM | HEIGHT: 64 IN | WEIGHT: 293 LBS | SYSTOLIC BLOOD PRESSURE: 156 MMHG

## 2024-12-18 DIAGNOSIS — E66.9 OBESITY: Primary | ICD-10-CM

## 2024-12-18 DIAGNOSIS — Z13.21 ENCOUNTER FOR VITAMIN DEFICIENCY SCREENING: Primary | ICD-10-CM

## 2024-12-18 PROCEDURE — 3079F DIAST BP 80-89 MM HG: CPT | Mod: CPTII,,,

## 2024-12-18 PROCEDURE — 3008F BODY MASS INDEX DOCD: CPT | Mod: CPTII,,,

## 2024-12-18 PROCEDURE — 1160F RVW MEDS BY RX/DR IN RCRD: CPT | Mod: CPTII,,,

## 2024-12-18 PROCEDURE — 99214 OFFICE O/P EST MOD 30 MIN: CPT | Mod: ,,,

## 2024-12-18 PROCEDURE — 3077F SYST BP >= 140 MM HG: CPT | Mod: CPTII,,,

## 2024-12-18 PROCEDURE — 1159F MED LIST DOCD IN RCRD: CPT | Mod: CPTII,,,

## 2024-12-18 NOTE — PROGRESS NOTES
"Progress Note  General Surgery    Patient Name: Shaina Olson  YOB: 1980    Date: 12/18/2024                   SUBJECTIVE:     Chief Complaint/Reason for Admission:   Chief Complaint   Patient presents with    Follow-up     VSG 12/19/23- 1 YR        History of Present Illness:  Ms. Shaina Olson is a 44 y.o. female 12 mo s/p LVSG.  Pt without any complaints. Exercising regularly -  3d/wk and walking the others. Good satiety. Taking vitamins as directed. Admits weight gain is related to snacking on sweets at night.    Review of Systems:  12 point ROS negative except as stated in HPI    The patient's problem list, medication list, history and allergies were reviewed and updated as appropriate.    OBJECTIVE:   Visit Vitals  BP (!) 156/82   Pulse 87   Ht 5' 4" (1.626 m)   Wt (!) 144.2 kg (318 lb)   BMI 54.58 kg/m²          BMI Readings from Last 3 Encounters:   12/18/24 54.58 kg/m²   06/19/24 51.22 kg/m²   02/21/24 59.43 kg/m²        Physical Exam:  General:  Well developed, well nourished, no acute distress  HEENT:  Normocephalic, atraumatic, PERRL, EOMI, clear sclera, ears normal, neck supple, throat clear without erythema or exudates  CVS:  RRR, S1 and S2 normal, no murmurs, rubs, gallops  Resp:  Lungs clear to auscultation, no wheezes, rales, rhonchi, cough  GI:  Abdomen soft, non-tender, non-distended, normoactive bowel sounds, no masses  :  Deferred  MSK:  No muscle atrophy, cyanosis, peripheral edema, full range of motion  Skin:  No rashes, ulcers, erythema  Neuro:  CNII-XII grossly intact  Psych:  Alert and oriented to person, place, and time      Review / Management:     LABS:  Reviewed CBC, CMP, Fe Panel, B Vitamin Panel & discussed with pt.    VISIT DIAGNOSES:       ICD-10-CM ICD-9-CM   1. Encounter for vitamin deficiency screening  Z13.21 V77.99        ASSESSMENT/PLAN:      Starting Weight 450   Preop Weight 411   2 Week Weight 375   2 Month Weight 346   6 Month " Weight 298    1 Year Weight 318         -  Pt doing well  -  Discussed exercise goals at this time  -  Discussed healthy dietary habits. Recommended going back to stage 5 diet and eliminating snacking/grazing and late night sweets.  -  F/U with Bariatric team   -  Encouraged support groups  -  RTC  6 mo for accountability

## 2024-12-18 NOTE — PROGRESS NOTES
A 1 year visit was conducted with Shaina in the office.  A body composition was conducted and patient lost 92 lbs. (154 lbs. From highest) and 51.5 inches since preop body composition was conducted.  Patient was educated on her results. She would like to lose another 100 lbs. She is walking at work on her lunch break and working out with a  3x/week. She reports that she does eat sweets and treats occasionally.  She does eat her protein first. No issues or concerns at this time.    Patient was educated on cravings and given a handout to manage cravings. It is my professional opinion that patient is in the action stage of behavior change.      GARRETT Menjivar, CPT, CHC

## 2024-12-19 ENCOUNTER — PATIENT MESSAGE (OUTPATIENT)
Dept: BARIATRICS | Facility: HOSPITAL | Age: 44
End: 2024-12-19
Payer: COMMERCIAL

## 2024-12-23 ENCOUNTER — CLINICAL SUPPORT (OUTPATIENT)
Dept: BARIATRICS | Facility: HOSPITAL | Age: 44
End: 2024-12-23

## 2024-12-23 VITALS — BODY MASS INDEX: 50.02 KG/M2 | WEIGHT: 293 LBS | HEIGHT: 64 IN

## 2024-12-23 DIAGNOSIS — Z98.84 HISTORY OF BARIATRIC SURGERY: Primary | ICD-10-CM

## 2024-12-23 NOTE — PROGRESS NOTES
"  POST OP BARIATRIC NUTRITION FOLLOW UP    Type of surgery: sleeve gastrectomy  Post-op visit:   [] 2 weeks  [] 4 weeks:  [] 2 months:  [] 4 months:  [] 6 months:  [] 9 months:  [x] 1 year:   [] Other:     Height:   Ht Readings from Last 1 Encounters:   12/23/24 5' 4" (1.626 m)      Weight:   Wt Readings from Last 3 Encounters:   12/23/24 1557 (!) 144.2 kg (318 lb)   12/18/24 0952 (!) 144.2 kg (318 lb)   06/19/24 0855 135.4 kg (298 lb 6.4 oz)      BMI:   BMI Readings from Last 1 Encounters:   12/23/24 54.58 kg/m²            Post op complications:   [] Yes [x] No  If yes: [] Nausea   [] Vomiting   [] Constipation    [] Diarrhea    [] Other:     Dietary tolerance:  [x] Yes [] No [] Comment:     Adequate fluid intake:  [x] Yes [] No Approx. daily fluid intake:   Adequate protein intake:  [] Yes [] No  Approx. daily protein intake:    Vitamins/Minerals:   [x] MVI: [employer]  [] Biotin:  [] Calcium:    [] Hair/Nails:  [] B 50 complex:   [] Bariatric Specific MVI:  [x] B12:    [] Bariatric Specific Calcium:  [] Iron:    [] Other:   [] Non-compliance:        Labs:   total protein low 6.1  Comment:    Expected compliance:  [x]Good  []Fair  []Poor      Progress:   [x]Patient progressing well at this time with no complaints   [] Patient expressed complaint at this time: See additional notes       Bariatric Diet Education:   Verbalizes understanding Demonstrates  Needs further teaching Needs practice/ supervision Comments    Bariatric Clear [] [] [] []    Bariatric Full [] [] [] []    Bariatric Puree [] [] [] []    Bariatric Soft [] [] [] []    Bariatric Regular [] [] [] []    Bariatric Reintroduction of Starchy CHO [] [] [] []    Bariatric: Mindful Eating [x] [] [] []    Importance of Protein and Vitamin Protocol [] [] [] []    Other:            Additional Notes:    Pt admits to eating sweets more routinely lately. Pt has gained 20# since 6 month visit. Pt advised on mindful strategies to break boredom eating in evening. " Pt confident she can make changes necessary. Pt exercising 5 days per week.

## 2024-12-27 ENCOUNTER — TELEPHONE (OUTPATIENT)
Dept: SURGERY | Facility: CLINIC | Age: 44
End: 2024-12-27
Payer: COMMERCIAL

## 2024-12-27 DIAGNOSIS — Z76.89 ENCOUNTER FOR WEIGHT MANAGEMENT: Primary | ICD-10-CM

## 2024-12-27 NOTE — TELEPHONE ENCOUNTER
----- Message from Dietitijer Crespo sent at 12/27/2024  8:46 AM CST -----  Shaina requested to see a RD when she sees Kathy in 6 months. Can you please send a referral for nutrition so Chantelle can schedule?  Thanks!

## 2025-02-24 ENCOUNTER — PATIENT MESSAGE (OUTPATIENT)
Dept: SURGERY | Facility: CLINIC | Age: 45
End: 2025-02-24
Payer: COMMERCIAL

## 2025-02-24 NOTE — TELEPHONE ENCOUNTER
Called pt. She states reflux has worsened with burning sensations at times. She is taking pantoprazole twice daily as she mentioned, but also gaviscon or tums prn. She notes that the nausea with reflux has increased just in the last week. Denies any new foods or beverages, eating schedules, behavior changes,etc.

## 2025-02-25 DIAGNOSIS — R11.0 NAUSEA: ICD-10-CM

## 2025-02-25 DIAGNOSIS — K21.9 GASTROESOPHAGEAL REFLUX DISEASE, UNSPECIFIED WHETHER ESOPHAGITIS PRESENT: Primary | ICD-10-CM

## 2025-02-25 RX ORDER — ONDANSETRON 4 MG/1
4 TABLET, ORALLY DISINTEGRATING ORAL EVERY 6 HOURS PRN
Qty: 10 TABLET | Refills: 0 | Status: SHIPPED | OUTPATIENT
Start: 2025-02-25

## 2025-03-05 ENCOUNTER — HOSPITAL ENCOUNTER (OUTPATIENT)
Dept: RADIOLOGY | Facility: HOSPITAL | Age: 45
Discharge: HOME | End: 2025-03-05
Attending: SURGERY
Payer: COMMERCIAL

## 2025-03-05 DIAGNOSIS — K21.9 GASTROESOPHAGEAL REFLUX DISEASE, UNSPECIFIED WHETHER ESOPHAGITIS PRESENT: ICD-10-CM

## 2025-03-05 PROCEDURE — A9698 NON-RAD CONTRAST MATERIALNOC: HCPCS | Performed by: SURGERY

## 2025-03-05 PROCEDURE — 25500020 PHARM REV CODE 255: Performed by: SURGERY

## 2025-03-05 PROCEDURE — 74240 X-RAY XM UPR GI TRC 1CNTRST: CPT | Mod: TC

## 2025-03-05 RX ADMIN — BARIUM SULFATE 40 ML: 980 POWDER, FOR SUSPENSION ORAL at 08:03

## 2025-03-05 RX ADMIN — BARIUM SULFATE 100 ML: 0.6 SUSPENSION ORAL at 08:03

## 2025-03-13 ENCOUNTER — TELEPHONE (OUTPATIENT)
Dept: SURGERY | Facility: CLINIC | Age: 45
End: 2025-03-13
Payer: COMMERCIAL

## 2025-03-13 DIAGNOSIS — K21.9 GASTROESOPHAGEAL REFLUX DISEASE WITHOUT ESOPHAGITIS: Primary | ICD-10-CM

## 2025-03-13 NOTE — TELEPHONE ENCOUNTER
Kathy Mariscal NP looked at pt's UGI- small reflux  Sending referral to GI- pt has seen Dr Barba previously

## 2025-03-31 DIAGNOSIS — K21.9 GASTROESOPHAGEAL REFLUX DISEASE, UNSPECIFIED WHETHER ESOPHAGITIS PRESENT: ICD-10-CM

## 2025-04-01 RX ORDER — PANTOPRAZOLE SODIUM 40 MG/1
40 TABLET, DELAYED RELEASE ORAL 2 TIMES DAILY
Qty: 60 TABLET | Refills: 1 | Status: SHIPPED | OUTPATIENT
Start: 2025-04-01 | End: 2025-05-31

## 2025-04-03 ENCOUNTER — PATIENT MESSAGE (OUTPATIENT)
Dept: SURGERY | Facility: CLINIC | Age: 45
End: 2025-04-03
Payer: COMMERCIAL

## 2025-06-10 ENCOUNTER — TELEPHONE (OUTPATIENT)
Dept: SURGERY | Facility: CLINIC | Age: 45
End: 2025-06-10
Payer: COMMERCIAL

## 2025-06-10 DIAGNOSIS — Z13.0 SCREENING FOR IRON DEFICIENCY ANEMIA: ICD-10-CM

## 2025-06-10 DIAGNOSIS — Z91.89 ELECTROLYTE IMBALANCE RISK: Primary | ICD-10-CM

## 2025-06-10 DIAGNOSIS — Z13.21 ENCOUNTER FOR VITAMIN DEFICIENCY SCREENING: ICD-10-CM

## 2025-06-18 DIAGNOSIS — K21.9 GASTROESOPHAGEAL REFLUX DISEASE, UNSPECIFIED WHETHER ESOPHAGITIS PRESENT: ICD-10-CM

## 2025-06-18 RX ORDER — PANTOPRAZOLE SODIUM 40 MG/1
40 TABLET, DELAYED RELEASE ORAL 2 TIMES DAILY
Qty: 60 TABLET | Refills: 1 | Status: SHIPPED | OUTPATIENT
Start: 2025-06-18 | End: 2025-08-17

## 2025-08-21 ENCOUNTER — TELEPHONE (OUTPATIENT)
Dept: SURGERY | Facility: CLINIC | Age: 45
End: 2025-08-21
Payer: COMMERCIAL

## 2025-08-21 DIAGNOSIS — K21.9 GASTROESOPHAGEAL REFLUX DISEASE, UNSPECIFIED WHETHER ESOPHAGITIS PRESENT: ICD-10-CM

## 2025-08-21 RX ORDER — PANTOPRAZOLE SODIUM 40 MG/1
40 TABLET, DELAYED RELEASE ORAL 2 TIMES DAILY
Qty: 60 TABLET | Refills: 3 | Status: SHIPPED | OUTPATIENT
Start: 2025-08-21 | End: 2025-12-19

## 2025-08-27 ENCOUNTER — HOSPITAL ENCOUNTER (OUTPATIENT)
Dept: RADIOLOGY | Facility: HOSPITAL | Age: 45
Discharge: HOME | End: 2025-08-27
Attending: SPECIALIST
Payer: COMMERCIAL

## 2025-08-27 ENCOUNTER — CLINICAL SUPPORT (OUTPATIENT)
Dept: RESPIRATORY THERAPY | Facility: HOSPITAL | Age: 45
End: 2025-08-27
Attending: SPECIALIST
Payer: COMMERCIAL

## 2025-08-27 DIAGNOSIS — T84.84XA PAINFUL ORTHOPAEDIC HARDWARE: ICD-10-CM

## 2025-08-27 LAB
OHS QRS DURATION: 82 MS
OHS QTC CALCULATION: 455 MS

## 2025-08-27 PROCEDURE — 71046 X-RAY EXAM CHEST 2 VIEWS: CPT | Mod: TC

## 2025-08-27 PROCEDURE — 93005 ELECTROCARDIOGRAM TRACING: CPT

## 2025-08-27 PROCEDURE — 93010 ELECTROCARDIOGRAM REPORT: CPT | Mod: ,,, | Performed by: INTERNAL MEDICINE

## 2025-09-02 ENCOUNTER — ANESTHESIA (OUTPATIENT)
Dept: SURGERY | Facility: HOSPITAL | Age: 45
End: 2025-09-02
Payer: COMMERCIAL

## 2025-09-02 ENCOUNTER — ANESTHESIA EVENT (OUTPATIENT)
Dept: SURGERY | Facility: HOSPITAL | Age: 45
End: 2025-09-02
Payer: COMMERCIAL

## 2025-09-02 PROBLEM — T84.84XA PAINFUL ORTHOPAEDIC HARDWARE: Status: ACTIVE | Noted: 2025-09-02

## 2025-09-02 PROCEDURE — 63600175 PHARM REV CODE 636 W HCPCS: Performed by: SPECIALIST

## 2025-09-02 PROCEDURE — 63600175 PHARM REV CODE 636 W HCPCS: Performed by: NURSE ANESTHETIST, CERTIFIED REGISTERED

## 2025-09-02 RX ORDER — PROPOFOL 10 MG/ML
VIAL (ML) INTRAVENOUS
Status: DISCONTINUED | OUTPATIENT
Start: 2025-09-02 | End: 2025-09-02

## 2025-09-02 RX ORDER — LIDOCAINE HYDROCHLORIDE 20 MG/ML
INJECTION, SOLUTION EPIDURAL; INFILTRATION; INTRACAUDAL; PERINEURAL
Status: DISCONTINUED | OUTPATIENT
Start: 2025-09-02 | End: 2025-09-02

## 2025-09-02 RX ORDER — DEXAMETHASONE SODIUM PHOSPHATE 4 MG/ML
INJECTION, SOLUTION INTRA-ARTICULAR; INTRALESIONAL; INTRAMUSCULAR; INTRAVENOUS; SOFT TISSUE
Status: DISCONTINUED | OUTPATIENT
Start: 2025-09-02 | End: 2025-09-02

## 2025-09-02 RX ORDER — ONDANSETRON HYDROCHLORIDE 2 MG/ML
INJECTION, SOLUTION INTRAVENOUS
Status: DISCONTINUED | OUTPATIENT
Start: 2025-09-02 | End: 2025-09-02

## 2025-09-02 RX ORDER — HYDROMORPHONE HYDROCHLORIDE 2 MG/ML
INJECTION, SOLUTION INTRAMUSCULAR; INTRAVENOUS; SUBCUTANEOUS
Status: DISCONTINUED | OUTPATIENT
Start: 2025-09-02 | End: 2025-09-02

## 2025-09-02 RX ADMIN — HYDROMORPHONE HYDROCHLORIDE 1 MG: 2 INJECTION INTRAMUSCULAR; INTRAVENOUS; SUBCUTANEOUS at 07:09

## 2025-09-02 RX ADMIN — PROPOFOL 50 MG: 10 INJECTION, EMULSION INTRAVENOUS at 07:09

## 2025-09-02 RX ADMIN — PROPOFOL 100 MG: 10 INJECTION, EMULSION INTRAVENOUS at 06:09

## 2025-09-02 RX ADMIN — CEFAZOLIN SODIUM 2 G: 2 SOLUTION INTRAVENOUS at 07:09

## 2025-09-02 RX ADMIN — PROPOFOL 150 MG: 10 INJECTION, EMULSION INTRAVENOUS at 06:09

## 2025-09-02 RX ADMIN — DEXAMETHASONE SODIUM PHOSPHATE 4 MG: 4 INJECTION, SOLUTION INTRA-ARTICULAR; INTRALESIONAL; INTRAMUSCULAR; INTRAVENOUS; SOFT TISSUE at 07:09

## 2025-09-02 RX ADMIN — PROPOFOL 50 MG: 10 INJECTION, EMULSION INTRAVENOUS at 06:09

## 2025-09-02 RX ADMIN — LIDOCAINE HYDROCHLORIDE 60 MG: 20 INJECTION, SOLUTION EPIDURAL; INFILTRATION; INTRACAUDAL; PERINEURAL at 06:09

## 2025-09-02 RX ADMIN — ONDANSETRON 4 MG: 2 INJECTION INTRAMUSCULAR; INTRAVENOUS at 07:09

## (undated) DEVICE — SOL IRRI STRL WATER 1000ML

## (undated) DEVICE — CUSHION  WC FOAM 20X20X.75IN

## (undated) DEVICE — SUCTION MALLEABLE 9FR

## (undated) DEVICE — GAUZE SPONGE 4X4 12PLY

## (undated) DEVICE — STAPLER ECHELON LONG 60X440MM

## (undated) DEVICE — KIT GEN LAPAROSCOPY LAFAYETTE

## (undated) DEVICE — SCISSOR CURVED ENDOPATH 5MM

## (undated) DEVICE — TROCAR ENDOPATH XCEL 5X100MM

## (undated) DEVICE — RAD 40 CVD SINUS BLADE

## (undated) DEVICE — BLADE TRICUT

## (undated) DEVICE — MANIFOLD 4 PORT

## (undated) DEVICE — SUT VICRYL PLUS 4-0 FS-2 27IN

## (undated) DEVICE — NDL GRANEE OPEN LOOP GRASPER

## (undated) DEVICE — KIT SURGICAL TURNOVER

## (undated) DEVICE — PATIENT TRACKER ENT

## (undated) DEVICE — SYR 10CC LUER LOCK

## (undated) DEVICE — ELECTRODE REM PLYHSV RETURN 9

## (undated) DEVICE — MATTRESS HOVERMAT SPLIT TRNSF

## (undated) DEVICE — COVER OVERHEAD SURG LT BLUE

## (undated) DEVICE — GLOVE PROTEXIS HYDROGEL SZ7.5

## (undated) DEVICE — SEALANT VISTASEAL FIBRIN 10ML

## (undated) DEVICE — PAD PREP 50/CA

## (undated) DEVICE — SEE MEDLINE ITEM 152622

## (undated) DEVICE — APPLICATOR CHLORAPREP ORN 26ML

## (undated) DEVICE — GLOVE PROTEXIS LTX MICRO  7.5

## (undated) DEVICE — COVER MAYO STAND REINFRCD 30

## (undated) DEVICE — DRAPE STERI LONG

## (undated) DEVICE — CLOSURE SKIN STERI STRIP 1/2X4

## (undated) DEVICE — HANDSWITCH SUCTION COAG

## (undated) DEVICE — NDL HYPO 27G X 1 1/2

## (undated) DEVICE — TRACKER ENT INSTRUMENT

## (undated) DEVICE — SEE MEDLINE ITEM 157166

## (undated) DEVICE — GLOVE PROTEXIS LTX MICRO 6.5

## (undated) DEVICE — SPONGE PATTY SURGICAL .5X3IN

## (undated) DEVICE — BINDER ABD 12IN MED/LG 46-62IN

## (undated) DEVICE — ELECTRODE PATIENT RETURN DISP

## (undated) DEVICE — DRAPE SLUSH WARMER 66X44IN

## (undated) DEVICE — CLIP ENDO LIGATION LARGE CLIPS

## (undated) DEVICE — BLADE INFERIOR TURBINATE 2MM

## (undated) DEVICE — TROCAR ENDOPATH XCEL 11MM 10CM

## (undated) DEVICE — HOLDER STRIP-T SELF ADH 2X10IN

## (undated) DEVICE — TROCAR ENDOPATH XCEL 15MM 10CM

## (undated) DEVICE — POSITIONER HEEL FOAM CONVOLTD

## (undated) DEVICE — NDL HYPO 22GX1 1/2 SYR 10ML LL

## (undated) DEVICE — KIT ANTIFOG

## (undated) DEVICE — SUT VICRYL+ 27 UR-6 VIOL

## (undated) DEVICE — SHEARS HS LONG 5MM CURVED

## (undated) DEVICE — RELOAD ECHELON FLEX BLU 60MM

## (undated) DEVICE — GLOVE PROTEXIS HYDROGEL SZ6

## (undated) DEVICE — CHLORAPREP W TINT 26ML APPL

## (undated) DEVICE — PADS ADHESIVE

## (undated) DEVICE — APPLICATOR VISTASEAL RIG 35CM

## (undated) DEVICE — IRRIGATOR HYDRO-SURG PLUS 5MM

## (undated) DEVICE — TUBING XPS IRRIG TO STRAIGHTSH

## (undated) DEVICE — RELOAD ECHELON FLEX GRN 60MM

## (undated) DEVICE — CANNULA ENDOPATH XCEL 5X100MM

## (undated) DEVICE — SEE MEDLINE ITEM 157027